# Patient Record
Sex: FEMALE | Race: WHITE | NOT HISPANIC OR LATINO | Employment: OTHER | URBAN - METROPOLITAN AREA
[De-identification: names, ages, dates, MRNs, and addresses within clinical notes are randomized per-mention and may not be internally consistent; named-entity substitution may affect disease eponyms.]

---

## 2017-06-19 RX ORDER — ATORVASTATIN CALCIUM 40 MG/1
TABLET, FILM COATED ORAL
Qty: 90 TABLET | Refills: 3 | Status: SHIPPED | OUTPATIENT
Start: 2017-06-19 | End: 2018-06-09 | Stop reason: SDUPTHER

## 2017-11-14 DIAGNOSIS — I25.709 CORONARY ARTERY DISEASE INVOLVING CORONARY BYPASS GRAFT OF NATIVE HEART WITH ANGINA PECTORIS (HCC): Primary | ICD-10-CM

## 2017-11-16 ENCOUNTER — TELEPHONE (OUTPATIENT)
Dept: CARDIOLOGY | Facility: CLINIC | Age: 82
End: 2017-11-16

## 2017-11-21 ENCOUNTER — HOSPITAL ENCOUNTER (OUTPATIENT)
Dept: CARDIOLOGY | Facility: HOSPITAL | Age: 82
Discharge: HOME | End: 2017-11-21
Attending: INTERNAL MEDICINE | Admitting: INTERNAL MEDICINE

## 2017-11-21 DIAGNOSIS — I25.709 CORONARY ARTERY DISEASE INVOLVING CORONARY BYPASS GRAFT OF NATIVE HEART WITH ANGINA PECTORIS (HCC): ICD-10-CM

## 2017-11-21 LAB
BH CV NUCLEAR PRIOR STUDY: 2
BH CV STRESS BP STAGE 1: NORMAL
BH CV STRESS DURATION MIN STAGE 1: 4
BH CV STRESS DURATION SEC STAGE 1: 30
BH CV STRESS GRADE STAGE 1: 10
BH CV STRESS HR STAGE 1: 130
BH CV STRESS METS STAGE 1: 5
BH CV STRESS PROTOCOL 1: NORMAL
BH CV STRESS RECOVERY BP: NORMAL MMHG
BH CV STRESS RECOVERY HR: 65 BPM
BH CV STRESS SPEED STAGE 1: 1.7
BH CV STRESS STAGE 1: 1
LV EF NUC BP: 63 %
MAXIMAL PREDICTED HEART RATE: 135 BPM
PERCENT MAX PREDICTED HR: 96.3 %
STRESS BASELINE BP: NORMAL MMHG
STRESS BASELINE HR: 62 BPM
STRESS PERCENT HR: 113 %
STRESS POST ESTIMATED WORKLOAD: 5 METS
STRESS POST EXERCISE DUR MIN: 4 MIN
STRESS POST EXERCISE DUR SEC: 30 SEC
STRESS POST PEAK BP: NORMAL MMHG
STRESS POST PEAK HR: 130 BPM
STRESS TARGET HR: 115 BPM

## 2017-11-21 PROCEDURE — 93017 CV STRESS TEST TRACING ONLY: CPT

## 2017-11-21 PROCEDURE — 78452 HT MUSCLE IMAGE SPECT MULT: CPT | Performed by: INTERNAL MEDICINE

## 2017-11-21 PROCEDURE — 93016 CV STRESS TEST SUPVJ ONLY: CPT | Performed by: INTERNAL MEDICINE

## 2017-11-21 PROCEDURE — 93018 CV STRESS TEST I&R ONLY: CPT | Performed by: INTERNAL MEDICINE

## 2017-11-21 PROCEDURE — 78452 HT MUSCLE IMAGE SPECT MULT: CPT

## 2017-11-21 PROCEDURE — 0 TECHNETIUM TETROFOSMIN KIT: Performed by: INTERNAL MEDICINE

## 2017-11-21 PROCEDURE — A9502 TC99M TETROFOSMIN: HCPCS | Performed by: INTERNAL MEDICINE

## 2017-11-21 RX ADMIN — TETROFOSMIN 1 DOSE: 1.38 INJECTION, POWDER, LYOPHILIZED, FOR SOLUTION INTRAVENOUS at 12:30

## 2017-11-21 RX ADMIN — TETROFOSMIN 1 DOSE: 1.38 INJECTION, POWDER, LYOPHILIZED, FOR SOLUTION INTRAVENOUS at 11:45

## 2017-11-22 NOTE — TELEPHONE ENCOUNTER
11/22/17  11:00 AM  Dahlia with Dr. Darby has not received clearance letter. She requests that it be faxed to her attention at [phone].    Georgie VILLASEÑOR RN

## 2017-12-15 ENCOUNTER — OFFICE VISIT (OUTPATIENT)
Dept: CARDIOLOGY | Facility: CLINIC | Age: 82
End: 2017-12-15

## 2017-12-15 VITALS
BODY MASS INDEX: 22.6 KG/M2 | HEIGHT: 66 IN | HEART RATE: 63 BPM | WEIGHT: 140.6 LBS | DIASTOLIC BLOOD PRESSURE: 72 MMHG | SYSTOLIC BLOOD PRESSURE: 166 MMHG

## 2017-12-15 DIAGNOSIS — Z95.1 S/P CABG (CORONARY ARTERY BYPASS GRAFT): Primary | ICD-10-CM

## 2017-12-15 DIAGNOSIS — I10 ESSENTIAL HYPERTENSION: ICD-10-CM

## 2017-12-15 PROCEDURE — 99214 OFFICE O/P EST MOD 30 MIN: CPT | Performed by: INTERNAL MEDICINE

## 2017-12-15 NOTE — PROGRESS NOTES
Date of Office Visit: 12/15/2017  Encounter Provider: José Hatch MD  Place of Service: [facility] CARDIOLOGY  Patient Name: Brodie Mo  :10/31/1932  [id number]    Chief Complaint   Patient presents with   • Coronary Artery Disease   :     HPI: Brodie Mo is a 85 y.o. female she's here for follow-up she had a bypass long time ago has been asymptomatic and doing well from a cardiac standpoint issue bad year for her her   and she is pretty upset about she had a stress test in November the ECG portion was abnormal but the nuclear pictures were normal and she didn't have any symptoms on the treadmill she's been doing well in  she had a LIMA to LAD and a vein to an OM 2 she was cathetered in  all grafts are open those had normal LV function    Past Medical History:   Diagnosis Date   • CAD (coronary artery disease)    • Cancer     bladder    • Health care maintenance    • HTN (hypertension)    • Hyperlipidemia    • LVH (left ventricular hypertrophy)    • S/P CABG (coronary artery bypass graft)        Past Surgical History:   Procedure Laterality Date   • BLADDER SURGERY      cancer   • CORONARY ARTERY BYPASS GRAFT       with Dr Prabhakar; AGUAYO to LAD and SVG to OM2;  cath all grafts open and 30% dx in RCA; nl stress in ,        Social History     Social History   • Marital status: Unknown     Spouse name: N/A   • Number of children: N/A   • Years of education: N/A     Occupational History   • Not on file.     Social History Main Topics   • Smoking status: Former Smoker   • Smokeless tobacco: Not on file      Comment: caffine use   • Alcohol use Yes   • Drug use: No   • Sexual activity: Not on file     Other Topics Concern   • Not on file     Social History Narrative       No family history on file.    Review of Systems   Constitution: Negative for decreased appetite, fever, malaise/fatigue and weight loss.   HENT: Negative for nosebleeds.   "  Eyes: Negative for double vision.   Cardiovascular: Negative for chest pain, claudication, cyanosis, dyspnea on exertion, irregular heartbeat, leg swelling, near-syncope, orthopnea, palpitations, paroxysmal nocturnal dyspnea and syncope.   Respiratory: Negative for cough, hemoptysis and shortness of breath.    Hematologic/Lymphatic: Negative for bleeding problem.   Skin: Negative for rash.   Musculoskeletal: Negative for falls and myalgias.   Gastrointestinal: Negative for hematochezia, jaundice, melena, nausea and vomiting.   Genitourinary: Negative for hematuria.   Neurological: Negative for dizziness and seizures.   Psychiatric/Behavioral: Negative for altered mental status and memory loss.       Allergies   Allergen Reactions   • No Known Drug Allergy          Current Outpatient Prescriptions:   •  aspirin 81 MG tablet, Take  by mouth Daily., Disp: , Rfl:   •  atenolol (TENORMIN) 25 MG tablet, Take  by mouth Daily., Disp: , Rfl:   •  atorvastatin (LIPITOR) 40 MG tablet, TAKE 1 TABLET DAILY., Disp: 90 tablet, Rfl: 3  •  lisinopril (PRINIVIL,ZESTRIL) 10 MG tablet, Take  by mouth Daily., Disp: , Rfl:      Objective:     Vitals:    12/15/17 1302   BP: 166/72   Pulse: 63   Weight: 63.8 kg (140 lb 9.6 oz)   Height: 166.4 cm (65.5\")     Body mass index is 23.04 kg/(m^2).    Physical Exam   Constitutional: She is oriented to person, place, and time. She appears well-developed and well-nourished.   HENT:   Head: Normocephalic.   Eyes: No scleral icterus.   Neck: No JVD present. No thyromegaly present.   Cardiovascular: Normal rate, regular rhythm and normal heart sounds.  Exam reveals no gallop and no friction rub.    No murmur heard.  Pulmonary/Chest: Effort normal and breath sounds normal. She has no wheezes. She has no rales.       Abdominal: Soft. There is no hepatosplenomegaly. There is no tenderness.   Musculoskeletal: Normal range of motion. She exhibits no edema.   Lymphadenopathy:     She has no cervical " adenopathy.   Neurological: She is alert and oriented to person, place, and time.   Skin: Skin is warm and dry. No rash noted.   Psychiatric: She has a normal mood and affect.       Procedures     Assessment:       Diagnosis Plan   1. S/P CABG (coronary artery bypass graft)     2. Essential hypertension            Plan:       She is doing well.  The ECG portion of her stress test is a little bit abnormal but the nuclear images portion is normal.  She is not having any symptoms.  She is 85.  I am not going to go looking for trouble.  If she becomes symptomatic, we could consider more medication but by definition with the pictures being normal, this is a lower risk stress test.  So I am just going to have her come back in a year or sooner if she has trouble.      Coronary Artery Disease  Assessment  • The patient has no angina    Plan  • Lifestyle modifications discussed include adhering to a heart healthy diet, maintenance of a healthy weight, medication compliance, regular exercise and regular monitoring of cholesterol and blood pressure    Subjective - Objective  • There is a history of previous coronary artery bypass graft  • Current antiplatelet therapy includes aspirin 81 mg        As always, it has been a pleasure to participate in your patient's care.      Sincerely,       José Hatch MD

## 2018-04-05 ENCOUNTER — HOSPITAL ENCOUNTER (OUTPATIENT)
Dept: GENERAL RADIOLOGY | Facility: HOSPITAL | Age: 83
Discharge: HOME | End: 2018-04-05

## 2018-04-05 ENCOUNTER — HOSPITAL ENCOUNTER (INPATIENT)
Facility: HOSPITAL | Age: 83
LOS: 4 days | Discharge: HOME | End: 2018-04-09
Attending: INTERNAL MEDICINE | Admitting: INTERNAL MEDICINE

## 2018-04-05 ENCOUNTER — APPOINTMENT (OUTPATIENT)
Dept: CT IMAGING | Facility: HOSPITAL | Age: 83
End: 2018-04-05

## 2018-04-05 DIAGNOSIS — R05.9 COUGH: ICD-10-CM

## 2018-04-05 PROBLEM — R91.8 LUNG MASS: Status: ACTIVE | Noted: 2018-04-05

## 2018-04-05 PROBLEM — J18.9 PNEUMONIA: Status: ACTIVE | Noted: 2018-04-05

## 2018-04-05 PROBLEM — I25.10 CAD (CORONARY ARTERY DISEASE): Status: ACTIVE | Noted: 2018-04-05

## 2018-04-05 PROBLEM — N17.9 AKI (ACUTE KIDNEY INJURY): Status: ACTIVE | Noted: 2018-04-05

## 2018-04-05 PROBLEM — C80.1 CANCER (HCC): Status: ACTIVE | Noted: 2018-04-05

## 2018-04-05 PROBLEM — J18.9 PNEUMONIA: Status: RESOLVED | Noted: 2018-04-05 | Resolved: 2018-04-05

## 2018-04-05 PROBLEM — R91.8 LUNG MASS: Status: RESOLVED | Noted: 2018-04-05 | Resolved: 2018-04-05

## 2018-04-05 LAB
ANION GAP SERPL CALCULATED.3IONS-SCNC: 13.6 MMOL/L
BUN BLD-MCNC: 24 MG/DL (ref 8–23)
BUN/CREAT SERPL: 18.5 (ref 7–25)
CALCIUM SPEC-SCNC: 8.7 MG/DL (ref 8.6–10.5)
CHLORIDE SERPL-SCNC: 95 MMOL/L (ref 98–107)
CO2 SERPL-SCNC: 23.4 MMOL/L (ref 22–29)
CREAT BLD-MCNC: 1.3 MG/DL (ref 0.57–1)
DEPRECATED RDW RBC AUTO: 42.9 FL (ref 37–54)
ERYTHROCYTE [DISTWIDTH] IN BLOOD BY AUTOMATED COUNT: 12.8 % (ref 11.7–13)
GFR SERPL CREATININE-BSD FRML MDRD: 39 ML/MIN/1.73
GLUCOSE BLD-MCNC: 160 MG/DL (ref 65–99)
HCT VFR BLD AUTO: 36.1 % (ref 35.6–45.5)
HGB BLD-MCNC: 11.4 G/DL (ref 11.9–15.5)
MCH RBC QN AUTO: 29.5 PG (ref 26.9–32)
MCHC RBC AUTO-ENTMCNC: 31.6 G/DL (ref 32.4–36.3)
MCV RBC AUTO: 93.5 FL (ref 80.5–98.2)
PLATELET # BLD AUTO: 154 10*3/MM3 (ref 140–500)
PMV BLD AUTO: 12 FL (ref 6–12)
POTASSIUM BLD-SCNC: 3.9 MMOL/L (ref 3.5–5.2)
RBC # BLD AUTO: 3.86 10*6/MM3 (ref 3.9–5.2)
SODIUM BLD-SCNC: 132 MMOL/L (ref 136–145)
WBC NRBC COR # BLD: 12.83 10*3/MM3 (ref 4.5–10.7)

## 2018-04-05 PROCEDURE — 71250 CT THORAX DX C-: CPT

## 2018-04-05 PROCEDURE — 25010000002 LEVOFLOXACIN PER 250 MG: Performed by: INTERNAL MEDICINE

## 2018-04-05 PROCEDURE — 87633 RESP VIRUS 12-25 TARGETS: CPT | Performed by: INTERNAL MEDICINE

## 2018-04-05 PROCEDURE — 71046 X-RAY EXAM CHEST 2 VIEWS: CPT

## 2018-04-05 PROCEDURE — 87486 CHLMYD PNEUM DNA AMP PROBE: CPT | Performed by: INTERNAL MEDICINE

## 2018-04-05 PROCEDURE — 87581 M.PNEUMON DNA AMP PROBE: CPT | Performed by: INTERNAL MEDICINE

## 2018-04-05 PROCEDURE — 87798 DETECT AGENT NOS DNA AMP: CPT | Performed by: INTERNAL MEDICINE

## 2018-04-05 PROCEDURE — 87040 BLOOD CULTURE FOR BACTERIA: CPT | Performed by: INTERNAL MEDICINE

## 2018-04-05 PROCEDURE — 80048 BASIC METABOLIC PNL TOTAL CA: CPT | Performed by: INTERNAL MEDICINE

## 2018-04-05 PROCEDURE — 85027 COMPLETE CBC AUTOMATED: CPT | Performed by: INTERNAL MEDICINE

## 2018-04-05 PROCEDURE — 25010000002 FUROSEMIDE PER 20 MG: Performed by: INTERNAL MEDICINE

## 2018-04-05 PROCEDURE — 94640 AIRWAY INHALATION TREATMENT: CPT

## 2018-04-05 RX ORDER — ASPIRIN 81 MG/1
81 TABLET, CHEWABLE ORAL DAILY
Status: DISCONTINUED | OUTPATIENT
Start: 2018-04-06 | End: 2018-04-09 | Stop reason: HOSPADM

## 2018-04-05 RX ORDER — ONDANSETRON 2 MG/ML
4 INJECTION INTRAMUSCULAR; INTRAVENOUS EVERY 6 HOURS PRN
Status: DISCONTINUED | OUTPATIENT
Start: 2018-04-05 | End: 2018-04-09 | Stop reason: HOSPADM

## 2018-04-05 RX ORDER — ZOLPIDEM TARTRATE 5 MG/1
5 TABLET ORAL NIGHTLY PRN
Status: DISCONTINUED | OUTPATIENT
Start: 2018-04-05 | End: 2018-04-09 | Stop reason: HOSPADM

## 2018-04-05 RX ORDER — SODIUM CHLORIDE 9 MG/ML
75 INJECTION, SOLUTION INTRAVENOUS CONTINUOUS
Status: DISCONTINUED | OUTPATIENT
Start: 2018-04-05 | End: 2018-04-05

## 2018-04-05 RX ORDER — LEVOFLOXACIN 5 MG/ML
750 INJECTION, SOLUTION INTRAVENOUS EVERY 24 HOURS
Status: DISCONTINUED | OUTPATIENT
Start: 2018-04-05 | End: 2018-04-06

## 2018-04-05 RX ORDER — SODIUM CHLORIDE 0.9 % (FLUSH) 0.9 %
1-10 SYRINGE (ML) INJECTION AS NEEDED
Status: DISCONTINUED | OUTPATIENT
Start: 2018-04-05 | End: 2018-04-09 | Stop reason: HOSPADM

## 2018-04-05 RX ORDER — LISINOPRIL 5 MG/1
5 TABLET ORAL DAILY
Status: DISCONTINUED | OUTPATIENT
Start: 2018-04-06 | End: 2018-04-06

## 2018-04-05 RX ORDER — FUROSEMIDE 10 MG/ML
40 INJECTION INTRAMUSCULAR; INTRAVENOUS DAILY
Status: DISCONTINUED | OUTPATIENT
Start: 2018-04-05 | End: 2018-04-07

## 2018-04-05 RX ORDER — IPRATROPIUM BROMIDE AND ALBUTEROL SULFATE 2.5; .5 MG/3ML; MG/3ML
3 SOLUTION RESPIRATORY (INHALATION)
Status: DISCONTINUED | OUTPATIENT
Start: 2018-04-05 | End: 2018-04-09 | Stop reason: HOSPADM

## 2018-04-05 RX ORDER — ATORVASTATIN CALCIUM 20 MG/1
40 TABLET, FILM COATED ORAL DAILY
Status: DISCONTINUED | OUTPATIENT
Start: 2018-04-06 | End: 2018-04-09 | Stop reason: HOSPADM

## 2018-04-05 RX ORDER — ATENOLOL 25 MG/1
25 TABLET ORAL
Status: DISCONTINUED | OUTPATIENT
Start: 2018-04-06 | End: 2018-04-09 | Stop reason: HOSPADM

## 2018-04-05 RX ORDER — ACETAMINOPHEN 325 MG/1
650 TABLET ORAL EVERY 6 HOURS PRN
Status: DISCONTINUED | OUTPATIENT
Start: 2018-04-05 | End: 2018-04-09 | Stop reason: HOSPADM

## 2018-04-05 RX ADMIN — ACETAMINOPHEN 650 MG: 325 TABLET ORAL at 19:10

## 2018-04-05 RX ADMIN — IPRATROPIUM BROMIDE AND ALBUTEROL SULFATE 3 ML: .5; 3 SOLUTION RESPIRATORY (INHALATION) at 23:50

## 2018-04-05 RX ADMIN — SODIUM CHLORIDE 75 ML/HR: 9 INJECTION, SOLUTION INTRAVENOUS at 20:14

## 2018-04-05 RX ADMIN — FUROSEMIDE 40 MG: 10 INJECTION, SOLUTION INTRAMUSCULAR; INTRAVENOUS at 23:47

## 2018-04-05 RX ADMIN — LEVOFLOXACIN 750 MG: 5 INJECTION, SOLUTION INTRAVENOUS at 20:14

## 2018-04-05 NOTE — PLAN OF CARE
Problem: Patient Care Overview  Goal: Plan of Care Review  Outcome: Ongoing (interventions implemented as appropriate)   04/05/18 3631   Coping/Psychosocial   Plan of Care Reviewed With patient;family   Plan of Care Review   Progress improving   OTHER   Outcome Summary Vitals stable. pt direct admit from PCP. Admission complete. Waiting MD orders. pt stated she wishes to be a conditional code (no CPR or intubation) . Will continue to monitor.        Problem: Pneumonia (Adult)  Goal: Signs and Symptoms of Listed Potential Problems Will be Absent, Minimized or Managed (Pneumonia)  Outcome: Ongoing (interventions implemented as appropriate)

## 2018-04-06 PROBLEM — N18.30 CKD (CHRONIC KIDNEY DISEASE) STAGE 3, GFR 30-59 ML/MIN: Status: ACTIVE | Noted: 2018-04-06

## 2018-04-06 LAB
ALBUMIN SERPL-MCNC: 3.5 G/DL (ref 3.5–5.2)
ALBUMIN/GLOB SERPL: 1.1 G/DL
ALP SERPL-CCNC: 69 U/L (ref 39–117)
ALT SERPL W P-5'-P-CCNC: 25 U/L (ref 1–33)
ANION GAP SERPL CALCULATED.3IONS-SCNC: 12.9 MMOL/L
AST SERPL-CCNC: 37 U/L (ref 1–32)
B PERT DNA SPEC QL NAA+PROBE: NOT DETECTED
BASOPHILS # BLD AUTO: 0.02 10*3/MM3 (ref 0–0.2)
BASOPHILS NFR BLD AUTO: 0.2 % (ref 0–1.5)
BILIRUB SERPL-MCNC: 0.7 MG/DL (ref 0.1–1.2)
BUN BLD-MCNC: 22 MG/DL (ref 8–23)
BUN/CREAT SERPL: 16.3 (ref 7–25)
C PNEUM DNA NPH QL NAA+NON-PROBE: NOT DETECTED
CALCIUM SPEC-SCNC: 9 MG/DL (ref 8.6–10.5)
CHLORIDE SERPL-SCNC: 95 MMOL/L (ref 98–107)
CHOLEST SERPL-MCNC: 141 MG/DL (ref 0–200)
CO2 SERPL-SCNC: 28.1 MMOL/L (ref 22–29)
CREAT BLD-MCNC: 1.35 MG/DL (ref 0.57–1)
DEPRECATED RDW RBC AUTO: 43.2 FL (ref 37–54)
EOSINOPHIL # BLD AUTO: 0.1 10*3/MM3 (ref 0–0.7)
EOSINOPHIL NFR BLD AUTO: 0.9 % (ref 0.3–6.2)
ERYTHROCYTE [DISTWIDTH] IN BLOOD BY AUTOMATED COUNT: 12.5 % (ref 11.7–13)
FLUAV H1 2009 PAND RNA NPH QL NAA+PROBE: NOT DETECTED
FLUAV H1 HA GENE NPH QL NAA+PROBE: NOT DETECTED
FLUAV H3 RNA NPH QL NAA+PROBE: NOT DETECTED
FLUAV SUBTYP SPEC NAA+PROBE: NOT DETECTED
FLUBV RNA ISLT QL NAA+PROBE: NOT DETECTED
GFR SERPL CREATININE-BSD FRML MDRD: 37 ML/MIN/1.73
GLOBULIN UR ELPH-MCNC: 3.2 GM/DL
GLUCOSE BLD-MCNC: 126 MG/DL (ref 65–99)
HADV DNA SPEC NAA+PROBE: NOT DETECTED
HBA1C MFR BLD: 6.1 % (ref 4.8–5.6)
HCOV 229E RNA SPEC QL NAA+PROBE: NOT DETECTED
HCOV HKU1 RNA SPEC QL NAA+PROBE: NOT DETECTED
HCOV NL63 RNA SPEC QL NAA+PROBE: NOT DETECTED
HCOV OC43 RNA SPEC QL NAA+PROBE: NOT DETECTED
HCT VFR BLD AUTO: 38.3 % (ref 35.6–45.5)
HDLC SERPL-MCNC: 76 MG/DL (ref 40–60)
HGB BLD-MCNC: 12.1 G/DL (ref 11.9–15.5)
HMPV RNA NPH QL NAA+NON-PROBE: NOT DETECTED
HPIV1 RNA SPEC QL NAA+PROBE: NOT DETECTED
HPIV2 RNA SPEC QL NAA+PROBE: NOT DETECTED
HPIV3 RNA NPH QL NAA+PROBE: NOT DETECTED
HPIV4 P GENE NPH QL NAA+PROBE: NOT DETECTED
IMM GRANULOCYTES # BLD: 0.02 10*3/MM3 (ref 0–0.03)
IMM GRANULOCYTES NFR BLD: 0.2 % (ref 0–0.5)
LDLC SERPL CALC-MCNC: 52 MG/DL (ref 0–100)
LDLC/HDLC SERPL: 0.68 {RATIO}
LYMPHOCYTES # BLD AUTO: 0.74 10*3/MM3 (ref 0.9–4.8)
LYMPHOCYTES NFR BLD AUTO: 6.8 % (ref 19.6–45.3)
M PNEUMO IGG SER IA-ACNC: NOT DETECTED
MCH RBC QN AUTO: 30 PG (ref 26.9–32)
MCHC RBC AUTO-ENTMCNC: 31.6 G/DL (ref 32.4–36.3)
MCV RBC AUTO: 94.8 FL (ref 80.5–98.2)
MONOCYTES # BLD AUTO: 1.53 10*3/MM3 (ref 0.2–1.2)
MONOCYTES NFR BLD AUTO: 14 % (ref 5–12)
NEUTROPHILS # BLD AUTO: 8.51 10*3/MM3 (ref 1.9–8.1)
NEUTROPHILS NFR BLD AUTO: 77.9 % (ref 42.7–76)
NT-PROBNP SERPL-MCNC: 2582 PG/ML (ref 0–1800)
PLATELET # BLD AUTO: 156 10*3/MM3 (ref 140–500)
PMV BLD AUTO: 11.5 FL (ref 6–12)
POTASSIUM BLD-SCNC: 4.3 MMOL/L (ref 3.5–5.2)
PROCALCITONIN SERPL-MCNC: 3.58 NG/ML (ref 0.1–0.25)
PROT SERPL-MCNC: 6.7 G/DL (ref 6–8.5)
RBC # BLD AUTO: 4.04 10*6/MM3 (ref 3.9–5.2)
RHINOVIRUS RNA SPEC NAA+PROBE: NOT DETECTED
RSV RNA NPH QL NAA+NON-PROBE: NOT DETECTED
SODIUM BLD-SCNC: 136 MMOL/L (ref 136–145)
TRIGL SERPL-MCNC: 66 MG/DL (ref 0–150)
TROPONIN T SERPL-MCNC: <0.01 NG/ML (ref 0–0.03)
TSH SERPL DL<=0.05 MIU/L-ACNC: 6.25 MIU/ML (ref 0.27–4.2)
VLDLC SERPL-MCNC: 13.2 MG/DL (ref 5–40)
WBC NRBC COR # BLD: 10.92 10*3/MM3 (ref 4.5–10.7)

## 2018-04-06 PROCEDURE — 83880 ASSAY OF NATRIURETIC PEPTIDE: CPT | Performed by: INTERNAL MEDICINE

## 2018-04-06 PROCEDURE — 83036 HEMOGLOBIN GLYCOSYLATED A1C: CPT | Performed by: INTERNAL MEDICINE

## 2018-04-06 PROCEDURE — 84484 ASSAY OF TROPONIN QUANT: CPT | Performed by: INTERNAL MEDICINE

## 2018-04-06 PROCEDURE — 84443 ASSAY THYROID STIM HORMONE: CPT | Performed by: INTERNAL MEDICINE

## 2018-04-06 PROCEDURE — 25010000002 LEVOFLOXACIN PER 250 MG: Performed by: INTERNAL MEDICINE

## 2018-04-06 PROCEDURE — 84145 PROCALCITONIN (PCT): CPT | Performed by: INTERNAL MEDICINE

## 2018-04-06 PROCEDURE — 80061 LIPID PANEL: CPT | Performed by: INTERNAL MEDICINE

## 2018-04-06 PROCEDURE — 80053 COMPREHEN METABOLIC PANEL: CPT | Performed by: INTERNAL MEDICINE

## 2018-04-06 PROCEDURE — 85025 COMPLETE CBC W/AUTO DIFF WBC: CPT | Performed by: INTERNAL MEDICINE

## 2018-04-06 PROCEDURE — 94799 UNLISTED PULMONARY SVC/PX: CPT

## 2018-04-06 RX ORDER — LEVOFLOXACIN 5 MG/ML
500 INJECTION, SOLUTION INTRAVENOUS EVERY 24 HOURS
Status: DISCONTINUED | OUTPATIENT
Start: 2018-04-06 | End: 2018-04-08

## 2018-04-06 RX ORDER — BENZONATATE 100 MG/1
200 CAPSULE ORAL 3 TIMES DAILY
Status: DISCONTINUED | OUTPATIENT
Start: 2018-04-06 | End: 2018-04-09 | Stop reason: HOSPADM

## 2018-04-06 RX ORDER — CODEINE PHOSPHATE AND GUAIFENESIN 10; 100 MG/5ML; MG/5ML
10 SOLUTION ORAL EVERY 6 HOURS PRN
Status: DISCONTINUED | OUTPATIENT
Start: 2018-04-06 | End: 2018-04-09 | Stop reason: HOSPADM

## 2018-04-06 RX ADMIN — IPRATROPIUM BROMIDE AND ALBUTEROL SULFATE 3 ML: .5; 3 SOLUTION RESPIRATORY (INHALATION) at 20:21

## 2018-04-06 RX ADMIN — GUAIFENESIN AND CODEINE PHOSPHATE 10 ML: 100; 10 SOLUTION ORAL at 20:38

## 2018-04-06 RX ADMIN — BENZONATATE 200 MG: 100 CAPSULE ORAL at 20:32

## 2018-04-06 RX ADMIN — LEVOFLOXACIN 500 MG: 5 INJECTION, SOLUTION INTRAVENOUS at 20:32

## 2018-04-06 RX ADMIN — LISINOPRIL 5 MG: 5 TABLET ORAL at 08:51

## 2018-04-06 RX ADMIN — IPRATROPIUM BROMIDE AND ALBUTEROL SULFATE 3 ML: .5; 3 SOLUTION RESPIRATORY (INHALATION) at 10:42

## 2018-04-06 RX ADMIN — IPRATROPIUM BROMIDE AND ALBUTEROL SULFATE 3 ML: .5; 3 SOLUTION RESPIRATORY (INHALATION) at 14:31

## 2018-04-06 RX ADMIN — HYDROCODONE POLISTIREX AND CHLORPHENIRAMINE POLISTIREX 5 ML: 10; 8 SUSPENSION, EXTENDED RELEASE ORAL at 16:32

## 2018-04-06 RX ADMIN — IPRATROPIUM BROMIDE AND ALBUTEROL SULFATE 3 ML: .5; 3 SOLUTION RESPIRATORY (INHALATION) at 07:40

## 2018-04-06 RX ADMIN — ATORVASTATIN CALCIUM 40 MG: 20 TABLET, FILM COATED ORAL at 08:51

## 2018-04-06 RX ADMIN — ASPIRIN 81 MG: 81 TABLET, CHEWABLE ORAL at 08:51

## 2018-04-06 RX ADMIN — BENZONATATE 200 MG: 100 CAPSULE ORAL at 17:48

## 2018-04-06 RX ADMIN — ATENOLOL 25 MG: 25 TABLET ORAL at 08:51

## 2018-04-06 NOTE — CONSULTS
Referring Provider: Dr. Pompa  Reason for Consultation: Lung mass ? pneumonia    Patient Care Team:  Georgie Diane MD as PCP - General (Internal Medicine)    Chief complaint:   Cough and shortness of breath    History of present illness:    This is an 85-year-old female patient, former minimal smoker (3 cigarettes daily for about 15 years), [place] but no birds.  She does not have prior history of lung disease.  She presented with shortness of breath which started about 4 days ago associated with generalized fatigue then cough which started about 2 days ago, predominantly dry and occasionally productive.  Dyspnea is worse with exertion and usually relieved by rest.  There is no recent flulike symptoms or sick contact.  However, she reported that she was exposed to fumes/smoke related to a defect in her furnace at home which she usually run on Kerosene.  This occurred about a week ago.  She described that her house was filled with black dust.    Labs:  JlfTLA=3670; RVP -ve; WBC=10    Review of Systems  Constitutional: She felt like she had a fever but she denies chills.  ENMT: No sinus congestion  Cardiovascular: No chest pain, palpitation or legs swelling.    Respiratory: See above  Gastrointestinal: No constipation, diarrhea or abdominal pain   Neurology: No headache, weakness, numbness or dizziness.   Musculoskeletal: No joints pain, stiffness or swelling.   Psychiatry: No depression.  Lymphatic: No swollen glands.  Integumentary: No rash.    History  Past Medical History:   Diagnosis Date   • CAD (coronary artery disease)    • Cancer     bladder    • Health care maintenance    • HTN (hypertension)    • Hyperlipidemia    • LVH (left ventricular hypertrophy)    • S/P CABG (coronary artery bypass graft)    ,   Past Surgical History:   Procedure Laterality Date   • BLADDER SURGERY      cancer   • CORONARY ARTERY BYPASS GRAFT      2002 with Dr Prabhakar; OLIVIER schwartz  LAD and SVG to OM2; 2005 cath all grafts open and 30% dx in RCA; nl stress in 2010, 2013   , No family history on file.,   Social History   Substance Use Topics   • Smoking status: Former Smoker   • Smokeless tobacco: Not on file      Comment: caffine use   • Alcohol use Yes   ,   Prescriptions Prior to Admission   Medication Sig Dispense Refill Last Dose   • aspirin 81 MG tablet Take  by mouth Daily.   4/4/2018 at 2000   • atenolol (TENORMIN) 25 MG tablet Take  by mouth Daily.   4/5/2018 at 0830   • atorvastatin (LIPITOR) 40 MG tablet TAKE 1 TABLET DAILY. (Patient taking differently: TAKE 1 TABLET DAILY at bedtime.) 90 tablet 3 4/4/2018 at 2000   • lisinopril (PRINIVIL,ZESTRIL) 10 MG tablet Take  by mouth Daily.   4/5/2018 at 0830   , Scheduled Meds:    aspirin 81 mg Oral Daily   atenolol 25 mg Oral Q24H   atorvastatin 40 mg Oral Daily   furosemide 40 mg Intravenous Daily   ipratropium-albuterol 3 mL Nebulization 4x Daily - RT   levoFLOXacin 750 mg Intravenous Q24H   lisinopril 5 mg Oral Daily    and Allergies:  No known drug allergy    Objective     Vital Signs   Temp:  [98.7 °F (37.1 °C)-99.3 °F (37.4 °C)] 99.3 °F (37.4 °C)  Heart Rate:  [71-95] 95  Resp:  [16-18] 16  BP: (140-156)/(54-70) 141/62    Physical Exam:  Constitutional: Not in acute distress.  Eyes: Injected conjunctiva, EOMI.  ENMT: Singh 3. No oral thrush.   Heart: RRR, no murmur  Lungs: Crackles in the right lower lobe.  Exam was limited due to her persistent cough.  There was mild wheezing during forced expiration and cough.       Abdomen: Soft. No tenderness or dullness.  Extremities: No cyanosis, clubbing or pitting edema: . Moves all extremities.  Neuro: Conscious, alert, oriented x3  Psych: Appropriate mood and affect.    Integumentary: No rash  Lymphatic: No palpable cervical or supraclavicular lymph nodes.            Diagnostic imaging:  I personally and independently reviewed the following images:     RLL scarring, chronic, present since  2015 on CT.   Consolidation + Nodular infiltrates RUL.  Partially calcified RLL nodule                  Assessment:  1. Right upper lobe pneumonia.  Doubt malignancy  2. Right lower lobe pulmonary nodule, at least partially calcified and located anteriorly.  3. Cough, Intractable, secondary to #1  4. RLL scarring, chronic, present since 2015 on CT.  5. 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  This is most likely consistent with community-acquired pneumonia.  Agree with current antibiotic treatment.  I discussed with the patient and her family.    Agree with current nebulizers.  She has mild bronchospasm as well.  No signs of emphysema on CT and she does not have an underlying diagnosis of COPD.  She was minimal smoker in the past    Would recommend follow-up CT of the chest in about 4-6 weeks after discontinuation of antibiotic treatment to evaluate for resolution of her pulmonary infiltrates.  She does also have a right lower lobe pulmonary nodule which needs to be followed medically in over 2 years due to her increased risk of malignancy.    Concerning the cough, it appears to be significant.  She could not stop coughing up on my interview and she reported waking up in the middle of the night with cough.  Therefore, I will start her on codeine.        I discussed the patients findings and my recommendations with patient and family    Eric Mi MD  04/06/18  9:23 AM

## 2018-04-06 NOTE — PLAN OF CARE
Problem: Patient Care Overview  Goal: Plan of Care Review  Outcome: Ongoing (interventions implemented as appropriate)   04/06/18 0452   Coping/Psychosocial   Plan of Care Reviewed With patient   Plan of Care Review   Progress no change   OTHER   Outcome Summary VSS. On 2L NC. C/o pain when coughing. CT chest complete. Resp. panel collected. SCD's placed. Started IV lasix. Blood cultures collected and IV abx started. Pulmonary consult placed. Will continue to monitor.     Goal: Individualization and Mutuality  Outcome: Ongoing (interventions implemented as appropriate)    Goal: Discharge Needs Assessment  Outcome: Ongoing (interventions implemented as appropriate)    Goal: Interprofessional Rounds/Family Conf  Outcome: Ongoing (interventions implemented as appropriate)      Problem: Pneumonia (Adult)  Goal: Signs and Symptoms of Listed Potential Problems Will be Absent, Minimized or Managed (Pneumonia)  Outcome: Ongoing (interventions implemented as appropriate)

## 2018-04-06 NOTE — H&P
Internal medicine history and physical    INTERNAL MEDICINE   [facility]       Patient Identification:  Name: Brodie Mo  Age: 85 y.o.  Sex: female  :  10/31/1932  MRN: [id number]                   Primary Care Physician: Georgie Diane MD                                   Chief Complaint:  Progressive cough shortness of breath since Monday morning fever and chills last night.  Decrease appetite for the last couple days.    History of Present Illness:   Discussion is a 85-year-old female with complicated past medical history as noted below was in her usual state of her health until Monday morning when she woke up feeling under the weather and was having shortness of breath and cough.  She continued to struggle throughout the week and last night had a fever as contributing very much concerned.  She went to see her primary care physician Dr. Diane and from there referred for a chest x-ray resulting in the finding of possible right-sided lung infiltrate with possible exacerbation of congestive heart failure and the possibility of lung mass in the right lung resulting in this hospitalization.  Chest x-rays were called to Dr. Diane who I asked [facility] to admit the patient for further care.  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  Past Medical History:  Past Medical History:   Diagnosis Date   • CAD (coronary artery disease)    • Cancer     bladder    • Health care maintenance    • HTN (hypertension)    • Hyperlipidemia    • LVH (left ventricular hypertrophy)    • S/P CABG (coronary artery bypass graft)      Past Surgical History:  Past Surgical History:   Procedure Laterality Date   • BLADDER SURGERY      cancer   • CORONARY ARTERY BYPASS GRAFT       with Dr Prabhakar; OLIVIER to LAD and SVG to OM2;  cath all grafts open and 30% dx in RCA; nl stress in ,       Home Meds:  Prescriptions Prior to Admission   Medication Sig Dispense  Refill Last Dose   • aspirin 81 MG tablet Take  by mouth Daily.   4/4/2018 at 2000   • atenolol (TENORMIN) 25 MG tablet Take  by mouth Daily.   4/5/2018 at 0830   • atorvastatin (LIPITOR) 40 MG tablet TAKE 1 TABLET DAILY. (Patient taking differently: TAKE 1 TABLET DAILY at bedtime.) 90 tablet 3 4/4/2018 at 2000   • lisinopril (PRINIVIL,ZESTRIL) 10 MG tablet Take  by mouth Daily.   4/5/2018 at 0830     Current Meds:     Current Facility-Administered Medications:   •  acetaminophen (TYLENOL) tablet 650 mg, 650 mg, Oral, Q6H PRN, Orly Lowery MD, 650 mg at 04/05/18 1910  •  levoFLOXacin (LEVAQUIN) 750 mg/150 mL D5W (premix) (LEVAQUIN) 750 mg, 750 mg, Intravenous, Q24H, Orly Lowery MD, 750 mg at 04/05/18 2014  •  pneumococcal polysaccharide 23-valent (PNEUMOVAX-23) vaccine 0.5 mL, 0.5 mL, Intramuscular, During Hospitalization, Orly Lowery MD  •  sodium chloride 0.9 % infusion, 75 mL/hr, Intravenous, Continuous, Orly Lowery MD, Last Rate: 75 mL/hr at 04/05/18 2014, 75 mL/hr at 04/05/18 2014  Allergies:  Allergies   Allergen Reactions   • No Known Drug Allergy      Social History:   Social History   Substance Use Topics   • Smoking status: Former Smoker   • Smokeless tobacco: Not on file      Comment: caffine use   • Alcohol use Yes      Family History:  No family history on file.       Review of Systems  See history of present illness and past medical history.    Constitutional: Remarkable for fevers chills and weakness since last night progressive weakness for the last 4 days.  Cardio vascular: Remarkable for dyspnea on exertion and orthopnea noted swelling of her legs.  Respiratory: Remarkable for dry cough congestion fever chills and obvious pleuritic chest discomfort  : Remarkable for no burning in urination frequency urgency  Musculoskeletal: Remarkable for chronic arthritic discomfort but no new joint aches and pain  Neurological: Remarkable for no loss of consciousness or incontinence  GI: Remarkable for  "preserved appetite but afraid to eat because of increasing cough.      Vitals:   /56 (BP Location: Right arm, Patient Position: Lying)   Pulse 71   Temp 98.7 °F (37.1 °C) (Oral)   Resp 18   Ht 165.1 cm (65\")   Wt 64.8 kg (142 lb 14.4 oz)   SpO2 91%   BMI 23.78 kg/m²   I/O:   Intake/Output Summary (Last 24 hours) at 04/05/18 2235  Last data filed at 04/05/18 2156   Gross per 24 hour   Intake                0 ml   Output              300 ml   Net             -300 ml     Exam:  General Appearance:    Alert, cooperative, no distress, appears stated age, Cannot complete the sentence because of the episodes of incessant cough.     Head:    Normocephalic, without obvious abnormality, atraumatic   Eyes:    PERRL, conjunctiva/corneas clear, EOM's intact, both eyes   Ears:    Normal external ear canals, both ears   Nose:   Nares normal, septum midline, mucosa normal, no drainage    or sinus tenderness   Throat:   Lips, tongue, gums normal; oral mucosa pink and moist   Neck:   Supple, symmetrical, trachea midline, no adenopathy;     thyroid:  no enlargement/tenderness/nodules; no carotid    bruit or JVD   Back:     Symmetric, no curvature, ROM normal, no CVA tenderness   Lungs:     rhonchi anteriorly decreased breath sounds the basis    Chest Wall:    No tenderness or deformity    Heart:    Regular rate and rhythm, S1 and S2 normal, no murmur, rub   or gallop   Abdomen:     Soft, non-tender, bowel sounds active all four quadrants,     no masses, no hepatomegaly, no splenomegaly   Extremities:   Extremities normal, atraumatic, no cyanosis or edema   Pulses:   Pulses palpable in all extremities; symmetric all extremities   Skin:   Skin color normal, Skin is warm and dry,  no rashes or palpable lesions   Neurologic:   CNII-XII intact, motor strength grossly intact, sensation grossly intact to light touch, no focal deficits noted       Data Review:      I reviewed the patient's new clinical results.    Results from " last 7 days  Lab Units 04/05/18 1949   WBC 10*3/mm3 12.83*   HEMOGLOBIN g/dL 11.4*   PLATELETS 10*3/mm3 154       Results from last 7 days  Lab Units 04/05/18 1949   SODIUM mmol/L 132*   POTASSIUM mmol/L 3.9   CHLORIDE mmol/L 95*   CO2 mmol/L 23.4   BUN mg/dL 24*   CREATININE mg/dL 1.30*   CALCIUM mg/dL 8.7   GLUCOSE mg/dL 160*   TWO-VIEW CHEST     HISTORY: Bladder cancer. Cough and fever.     FINDINGS:  There is cardiomegaly with moderate vascular congestion  suspicious for changes of mild congestive heart failure. In addition,  there is patchy density in the right upper lobe and a portion of this  has a nodular configuration measuring up to 2.5 cm. The findings are  suspicious for pneumonia and possible underlying metastatic disease and  further evaluation with CT scan may be helpful.    Assessment:  Active Hospital Problems (** Indicates Principal Problem)    Diagnosis Date Noted   • **Pneumonia [J18.9] 04/05/2018   • Cancer [C80.1] 04/05/2018     bladder      • Lung mass [R91.8] 04/05/2018   • CAD (coronary artery disease) [I25.10] 04/05/2018   • Essential hypertension [I10] 12/15/2017   • S/P CABG (coronary artery bypass graft) [Z95.1] 12/09/2016      Resolved Hospital Problems    Diagnosis Date Noted Date Resolved   No resolved problems to display.       Plan:  Cough shortness of breath and bilateral vascular congestion with right upper lung infiltrate and nodular lesion in the right upper lung.  This is consistent with:   - community acquired pneumonia with probable  - Levaquin after blood cultures   - exacerbation of diastolic congestive heart failure - IV diuretics check BNP, monitor renal function  Lung mass rule out underlying malignancy - pulmonary consultation check respiratory viral panel  Coronary artery disease status post coronary artery bypass grafting - new current regimen  Hypertension - continue her home medications  Hyperglycemia - hemoglobin A1c  Acute on chronic renal insufficiency - avoid  nephrotoxic medications    Orly Lowery MD   4/5/2018  10:35 PM  Much of this encounter note is an electronic transcription/translation of spoken language to printed text. The electronic translation of spoken language may permit erroneous, or at times, nonsensical words or phrases to be inadvertently transcribed; Although I have reviewed the note for such errors, some may still exist

## 2018-04-06 NOTE — PLAN OF CARE
Problem: Patient Care Overview  Goal: Plan of Care Review  Outcome: Ongoing (interventions implemented as appropriate)   04/06/18 2703   Coping/Psychosocial   Plan of Care Reviewed With patient   Plan of Care Review   Progress no change   OTHER   Outcome Summary echo ordered, tessalon and cough syrup ordered for cough, no acute changes, vss, will continue to monitor      Goal: Individualization and Mutuality  Outcome: Ongoing (interventions implemented as appropriate)    Goal: Discharge Needs Assessment  Outcome: Ongoing (interventions implemented as appropriate)    Goal: Interprofessional Rounds/Family Conf  Outcome: Ongoing (interventions implemented as appropriate)      Problem: Pneumonia (Adult)  Goal: Signs and Symptoms of Listed Potential Problems Will be Absent, Minimized or Managed (Pneumonia)  Outcome: Ongoing (interventions implemented as appropriate)

## 2018-04-06 NOTE — PROGRESS NOTES
"     LOS: 1 day   Primary Care Physician: Georgie Diane MD     Subjective   Feels about the same.  Dry cough.  No orthopnea but when I recline the head of her bed, she does cough more.    Vital Signs  Body mass index is 23.78 kg/m².  Temp:  [98.6 °F (37 °C)-99.3 °F (37.4 °C)] 98.6 °F (37 °C)  Heart Rate:  [71-95] 73  Resp:  [16-18] 18  BP: (123-156)/(54-70) 123/67      Objective:  General Appearance:  In no acute distress (Looks younger than age).    Vital signs: (most recent): Blood pressure 123/67, pulse 73, temperature 98.6 °F (37 °C), temperature source Oral, resp. rate 18, height 165.1 cm (65\"), weight 64.8 kg (142 lb 14.4 oz), SpO2 90 %.    HEENT: (Positive JVD.  No lymphadenopathy.  Trachea midline.  Neck supple)    Lungs:  She is not in respiratory distress.  No stridor.  There are rales and decreased breath sounds.  No wheezes or rhonchi.  (A few scattered light crackles)  Heart: Normal rate.  Regular rhythm.  Positive for murmur.    Abdomen: Abdomen is soft and non-distended.  Bowel sounds are normal.   There is no abdominal tenderness.   There is no splenomegaly. There is no hepatomegaly.   Extremities: There is no dependent edema.    Neurological: Patient is alert.    Skin:  Warm and dry.          Results Review:    I reviewed the patient's new clinical results.      Results from last 7 days  Lab Units 04/06/18 0533 04/05/18 1949   WBC 10*3/mm3 10.92* 12.83*   HEMOGLOBIN g/dL 12.1 11.4*   PLATELETS 10*3/mm3 156 154       Results from last 7 days  Lab Units 04/06/18 0533 04/05/18 1949   SODIUM mmol/L 136 132*   POTASSIUM mmol/L 4.3 3.9   CHLORIDE mmol/L 95* 95*   CO2 mmol/L 28.1 23.4   BUN mg/dL 22 24*   CREATININE mg/dL 1.35* 1.30*   CALCIUM mg/dL 9.0 8.7   GLUCOSE mg/dL 126* 160*         Hemoglobin A1C:  Lab Results   Component Value Date    HGBA1C 6.10 (H) 04/06/2018       Glucose Range:No results found for: POCGLU    No results found for: [id number]    Lab Results   Component Value Date    " TSH 6.250 (H) 04/06/2018       Assessment & Plan      Medication Review: Yes    Active Hospital Problems (** Indicates Principal Problem)    Diagnosis Date Noted   • **Pneumonia [J18.9] 04/05/2018   • CKD (chronic kidney disease) stage 3, GFR 30-59 ml/min [N18.3] 04/06/2018   • Cancer [C80.1] 04/05/2018   • CAD (coronary artery disease) [I25.10] 04/05/2018   • SUKI (acute kidney injury) [N17.9] 04/05/2018   • Essential hypertension [I10] 12/15/2017   • S/P CABG (coronary artery bypass graft) [Z95.1] 12/09/2016      Resolved Hospital Problems    Diagnosis Date Noted Date Resolved   No resolved problems to display.       Assessment/Plan  1.  Cough with shortness of breath.  Pneumonia by CT.  Old scar, not tumor by CT.  Add Tessalon Perles and Tussionex.  Continue IV antibiotic.  Echocardiogram ordered for component of congestive heart failure.  Some of her cough could be from that.  Leukocytosis improved  2.  Chronic kidney disease stage III.  Creatinine was 1.2 in 2005.  We don't have any more recent than that.  Will stop lisinopril for now.  Blood pressures are okay for now.  3.  Coronary artery disease.  Stress test read as low risk November 2017.  4.  Hyperglycemia without diabetes    Glo Brunner MD  04/06/18  3:56 PM

## 2018-04-06 NOTE — PROGRESS NOTES
Discharge Planning Assessment  [facility]     Patient Name: Brodie Mo  MRN: [id number]  Today's Date: 4/6/2018    Admit Date: 4/5/2018          Discharge Needs Assessment     Row Name 04/06/18 1650       Living Environment    Lives With alone    Current Living Arrangements home/apartment/condo    Primary Care Provided by self    Able to Return to Prior Arrangements yes       Resource/Environmental Concerns    Transportation Concerns car, none       Transition Planning    Patient/Family Anticipates Transition to home    Transportation Anticipated family or friend will provide       Discharge Needs Assessment    Readmission Within the Last 30 Days no previous admission in last 30 days    Concerns to be Addressed no discharge needs identified    Equipment Currently Used at Home none    Equipment Needed After Discharge none            Discharge Plan     Row Name 04/06/18 1657       Plan    Plan Home denies any discharge needs.    Plan Comments Spoke with patient at bedside, face sheet verified. Patient lives alone in a 2 story house and does not use any medical equipment. Patient has used Home Health in the past she can not recall which agency she used. Patient plans to return home denies any discharge needs. Carrie Epstein RN        Destination     No service coordination in this encounter.      Durable Medical Equipment     No service coordination in this encounter.      Dialysis/Infusion     No service coordination in this encounter.      Home Medical Care     No service coordination in this encounter.      Social Care     No service coordination in this encounter.                Demographic Summary     Row Name 04/06/18 1657       General Information    Admission Type inpatient    Referral Source admission list    Reason for Consult discharge planning    Preferred Language English            Functional Status     Row Name 04/06/18 1651       Functional Status    Usual Activity Tolerance good    Current  Activity Tolerance good       Functional Status, IADL    Medications independent    Meal Preparation independent    Housekeeping independent    Laundry independent    Shopping independent            Psychosocial    No documentation.           Abuse/Neglect    No documentation.           Legal    No documentation.           Substance Abuse    No documentation.           Patient Forms    No documentation.         Carrie Epstein RN

## 2018-04-07 ENCOUNTER — APPOINTMENT (OUTPATIENT)
Dept: CARDIOLOGY | Facility: HOSPITAL | Age: 83
End: 2018-04-07
Attending: INTERNAL MEDICINE

## 2018-04-07 PROBLEM — N17.9 AKI (ACUTE KIDNEY INJURY): Status: RESOLVED | Noted: 2018-04-05 | Resolved: 2018-04-07

## 2018-04-07 LAB
ANION GAP SERPL CALCULATED.3IONS-SCNC: 12.6 MMOL/L
BUN BLD-MCNC: 25 MG/DL (ref 8–23)
BUN/CREAT SERPL: 19.8 (ref 7–25)
CALCIUM SPEC-SCNC: 8.6 MG/DL (ref 8.6–10.5)
CHLORIDE SERPL-SCNC: 92 MMOL/L (ref 98–107)
CO2 SERPL-SCNC: 26.4 MMOL/L (ref 22–29)
CREAT BLD-MCNC: 1.26 MG/DL (ref 0.57–1)
DEPRECATED RDW RBC AUTO: 43 FL (ref 37–54)
ERYTHROCYTE [DISTWIDTH] IN BLOOD BY AUTOMATED COUNT: 12.4 % (ref 11.7–13)
GFR SERPL CREATININE-BSD FRML MDRD: 40 ML/MIN/1.73
GLUCOSE BLD-MCNC: 101 MG/DL (ref 65–99)
HCT VFR BLD AUTO: 37.1 % (ref 35.6–45.5)
HGB BLD-MCNC: 11.7 G/DL (ref 11.9–15.5)
MCH RBC QN AUTO: 29.6 PG (ref 26.9–32)
MCHC RBC AUTO-ENTMCNC: 31.5 G/DL (ref 32.4–36.3)
MCV RBC AUTO: 93.9 FL (ref 80.5–98.2)
PLATELET # BLD AUTO: 160 10*3/MM3 (ref 140–500)
PMV BLD AUTO: 11.6 FL (ref 6–12)
POTASSIUM BLD-SCNC: 4 MMOL/L (ref 3.5–5.2)
RBC # BLD AUTO: 3.95 10*6/MM3 (ref 3.9–5.2)
SODIUM BLD-SCNC: 131 MMOL/L (ref 136–145)
WBC NRBC COR # BLD: 9.04 10*3/MM3 (ref 4.5–10.7)

## 2018-04-07 PROCEDURE — 94799 UNLISTED PULMONARY SVC/PX: CPT

## 2018-04-07 PROCEDURE — 25010000002 LEVOFLOXACIN PER 250 MG: Performed by: INTERNAL MEDICINE

## 2018-04-07 PROCEDURE — 93306 TTE W/DOPPLER COMPLETE: CPT | Performed by: INTERNAL MEDICINE

## 2018-04-07 PROCEDURE — 85027 COMPLETE CBC AUTOMATED: CPT | Performed by: INTERNAL MEDICINE

## 2018-04-07 PROCEDURE — 93306 TTE W/DOPPLER COMPLETE: CPT

## 2018-04-07 PROCEDURE — 25010000002 FUROSEMIDE PER 20 MG: Performed by: INTERNAL MEDICINE

## 2018-04-07 PROCEDURE — 80048 BASIC METABOLIC PNL TOTAL CA: CPT | Performed by: INTERNAL MEDICINE

## 2018-04-07 RX ORDER — SENNA AND DOCUSATE SODIUM 50; 8.6 MG/1; MG/1
2 TABLET, FILM COATED ORAL 2 TIMES DAILY
Status: DISCONTINUED | OUTPATIENT
Start: 2018-04-07 | End: 2018-04-09 | Stop reason: HOSPADM

## 2018-04-07 RX ORDER — BISACODYL 5 MG/1
10 TABLET, DELAYED RELEASE ORAL DAILY PRN
Status: DISCONTINUED | OUTPATIENT
Start: 2018-04-07 | End: 2018-04-09 | Stop reason: HOSPADM

## 2018-04-07 RX ADMIN — GUAIFENESIN AND CODEINE PHOSPHATE 10 ML: 100; 10 SOLUTION ORAL at 08:08

## 2018-04-07 RX ADMIN — FUROSEMIDE 40 MG: 10 INJECTION, SOLUTION INTRAMUSCULAR; INTRAVENOUS at 08:07

## 2018-04-07 RX ADMIN — ASPIRIN 81 MG: 81 TABLET, CHEWABLE ORAL at 08:07

## 2018-04-07 RX ADMIN — GUAIFENESIN AND CODEINE PHOSPHATE 10 ML: 100; 10 SOLUTION ORAL at 14:17

## 2018-04-07 RX ADMIN — ATENOLOL 25 MG: 25 TABLET ORAL at 08:07

## 2018-04-07 RX ADMIN — IPRATROPIUM BROMIDE AND ALBUTEROL SULFATE 3 ML: .5; 3 SOLUTION RESPIRATORY (INHALATION) at 16:14

## 2018-04-07 RX ADMIN — LEVOFLOXACIN 500 MG: 5 INJECTION, SOLUTION INTRAVENOUS at 20:04

## 2018-04-07 RX ADMIN — IPRATROPIUM BROMIDE AND ALBUTEROL SULFATE 3 ML: .5; 3 SOLUTION RESPIRATORY (INHALATION) at 11:12

## 2018-04-07 RX ADMIN — BENZONATATE 200 MG: 100 CAPSULE ORAL at 15:52

## 2018-04-07 RX ADMIN — BENZONATATE 200 MG: 100 CAPSULE ORAL at 20:04

## 2018-04-07 RX ADMIN — IPRATROPIUM BROMIDE AND ALBUTEROL SULFATE 3 ML: .5; 3 SOLUTION RESPIRATORY (INHALATION) at 20:31

## 2018-04-07 RX ADMIN — ATORVASTATIN CALCIUM 40 MG: 20 TABLET, FILM COATED ORAL at 08:07

## 2018-04-07 RX ADMIN — ZOLPIDEM TARTRATE 5 MG: 5 TABLET ORAL at 20:04

## 2018-04-07 RX ADMIN — ZOLPIDEM TARTRATE 5 MG: 5 TABLET ORAL at 00:19

## 2018-04-07 RX ADMIN — DOCUSATE SODIUM -SENNOSIDES 2 TABLET: 50; 8.6 TABLET, COATED ORAL at 20:04

## 2018-04-07 RX ADMIN — IPRATROPIUM BROMIDE AND ALBUTEROL SULFATE 3 ML: .5; 3 SOLUTION RESPIRATORY (INHALATION) at 07:26

## 2018-04-07 RX ADMIN — BENZONATATE 200 MG: 100 CAPSULE ORAL at 08:07

## 2018-04-07 NOTE — PROGRESS NOTES
"     LOS: 2 days   Primary Care Physician: Georgie Diane MD     Subjective   Feels a little better.  Coughing and shortness of breath are slightly improved.  No chest pain.  Still no bowel movement    Vital Signs  Body mass index is 23.78 kg/m².  Temp:  [98.1 °F (36.7 °C)-98.6 °F (37 °C)] 98.5 °F (36.9 °C)  Heart Rate:  [63-83] 73  Resp:  [16-18] 18  BP: (121-149)/(51-62) 121/51      Objective:  General Appearance:  In no acute distress (Looks younger than age).    Vital signs: (most recent): Blood pressure 121/51, pulse 73, temperature 98.5 °F (36.9 °C), temperature source Oral, resp. rate 18, height 165.1 cm (65\"), weight 64.8 kg (142 lb 14.4 oz), SpO2 93 %.    HEENT: (JVD 3 cm at 30°)    Lungs:  She is not in respiratory distress.  No stridor.  There are rales and decreased breath sounds.  No wheezes or rhonchi.  (Frequent coughing.  A few crackles right base greater than left.  Breath sounds are decreased on the right compared to the left)  Heart: Normal rate.  Regular rhythm.  No murmur.   Abdomen: Abdomen is soft and distended.  Bowel sounds are normal.   There is no abdominal tenderness.   There is no splenomegaly. There is no hepatomegaly.   Extremities: There is no dependent edema.    Neurological: Patient is alert.    Skin:  Warm and dry.          Results Review:    I reviewed the patient's new clinical results.      Results from last 7 days  Lab Units 04/07/18  0443 04/06/18  0533   WBC 10*3/mm3 9.04 10.92*   HEMOGLOBIN g/dL 11.7* 12.1   PLATELETS 10*3/mm3 160 156       Results from last 7 days  Lab Units 04/07/18  0443 04/06/18  0533   SODIUM mmol/L 131* 136   POTASSIUM mmol/L 4.0 4.3   CHLORIDE mmol/L 92* 95*   CO2 mmol/L 26.4 28.1   BUN mg/dL 25* 22   CREATININE mg/dL 1.26* 1.35*   CALCIUM mg/dL 8.6 9.0   GLUCOSE mg/dL 101* 126*         Hemoglobin A1C:  Lab Results   Component Value Date    HGBA1C 6.10 (H) 04/06/2018       Glucose Range:No results found for: POCGLU    No results found for: " [id number]    Lab Results   Component Value Date    TSH 6.250 (H) 04/06/2018       Assessment & Plan      Medication Review: Yes    Active Hospital Problems (** Indicates Principal Problem)    Diagnosis Date Noted   • **Pneumonia [J18.9] 04/05/2018   • CKD (chronic kidney disease) stage 3, GFR 30-59 ml/min [N18.3] 04/06/2018   • History of bladder cancer [C80.1] 04/05/2018   • CAD (coronary artery disease) [I25.10] 04/05/2018   • Essential hypertension [I10] 12/15/2017   • S/P CABG (coronary artery bypass graft) [Z95.1] 12/09/2016      Resolved Hospital Problems    Diagnosis Date Noted Date Resolved   • SUIK (acute kidney injury) [N17.9] 04/05/2018 04/07/2018       Assessment/Plan  1.  Community-acquired pneumonia.  Continue IV Levaquin.  Will need repeat CT in 4-6 weeks to confirm resolution.  Scar right upper lobe per radiologist.  Right lower lobe nodule per Dr. Mi  2.  Chronic kidney disease stage III, stable.  Off lisinopril for now.  Creatinine 1.2 in 2015  3.  Possible congestive heart failure.  Await results of echo.  Volume status looks okay.  I stopped Lasix.  4.  Hyponatremia likely secondary to pulmonary process plus minus Lasix.  Recheck in a.m.  5.  Hypertension.  Blood pressures acceptable    Glo Brunner MD  04/07/18  4:32 PM

## 2018-04-07 NOTE — PLAN OF CARE
Problem: Patient Care Overview  Goal: Plan of Care Review  Outcome: Ongoing (interventions implemented as appropriate)   04/06/18 1651 04/06/18 2040 04/07/18 0542   Coping/Psychosocial   Plan of Care Reviewed With --  patient --    Plan of Care Review   Progress no change --  --    OTHER   Outcome Summary --  --  VSS; cough syrup seems to suppress cough so patient can rest; will continue to monitor;      Goal: Individualization and Mutuality  Outcome: Ongoing (interventions implemented as appropriate)    Goal: Discharge Needs Assessment  Outcome: Ongoing (interventions implemented as appropriate)    Goal: Interprofessional Rounds/Family Conf  Outcome: Ongoing (interventions implemented as appropriate)      Problem: Pneumonia (Adult)  Goal: Signs and Symptoms of Listed Potential Problems Will be Absent, Minimized or Managed (Pneumonia)  Outcome: Ongoing (interventions implemented as appropriate)      Problem: Cardiac: Heart Failure (Adult)  Goal: Signs and Symptoms of Listed Potential Problems Will be Absent, Minimized or Managed (Cardiac: Heart Failure)  Outcome: Ongoing (interventions implemented as appropriate)

## 2018-04-07 NOTE — PROGRESS NOTES
"                                              LOS: 2 days   Patient Care Team:  Georgie Diane MD as PCP - General (Internal Medicine)    Chief Complaint:  Follow up pneumonia    Interval History:   Cough improved and more loose now. Less short of breath. Codeine helped. She used it last night and again today    Ventilator/Non-Invasive Ventilation Settings     None            Vital Signs  Temp:  [98.1 °F (36.7 °C)-98.6 °F (37 °C)] 98.5 °F (36.9 °C)  Heart Rate:  [63-83] 68  Resp:  [16-18] 18  BP: (121-149)/(51-62) 121/51    Intake/Output Summary (Last 24 hours) at 04/07/18 1524  Last data filed at 04/07/18 0959   Gross per 24 hour   Intake                0 ml   Output             1200 ml   Net            -1200 ml     Flowsheet Rows    Flowsheet Row First Filed Value   Admission Height 165.1 cm (65\") Documented at 04/05/2018 1622   Admission Weight 64.8 kg (142 lb 14.4 oz) Documented at 04/05/2018 1622          Physical Exam:   General Appearance:    Alert, cooperative, in no acute distress   Lungs:     Decreased air entry overall. Mild rales RLL    Heart:    Regular rhythm and normal rate, normal S1 and S2, no            murmur   Chest Wall:    No abnormalities observed   Abdomen:     Soft   Neuro:   Conscious, alert, oriented x3   Extremities:   Moves all extremities well, no edema, no cyanosis, no             Redness          Results Review:          Results from last 7 days  Lab Units 04/07/18 0443 04/06/18  0533 04/05/18 1949   SODIUM mmol/L 131* 136 132*   POTASSIUM mmol/L 4.0 4.3 3.9   CHLORIDE mmol/L 92* 95* 95*   CO2 mmol/L 26.4 28.1 23.4   BUN mg/dL 25* 22 24*   CREATININE mg/dL 1.26* 1.35* 1.30*   GLUCOSE mg/dL 101* 126* 160*   CALCIUM mg/dL 8.6 9.0 8.7       Results from last 7 days  Lab Units 04/06/18  0533   TROPONIN T ng/mL <0.010       Results from last 7 days  Lab Units 04/07/18 0443 04/06/18  0533 04/05/18 1949   WBC 10*3/mm3 9.04 10.92* 12.83*   HEMOGLOBIN g/dL 11.7* 12.1 11.4*   HEMATOCRIT " % 37.1 38.3 36.1   PLATELETS 10*3/mm3 160 156 154           Results from last 7 days  Lab Units 04/06/18  0533   PROBNP pg/mL 2,582.0*       I reviewed the patient's new clinical results.  I personally viewed and interpreted the patient's CXR        Medication Review:     aspirin 81 mg Oral Daily   atenolol 25 mg Oral Q24H   atorvastatin 40 mg Oral Daily   benzonatate 200 mg Oral TID   furosemide 40 mg Intravenous Daily   ipratropium-albuterol 3 mL Nebulization 4x Daily - RT   levoFLOXacin 500 mg Intravenous Q24H       Diagnostic imaging:  I personally and independently reviewed the following images:      RLL scarring, chronic, present since 2015 on CT.   Consolidation + Nodular infiltrates RUL.  Partially calcified RLL nodule                      Assessment:  1. Right upper lobe pneumonia.  Doubt malignancy  2. Right lower lobe pulmonary nodule, at least partially calcified and located anteriorly.  3. Cough, Intractable, secondary to #1  4. RLL scarring, chronic, present since 2015 on CT.  5. 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    Continue Codeine PRN     Would recommend follow-up CT of the chest in about 4-6 weeks after discontinuation of antibiotic treatment to evaluate for resolution of her pulmonary infiltrates.  She does also have a right lower lobe pulmonary nodule which needs to be followed radiologicall over 2 years due to her increased risk of malignancy.         Eric Mi MD  04/07/18  3:24 PM            This note was dictated utilizing Dragon dictation

## 2018-04-07 NOTE — PLAN OF CARE
Problem: Patient Care Overview  Goal: Plan of Care Review  Outcome: Ongoing (interventions implemented as appropriate)   04/07/18 9812   Coping/Psychosocial   Plan of Care Reviewed With patient   Plan of Care Review   Progress no change   OTHER   Outcome Summary cough syrup has helped to suppress cough given x2, IV lasix d/c'd,  No BM so ducolax started, up in chair, vss, will continue to monitor      Goal: Individualization and Mutuality  Outcome: Ongoing (interventions implemented as appropriate)    Goal: Discharge Needs Assessment  Outcome: Ongoing (interventions implemented as appropriate)    Goal: Interprofessional Rounds/Family Conf  Outcome: Ongoing (interventions implemented as appropriate)      Problem: Pneumonia (Adult)  Goal: Signs and Symptoms of Listed Potential Problems Will be Absent, Minimized or Managed (Pneumonia)  Outcome: Ongoing (interventions implemented as appropriate)      Problem: Cardiac: Heart Failure (Adult)  Goal: Signs and Symptoms of Listed Potential Problems Will be Absent, Minimized or Managed (Cardiac: Heart Failure)  Outcome: Ongoing (interventions implemented as appropriate)

## 2018-04-08 PROBLEM — I50.33 ACUTE ON CHRONIC DIASTOLIC CONGESTIVE HEART FAILURE (HCC): Status: ACTIVE | Noted: 2018-04-08

## 2018-04-08 LAB
ANION GAP SERPL CALCULATED.3IONS-SCNC: 12.9 MMOL/L
ASCENDING AORTA: 2.7 CM
BH CV ECHO MEAS - ACS: 1.4 CM
BH CV ECHO MEAS - AO MAX PG (FULL): 5.4 MMHG
BH CV ECHO MEAS - AO MAX PG: 9.4 MMHG
BH CV ECHO MEAS - AO MEAN PG (FULL): 2 MMHG
BH CV ECHO MEAS - AO MEAN PG: 4 MMHG
BH CV ECHO MEAS - AO ROOT AREA (BSA CORRECTED): 1.3
BH CV ECHO MEAS - AO ROOT AREA: 3.8 CM^2
BH CV ECHO MEAS - AO ROOT DIAM: 2.2 CM
BH CV ECHO MEAS - AO V2 MAX: 153 CM/SEC
BH CV ECHO MEAS - AO V2 MEAN: 93.5 CM/SEC
BH CV ECHO MEAS - AO V2 VTI: 29.4 CM
BH CV ECHO MEAS - ASC AORTA: 2.7 CM
BH CV ECHO MEAS - AVA(I,A): 2.1 CM^2
BH CV ECHO MEAS - AVA(I,D): 2.1 CM^2
BH CV ECHO MEAS - AVA(V,A): 1.9 CM^2
BH CV ECHO MEAS - AVA(V,D): 1.9 CM^2
BH CV ECHO MEAS - BSA(HAYCOCK): 1.7 M^2
BH CV ECHO MEAS - BSA: 1.7 M^2
BH CV ECHO MEAS - BZI_BMI: 23.6 KILOGRAMS/M^2
BH CV ECHO MEAS - BZI_METRIC_HEIGHT: 165.1 CM
BH CV ECHO MEAS - BZI_METRIC_WEIGHT: 64.4 KG
BH CV ECHO MEAS - CONTRAST EF (2CH): 60.6 ML/M^2
BH CV ECHO MEAS - CONTRAST EF 4CH: 54.5 ML/M^2
BH CV ECHO MEAS - EDV(CUBED): 74.1 ML
BH CV ECHO MEAS - EDV(MOD-SP2): 127 ML
BH CV ECHO MEAS - EDV(MOD-SP4): 88 ML
BH CV ECHO MEAS - EDV(TEICH): 78.6 ML
BH CV ECHO MEAS - EF(CUBED): 70.4 %
BH CV ECHO MEAS - EF(MOD-SP2): 60.6 %
BH CV ECHO MEAS - EF(MOD-SP4): 54.5 %
BH CV ECHO MEAS - EF(TEICH): 62.4 %
BH CV ECHO MEAS - ESV(CUBED): 22 ML
BH CV ECHO MEAS - ESV(MOD-SP2): 50 ML
BH CV ECHO MEAS - ESV(MOD-SP4): 40 ML
BH CV ECHO MEAS - ESV(TEICH): 29.6 ML
BH CV ECHO MEAS - FS: 33.3 %
BH CV ECHO MEAS - IVS/LVPW: 1
BH CV ECHO MEAS - IVSD: 0.8 CM
BH CV ECHO MEAS - LAT PEAK E' VEL: 8 CM/SEC
BH CV ECHO MEAS - LV DIASTOLIC VOL/BSA (35-75): 51.5 ML/M^2
BH CV ECHO MEAS - LV MASS(C)D: 101.3 GRAMS
BH CV ECHO MEAS - LV MASS(C)DI: 59.2 GRAMS/M^2
BH CV ECHO MEAS - LV MAX PG: 4 MMHG
BH CV ECHO MEAS - LV MEAN PG: 2 MMHG
BH CV ECHO MEAS - LV SYSTOLIC VOL/BSA (12-30): 23.4 ML/M^2
BH CV ECHO MEAS - LV V1 MAX: 100 CM/SEC
BH CV ECHO MEAS - LV V1 MEAN: 66 CM/SEC
BH CV ECHO MEAS - LV V1 VTI: 21.4 CM
BH CV ECHO MEAS - LVIDD: 4.2 CM
BH CV ECHO MEAS - LVIDS: 2.8 CM
BH CV ECHO MEAS - LVLD AP2: 7.2 CM
BH CV ECHO MEAS - LVLD AP4: 7.2 CM
BH CV ECHO MEAS - LVLS AP2: 6 CM
BH CV ECHO MEAS - LVLS AP4: 6 CM
BH CV ECHO MEAS - LVOT AREA (M): 2.8 CM^2
BH CV ECHO MEAS - LVOT AREA: 2.8 CM^2
BH CV ECHO MEAS - LVOT DIAM: 1.9 CM
BH CV ECHO MEAS - LVPWD: 0.8 CM
BH CV ECHO MEAS - MED PEAK E' VEL: 6 CM/SEC
BH CV ECHO MEAS - MR MAX PG: 106.1 MMHG
BH CV ECHO MEAS - MR MAX VEL: 515 CM/SEC
BH CV ECHO MEAS - MV A DUR: 0.12 SEC
BH CV ECHO MEAS - MV A MAX VEL: 72.8 CM/SEC
BH CV ECHO MEAS - MV DEC SLOPE: 660 CM/SEC^2
BH CV ECHO MEAS - MV DEC TIME: 0.12 SEC
BH CV ECHO MEAS - MV E MAX VEL: 114 CM/SEC
BH CV ECHO MEAS - MV E/A: 1.6
BH CV ECHO MEAS - MV MAX PG: 5.8 MMHG
BH CV ECHO MEAS - MV MEAN PG: 2 MMHG
BH CV ECHO MEAS - MV P1/2T MAX VEL: 120 CM/SEC
BH CV ECHO MEAS - MV P1/2T: 53.3 MSEC
BH CV ECHO MEAS - MV V2 MAX: 120 CM/SEC
BH CV ECHO MEAS - MV V2 MEAN: 59.7 CM/SEC
BH CV ECHO MEAS - MV V2 VTI: 30.7 CM
BH CV ECHO MEAS - MVA P1/2T LCG: 1.8 CM^2
BH CV ECHO MEAS - MVA(P1/2T): 4.1 CM^2
BH CV ECHO MEAS - MVA(VTI): 2 CM^2
BH CV ECHO MEAS - PA ACC TIME: 0.12 SEC
BH CV ECHO MEAS - PA MAX PG (FULL): 2.3 MMHG
BH CV ECHO MEAS - PA MAX PG: 4.4 MMHG
BH CV ECHO MEAS - PA PR(ACCEL): 25 MMHG
BH CV ECHO MEAS - PA V2 MAX: 105 CM/SEC
BH CV ECHO MEAS - PULM A REVS DUR: 0.14 SEC
BH CV ECHO MEAS - PULM A REVS VEL: 40.3 CM/SEC
BH CV ECHO MEAS - PULM DIAS VEL: 44.1 CM/SEC
BH CV ECHO MEAS - PULM S/D: 0.8
BH CV ECHO MEAS - PULM SYS VEL: 35.4 CM/SEC
BH CV ECHO MEAS - PVA(V,A): 2 CM^2
BH CV ECHO MEAS - PVA(V,D): 2 CM^2
BH CV ECHO MEAS - QP/QS: 0.89
BH CV ECHO MEAS - RAP SYSTOLE: 3 MMHG
BH CV ECHO MEAS - RV MAX PG: 2.1 MMHG
BH CV ECHO MEAS - RV MEAN PG: 1 MMHG
BH CV ECHO MEAS - RV V1 MAX: 73.2 CM/SEC
BH CV ECHO MEAS - RV V1 MEAN: 51.1 CM/SEC
BH CV ECHO MEAS - RV V1 VTI: 19.1 CM
BH CV ECHO MEAS - RVOT AREA: 2.8 CM^2
BH CV ECHO MEAS - RVOT DIAM: 1.9 CM
BH CV ECHO MEAS - RVSP: 49.8 MMHG
BH CV ECHO MEAS - SI(AO): 65.3 ML/M^2
BH CV ECHO MEAS - SI(CUBED): 30.5 ML/M^2
BH CV ECHO MEAS - SI(LVOT): 35.5 ML/M^2
BH CV ECHO MEAS - SI(MOD-SP2): 45 ML/M^2
BH CV ECHO MEAS - SI(MOD-SP4): 28.1 ML/M^2
BH CV ECHO MEAS - SI(TEICH): 28.7 ML/M^2
BH CV ECHO MEAS - SUP REN AO DIAM: 1.4 CM
BH CV ECHO MEAS - SV(AO): 111.8 ML
BH CV ECHO MEAS - SV(CUBED): 52.1 ML
BH CV ECHO MEAS - SV(LVOT): 60.7 ML
BH CV ECHO MEAS - SV(MOD-SP2): 77 ML
BH CV ECHO MEAS - SV(MOD-SP4): 48 ML
BH CV ECHO MEAS - SV(RVOT): 54.2 ML
BH CV ECHO MEAS - SV(TEICH): 49 ML
BH CV ECHO MEAS - TAPSE (>1.6): 1.9 CM2
BH CV ECHO MEAS - TR MAX VEL: 342 CM/SEC
BH CV VAS BP RIGHT ARM: NORMAL MMHG
BH CV XLRA - RV BASE: 3.2 CM
BH CV XLRA - TDI S': 11 CM/SEC
BUN BLD-MCNC: 28 MG/DL (ref 8–23)
BUN/CREAT SERPL: 21.2 (ref 7–25)
CALCIUM SPEC-SCNC: 9.1 MG/DL (ref 8.6–10.5)
CHLORIDE SERPL-SCNC: 96 MMOL/L (ref 98–107)
CO2 SERPL-SCNC: 27.1 MMOL/L (ref 22–29)
CREAT BLD-MCNC: 1.32 MG/DL (ref 0.57–1)
DEPRECATED RDW RBC AUTO: 42.3 FL (ref 37–54)
E/E' RATIO: 17
ERYTHROCYTE [DISTWIDTH] IN BLOOD BY AUTOMATED COUNT: 12.5 % (ref 11.7–13)
GFR SERPL CREATININE-BSD FRML MDRD: 38 ML/MIN/1.73
GLUCOSE BLD-MCNC: 124 MG/DL (ref 65–99)
HCT VFR BLD AUTO: 36.2 % (ref 35.6–45.5)
HGB BLD-MCNC: 11.7 G/DL (ref 11.9–15.5)
LEFT ATRIUM VOLUME INDEX: 36 ML/M2
MAXIMAL PREDICTED HEART RATE: 135 BPM
MCH RBC QN AUTO: 29.8 PG (ref 26.9–32)
MCHC RBC AUTO-ENTMCNC: 32.3 G/DL (ref 32.4–36.3)
MCV RBC AUTO: 92.3 FL (ref 80.5–98.2)
PLATELET # BLD AUTO: 177 10*3/MM3 (ref 140–500)
PMV BLD AUTO: 11.1 FL (ref 6–12)
POTASSIUM BLD-SCNC: 4.1 MMOL/L (ref 3.5–5.2)
RBC # BLD AUTO: 3.92 10*6/MM3 (ref 3.9–5.2)
SODIUM BLD-SCNC: 136 MMOL/L (ref 136–145)
STRESS TARGET HR: 115 BPM
WBC NRBC COR # BLD: 10.37 10*3/MM3 (ref 4.5–10.7)

## 2018-04-08 PROCEDURE — 80048 BASIC METABOLIC PNL TOTAL CA: CPT | Performed by: INTERNAL MEDICINE

## 2018-04-08 PROCEDURE — 85027 COMPLETE CBC AUTOMATED: CPT | Performed by: INTERNAL MEDICINE

## 2018-04-08 PROCEDURE — 94799 UNLISTED PULMONARY SVC/PX: CPT

## 2018-04-08 RX ORDER — LEVOFLOXACIN 500 MG/1
500 TABLET, FILM COATED ORAL EVERY 24 HOURS
Status: DISCONTINUED | OUTPATIENT
Start: 2018-04-08 | End: 2018-04-09 | Stop reason: HOSPADM

## 2018-04-08 RX ADMIN — BENZONATATE 200 MG: 100 CAPSULE ORAL at 21:54

## 2018-04-08 RX ADMIN — IPRATROPIUM BROMIDE AND ALBUTEROL SULFATE 3 ML: .5; 3 SOLUTION RESPIRATORY (INHALATION) at 13:08

## 2018-04-08 RX ADMIN — HYDROCODONE POLISTIREX AND CHLORPHENIRAMINE POLISTIREX 5 ML: 10; 8 SUSPENSION, EXTENDED RELEASE ORAL at 20:08

## 2018-04-08 RX ADMIN — LEVOFLOXACIN 500 MG: 500 TABLET, FILM COATED ORAL at 20:06

## 2018-04-08 RX ADMIN — ATENOLOL 25 MG: 25 TABLET ORAL at 08:37

## 2018-04-08 RX ADMIN — HYDROCODONE POLISTIREX AND CHLORPHENIRAMINE POLISTIREX 5 ML: 10; 8 SUSPENSION, EXTENDED RELEASE ORAL at 06:08

## 2018-04-08 RX ADMIN — IPRATROPIUM BROMIDE AND ALBUTEROL SULFATE 3 ML: .5; 3 SOLUTION RESPIRATORY (INHALATION) at 17:12

## 2018-04-08 RX ADMIN — IPRATROPIUM BROMIDE AND ALBUTEROL SULFATE 3 ML: .5; 3 SOLUTION RESPIRATORY (INHALATION) at 19:47

## 2018-04-08 RX ADMIN — IPRATROPIUM BROMIDE AND ALBUTEROL SULFATE 3 ML: .5; 3 SOLUTION RESPIRATORY (INHALATION) at 09:37

## 2018-04-08 RX ADMIN — BENZONATATE 200 MG: 100 CAPSULE ORAL at 15:56

## 2018-04-08 RX ADMIN — ASPIRIN 81 MG: 81 TABLET, CHEWABLE ORAL at 08:37

## 2018-04-08 RX ADMIN — GUAIFENESIN AND CODEINE PHOSPHATE 10 ML: 100; 10 SOLUTION ORAL at 14:20

## 2018-04-08 RX ADMIN — ATORVASTATIN CALCIUM 40 MG: 20 TABLET, FILM COATED ORAL at 08:37

## 2018-04-08 RX ADMIN — BENZONATATE 200 MG: 100 CAPSULE ORAL at 08:37

## 2018-04-08 NOTE — PLAN OF CARE
Problem: Patient Care Overview  Goal: Plan of Care Review  Outcome: Ongoing (interventions implemented as appropriate)   04/07/18 1642 04/07/18 2020 04/08/18 0511   Coping/Psychosocial   Plan of Care Reviewed With --  patient --    Plan of Care Review   Progress no change --  --    OTHER   Outcome Summary --  --  VSS; cough has lessen and patient is able to rest better; will continue current cough surpressant; will continue to monitor;      Goal: Individualization and Mutuality  Outcome: Ongoing (interventions implemented as appropriate)    Goal: Discharge Needs Assessment  Outcome: Ongoing (interventions implemented as appropriate)    Goal: Interprofessional Rounds/Family Conf  Outcome: Ongoing (interventions implemented as appropriate)      Problem: Pneumonia (Adult)  Goal: Signs and Symptoms of Listed Potential Problems Will be Absent, Minimized or Managed (Pneumonia)  Outcome: Ongoing (interventions implemented as appropriate)      Problem: Cardiac: Heart Failure (Adult)  Goal: Signs and Symptoms of Listed Potential Problems Will be Absent, Minimized or Managed (Cardiac: Heart Failure)  Outcome: Ongoing (interventions implemented as appropriate)

## 2018-04-08 NOTE — PROGRESS NOTES
"                                              LOS: 3 days   Patient Care Team:  Georgie Diane MD as PCP - General (Internal Medicine)    Chief Complaint:  Follow up pneumonia    Interval History:   Same since yesterday.  Still has cough which improves with codeine for the low back.  No dyspnea at rest or on minimal exertion    Ventilator/Non-Invasive Ventilation Settings     None            Vital Signs  Temp:  [98.5 °F (36.9 °C)-100.3 °F (37.9 °C)] 98.5 °F (36.9 °C)  Heart Rate:  [67-80] 67  Resp:  [16-20] 18  BP: (109-135)/(61-77) 118/75    Intake/Output Summary (Last 24 hours) at 04/08/18 1447  Last data filed at 04/08/18 0700   Gross per 24 hour   Intake                0 ml   Output             1800 ml   Net            -1800 ml     Flowsheet Rows    Flowsheet Row First Filed Value   Admission Height 165.1 cm (65\") Documented at 04/05/2018 1622   Admission Weight 64.8 kg (142 lb 14.4 oz) Documented at 04/05/2018 1622          Physical Exam:   General Appearance:    Alert, cooperative, in no acute distress   Lungs:     Decreased air entry overall. Mild rales RLLAnd right upper lobe.  No wheezing     Heart:    Regular rhythm and normal rate, normal S1 and S2, no            murmur   Chest Wall:    No abnormalities observed   Abdomen:     Soft   Neuro:   Conscious, alert, oriented x3   Extremities:   Moves all extremities well, no edema, no cyanosis, no             Redness          Results Review:          Results from last 7 days  Lab Units 04/08/18  0553 04/07/18  0443 04/06/18  0533   SODIUM mmol/L 136 131* 136   POTASSIUM mmol/L 4.1 4.0 4.3   CHLORIDE mmol/L 96* 92* 95*   CO2 mmol/L 27.1 26.4 28.1   BUN mg/dL 28* 25* 22   CREATININE mg/dL 1.32* 1.26* 1.35*   GLUCOSE mg/dL 124* 101* 126*   CALCIUM mg/dL 9.1 8.6 9.0       Results from last 7 days  Lab Units 04/06/18  0533   TROPONIN T ng/mL <0.010       Results from last 7 days  Lab Units 04/08/18  0553 04/07/18  0443 04/06/18  0533   WBC 10*3/mm3 10.37 9.04 " 10.92*   HEMOGLOBIN g/dL 11.7* 11.7* 12.1   HEMATOCRIT % 36.2 37.1 38.3   PLATELETS 10*3/mm3 177 160 156           Results from last 7 days  Lab Units 04/06/18  0533   PROBNP pg/mL 2,582.0*       I reviewed the patient's new clinical results.  I personally viewed and interpreted the patient's CXR        Medication Review:     aspirin 81 mg Oral Daily   atenolol 25 mg Oral Q24H   atorvastatin 40 mg Oral Daily   benzonatate 200 mg Oral TID   ipratropium-albuterol 3 mL Nebulization 4x Daily - RT   levoFLOXacin 500 mg Intravenous Q24H   sennosides-docusate sodium 2 tablet Oral BID       Diagnostic imaging:  I personally and independently reviewed the following images:      RLL scarring, chronic, present since 2015 on CT.   Consolidation + Nodular infiltrates RUL.  Partially calcified RLL nodule                      Assessment:  1. Right upper lobe pneumonia.  Doubt malignancy  2. Right lower lobe pulmonary nodule, at least partially calcified and located anteriorly.  3. Cough, Intractable, secondary to #1  4. RLL scarring, chronic, present since 2015 on CT.  5. Recent diagnosis of limited stage bladder carcinoma which was treated endoscopically     Plan:  Agree with Levaquin for 7 days    Continue Codeine PRN. May discharge her on that since she has significant cough     Would recommend follow-up CT of the chest in about 4-6 weeks after discontinuation of antibiotic treatment to evaluate for resolution of her pulmonary infiltrates.  She does also have a right lower lobe pulmonary nodule which needs to be followed radiologicall over 2 years due to her increased risk of malignancy.    Ok to discharge home tomorrow.  Follow-up with us in 4 weeks after discharge         Eric Mi MD  04/08/18  2:47 PM            This note was dictated utilizing Veeqo dictation

## 2018-04-08 NOTE — PROGRESS NOTES
Name: Brodie DANIELSON Naive ADMIT: 2018   : 10/31/1932  PCP: Georgie Diane MD    MRN: [id number] LOS: 3 days   AGE/SEX: 85 y.o. female  ROOM: [facility]/     Subjective   Feeling better.    Objective   Temp:  [98.5 °F (36.9 °C)-100.3 °F (37.9 °C)] 98.7 °F (37.1 °C)  Heart Rate:  [63-77] 69  Resp:  [16-20] 20  BP: (109-135)/(51-77) 135/62  SpO2:  [90 %-97 %] 94 %  on  Flow (L/min):  [1-2] 2;   Device (Oxygen Therapy): nasal cannula  Body mass index is 23.78 kg/m².    Physical Exam   Constitutional: She is oriented to person, place, and time. No distress.   Cardiovascular: Normal rate and regular rhythm.    No murmur heard.  Pulmonary/Chest: Effort normal and breath sounds normal.   Abdominal: Soft. Bowel sounds are normal. She exhibits no distension. There is no tenderness.   Musculoskeletal: Normal range of motion. She exhibits no edema.   Neurological: She is alert and oriented to person, place, and time.   Skin: Skin is warm and dry. She is not diaphoretic.       Results Review:       I reviewed the patient's new clinical results.    Results from last 7 days  Lab Units 18  0553 183 18   WBC 10*3/mm3 10.37 9.04 10.92* 12.83*   HEMOGLOBIN g/dL 11.7* 11.7* 12.1 11.4*   PLATELETS 10*3/mm3 177 160 156 154     Results from last 7 days  Lab Units 18  0553 18  0443 18  0533 18   SODIUM mmol/L 136 131* 136 132*   POTASSIUM mmol/L 4.1 4.0 4.3 3.9   CHLORIDE mmol/L 96* 92* 95* 95*   CO2 mmol/L 27.1 26.4 28.1 23.4   BUN mg/dL 28* 25* 22 24*   CREATININE mg/dL 1.32* 1.26* 1.35* 1.30*   GLUCOSE mg/dL 124* 101* 126* 160*   Estimated Creatinine Clearance: 31.9 mL/min (by C-G formula based on SCr of 1.32 mg/dL (H)).  Results from last 7 days  Lab Units 18  0553 18  0443 18  0533 18  1949   CALCIUM mg/dL 9.1 8.6 9.0 8.7   ALBUMIN g/dL  --   --  3.50  --      Results from last 7 days  Lab Units 18  0533   PROCALCITONIN  ng/mL 3.58*   No results found for: STREPPNEUAG, LEGANTIGENUR  Results from last 7 days  Lab Units 04/05/18  1949   BLOODCX  No growth at 2 days   VRP negative      2D ECHOCARDIOGRAM   4/7/2018  · Left atrial cavity size is moderately dilated.  · Mild-to-moderate mitral valve regurgitation is present  · Moderate tricuspid valve regurgitation is present.  · Right ventricular cavity is mild-to-moderately dilated.  · Calculated EF = 54.5%.  · There is no evidence of pericardial effusion.    THORACIC CT SCAN WITHOUT CONTRAST  4/5/2018  1. Exam quality degraded by motion.  2. Stable right lung base consolidation with adjacent scarring and right  greater than left pleural thickening.  3. Extensive right upper lobe opacities are most consistent with  pneumonia. Follow-up will be needed to ensure resolution.      aspirin 81 mg Oral Daily   atenolol 25 mg Oral Q24H   atorvastatin 40 mg Oral Daily   benzonatate 200 mg Oral TID   ipratropium-albuterol 3 mL Nebulization 4x Daily - RT   levoFLOXacin 500 mg Intravenous Q24H   sennosides-docusate sodium 2 tablet Oral BID      Diet Regular      Assessment/Plan      Active Hospital Problems (** Indicates Principal Problem)    Diagnosis Date Noted   • **Community acquired pneumonia [J18.9] 04/05/2018   • Acute on chronic diastolic congestive heart failure [I50.33] 04/08/2018   • CKD (chronic kidney disease) stage 3, GFR 30-59 ml/min [N18.3] 04/06/2018   • History of bladder cancer [C80.1] 04/05/2018   • CAD (coronary artery disease) [I25.10] 04/05/2018   • Essential hypertension [I10] 12/15/2017   • S/P CABG (coronary artery bypass graft) [Z95.1] 12/09/2016      Resolved Hospital Problems    Diagnosis Date Noted Date Resolved   No resolved problems to display.       Ms. Mo is a 85 y.o. female with a history of CAD, CHF and CKD3 who has been admitted with pneumonia.     · Admitted to monitored unit.  · Continue LEVAQUIN day 4 of 7 for treatment of community acquired pneumonia.    · All cultures including viral panel negative.  · Supplemental oxygen to keep SaO2 > 92%  · Pulmonary consulted. Will need outpatient follow-up and repeat CT scan as per their note.  · Echo noted. Normal EF. Moderate valvular disease.  · Likely discharge tomorrow if okay with pulmonology.      Mayur Baer MD  [facility]  04/08/18  8:48 AM

## 2018-04-08 NOTE — PLAN OF CARE
Problem: Patient Care Overview  Goal: Plan of Care Review  Outcome: Ongoing (interventions implemented as appropriate)   04/08/18 0444   Coping/Psychosocial   Plan of Care Reviewed With patient   Plan of Care Review   Progress no change   OTHER   Outcome Summary cough syrup given to help with cough, on RA and sats in 90s, d/c in am, vss, will continue to monitor      Goal: Individualization and Mutuality  Outcome: Ongoing (interventions implemented as appropriate)    Goal: Discharge Needs Assessment  Outcome: Ongoing (interventions implemented as appropriate)    Goal: Interprofessional Rounds/Family Conf  Outcome: Ongoing (interventions implemented as appropriate)      Problem: Pneumonia (Adult)  Goal: Signs and Symptoms of Listed Potential Problems Will be Absent, Minimized or Managed (Pneumonia)  Outcome: Ongoing (interventions implemented as appropriate)      Problem: Cardiac: Heart Failure (Adult)  Goal: Signs and Symptoms of Listed Potential Problems Will be Absent, Minimized or Managed (Cardiac: Heart Failure)  Outcome: Ongoing (interventions implemented as appropriate)

## 2018-04-09 VITALS
TEMPERATURE: 99 F | RESPIRATION RATE: 20 BRPM | DIASTOLIC BLOOD PRESSURE: 56 MMHG | SYSTOLIC BLOOD PRESSURE: 148 MMHG | BODY MASS INDEX: 23.81 KG/M2 | WEIGHT: 142.9 LBS | HEIGHT: 65 IN | OXYGEN SATURATION: 98 % | HEART RATE: 65 BPM

## 2018-04-09 PROBLEM — J18.9 COMMUNITY ACQUIRED PNEUMONIA: Status: RESOLVED | Noted: 2018-04-05 | Resolved: 2018-04-09

## 2018-04-09 PROBLEM — I50.33 ACUTE ON CHRONIC DIASTOLIC CONGESTIVE HEART FAILURE: Status: RESOLVED | Noted: 2018-04-08 | Resolved: 2018-04-09

## 2018-04-09 PROCEDURE — 94799 UNLISTED PULMONARY SVC/PX: CPT

## 2018-04-09 RX ORDER — CODEINE PHOSPHATE AND GUAIFENESIN 10; 100 MG/5ML; MG/5ML
5 SOLUTION ORAL EVERY 6 HOURS PRN
Qty: 118 ML | Refills: 0 | Status: SHIPPED | OUTPATIENT
Start: 2018-04-09 | End: 2018-12-05

## 2018-04-09 RX ORDER — LEVOFLOXACIN 500 MG/1
500 TABLET, FILM COATED ORAL EVERY 24 HOURS
Qty: 3 TABLET | Refills: 0 | Status: SHIPPED | OUTPATIENT
Start: 2018-04-09 | End: 2018-04-12

## 2018-04-09 RX ORDER — BENZONATATE 200 MG/1
200 CAPSULE ORAL 3 TIMES DAILY
Qty: 21 CAPSULE | Refills: 0 | Status: SHIPPED | OUTPATIENT
Start: 2018-04-09 | End: 2018-04-16

## 2018-04-09 RX ADMIN — BENZONATATE 200 MG: 100 CAPSULE ORAL at 08:10

## 2018-04-09 RX ADMIN — ATENOLOL 25 MG: 25 TABLET ORAL at 08:10

## 2018-04-09 RX ADMIN — ATORVASTATIN CALCIUM 40 MG: 20 TABLET, FILM COATED ORAL at 08:10

## 2018-04-09 RX ADMIN — IPRATROPIUM BROMIDE AND ALBUTEROL SULFATE 3 ML: .5; 3 SOLUTION RESPIRATORY (INHALATION) at 11:59

## 2018-04-09 RX ADMIN — GUAIFENESIN AND CODEINE PHOSPHATE 10 ML: 100; 10 SOLUTION ORAL at 06:34

## 2018-04-09 RX ADMIN — IPRATROPIUM BROMIDE AND ALBUTEROL SULFATE 3 ML: .5; 3 SOLUTION RESPIRATORY (INHALATION) at 08:53

## 2018-04-09 RX ADMIN — ASPIRIN 81 MG: 81 TABLET, CHEWABLE ORAL at 08:10

## 2018-04-09 NOTE — PROGRESS NOTES
"  PROGRESS NOTE  Patient Name: Brodie Mo  Age/Sex: 85 y.o. female  : 10/31/1932  MRN: [id number]    Date of Admission: 2018  Date of Encounter Visit: 18   LOS: 4 days   Patient Care Team:  Georgie Diane MD as PCP - General (Internal Medicine)    Chief Complaint:RUL pneumonia, ELL nodule     Interval History: patient was admitted 18 with RUL pneumonia and CT of chest showed RLL scarring present since  as well as a RLL nodule with partial calcification. Procalcitonin was evelated and was treated with antibiotics, bronchodilators and cough suppressants. Currently on Levaquin.     REVIEW OF SYSTEMS:   CARDIOVASCULAR: No chest pain, chest pressure or chest discomfort. No palpitations or edema.   RESPIRATORY: improved shortness of breath. Cough with difficulty taking a deep breath. No sputum production.   GASTROINTESTINAL: No anorexia, nausea, vomiting or diarrhea. No abdominal pain or blood.   HEMATOLOGIC: No bleeding or bruising.     Ventilator/Non-Invasive Ventilation Settings     None            Vital Signs  Temp:  [98.5 °F (36.9 °C)-100.3 °F (37.9 °C)] 99 °F (37.2 °C)  Heart Rate:  [67-78] 72  Resp:  [16-20] 20  BP: (118-150)/(51-75) 148/56  SpO2:  [90 %-98 %] 98 %  on  Flow (L/min):  [1-2] 1 Device (Oxygen Therapy): room air    Intake/Output Summary (Last 24 hours) at 18 0951  Last data filed at 18 0900   Gross per 24 hour   Intake              480 ml   Output             1700 ml   Net            -1220 ml     Flowsheet Rows    Flowsheet Row First Filed Value   Admission Height 165.1 cm (65\") Documented at 2018 162   Admission Weight 64.8 kg (142 lb 14.4 oz) Documented at 2018 162        Body mass index is 23.78 kg/m².  1    18  162   Weight: 64.8 kg (142 lb 14.4 oz)       Physical Exam:  GEN:  No acute distress, alert, cooperative, well developed, on room air  EYES:   Sclera clear. No icterus. PERRL.  ENT:   External ears/nose normal, inner lower lip " ulcer, no thrush, mucous membranes moist  NECK:  Supple, midline trachea, no JVD  LUNGS: Normal chest on inspection, diminished lung sounds. RLL crackles. no wheezes. No rhonchi. Respirations regular, even and unlabored.   CV:  Regular rhythm and rate. Normal S1/S2. No murmurs, gallops, or rubs noted.  ABD:  Soft, non-tender and non-distended. Normal bowel sounds. No guarding  EXT:  Moves all extremities well. No cyanosis. No redness. No edema.   Skin: dry, intact, no bleeding    Results Review:        Results from last 7 days  Lab Units 04/08/18 0553 04/07/18 0443 04/06/18 0533 04/05/18 1949   SODIUM mmol/L 136 131* 136 132*   POTASSIUM mmol/L 4.1 4.0 4.3 3.9   CHLORIDE mmol/L 96* 92* 95* 95*   CO2 mmol/L 27.1 26.4 28.1 23.4   BUN mg/dL 28* 25* 22 24*   CREATININE mg/dL 1.32* 1.26* 1.35* 1.30*   CALCIUM mg/dL 9.1 8.6 9.0 8.7   AST (SGOT) U/L  --   --  37*  --    ALT (SGPT) U/L  --   --  25  --    ANION GAP mmol/L 12.9 12.6 12.9 13.6   ALBUMIN g/dL  --   --  3.50  --        Results from last 7 days  Lab Units 04/06/18  0533   TROPONIN T ng/mL <0.010       Results from last 7 days  Lab Units 04/06/18  0533   TSH mIU/mL 6.250*       Results from last 7 days  Lab Units 04/06/18  0533   PROBNP pg/mL 2,582.0*       Results from last 7 days  Lab Units 04/08/18 0553 04/07/18 0443 04/06/18 0533 04/05/18 1949   WBC 10*3/mm3 10.37 9.04 10.92* 12.83*   HEMOGLOBIN g/dL 11.7* 11.7* 12.1 11.4*   HEMATOCRIT % 36.2 37.1 38.3 36.1   PLATELETS 10*3/mm3 177 160 156 154   MCV fL 92.3 93.9 94.8 93.5   NEUTROPHIL % %  --   --  77.9*  --    LYMPHOCYTE % %  --   --  6.8*  --    MONOCYTES % %  --   --  14.0*  --    EOSINOPHIL % %  --   --  0.9  --    BASOPHIL % %  --   --  0.2  --    IMM GRAN % %  --   --  0.2  --                Results from last 7 days  Lab Units 04/06/18  0533   CHOLESTEROL mg/dL 141   TRIGLYCERIDES mg/dL 66   HDL CHOL mg/dL 76*           Results from last 7 days  Lab Units 04/06/18  0533   HEMOGLOBIN A1C % 6.10*      No results found for: POCGLU    Results from last 7 days  Lab Units 04/06/18  0533   PROCALCITONIN ng/mL 3.58*       Results from last 7 days  Lab Units 04/05/18  1949   BLOODCX  No growth at 3 days           Results from last 7 days  Lab Units 04/05/18  2329   ADENOVIRUS DETECTION BY PCR  Not Detected   CORONAVIRUS 229E  Not Detected   CORONAVIRUS HKU1  Not Detected   CORONAVIRUS NL63  Not Detected   CORONAVIRUS OC43  Not Detected   HUMAN METAPNEUMOVIRUS  Not Detected   HUMAN RHINOVIRUS/ENTEROVIRUS  Not Detected   INFLUENZA B PCR  Not Detected   PARAINFLUENZA 1  Not Detected   PARAINFLUENZA VIRUS 2  Not Detected   PARAINFLUENZA VIRUS 3  Not Detected   PARAINFLUENZA VIRUS 4  Not Detected   BORDETELLA PERTUSSIS PCR  Not Detected   CHLAMYDOPHILA PNEUMONIAE PCR  Not Detected   MYCOPLAMA PNEUMO PCR  Not Detected   INFLUENZA A PCR  Not Detected   INFLUENZA A H3  Not Detected   INFLUENZA A H1  Not Detected   RSV, PCR  Not Detected           Imaging:   Imaging Results (all)     Procedure Component Value Units Date/Time    CT Chest Without Contrast [[id number]] Collected:  04/05/18 2343     Updated:  04/07/18 0530    Narrative:       THORACIC CT SCAN WITHOUT CONTRAST     HISTORY: Mass or lump, thorax axilla     COMPARISON: 10/07/2015 .     TECHNIQUE:  Radiation dose reduction techniques were utilized, including  automated exposure control and exposure modulation based on body size.  Axial images of the thorax obtained without IV contrast, per request.     FINDINGS: There is respiratory motion on the vast majority of images  which degrades quality significantly. Within these limitations, there is  a stable masslike density in the right lung base near the diaphragm with  curvilinear scarring which appears virtually identical to the comparison  examination and is felt to be chronic. Ill-defined areas of  consolidation are noted in the right upper lobe in particular and are  most consistent with extensive pneumonia.  Follow-up will be needed to  ensure resolution. The left lung is grossly clear considering excessive  motion other than a calcified granuloma. There is some mild pleural  thickening bilaterally, right greater than left but without significant  pleural fluid collection identified. Mild cardiomegaly. No obvious  thoracic adenopathy although assessment limited by motion and lack of  contrast. Aorta is nonaneurysmal     Unenhanced images of the included upper abdomen are otherwise  unremarkable.                   Impression:       1. Exam quality degraded by motion.  2. Stable right lung base consolidation with adjacent scarring and right  greater than left pleural thickening.  3. Extensive right upper lobe opacities are most consistent with  pneumonia. Follow-up will be needed to ensure resolution.        This report was finalized on 4/7/2018 5:26 AM by Jermain De La Rosa MD.             I reviewed the patient's new clinical results.  I personally viewed and interpreted the patient's imaging results: RLL scarring, chronic, present since 2015 on CT. Consolidation + Nodular infiltrates RUL. Partially calcified RLL nodule.        Medication Review:     aspirin 81 mg Oral Daily   atenolol 25 mg Oral Q24H   atorvastatin 40 mg Oral Daily   benzonatate 200 mg Oral TID   ipratropium-albuterol 3 mL Nebulization 4x Daily - RT   levoFLOXacin 500 mg Oral Q24H   sennosides-docusate sodium 2 tablet Oral BID            ASSESSMENT:   1. Right upper lobe pneumonia.  Doubt malignancy  2. Right lower lobe pulmonary nodule, at least partially calcified and located anteriorly.  3. Cough, Intractable, secondary to #1  4. RLL scarring, chronic, present since 2015 on CT.  5. 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   Continue tessalon perles and may use codeine PRN for cough.   Patient was encouraged to work harder on deep breathing. She has urge to cough  with shallow breaths today and unable to get to 500 ml. Encouraged to take IS home and continue working on deep breathing and coughing.   Ok to discharge home today.     Discussed with patient.     Disposition: home.   Will need follow up with Dr. Mi and repeat CT of chest in 4-6 weeks.     STACIA Ocampo  04/09/18  9:51 AM      Time:       Dictated utilizing Dragon dictation:  Much of this encounter note is an electronic transcription/translation of spoken language to printed text. The electronic translation of spoken language may permit erroneous, or at times, nonsensical words or phrases to be inadvertently transcribed; Although I have reviewed the note for such errors, some may still exist.

## 2018-04-09 NOTE — PLAN OF CARE
Problem: Patient Care Overview  Goal: Plan of Care Review  Outcome: Ongoing (interventions implemented as appropriate)   04/09/18 0031 04/09/18 0433   Coping/Psychosocial   Plan of Care Reviewed With patient --    Plan of Care Review   Progress --  no change   OTHER   Outcome Summary --  Pt given PRN cough medicine. VSS. Room air to 1 L NC. Safety maintained. Probable d/c today.      Goal: Individualization and Mutuality  Outcome: Ongoing (interventions implemented as appropriate)    Goal: Discharge Needs Assessment  Outcome: Ongoing (interventions implemented as appropriate)    Goal: Interprofessional Rounds/Family Conf  Outcome: Ongoing (interventions implemented as appropriate)      Problem: Pneumonia (Adult)  Goal: Signs and Symptoms of Listed Potential Problems Will be Absent, Minimized or Managed (Pneumonia)  Outcome: Ongoing (interventions implemented as appropriate)      Problem: Cardiac: Heart Failure (Adult)  Goal: Signs and Symptoms of Listed Potential Problems Will be Absent, Minimized or Managed (Cardiac: Heart Failure)  Outcome: Ongoing (interventions implemented as appropriate)

## 2018-04-09 NOTE — DISCHARGE SUMMARY
Name: Brodie Mo  Age: 85 y.o.  Sex: female  :  10/31/1932  MRN: [id number]         Primary Care Physician: Georgie Diane MD      Date of Admission:  2018  Date of Discharge:  2018      CHIEF COMPLAINT  No chief complaint on file.      DISCHARGE DIAGNOSIS  Active Hospital Problems (** Indicates Principal Problem)    Diagnosis Date Noted   • **Community acquired pneumonia - bacterial  [J18.9] 2018   • Acute on chronic diastolic congestive heart failure [I50.33] 2018   • CKD (chronic kidney disease) stage 3, GFR 30-59 ml/min [N18.3] 2018   • History of bladder cancer [C80.1] 2018   • CAD (coronary artery disease) [I25.10] 2018   • Essential hypertension [I10] 12/15/2017   • S/P CABG (coronary artery bypass graft) [Z95.1] 2016      Resolved Hospital Problems    Diagnosis Date Noted Date Resolved   No resolved problems to display.       SECONDARY DIAGNOSES  Past Medical History:   Diagnosis Date   • CAD (coronary artery disease)    • Cancer     bladder    • Health care maintenance    • HTN (hypertension)    • Hyperlipidemia    • LVH (left ventricular hypertrophy)    • S/P CABG (coronary artery bypass graft)        CONSULTS   Consulting Physician(s)     Provider Relationship Specialty    Kelby Huston MD Consulting Physician Pulmonary Disease            PROCEDURES PERFORMED  2D ECHOCARDIOGRAM   2018  · Left atrial cavity size is moderately dilated.  · Mild-to-moderate mitral valve regurgitation is present  · Moderate tricuspid valve regurgitation is present.  · Right ventricular cavity is mild-to-moderately dilated.  · Calculated EF = 54.5%.  · There is no evidence of pericardial effusion.    THORACIC CT SCAN WITHOUT CONTRAST  2018  1. Exam quality degraded by motion.  2. Stable right lung base consolidation with adjacent scarring and right  greater than left pleural thickening.  3. Extensive right upper lobe opacities are most consistent  with  pneumonia. Follow-up will be needed to ensure resolution.          HOSPITAL COURSE  Ms. Mo is a 85 y.o. female who presented to [facility] initially complaining of SOA, fever and chills. Please see the admitting history and physical for further details. She was found to have pneumonia and was admitted to the hospital for further evaluation and treatment. She was started on Levaquin and seen by pulmonology. Cultures have been negative and she has responded to treatment. There was initial concern of a right upper lobe mass however radiologist read it as scar tissue.  She does have a nodule in her RLL and will need repeat CT chest 4-6 weeks. She was also felt to have a slight component of decompensated heart failure and was given diuresis with IV Lasix. Echocardiogram was performed with results outlined above. She seems compensated at this point. Renal function has been stable. Will resume lisinopril at discharge. She will follow-up with her primary care provider as well as pulmonology.    She will require a follow-up CT of the chest in about 4-6 weeks after discontinuation of antibiotic treatment to evaluate for resolution of her pulmonary infiltrates.  She does also have a right lower lobe pulmonary nodule which needs to be followed radiologicall over 2 years due to her increased risk of malignancy.    She will complete a seven-day course of Levaquin. She was given prescription for Tessalon Perles and codeine cough syrup to be used mainly at night.        PHYSICAL EXAM  Temp:  [98.5 °F (36.9 °C)-100.3 °F (37.9 °C)] 99 °F (37.2 °C)  Heart Rate:  [67-78] 72  Resp:  [16-20] 20  BP: (118-150)/(51-75) 148/56  Body mass index is 23.78 kg/m².  Physical Exam   Constitutional: She is oriented to person, place, and time. No distress.   Eyes: No scleral icterus.   Cardiovascular: Normal rate and regular rhythm.    No murmur heard.  Pulmonary/Chest: Effort normal. She has rales (RLL).   Abdominal: Soft.  Bowel sounds are normal. She exhibits no distension. There is no tenderness.   Musculoskeletal: Normal range of motion. She exhibits no edema.   Neurological: She is alert and oriented to person, place, and time.   Skin: Skin is warm and dry. She is not diaphoretic.         CONDITION ON DISCHARGE  Stable.      DISCHARGE DISPOSITION   Home with family      ALLERGIES  Allergies   Allergen Reactions   • No Known Drug Allergy          DISCHARGE MEDICATIONS     Your medication list      START taking these medications      Instructions Last Dose Given Next Dose Due   benzonatate 200 MG capsule  Commonly known as:  TESSALON      Take 1 capsule by mouth 3 (Three) Times a Day for 7 days.       guaiFENesin-codeine 100-10 MG/5ML syrup  Commonly known as:  ROMILAR-AC      Take 5 mL by mouth Every 6 (Six) Hours As Needed for Cough.       levoFLOXacin 500 MG tablet  Commonly known as:  LEVAQUIN      Take 1 tablet by mouth Daily for 3 doses.          CHANGE how you take these medications      Instructions Last Dose Given Next Dose Due   atorvastatin 40 MG tablet  Commonly known as:  LIPITOR  What changed:  See the new instructions.      TAKE 1 TABLET DAILY.          CONTINUE taking these medications      Instructions Last Dose Given Next Dose Due   aspirin 81 MG tablet      Take  by mouth Daily.       atenolol 25 MG tablet  Commonly known as:  TENORMIN      Take  by mouth Daily.       lisinopril 10 MG tablet  Commonly known as:  PRINIVIL,ZESTRIL      Take  by mouth Daily.             Where to Get Your Medications      These medications were sent to [facility]/pharmacy #[id number] - [facility], [place] - [address] AT [facility] [phone]  - [phone]   [address], [facility] [address]    Phone:  [phone]    benzonatate 200 MG capsule   levoFLOXacin 500 MG tablet     You can get these medications from any pharmacy    Bring a paper prescription for each of these medications   guaiFENesin-codeine  100-10 MG/5ML syrup       Diet Instructions     Diet: Regular, Cardiac       Discharge Diet:   Regular  Cardiac          Activity Instructions     Activity as Tolerated         Future Appointments  Date Time Provider Department Center   12/5/2018 11:00 AM EDVIN Walsh [facility] None     Follow-up Information     Georgie Diane MD Follow up in 2 week(s).    Specialty:  Internal Medicine  Contact information:  [address] [facility] [facility] [address]  [place] [address]  [phone]             Eric Mi MD Follow up in 4 week(s).    Specialties:  Pulmonary Disease, Sleep Medicine  Contact information:  [address]  [place] [address]  [phone]                   TEST  RESULTS PENDING AT DISCHARGE  None      CODE STATUS  Conditional Code        Mayur Baer MD  [facility]  04/09/18  11:52 AM

## 2018-04-10 LAB — BACTERIA SPEC AEROBE CULT: NORMAL

## 2018-04-17 ENCOUNTER — TRANSCRIBE ORDERS (OUTPATIENT)
Dept: ADMINISTRATIVE | Facility: HOSPITAL | Age: 83
End: 2018-04-17

## 2018-04-17 DIAGNOSIS — J18.9 PNEUMONIA OF BOTH LOWER LOBES DUE TO INFECTIOUS ORGANISM: Primary | ICD-10-CM

## 2018-05-15 ENCOUNTER — HOSPITAL ENCOUNTER (OUTPATIENT)
Dept: CT IMAGING | Facility: HOSPITAL | Age: 83
Discharge: HOME | End: 2018-05-15
Admitting: INTERNAL MEDICINE

## 2018-05-15 DIAGNOSIS — J18.9 PNEUMONIA OF BOTH LOWER LOBES DUE TO INFECTIOUS ORGANISM: ICD-10-CM

## 2018-05-15 LAB — CREAT BLDA-MCNC: 1.2 MG/DL (ref 0.6–1.3)

## 2018-05-15 PROCEDURE — 82565 ASSAY OF CREATININE: CPT

## 2018-05-15 PROCEDURE — 25010000002 IOPAMIDOL 61 % SOLUTION: Performed by: INTERNAL MEDICINE

## 2018-05-15 PROCEDURE — 71260 CT THORAX DX C+: CPT

## 2018-05-15 RX ADMIN — IOPAMIDOL 75 ML: 612 INJECTION, SOLUTION INTRAVENOUS at 12:58

## 2018-06-11 RX ORDER — ATORVASTATIN CALCIUM 40 MG/1
TABLET, FILM COATED ORAL
Qty: 90 TABLET | Refills: 3 | Status: SHIPPED | OUTPATIENT
Start: 2018-06-11 | End: 2019-06-18 | Stop reason: SDUPTHER

## 2018-12-05 ENCOUNTER — HOSPITAL ENCOUNTER (OUTPATIENT)
Dept: GENERAL RADIOLOGY | Facility: HOSPITAL | Age: 83
Discharge: HOME | End: 2018-12-05
Admitting: INTERNAL MEDICINE

## 2018-12-05 ENCOUNTER — OFFICE VISIT (OUTPATIENT)
Dept: CARDIOLOGY | Facility: CLINIC | Age: 83
End: 2018-12-05

## 2018-12-05 VITALS
WEIGHT: 144 LBS | DIASTOLIC BLOOD PRESSURE: 50 MMHG | OXYGEN SATURATION: 99 % | HEART RATE: 50 BPM | HEIGHT: 65 IN | BODY MASS INDEX: 23.99 KG/M2 | SYSTOLIC BLOOD PRESSURE: 138 MMHG

## 2018-12-05 DIAGNOSIS — I25.10 CORONARY ARTERY DISEASE INVOLVING NATIVE CORONARY ARTERY OF NATIVE HEART WITHOUT ANGINA PECTORIS: Primary | ICD-10-CM

## 2018-12-05 DIAGNOSIS — R06.09 DOE (DYSPNEA ON EXERTION): ICD-10-CM

## 2018-12-05 PROCEDURE — 99213 OFFICE O/P EST LOW 20 MIN: CPT | Performed by: PHYSICIAN ASSISTANT

## 2018-12-05 PROCEDURE — 93000 ELECTROCARDIOGRAM COMPLETE: CPT | Performed by: PHYSICIAN ASSISTANT

## 2018-12-05 PROCEDURE — 71046 X-RAY EXAM CHEST 2 VIEWS: CPT

## 2018-12-05 RX ORDER — MELATONIN
1000 DAILY
COMMUNITY

## 2018-12-05 NOTE — PROGRESS NOTES
Date of Office Visit: 2018  Encounter Provider: EDVIN Sutton  Place of Service: [facility] CARDIOLOGY  Patient Name: Brodie Mo  :10/31/1932    Chief Complaint   Patient presents with   • Coronary Artery Disease     1 year follow up   :     HPI: Brodie Mo is a 86 y.o. female, new to me, who presents today for follow-up.  Old records have been obtained and reviewed by me.  She is a patient of Dr. Hatch's with a past cardiac history significant for coronary artery disease.  In  she had a CABG ×2 with a LIMA to the LAD and a vein graft to the OM 2.  A repeat cardiac catheterization in  showed her grafts to be patent and normal LV function.  Her last stress test was in 2017.  The ECG portion was abnormal, however the nuclear pictures were normal and she was asymptomatic on the treadmill.  She was last in our office to see Dr. Hatch on 12/15/2017.  At that visit she was doing well.  She was dealing with some grief after the death of her .  No changes were made to her medical regimen, and she's here today for yearly visit.   Over the past year she's been doing well from a cardiac standpoint.  She denies any chest pain, palpitations, edema, dizziness, or syncope.  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  She used to run this with her , however he passed away about a year and a half ago and now her son helps to run the business.  It is been very stressful on her ever since her  , and she constantly worries about the farm and the cattle.  She has noticed over the past 2 months that she is a little more short of breath than she used to be.  She notices it when she climbs the stairs and when she is out on the farm.  The shortness of breath is not limiting, and it has not gotten progressively worse.  It has stayed the same over the past 2 months.         Past Medical History:    Diagnosis Date   • CAD (coronary artery disease)    • Cancer (CMS/HCC)     bladder    • Health care maintenance    • HTN (hypertension)    • Hyperlipidemia    • LVH (left ventricular hypertrophy)    • S/P CABG (coronary artery bypass graft)        Past Surgical History:   Procedure Laterality Date   • BLADDER SURGERY      cancer   • CORONARY ARTERY BYPASS GRAFT      2002 with Dr Prabhakar; OLIVIER to LAD and SVG to OM2; 2005 cath all grafts open and 30% dx in RCA; nl stress in 2010, 2013       Social History     Socioeconomic History   • Marital status: Unknown     Spouse name: Not on file   • Number of children: Not on file   • Years of education: Not on file   • Highest education level: Not on file   Social Needs   • Financial resource strain: Not on file   • Food insecurity - worry: Not on file   • Food insecurity - inability: Not on file   • Transportation needs - medical: Not on file   • Transportation needs - non-medical: Not on file   Occupational History   • Not on file   Tobacco Use   • Smoking status: Former Smoker   • Smokeless tobacco: Never Used   • Tobacco comment: caffine use   Substance and Sexual Activity   • Alcohol use: Yes     Alcohol/week: 3.0 oz     Types: 5 Shots of liquor per week     Comment: weekly   • Drug use: No   • Sexual activity: Defer   Other Topics Concern   • Not on file   Social History Narrative   • Not on file       Family History   Problem Relation Age of Onset   • No Known Problems Mother    • No Known Problems Father    • No Known Problems Maternal Grandmother    • No Known Problems Maternal Grandfather    • No Known Problems Paternal Grandmother    • No Known Problems Paternal Grandfather        Review of Systems   Constitution: Negative for chills, fever and malaise/fatigue.   Cardiovascular: Positive for dyspnea on exertion. Negative for chest pain, leg swelling, near-syncope, orthopnea, palpitations, paroxysmal nocturnal dyspnea and syncope.   Respiratory: Negative for cough  "and shortness of breath.    Musculoskeletal: Negative for joint pain, joint swelling and myalgias.   Gastrointestinal: Negative for abdominal pain, diarrhea, melena, nausea and vomiting.   Genitourinary: Negative for frequency and hematuria.   Neurological: Negative for light-headedness, numbness, paresthesias and seizures.   Allergic/Immunologic: Negative.    All other systems reviewed and are negative.      Allergies   Allergen Reactions   • No Known Drug Allergy          Current Outpatient Medications:   •  aspirin 81 MG tablet, Take 81 mg by mouth Daily., Disp: , Rfl:   •  atenolol (TENORMIN) 25 MG tablet, Take 25 mg by mouth Daily., Disp: , Rfl:   •  atorvastatin (LIPITOR) 40 MG tablet, TAKE 1 TABLET DAILY., Disp: 90 tablet, Rfl: 3  •  cholecalciferol (VITAMIN D3) 1000 units tablet, Take 1,000 Units by mouth Daily., Disp: , Rfl:   •  lisinopril (PRINIVIL,ZESTRIL) 10 MG tablet, Take 10 mg by mouth Daily., Disp: , Rfl:       Objective:     Vitals:    12/05/18 1051   BP: 138/50   BP Location: Right arm   Pulse: 50   SpO2: 99%   Weight: 65.3 kg (144 lb)   Height: 165.1 cm (65\")     Body mass index is 23.96 kg/m².    PHYSICAL EXAM:    Physical Exam   Constitutional: She is oriented to person, place, and time. She appears well-developed and well-nourished. No distress.   HENT:   Head: Normocephalic and atraumatic.   Eyes: Pupils are equal, round, and reactive to light.   Neck: No JVD present. No thyromegaly present.   Cardiovascular: Normal rate, regular rhythm, normal heart sounds and intact distal pulses.   No murmur heard.  Pulmonary/Chest: Effort normal and breath sounds normal. No respiratory distress.   Abdominal: Soft. Bowel sounds are normal. She exhibits no distension. There is no splenomegaly or hepatomegaly. There is no tenderness.   Musculoskeletal: Normal range of motion. She exhibits no edema.   Neurological: She is alert and oriented to person, place, and time.   Skin: Skin is warm and dry. She is not " diaphoretic. No erythema.   Psychiatric: She has a normal mood and affect. Her behavior is normal. Judgment normal.         ECG 12 Lead  Date/Time: 12/5/2018 11:10 AM  Performed by: Nisha Cedeno PA  Authorized by: Nisha Cedeno PA   Comparison: compared with previous ECG from 12/9/2016  Similar to previous ECG  Rhythm: sinus rhythm  BPM: 50  Clinical impression: abnormal ECG  Comments: Indication: Coronary disease.    Diffuse T-wave abnormalities, unchanged from prior ECG.              Assessment:       Diagnosis Plan   1. Coronary artery disease involving native coronary artery of native heart without angina pectoris  ECG 12 Lead     Orders Placed This Encounter   Procedures   • ECG 12 Lead     This order was created via procedure documentation          Plan:       1.  Coronary Artery Disease  Assessment  • The patient has no angina    Plan  • Lifestyle modifications discussed include regular exercise    Subjective - Objective  • There is a history of previous coronary artery bypass graft  • Current antiplatelet therapy includes aspirin 81 mg  • Overall I think she stable and doing well.  I think that her shortness of breath could be an anginal equivalent, however it is not lifestyle limiting at this point and she does not want to pursue further cardiac testing.  I also think that there could be a stress component to this.  She gets pretty tearful when discussing her stress and the death of her .  I've asked her to make sure that she reaches out to family members for help, she indicates that she does.  If her symptoms worsen or if they become concerning to her, my recommendation would be for a nuclear stress test.  I'm not going to make any changes to her medical regimen, and she will follow-up with Dr. Hatch in 1 year or sooner if needed.      As always, it has been a pleasure to participate in your patient's care.      Sincerely,         Nisha Cedeno PA-C

## 2019-05-22 ENCOUNTER — OFFICE VISIT (OUTPATIENT)
Dept: CARDIOLOGY | Facility: CLINIC | Age: 84
End: 2019-05-22

## 2019-05-22 VITALS
BODY MASS INDEX: 23.99 KG/M2 | HEIGHT: 65 IN | SYSTOLIC BLOOD PRESSURE: 142 MMHG | DIASTOLIC BLOOD PRESSURE: 70 MMHG | OXYGEN SATURATION: 98 % | HEART RATE: 58 BPM | WEIGHT: 144 LBS

## 2019-05-22 DIAGNOSIS — I25.10 CORONARY ARTERY DISEASE INVOLVING NATIVE CORONARY ARTERY OF NATIVE HEART WITHOUT ANGINA PECTORIS: Primary | ICD-10-CM

## 2019-05-22 DIAGNOSIS — R06.09 DOE (DYSPNEA ON EXERTION): ICD-10-CM

## 2019-05-22 PROCEDURE — 93000 ELECTROCARDIOGRAM COMPLETE: CPT | Performed by: PHYSICIAN ASSISTANT

## 2019-05-22 PROCEDURE — 99213 OFFICE O/P EST LOW 20 MIN: CPT | Performed by: PHYSICIAN ASSISTANT

## 2019-05-22 NOTE — PROGRESS NOTES
Date of Office Visit: 2019  Encounter Provider: EDVIN Sutton  Place of Service: [facility] CARDIOLOGY  Patient Name: Brodie Mo  :10/31/1932    Chief Complaint   Patient presents with   • Coronary Artery Disease     6 month follow up   :     HPI: Brodie Mo is a 86 y.o. female who presents today for follow-up.  Old records have been obtained and reviewed by me.  She is a patient of Dr. Hatch's with a past cardiac history significant for coronary artery disease.  She had a CABG x2 with a LIMA to the LAD and a vein graft to the OM  and .  She had a repeat cardiac catheterization in , her grafts were patent.  She had a normal nuclear stress test in 2017 although the ECG portion was abnormal.  She did have an echocardiogram in 2018, this showed mild to moderate mitral regurgitation, moderate tricuspid regurgitation, mild to moderately dilated RV, and normal LV function with an EF of 55%.  She was last in our office to see me on 2018.  At that visit she was doing well from a cardiac standpoint without complaints of angina or heart failure.  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  Her son was helping her run the business.  She was still struggling a little bit after the death of her .  She had some complaints of a little bit of shortness of breath, however it was relatively unchanged.  I offered a nuclear stress test, however she wanted to wait.  My recommendation was to see her in 1 year or sooner if needed.   She is here sooner than her appointment with increased shortness of breath on exertion.  She is having trouble walking to the end of the driveway and back without getting short of breath.  This time 6 months ago that was no problem for her.  She feels like it is gotten progressive.  Interestingly enough, her symptoms prior to her bypass surgery in  where shortness of  breath on exertion.  She never did have any chest pain.  She denies any palpitations, angina, edema, dizziness, or syncope.  She is a little lightheaded in the morning when she wakes up.  She has not had any weight gain, she has no orthopnea or PND.      Past Medical History:   Diagnosis Date   • CAD (coronary artery disease)    • Cancer (CMS/HCC)     bladder    • Health care maintenance    • HTN (hypertension)    • Hyperlipidemia    • LVH (left ventricular hypertrophy)    • Pneumonia    • S/P CABG (coronary artery bypass graft)        Past Surgical History:   Procedure Laterality Date   • BLADDER SURGERY      cancer   • CORONARY ARTERY BYPASS GRAFT      2002 with Dr Prabhakar; OLIVIER to LAD and SVG to OM2; 2005 cath all grafts open and 30% dx in RCA; nl stress in 2010, 2013       Social History     Socioeconomic History   • Marital status: Unknown     Spouse name: Not on file   • Number of children: Not on file   • Years of education: Not on file   • Highest education level: Not on file   Tobacco Use   • Smoking status: Former Smoker   • Smokeless tobacco: Never Used   • Tobacco comment: caffine use   Substance and Sexual Activity   • Alcohol use: Yes     Alcohol/week: 3.0 oz     Types: 5 Shots of liquor per week     Comment: weekly   • Drug use: No   • Sexual activity: Defer       Family History   Problem Relation Age of Onset   • No Known Problems Mother    • No Known Problems Father    • No Known Problems Maternal Grandmother    • No Known Problems Maternal Grandfather    • No Known Problems Paternal Grandmother    • No Known Problems Paternal Grandfather        Review of Systems   Constitution: Negative for chills, fever and malaise/fatigue.   Cardiovascular: Positive for dyspnea on exertion. Negative for chest pain, leg swelling, near-syncope, orthopnea, palpitations, paroxysmal nocturnal dyspnea and syncope.   Respiratory: Negative for cough and shortness of breath.    Musculoskeletal: Negative for joint pain,  "joint swelling and myalgias.   Gastrointestinal: Negative for abdominal pain, diarrhea, melena, nausea and vomiting.   Genitourinary: Negative for frequency and hematuria.   Neurological: Negative for light-headedness, numbness, paresthesias and seizures.   Allergic/Immunologic: Negative.    All other systems reviewed and are negative.      Allergies   Allergen Reactions   • No Known Drug Allergy          Current Outpatient Medications:   •  aspirin 81 MG tablet, Take 81 mg by mouth Daily., Disp: , Rfl:   •  atenolol (TENORMIN) 25 MG tablet, Take 25 mg by mouth Daily., Disp: , Rfl:   •  atorvastatin (LIPITOR) 40 MG tablet, TAKE 1 TABLET DAILY., Disp: 90 tablet, Rfl: 3  •  cholecalciferol (VITAMIN D3) 1000 units tablet, Take 1,000 Units by mouth Daily., Disp: , Rfl:   •  lisinopril (PRINIVIL,ZESTRIL) 10 MG tablet, Take 10 mg by mouth Daily., Disp: , Rfl:       Objective:     Vitals:    05/22/19 1113 05/22/19 1121   BP: 128/72 142/70   BP Location: Right arm Left arm   Pulse: 58    SpO2: 98%    Weight: 65.3 kg (144 lb)    Height: 165.1 cm (65\")      Body mass index is 23.96 kg/m².    PHYSICAL EXAM:    Physical Exam   Constitutional: She is oriented to person, place, and time. She appears well-developed and well-nourished. No distress.   HENT:   Head: Normocephalic and atraumatic.   Eyes: Pupils are equal, round, and reactive to light.   Neck: No JVD present. No thyromegaly present.   Cardiovascular: Normal rate, regular rhythm, normal heart sounds and intact distal pulses.   No murmur heard.  Pulmonary/Chest: Effort normal and breath sounds normal. No respiratory distress.   Abdominal: Soft. Bowel sounds are normal. She exhibits no distension. There is no splenomegaly or hepatomegaly. There is no tenderness.   Musculoskeletal: Normal range of motion. She exhibits no edema.   Neurological: She is alert and oriented to person, place, and time.   Skin: Skin is warm and dry. She is not diaphoretic. No erythema. "   Psychiatric: She has a normal mood and affect. Her behavior is normal. Judgment normal.         ECG 12 Lead  Date/Time: 5/22/2019 11:30 AM  Performed by: Nisha Cedeno PA  Authorized by: Nisha Cedeno PA   Comparison: compared with previous ECG from 12/5/2018  Similar to previous ECG  Rhythm: sinus rhythm  BPM: 59  T flattening: V5, V6, I, aVL, III and aVF    Clinical impression: abnormal EKG  Comments: Indication: Coronary disease              Assessment:       Diagnosis Plan   1. Coronary artery disease involving native coronary artery of native heart without angina pectoris  ECG 12 Lead    Stress Test With Myocardial Perfusion One Day    Adult Transthoracic Echo Complete W/ Cont if Necessary Per Protocol   2. [place] (dyspnea on exertion)  Stress Test With Myocardial Perfusion One Day    Adult Transthoracic Echo Complete W/ Cont if Necessary Per Protocol     Orders Placed This Encounter   Procedures   • Stress Test With Myocardial Perfusion One Day     Standing Status:   Future     Standing Expiration Date:   5/21/2020     Order Specific Question:   What stress agent will be used?     Answer:   Regadenoson (Lexiscan)     Order Specific Question:   Difficulty walking criteria?     Answer:   Poor Exercise Tolerance     Order Specific Question:   Reason for exam?     Answer:   Known Coronary Artery Disease   • ECG 12 Lead     This order was created via procedure documentation   • Adult Transthoracic Echo Complete W/ Cont if Necessary Per Protocol     Standing Status:   Future     Order Specific Question:   Reason for exam?     Answer:   Dyspnea          Plan:       Overall a little concerned about her.  I am worried that her shortness of breath on exertion is her anginal equivalent.  She also has some valvular disease that was detected last year on an echocardiogram, certainly this could be getting worse as well.  She does not have any signs of heart failure.  No JVD, no edema, no abdominal distention, no  orthopnea, no PND.  She has not had any weight gain.  Her twelve-lead today is unchanged.  I think she is stable, however I would like to check a nuclear stress test as well as another echocardiogram on her sometime in the next couple of weeks.  Her son is going to [place] and will not return until 6/10/2019, she would like to wait until after that and I think this is fine.  Of asked her to call me in the meantime if she has any problems.    Coronary Artery Disease  Assessment  • The patient has no angina    Plan  • Lifestyle modifications discussed include adhering to a heart healthy diet and regular exercise    Subjective - Objective  • There is a history of previous coronary artery bypass graft  • Current antiplatelet therapy includes aspirin 81 mg        As always, it has been a pleasure to participate in your patient's care.      Sincerely,         Nisha Cedeno PA-C

## 2019-06-18 RX ORDER — ATORVASTATIN CALCIUM 40 MG/1
TABLET, FILM COATED ORAL
Qty: 90 TABLET | Refills: 3 | Status: SHIPPED | OUTPATIENT
Start: 2019-06-18 | End: 2020-06-05

## 2019-07-09 ENCOUNTER — HOSPITAL ENCOUNTER (OUTPATIENT)
Dept: CARDIOLOGY | Facility: HOSPITAL | Age: 84
Discharge: HOME | End: 2019-07-09
Admitting: PHYSICIAN ASSISTANT

## 2019-07-09 ENCOUNTER — HOSPITAL ENCOUNTER (OUTPATIENT)
Dept: CARDIOLOGY | Facility: HOSPITAL | Age: 84
Discharge: HOME | End: 2019-07-09

## 2019-07-09 VITALS
WEIGHT: 144 LBS | SYSTOLIC BLOOD PRESSURE: 132 MMHG | HEIGHT: 65 IN | BODY MASS INDEX: 23.99 KG/M2 | DIASTOLIC BLOOD PRESSURE: 50 MMHG

## 2019-07-09 DIAGNOSIS — R06.09 DOE (DYSPNEA ON EXERTION): ICD-10-CM

## 2019-07-09 DIAGNOSIS — I25.10 CORONARY ARTERY DISEASE INVOLVING NATIVE CORONARY ARTERY OF NATIVE HEART WITHOUT ANGINA PECTORIS: ICD-10-CM

## 2019-07-09 LAB
AORTIC ROOT ANNULUS: 1.8 CM
ASCENDING AORTA: 2.2 CM
BH CV ECHO MEAS - ACS: 1.4 CM
BH CV ECHO MEAS - AO MAX PG (FULL): 3.9 MMHG
BH CV ECHO MEAS - AO MAX PG: 7.5 MMHG
BH CV ECHO MEAS - AO MEAN PG (FULL): 2.2 MMHG
BH CV ECHO MEAS - AO MEAN PG: 4.3 MMHG
BH CV ECHO MEAS - AO V2 MAX: 137.3 CM/SEC
BH CV ECHO MEAS - AO V2 MEAN: 98.9 CM/SEC
BH CV ECHO MEAS - AO V2 VTI: 38.3 CM
BH CV ECHO MEAS - AVA(I,A): 1.5 CM^2
BH CV ECHO MEAS - AVA(I,D): 1.5 CM^2
BH CV ECHO MEAS - AVA(V,A): 1.4 CM^2
BH CV ECHO MEAS - AVA(V,D): 1.4 CM^2
BH CV ECHO MEAS - BSA(HAYCOCK): 1.7 M^2
BH CV ECHO MEAS - BSA: 1.7 M^2
BH CV ECHO MEAS - BZI_BMI: 24 KILOGRAMS/M^2
BH CV ECHO MEAS - BZI_METRIC_HEIGHT: 165.1 CM
BH CV ECHO MEAS - BZI_METRIC_WEIGHT: 65.3 KG
BH CV ECHO MEAS - EDV(MOD-SP2): 78 ML
BH CV ECHO MEAS - EDV(MOD-SP4): 88 ML
BH CV ECHO MEAS - EDV(TEICH): 94.8 ML
BH CV ECHO MEAS - EF(CUBED): 80.4 %
BH CV ECHO MEAS - EF(MOD-BP): 63 %
BH CV ECHO MEAS - EF(MOD-SP2): 61.5 %
BH CV ECHO MEAS - EF(MOD-SP4): 64.8 %
BH CV ECHO MEAS - EF(TEICH): 73 %
BH CV ECHO MEAS - ESV(MOD-SP2): 30 ML
BH CV ECHO MEAS - ESV(MOD-SP4): 31 ML
BH CV ECHO MEAS - ESV(TEICH): 25.6 ML
BH CV ECHO MEAS - FS: 41.9 %
BH CV ECHO MEAS - IVS/LVPW: 0.97
BH CV ECHO MEAS - IVSD: 0.95 CM
BH CV ECHO MEAS - LAT PEAK E' VEL: 8 CM/SEC
BH CV ECHO MEAS - LV DIASTOLIC VOL/BSA (35-75): 51.2 ML/M^2
BH CV ECHO MEAS - LV MASS(C)D: 149.1 GRAMS
BH CV ECHO MEAS - LV MASS(C)DI: 86.7 GRAMS/M^2
BH CV ECHO MEAS - LV MAX PG: 3.6 MMHG
BH CV ECHO MEAS - LV MEAN PG: 2.1 MMHG
BH CV ECHO MEAS - LV SYSTOLIC VOL/BSA (12-30): 18 ML/M^2
BH CV ECHO MEAS - LV V1 MAX: 95.1 CM/SEC
BH CV ECHO MEAS - LV V1 MEAN: 67.7 CM/SEC
BH CV ECHO MEAS - LV V1 VTI: 27.6 CM
BH CV ECHO MEAS - LVIDD: 4.5 CM
BH CV ECHO MEAS - LVIDS: 2.6 CM
BH CV ECHO MEAS - LVLD AP2: 6.4 CM
BH CV ECHO MEAS - LVLD AP4: 6.8 CM
BH CV ECHO MEAS - LVLS AP2: 6 CM
BH CV ECHO MEAS - LVLS AP4: 6.2 CM
BH CV ECHO MEAS - LVOT AREA (M): 2 CM^2
BH CV ECHO MEAS - LVOT AREA: 2 CM^2
BH CV ECHO MEAS - LVOT DIAM: 1.6 CM
BH CV ECHO MEAS - LVPWD: 0.98 CM
BH CV ECHO MEAS - MED PEAK E' VEL: 8 CM/SEC
BH CV ECHO MEAS - MR MAX PG: 152.4 MMHG
BH CV ECHO MEAS - MR MAX VEL: 617.3 CM/SEC
BH CV ECHO MEAS - MV A DUR: 0.11 SEC
BH CV ECHO MEAS - MV A MAX VEL: 84.4 CM/SEC
BH CV ECHO MEAS - MV DEC SLOPE: 485.8 CM/SEC^2
BH CV ECHO MEAS - MV DEC TIME: 0.2 SEC
BH CV ECHO MEAS - MV E MAX VEL: 97.5 CM/SEC
BH CV ECHO MEAS - MV E/A: 1.2
BH CV ECHO MEAS - MV MAX PG: 7.1 MMHG
BH CV ECHO MEAS - MV MEAN PG: 1.3 MMHG
BH CV ECHO MEAS - MV P1/2T MAX VEL: 96.5 CM/SEC
BH CV ECHO MEAS - MV P1/2T: 58.2 MSEC
BH CV ECHO MEAS - MV V2 MAX: 132.9 CM/SEC
BH CV ECHO MEAS - MV V2 MEAN: 45.4 CM/SEC
BH CV ECHO MEAS - MV V2 VTI: 33.3 CM
BH CV ECHO MEAS - MVA P1/2T LCG: 2.3 CM^2
BH CV ECHO MEAS - MVA(P1/2T): 3.8 CM^2
BH CV ECHO MEAS - MVA(VTI): 1.7 CM^2
BH CV ECHO MEAS - PA ACC TIME: 0.19 SEC
BH CV ECHO MEAS - PA MAX PG (FULL): 1.3 MMHG
BH CV ECHO MEAS - PA MAX PG: 3.2 MMHG
BH CV ECHO MEAS - PA PR(ACCEL): -6.6 MMHG
BH CV ECHO MEAS - PA V2 MAX: 89.2 CM/SEC
BH CV ECHO MEAS - PULM A REVS DUR: 0.08 SEC
BH CV ECHO MEAS - PULM A REVS VEL: 32.5 CM/SEC
BH CV ECHO MEAS - PULM DIAS VEL: 62.1 CM/SEC
BH CV ECHO MEAS - PULM S/D: 1.1
BH CV ECHO MEAS - PULM SYS VEL: 69.4 CM/SEC
BH CV ECHO MEAS - PVA(V,A): 1.1 CM^2
BH CV ECHO MEAS - PVA(V,D): 1.1 CM^2
BH CV ECHO MEAS - QP/QS: 0.58
BH CV ECHO MEAS - RAP SYSTOLE: 15 MMHG
BH CV ECHO MEAS - RV MAX PG: 1.8 MMHG
BH CV ECHO MEAS - RV MEAN PG: 1.4 MMHG
BH CV ECHO MEAS - RV V1 MAX: 67.9 CM/SEC
BH CV ECHO MEAS - RV V1 MEAN: 58 CM/SEC
BH CV ECHO MEAS - RV V1 VTI: 21.7 CM
BH CV ECHO MEAS - RVOT AREA: 1.5 CM^2
BH CV ECHO MEAS - RVOT DIAM: 1.4 CM
BH CV ECHO MEAS - RVSP: 56 MMHG
BH CV ECHO MEAS - SI(CUBED): 43.9 ML/M^2
BH CV ECHO MEAS - SI(LVOT): 32.3 ML/M^2
BH CV ECHO MEAS - SI(MOD-SP2): 27.9 ML/M^2
BH CV ECHO MEAS - SI(MOD-SP4): 33.1 ML/M^2
BH CV ECHO MEAS - SI(TEICH): 40.2 ML/M^2
BH CV ECHO MEAS - SUP REN AO DIAM: 1.7 CM
BH CV ECHO MEAS - SV(CUBED): 75.6 ML
BH CV ECHO MEAS - SV(LVOT): 55.6 ML
BH CV ECHO MEAS - SV(MOD-SP2): 48 ML
BH CV ECHO MEAS - SV(MOD-SP4): 57 ML
BH CV ECHO MEAS - SV(RVOT): 32.3 ML
BH CV ECHO MEAS - SV(TEICH): 69.1 ML
BH CV ECHO MEAS - TAPSE (>1.6): 1.7 CM2
BH CV ECHO MEAS - TR MAX VEL: 319 CM/SEC
BH CV ECHO MEASUREMENTS AVERAGE E/E' RATIO: 12.19
BH CV NUCLEAR PRIOR STUDY: 3
BH CV STRESS BP STAGE 1: NORMAL
BH CV STRESS COMMENTS STAGE 1: NORMAL
BH CV STRESS DOSE REGADENOSON STAGE 1: 0.4
BH CV STRESS DURATION MIN STAGE 1: 0
BH CV STRESS DURATION SEC STAGE 1: 10
BH CV STRESS HR STAGE 1: 81
BH CV STRESS PROTOCOL 1: NORMAL
BH CV STRESS RECOVERY BP: NORMAL MMHG
BH CV STRESS RECOVERY HR: 67 BPM
BH CV STRESS STAGE 1: 1
BH CV XLRA - RV BASE: 2.7 CM
BH CV XLRA - TDI S': 10 CM/SEC
LEFT ATRIUM VOLUME INDEX: 27 ML/M2
LV EF 2D ECHO EST: 63 %
LV EF NUC BP: 63 %
MAXIMAL PREDICTED HEART RATE: 134 BPM
MAXIMAL PREDICTED HEART RATE: 134 BPM
PERCENT MAX PREDICTED HR: 60.45 %
SINUS: 2.8 CM
STJ: 2 CM
STRESS BASELINE BP: NORMAL MMHG
STRESS BASELINE HR: 52 BPM
STRESS PERCENT HR: 71 %
STRESS POST EXERCISE DUR SEC: 10 SEC
STRESS POST PEAK BP: NORMAL MMHG
STRESS POST PEAK HR: 81 BPM
STRESS TARGET HR: 114 BPM
STRESS TARGET HR: 114 BPM

## 2019-07-09 PROCEDURE — 93018 CV STRESS TEST I&R ONLY: CPT | Performed by: INTERNAL MEDICINE

## 2019-07-09 PROCEDURE — 93306 TTE W/DOPPLER COMPLETE: CPT | Performed by: INTERNAL MEDICINE

## 2019-07-09 PROCEDURE — 25010000002 REGADENOSON 0.4 MG/5ML SOLUTION: Performed by: PHYSICIAN ASSISTANT

## 2019-07-09 PROCEDURE — 93017 CV STRESS TEST TRACING ONLY: CPT

## 2019-07-09 PROCEDURE — A9502 TC99M TETROFOSMIN: HCPCS | Performed by: PHYSICIAN ASSISTANT

## 2019-07-09 PROCEDURE — 78452 HT MUSCLE IMAGE SPECT MULT: CPT

## 2019-07-09 PROCEDURE — 93016 CV STRESS TEST SUPVJ ONLY: CPT | Performed by: INTERNAL MEDICINE

## 2019-07-09 PROCEDURE — 93306 TTE W/DOPPLER COMPLETE: CPT

## 2019-07-09 PROCEDURE — 78452 HT MUSCLE IMAGE SPECT MULT: CPT | Performed by: INTERNAL MEDICINE

## 2019-07-09 PROCEDURE — 0 TECHNETIUM TETROFOSMIN KIT: Performed by: PHYSICIAN ASSISTANT

## 2019-07-09 RX ADMIN — REGADENOSON 0.4 MG: 0.08 INJECTION, SOLUTION INTRAVENOUS at 11:55

## 2019-07-09 RX ADMIN — TETROFOSMIN 1 DOSE: 1.38 INJECTION, POWDER, LYOPHILIZED, FOR SOLUTION INTRAVENOUS at 10:57

## 2019-07-09 RX ADMIN — TETROFOSMIN 1 DOSE: 1.38 INJECTION, POWDER, LYOPHILIZED, FOR SOLUTION INTRAVENOUS at 11:55

## 2019-07-11 ENCOUNTER — TELEPHONE (OUTPATIENT)
Dept: CARDIOLOGY | Facility: CLINIC | Age: 84
End: 2019-07-11

## 2019-07-11 NOTE — TELEPHONE ENCOUNTER
I left a message asking her to call me.  She has severe mitral regurgitation.  We will need to order a BRI.  I will wait until she calls me back so I can discuss it with her further.

## 2019-07-11 NOTE — TELEPHONE ENCOUNTER
I spoke with her.  I told her my recommendations for a BRI.  Right now she would like to wait.  She still has days where she feels pretty good and is not short of breath at all.  She will keep her appointment with Dr. Hatch for January and will call me if she needs to be seen sooner.  If any of her symptoms change, I would recommend that she go ahead and get a BRI for further evaluation of her mitral valve.

## 2019-09-24 ENCOUNTER — TELEPHONE (OUTPATIENT)
Dept: CARDIOLOGY | Facility: CLINIC | Age: 84
End: 2019-09-24

## 2019-09-24 ENCOUNTER — HOSPITAL ENCOUNTER (OUTPATIENT)
Dept: CARDIOLOGY | Facility: HOSPITAL | Age: 84
Discharge: HOME | End: 2019-09-24
Admitting: INTERNAL MEDICINE

## 2019-09-24 VITALS
OXYGEN SATURATION: 97 % | DIASTOLIC BLOOD PRESSURE: 72 MMHG | SYSTOLIC BLOOD PRESSURE: 191 MMHG | HEART RATE: 58 BPM | BODY MASS INDEX: 23.99 KG/M2 | WEIGHT: 144 LBS | HEIGHT: 65 IN

## 2019-09-24 DIAGNOSIS — R06.02 SHORTNESS OF BREATH: ICD-10-CM

## 2019-09-24 DIAGNOSIS — R00.2 PALPITATIONS: ICD-10-CM

## 2019-09-24 DIAGNOSIS — R00.2 PALPITATIONS: Primary | ICD-10-CM

## 2019-09-24 LAB
ALBUMIN SERPL-MCNC: 4.4 G/DL (ref 3.5–5.2)
ALBUMIN/GLOB SERPL: 1.6 G/DL
ALP SERPL-CCNC: 85 U/L (ref 39–117)
ALT SERPL W P-5'-P-CCNC: 15 U/L (ref 1–33)
ANION GAP SERPL CALCULATED.3IONS-SCNC: 12.8 MMOL/L (ref 5–15)
AST SERPL-CCNC: 26 U/L (ref 1–32)
BASOPHILS # BLD AUTO: 0.05 10*3/MM3 (ref 0–0.2)
BASOPHILS NFR BLD AUTO: 0.8 % (ref 0–1.5)
BILIRUB SERPL-MCNC: 0.5 MG/DL (ref 0.2–1.2)
BUN BLD-MCNC: 17 MG/DL (ref 8–23)
BUN/CREAT SERPL: 13.3 (ref 7–25)
CALCIUM SPEC-SCNC: 9.2 MG/DL (ref 8.6–10.5)
CHLORIDE SERPL-SCNC: 101 MMOL/L (ref 98–107)
CO2 SERPL-SCNC: 27.2 MMOL/L (ref 22–29)
CREAT BLD-MCNC: 1.28 MG/DL (ref 0.57–1)
D DIMER PPP FEU-MCNC: 2.79 MCGFEU/ML (ref 0–0.49)
DEPRECATED RDW RBC AUTO: 42.4 FL (ref 37–54)
EOSINOPHIL # BLD AUTO: 0.1 10*3/MM3 (ref 0–0.4)
EOSINOPHIL NFR BLD AUTO: 1.6 % (ref 0.3–6.2)
ERYTHROCYTE [DISTWIDTH] IN BLOOD BY AUTOMATED COUNT: 12.8 % (ref 12.3–15.4)
GFR SERPL CREATININE-BSD FRML MDRD: 40 ML/MIN/1.73
GLOBULIN UR ELPH-MCNC: 2.7 GM/DL
GLUCOSE BLD-MCNC: 102 MG/DL (ref 65–99)
HCT VFR BLD AUTO: 41.8 % (ref 34–46.6)
HGB BLD-MCNC: 13.9 G/DL (ref 12–15.9)
IMM GRANULOCYTES # BLD AUTO: 0.01 10*3/MM3 (ref 0–0.05)
IMM GRANULOCYTES NFR BLD AUTO: 0.2 % (ref 0–0.5)
LYMPHOCYTES # BLD AUTO: 1.18 10*3/MM3 (ref 0.7–3.1)
LYMPHOCYTES NFR BLD AUTO: 18.5 % (ref 19.6–45.3)
MCH RBC QN AUTO: 30.2 PG (ref 26.6–33)
MCHC RBC AUTO-ENTMCNC: 33.3 G/DL (ref 31.5–35.7)
MCV RBC AUTO: 90.7 FL (ref 79–97)
MONOCYTES # BLD AUTO: 0.7 10*3/MM3 (ref 0.1–0.9)
MONOCYTES NFR BLD AUTO: 11 % (ref 5–12)
NEUTROPHILS # BLD AUTO: 4.35 10*3/MM3 (ref 1.7–7)
NEUTROPHILS NFR BLD AUTO: 67.9 % (ref 42.7–76)
NRBC BLD AUTO-RTO: 0 /100 WBC (ref 0–0.2)
NT-PROBNP SERPL-MCNC: 1195 PG/ML (ref 5–1800)
PLATELET # BLD AUTO: 171 10*3/MM3 (ref 140–450)
PMV BLD AUTO: 11.1 FL (ref 6–12)
POTASSIUM BLD-SCNC: 4.4 MMOL/L (ref 3.5–5.2)
PROT SERPL-MCNC: 7.1 G/DL (ref 6–8.5)
RBC # BLD AUTO: 4.61 10*6/MM3 (ref 3.77–5.28)
SODIUM BLD-SCNC: 141 MMOL/L (ref 136–145)
TROPONIN T SERPL-MCNC: <0.01 NG/ML (ref 0–0.03)
WBC NRBC COR # BLD: 6.39 10*3/MM3 (ref 3.4–10.8)

## 2019-09-24 PROCEDURE — 93010 ELECTROCARDIOGRAM REPORT: CPT | Performed by: INTERNAL MEDICINE

## 2019-09-24 PROCEDURE — 80053 COMPREHEN METABOLIC PANEL: CPT | Performed by: INTERNAL MEDICINE

## 2019-09-24 PROCEDURE — 85379 FIBRIN DEGRADATION QUANT: CPT | Performed by: INTERNAL MEDICINE

## 2019-09-24 PROCEDURE — 85025 COMPLETE CBC W/AUTO DIFF WBC: CPT | Performed by: INTERNAL MEDICINE

## 2019-09-24 PROCEDURE — 36415 COLL VENOUS BLD VENIPUNCTURE: CPT

## 2019-09-24 PROCEDURE — 93005 ELECTROCARDIOGRAM TRACING: CPT | Performed by: INTERNAL MEDICINE

## 2019-09-24 PROCEDURE — 83880 ASSAY OF NATRIURETIC PEPTIDE: CPT | Performed by: INTERNAL MEDICINE

## 2019-09-24 PROCEDURE — 94760 N-INVAS EAR/PLS OXIMETRY 1: CPT

## 2019-09-24 PROCEDURE — 84484 ASSAY OF TROPONIN QUANT: CPT | Performed by: INTERNAL MEDICINE

## 2019-09-24 PROCEDURE — 99214 OFFICE O/P EST MOD 30 MIN: CPT | Performed by: INTERNAL MEDICINE

## 2019-09-24 RX ORDER — NITROGLYCERIN 0.4 MG/1
0.4 TABLET SUBLINGUAL
Status: DISCONTINUED | OUTPATIENT
Start: 2019-09-24 | End: 2020-09-10

## 2019-09-24 RX ORDER — SODIUM CHLORIDE 0.9 % (FLUSH) 0.9 %
10 SYRINGE (ML) INJECTION AS NEEDED
Status: DISCONTINUED | OUTPATIENT
Start: 2019-09-24 | End: 2020-09-10

## 2019-09-24 NOTE — PROGRESS NOTES
[facility]      Patient Name: Brodie Mo  :10/31/1932  Age: 86 y.o.  Encounter Provider:  TIMOTEO CPU      Chief Complaint:   Chief Complaint   Patient presents with   • Chest Pain     Patient states that she had an episode on Friday where she felt like her heart was racing.  States the next day she was dizzy and lightheaded. This morning sh had some chest pressure while working in the yard.          HPI  Brodie Mo is a 86 y.o. female past medical history coronary artery disease status post CABG  followed by Dr. Hatch who presents for episode of palpitations.  On Friday night she awoke and felt well was having a hard time going back to sleep when she had a episode of palpitations.  She noticed some shortness of breath with this but it was mostly sensation of fluttering that bother her.  She states that lasted for 30 minutes and then spontaneously resolved.  She was able to go to back to sleep shortly thereafter as she felt well.  However she was severely fatigued for the remainder of Saturday and .  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  She denies orthopnea PND or edema.  She denies tobacco alcohol or illicit drug use.  One concern that it is been noted in past notes through tosin Cedeno and Dr. Hatch is her lack of clear anginal equivalent.  It is for this that she underwent nuclear stress study in May 2019 showing no infarct or ischemia.  She had a transthoracic echocardiogram at the same time showing normal left ventricular ejection fraction (63%), mild mitral valve prolapse with the results since moderate to severe mitral regurgitation and severe pulmonary hypertension.      The following portions of the patient's history were reviewed and updated as appropriate: allergies, current medications, past family history, past medical history, past social history,  "past surgical history and problem list.      Review of Systems   Constitution: Negative for chills and fever.   HENT: Negative for hoarse voice and sore throat.    Eyes: Negative for double vision and photophobia.   Cardiovascular: Positive for palpitations. Negative for chest pain, dyspnea on exertion, leg swelling, near-syncope, orthopnea, paroxysmal nocturnal dyspnea and syncope.   Respiratory: Positive for shortness of breath. Negative for cough and wheezing.    Skin: Negative for poor wound healing and rash.   Musculoskeletal: Negative for arthritis and joint swelling.   Gastrointestinal: Negative for bloating, abdominal pain, hematemesis and hematochezia.   Neurological: Negative for dizziness and focal weakness.   Psychiatric/Behavioral: Negative for depression and suicidal ideas.       OBJECTIVE:   Vital Signs  Vitals:    09/24/19 1356   BP: (!) 191/72   Pulse: 58   SpO2: 97%     Estimated body mass index is 23.96 kg/m² as calculated from the following:    Height as of this encounter: 165.1 cm (65\").    Weight as of this encounter: 65.3 kg (144 lb).    Physical Exam   Constitutional: She is oriented to person, place, and time. She appears well-developed and well-nourished.   HENT:   Head: Normocephalic and atraumatic.   Eyes: Conjunctivae are normal. Pupils are equal, round, and reactive to light.   Neck: No JVD present. No thyromegaly present.   Cardiovascular: Exam reveals no gallop and no friction rub.   No murmur heard.  Pulmonary/Chest: No respiratory distress. She exhibits no tenderness.   Abdominal: Bowel sounds are normal. She exhibits no distension.   Musculoskeletal: She exhibits no edema or tenderness.   Neurological: She is alert and oriented to person, place, and time.   Skin: No rash noted. No erythema.   Psychiatric: She has a normal mood and affect. Judgment normal.   Vitals reviewed.        ECG 12 Lead  Date/Time: 9/24/2019 3:56 PM  Performed by: Gokul Oliva Jr., MD  Authorized by: " Gokul Oliva Jr., MD   Comparison: compared with previous ECG from 5/22/2019  Similar to previous ECG  Rhythm: sinus rhythm  ST segment depression noted on lead: Diffuse 0.5 to 1 mm ST depression unchanged from previous.    Clinical impression: abnormal EKG              ASSESSMENT:      Diagnosis Plan   1. Palpitations   Holter Monitor - 72 Hour Up To 21 Days   2. Shortness of breath  Cardiac Monitoring    Vital Signs - Once    Vital Signs - As Needed    Pulse Oximetry    Insert Peripheral IV    sodium chloride 0.9 % flush 10 mL    nitroglycerin (NITROSTAT) SL tablet 0.4 mg    NPO Diet    Bathroom Privileges With Assistance    CBC & Differential    Comprehensive Metabolic Panel    Troponin T    D-Dimer    proBNP    ECG 12 Lead    Cardiac Monitoring    Vital Signs - Once    Insert Peripheral IV    NPO Diet    Bathroom Privileges With Assistance    Troponin T    Troponin T    D-Dimer    D-Dimer    proBNP    proBNP         PLAN OF CARE:     1. Palpitations -clinical complaints seem consistent with arrhythmia.  We will check a ZIO patch for 14 days.  Continue atenolol.  2. Mitral regurgitation -although echocardiogram shows moderate to severe regurgitation the murmur is barely audible.  The patient has no complaints of clinical heart failure.  According to her report has good functional capacity.  Case discussed with Dr. Hatch who does not feel the further work-up for her valvular disease and heart failure is warranted at this time.  Monitor clinical progress.  3. Pulmonary hypertension -as above  4. Coronary artery disease -continue aspirin and statin.  Negative stress test as noted above.  No clear clinical complaints consistent with anginal equivalent.  5. Elevated d-dimer -patient is not tachycardic or hypoxic.  She was elevated under similar clinical circumstances in 2015 had a CT angiogram which was negative for pulmonary embolism.  No clinical signs for PE or DVT.  No indication for further testing.    We will  follow ambulatory telemetry and have her follow-up at normally scheduled clinic appointment with Dr. Hatch.             Discharge Medications      ASK your doctor about these medications      Instructions Start Date   aspirin 81 MG tablet   81 mg, Oral, Daily      atenolol 25 MG tablet  Commonly known as:  TENORMIN   25 mg, Oral, Daily      atorvastatin 40 MG tablet  Commonly known as:  LIPITOR   TAKE 1 TABLET DAILY.      cholecalciferol 1000 units tablet  Commonly known as:  VITAMIN D3   1,000 Units, Oral, Daily      lisinopril 10 MG tablet  Commonly known as:  PRINIVIL,ZESTRIL   10 mg, Oral, Daily             Thank you for allowing me to participate in the care of your patient,      Sincerely,   Gokul Oliva Jr, MD  [facility]  09/24/19  3:52 PM

## 2019-09-24 NOTE — ADDENDUM NOTE
Encounter addended by: Gokul Oliva Jr., MD on: 9/24/2019 4:04 PM   Actions taken: Sign clinical note

## 2019-09-24 NOTE — TELEPHONE ENCOUNTER
Spoke with Dr. Hatch. He referred her to [facility] [place]. Notified son. He verbalized understanding. He stated they would try to be here by 1:45 pm. Dr. Hatch said he will see her if he has time. If not, [facility] should see.    Shyla Branch, RN  Triage RN [facility] [place] [facility] [place]

## 2019-09-24 NOTE — TELEPHONE ENCOUNTER
"09/24/19  12:32 PM  Brodie DANIELSON Naive  10/31/1932  Home Phone [phone]       Dr. Hatch,    The pt's son called me today. On Friday night she had a 30 minute episode that woke her up in the middle of the night. She felt like her \"heart was bouncing all over her chest\". Afterwards, she felt dizzy all day Saturday. Then, this morning she was doing some yard work and started feeling a \"heaviness in her chest\".     She did not have a way to check her B/P and HR at home.    Cardiac-related medications:  Atorvastatin 40 mg daily  Aspirin 81 mg daily  Atenolol 25 mg daily  Lisinopril 10 mg daily    Her next appt with you is 1-7-20. Would you like to see her sooner? Does she need to come to [facility] [place]?    Thank you!    Shyla Branch, RN  Triage RN [facility]      "

## 2019-10-11 ENCOUNTER — TELEPHONE (OUTPATIENT)
Dept: CARDIOLOGY | Facility: CLINIC | Age: 84
End: 2019-10-11

## 2019-10-11 NOTE — TELEPHONE ENCOUNTER
Called left voicemail.  She is to return call to discuss 7 short runs of SVT what appears to be possible atrial flutter but not sustained.  She had a single episode of Mobitz type I second-degree AV block that occurred at 7:44 AM.  Episodes of shortness of breath did not correlate.  There were no complaints of chest pain.  Assumed these arrhythmias are asymptomatic and would not recommend any further treatment.

## 2019-10-14 NOTE — TELEPHONE ENCOUNTER
Spoke with patient she only has palpitations when she is climbing steps or exerting herself otherwise no real symptoms.  She will keep her follow-up in January and verbalized understanding.

## 2020-01-07 ENCOUNTER — OFFICE VISIT (OUTPATIENT)
Dept: CARDIOLOGY | Facility: CLINIC | Age: 85
End: 2020-01-07

## 2020-01-07 VITALS
SYSTOLIC BLOOD PRESSURE: 130 MMHG | BODY MASS INDEX: 23.32 KG/M2 | WEIGHT: 140 LBS | DIASTOLIC BLOOD PRESSURE: 64 MMHG | HEIGHT: 65 IN | HEART RATE: 62 BPM

## 2020-01-07 DIAGNOSIS — Z95.1 S/P CABG (CORONARY ARTERY BYPASS GRAFT): ICD-10-CM

## 2020-01-07 DIAGNOSIS — I25.10 CORONARY ARTERY DISEASE INVOLVING NATIVE CORONARY ARTERY OF NATIVE HEART WITHOUT ANGINA PECTORIS: Primary | ICD-10-CM

## 2020-01-07 DIAGNOSIS — I10 ESSENTIAL HYPERTENSION: ICD-10-CM

## 2020-01-07 PROCEDURE — 93000 ELECTROCARDIOGRAM COMPLETE: CPT | Performed by: INTERNAL MEDICINE

## 2020-01-07 PROCEDURE — 99214 OFFICE O/P EST MOD 30 MIN: CPT | Performed by: INTERNAL MEDICINE

## 2020-01-07 NOTE — PROGRESS NOTES
Date of Office Visit: 20  Encounter Provider: José Hatch MD  Place of Service: [facility] CARDIOLOGY  Patient Name: Brodie Mo  :10/31/1932  [id number]    Chief Complaint   Patient presents with   • Coronary Artery Disease   • Shortness of Breath   :     HPI: Brodie Mo is a 87 y.o. female she had a 5 vessel bypass in  with Dr. Prabhakar.  She had a stress test in May 2019 that was normal she had an echo which showed moderate to severe MR and severe pulmonary hypertension.  She recently was seen in the 2019 with some palpitations.  The monitor showed a little bit of tachycardia possibly a brief run of atrial flutter.     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  She is a little bit more short of breath than she used to be she has occasional chest discomfort if she exerts herself, beyond the normal but for the most part she is able to do everything she likes to do.  She does not have any PND orthopnea edema.  She had an episode of tachypalpitations in the fall was evaluated had this kind of short run might of been atrial flutter or fib but it was not more than 30 seconds.  She is not had any recurrence of that    Past Medical History:   Diagnosis Date   • CAD (coronary artery disease)    • Cancer (CMS/HCC)     bladder    • Deep vein thrombosis (CMS/HCC)     Left leg-   • Health care maintenance    • HTN (hypertension)    • Hyperlipidemia    • LVH (left ventricular hypertrophy)    • Pneumonia    • S/P CABG (coronary artery bypass graft)        Past Surgical History:   Procedure Laterality Date   • BLADDER SURGERY      cancer   • CORONARY ARTERY BYPASS GRAFT       with Dr Prabhakar; AGUAYO to LAD and SVG to OM2;  cath all grafts open and 30% dx in RCA; nl stress in ,        Social History     Socioeconomic History   • Marital status: Unknown     Spouse name: Not on file   • Number of children: Not on file   • Years of education: Not  on file   • Highest education level: Not on file   Tobacco Use   • Smoking status: Former Smoker   • Smokeless tobacco: Never Used   • Tobacco comment: caffine use   Substance and Sexual Activity   • Alcohol use: Yes     Alcohol/week: 5.0 standard drinks     Types: 5 Shots of liquor per week     Comment: weekly   • Drug use: No   • Sexual activity: Defer       Family History   Problem Relation Age of Onset   • Heart disease Mother    • Kidney cancer Father    • No Known Problems Maternal Grandmother    • No Known Problems Maternal Grandfather    • No Known Problems Paternal Grandmother    • No Known Problems Paternal Grandfather    • Heart disease Sister    • Heart disease Brother        Review of Systems   Constitution: Negative for decreased appetite, fever, malaise/fatigue and weight loss.   HENT: Negative for nosebleeds.    Eyes: Negative for double vision.   Cardiovascular: Negative for chest pain, claudication, cyanosis, dyspnea on exertion, irregular heartbeat, leg swelling, near-syncope, orthopnea, palpitations, paroxysmal nocturnal dyspnea and syncope.   Respiratory: Negative for cough, hemoptysis and shortness of breath.    Hematologic/Lymphatic: Negative for bleeding problem.   Skin: Negative for rash.   Musculoskeletal: Negative for falls and myalgias.   Gastrointestinal: Negative for hematochezia, jaundice, melena, nausea and vomiting.   Genitourinary: Negative for hematuria.   Neurological: Negative for dizziness and seizures.   Psychiatric/Behavioral: Negative for altered mental status and memory loss.       Allergies   Allergen Reactions   • No Known Drug Allergy          Current Outpatient Medications:   •  aspirin 81 MG tablet, Take 81 mg by mouth Daily., Disp: , Rfl:   •  atenolol (TENORMIN) 25 MG tablet, Take 25 mg by mouth Daily., Disp: , Rfl:   •  atorvastatin (LIPITOR) 40 MG tablet, TAKE 1 TABLET DAILY., Disp: 90 tablet, Rfl: 3  •  cholecalciferol (VITAMIN D3) 1000 units tablet, Take 1,000  Units by mouth Daily., Disp: , Rfl:   •  lisinopril (PRINIVIL,ZESTRIL) 10 MG tablet, Take 10 mg by mouth Daily., Disp: , Rfl:     Current Facility-Administered Medications:   •  nitroglycerin (NITROSTAT) SL tablet 0.4 mg, 0.4 mg, Sublingual, Q5 Min PRN, Gokul Oliva Jr., MD  •  sodium chloride 0.9 % flush 10 mL, 10 mL, Intravenous, PRN, Gokul Oliva Jr., MD      Objective:     There were no vitals filed for this visit.  There is no height or weight on file to calculate BMI.    Physical Exam   Constitutional: She is oriented to person, place, and time. She appears well-developed and well-nourished.   HENT:   Head: Normocephalic.   Eyes: No scleral icterus.   Neck: No JVD present. No thyromegaly present.   Cardiovascular: Normal rate, regular rhythm and normal heart sounds. Exam reveals no gallop and no friction rub.   No murmur heard.  Pulmonary/Chest: Effort normal and breath sounds normal. She has no wheezes. She has no rales.       Abdominal: Soft. There is no hepatosplenomegaly. There is no tenderness.   Musculoskeletal: Normal range of motion. She exhibits no edema.   Lymphadenopathy:     She has no cervical adenopathy.   Neurological: She is alert and oriented to person, place, and time.   Skin: Skin is warm and dry. No rash noted.   Psychiatric: She has a normal mood and affect.         ECG 12 Lead  Date/Time: 1/7/2020 2:28 PM  Performed by: José Hatch MD  Authorized by: José Hatch MD   Comparison: compared with previous ECG   Similar to previous ECG  Rhythm: sinus rhythm  Other findings: left ventricular hypertrophy with strain    Clinical impression: abnormal EKG             Assessment:       Diagnosis Plan   1. Coronary artery disease involving native coronary artery of native heart without angina pectoris     2. S/P CABG (coronary artery bypass graft)     3. Essential hypertension            Plan:       So I think she has severe MR I mean she is got a huge left atrium a lot of leak and  probably is having a little bit of symptoms with that the issue is she is an elderly woman we talked about the options she does not want to have a second open heart surgery and at her age I do not recommend that.  She could be a candidate for mitral clip but right now more than a stick tight and just see how she does I will see her back in 3 months and I think she is at risk to have A. fib if she does we will anticoagulate her and treat her    As always, it has been a pleasure to participate in your patient's care.      Sincerely,       José Hatch MD

## 2020-02-04 ENCOUNTER — TRANSCRIBE ORDERS (OUTPATIENT)
Dept: ADMINISTRATIVE | Facility: HOSPITAL | Age: 85
End: 2020-02-04

## 2020-02-04 DIAGNOSIS — R19.4 CHANGE IN BOWEL HABITS: ICD-10-CM

## 2020-02-04 DIAGNOSIS — R10.9 ABDOMINAL PAIN, UNSPECIFIED ABDOMINAL LOCATION: Primary | ICD-10-CM

## 2020-02-05 ENCOUNTER — HOSPITAL ENCOUNTER (OUTPATIENT)
Dept: CT IMAGING | Facility: HOSPITAL | Age: 85
Discharge: HOME | End: 2020-02-05
Admitting: INTERNAL MEDICINE

## 2020-02-05 DIAGNOSIS — R19.4 CHANGE IN BOWEL HABITS: ICD-10-CM

## 2020-02-05 DIAGNOSIS — R10.9 ABDOMINAL PAIN, UNSPECIFIED ABDOMINAL LOCATION: ICD-10-CM

## 2020-02-05 PROCEDURE — 82565 ASSAY OF CREATININE: CPT

## 2020-02-05 PROCEDURE — 25010000002 IOPAMIDOL 61 % SOLUTION: Performed by: INTERNAL MEDICINE

## 2020-02-05 PROCEDURE — 0 DIATRIZOATE MEGLUMINE & SODIUM PER 1 ML: Performed by: INTERNAL MEDICINE

## 2020-02-05 PROCEDURE — 74177 CT ABD & PELVIS W/CONTRAST: CPT

## 2020-02-05 RX ADMIN — IOPAMIDOL 85 ML: 612 INJECTION, SOLUTION INTRAVENOUS at 14:36

## 2020-02-05 RX ADMIN — DIATRIZOATE MEGLUMINE AND DIATRIZOATE SODIUM 30 ML: 660; 100 LIQUID ORAL; RECTAL at 13:30

## 2020-02-06 LAB — CREAT BLDA-MCNC: 1.2 MG/DL (ref 0.6–1.3)

## 2020-04-09 ENCOUNTER — OFFICE VISIT (OUTPATIENT)
Dept: CARDIOLOGY | Facility: CLINIC | Age: 85
End: 2020-04-09

## 2020-04-09 VITALS — WEIGHT: 143.5 LBS | HEIGHT: 65 IN | TEMPERATURE: 97.6 F | BODY MASS INDEX: 23.91 KG/M2

## 2020-04-09 DIAGNOSIS — I34.0 NONRHEUMATIC MITRAL VALVE REGURGITATION: ICD-10-CM

## 2020-04-09 DIAGNOSIS — R06.09 DYSPNEA ON EXERTION: ICD-10-CM

## 2020-04-09 DIAGNOSIS — Z95.1 S/P CABG (CORONARY ARTERY BYPASS GRAFT): ICD-10-CM

## 2020-04-09 DIAGNOSIS — I10 ESSENTIAL HYPERTENSION: Primary | ICD-10-CM

## 2020-04-09 PROCEDURE — 99441 PR PHYS/QHP TELEPHONE EVALUATION 5-10 MIN: CPT | Performed by: INTERNAL MEDICINE

## 2020-04-09 NOTE — PROGRESS NOTES
She is an 89-year-old woman who is here with a telemedicine visit today she has had a prior history of bypass a long time ago is done very well with that.   However she has developed dyspnea on exertion with some activities of daily living and some a little bit more than just daily activities if she exerts herself.  We evaluated her and found that she has I think severe mitral insufficiency.  We taken an approach of watching it at this point.  She does not really notice any difference in months.  And with a virus going on she does not have any done.  He is not having PND orthopnea edema.  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  I think at this point I am going to have her come see me in about 3 months and we will echo her at that time.  I will then asked Dr. Nolan Mandujano to take a look at her echo I really think she is between class II and class III heart failure symptoms and she might benefit from a mitral clip    I spent about 10 minutes on the phone and 10 minutes of charting

## 2020-06-05 RX ORDER — ATORVASTATIN CALCIUM 40 MG/1
TABLET, FILM COATED ORAL
Qty: 90 TABLET | Refills: 3 | Status: SHIPPED | OUTPATIENT
Start: 2020-06-05 | End: 2021-05-24

## 2020-07-16 ENCOUNTER — TRANSCRIBE ORDERS (OUTPATIENT)
Dept: CARDIOLOGY | Facility: CLINIC | Age: 85
End: 2020-07-16

## 2020-07-16 ENCOUNTER — TRANSCRIBE ORDERS (OUTPATIENT)
Dept: SLEEP MEDICINE | Facility: HOSPITAL | Age: 85
End: 2020-07-16

## 2020-07-16 ENCOUNTER — OFFICE VISIT (OUTPATIENT)
Dept: CARDIOLOGY | Facility: CLINIC | Age: 85
End: 2020-07-16

## 2020-07-16 VITALS
HEIGHT: 65 IN | DIASTOLIC BLOOD PRESSURE: 60 MMHG | BODY MASS INDEX: 24.09 KG/M2 | SYSTOLIC BLOOD PRESSURE: 160 MMHG | HEART RATE: 53 BPM | WEIGHT: 144.6 LBS

## 2020-07-16 DIAGNOSIS — Z01.810 PREPROCEDURAL CARDIOVASCULAR EXAMINATION: ICD-10-CM

## 2020-07-16 DIAGNOSIS — Z01.818 OTHER SPECIFIED PRE-OPERATIVE EXAMINATION: Primary | ICD-10-CM

## 2020-07-16 DIAGNOSIS — Z13.6 SCREENING FOR CARDIOVASCULAR CONDITION: Primary | ICD-10-CM

## 2020-07-16 DIAGNOSIS — I34.0 NONRHEUMATIC MITRAL VALVE REGURGITATION: ICD-10-CM

## 2020-07-16 DIAGNOSIS — I34.0 NONRHEUMATIC MITRAL VALVE REGURGITATION: Primary | ICD-10-CM

## 2020-07-16 DIAGNOSIS — I10 ESSENTIAL HYPERTENSION: ICD-10-CM

## 2020-07-16 DIAGNOSIS — I25.10 CORONARY ARTERY DISEASE INVOLVING NATIVE CORONARY ARTERY OF NATIVE HEART WITHOUT ANGINA PECTORIS: ICD-10-CM

## 2020-07-16 DIAGNOSIS — Z95.1 S/P CABG (CORONARY ARTERY BYPASS GRAFT): ICD-10-CM

## 2020-07-16 PROCEDURE — 99214 OFFICE O/P EST MOD 30 MIN: CPT | Performed by: INTERNAL MEDICINE

## 2020-07-16 PROCEDURE — 93000 ELECTROCARDIOGRAM COMPLETE: CPT | Performed by: INTERNAL MEDICINE

## 2020-07-16 NOTE — PROGRESS NOTES
Date of Office Visit: 20  Encounter Provider: José Hatch MD  Place of Service: [facility] CARDIOLOGY  Patient Name: Brodie Mo  :10/31/1932  [id number]    Chief Complaint   Patient presents with   • Shortness of Breath   :     HPI: Brodie Mo is a 87 y.o. female she had a 5 vessel bypass in  with Dr. Prabhakar.  She had a stress test in May 2019 that was normal she had an echo which showed moderate to severe MR and severe pulmonary hypertension.  She is having more dyspnea on exertion she does not have PND he does not have orthopnea no edema but definitely has noted a little bit worsening shortness of breath.  He is not had any chest discomfort.  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      Past Medical History:   Diagnosis Date   • CAD (coronary artery disease)    • Cancer (CMS/HCC)     bladder    • Deep vein thrombosis (CMS/HCC)     Left leg-   • Health care maintenance    • HTN (hypertension)    • Hyperlipidemia    • LVH (left ventricular hypertrophy)    • Pneumonia    • S/P CABG (coronary artery bypass graft)        Past Surgical History:   Procedure Laterality Date   • BLADDER SURGERY      cancer   • CORONARY ARTERY BYPASS GRAFT       with Dr Prabhakar; AGUAYO to LAD and SVG to OM2;  cath all grafts open and 30% dx in RCA; nl stress in ,        Social History     Socioeconomic History   • Marital status: Unknown     Spouse name: Not on file   • Number of children: Not on file   • Years of education: Not on file   • Highest education level: Not on file   Tobacco Use   • Smoking status: Former Smoker   • Smokeless tobacco: Never Used   • Tobacco comment: caffine use   Substance and Sexual Activity   • Alcohol use: Yes     Alcohol/week: 5.0 standard drinks     Types: 5 Shots of liquor per week     Comment: weekly   • Drug use: No   • Sexual activity: Defer       Family History   Problem Relation Age of Onset   •  Heart disease Mother    • Kidney cancer Father    • No Known Problems Maternal Grandmother    • No Known Problems Maternal Grandfather    • No Known Problems Paternal Grandmother    • No Known Problems Paternal Grandfather    • Heart disease Sister    • Heart disease Brother        Review of Systems   Constitution: Negative for decreased appetite, fever, malaise/fatigue and weight loss.   HENT: Negative for nosebleeds.    Eyes: Negative for double vision.   Cardiovascular: Negative for chest pain, claudication, cyanosis, dyspnea on exertion, irregular heartbeat, leg swelling, near-syncope, orthopnea, palpitations, paroxysmal nocturnal dyspnea and syncope.   Respiratory: Negative for cough, hemoptysis and shortness of breath.    Hematologic/Lymphatic: Negative for bleeding problem.   Skin: Negative for rash.   Musculoskeletal: Negative for falls and myalgias.   Gastrointestinal: Negative for hematochezia, jaundice, melena, nausea and vomiting.   Genitourinary: Negative for hematuria.   Neurological: Negative for dizziness and seizures.   Psychiatric/Behavioral: Negative for altered mental status and memory loss.       Allergies   Allergen Reactions   • No Known Drug Allergy          Current Outpatient Medications:   •  aspirin 81 MG tablet, Take 81 mg by mouth Daily., Disp: , Rfl:   •  atenolol (TENORMIN) 25 MG tablet, Take 25 mg by mouth Daily., Disp: , Rfl:   •  atorvastatin (LIPITOR) 40 MG tablet, TAKE 1 TABLET BY MOUTH EVERY DAY, Disp: 90 tablet, Rfl: 3  •  cholecalciferol (VITAMIN D3) 1000 units tablet, Take 1,000 Units by mouth Daily., Disp: , Rfl:   •  lisinopril (PRINIVIL,ZESTRIL) 10 MG tablet, Take 10 mg by mouth Daily., Disp: , Rfl:     Current Facility-Administered Medications:   •  nitroglycerin (NITROSTAT) SL tablet 0.4 mg, 0.4 mg, Sublingual, Q5 Min PRN, Gokul Oliva Jr., MD  •  sodium chloride 0.9 % flush 10 mL, 10 mL, Intravenous, PRN, Gokul Oliva Jr., MD      Objective:     Vitals:    07/16/20  "1148   BP: 160/60   Pulse: 53   Weight: 65.6 kg (144 lb 9.6 oz)   Height: 165.1 cm (65\")     Body mass index is 24.06 kg/m².    Physical Exam   Constitutional: She is oriented to person, place, and time. She appears well-developed and well-nourished.   HENT:   Head: Normocephalic.   Eyes: No scleral icterus.   Neck: No JVD present. No thyromegaly present.   Cardiovascular: Normal rate, regular rhythm and normal heart sounds. Exam reveals no gallop and no friction rub.   No murmur heard.  Pulmonary/Chest: Effort normal and breath sounds normal. She has no wheezes. She has no rales.   Abdominal: Soft. There is no hepatosplenomegaly. There is no tenderness.   Musculoskeletal: Normal range of motion. She exhibits no edema.   Lymphadenopathy:     She has no cervical adenopathy.   Neurological: She is alert and oriented to person, place, and time.   Skin: Skin is warm and dry. No rash noted.   Psychiatric: She has a normal mood and affect.         ECG 12 Lead  Date/Time: 7/16/2020 12:13 PM  Performed by: José Hatch MD  Authorized by: José Hatch MD   Comparison: compared with previous ECG   Similar to previous ECG  Rhythm: sinus rhythm  Other findings: left ventricular hypertrophy with strain    Clinical impression: abnormal EKG             Assessment:       Diagnosis Plan   1. Nonrheumatic mitral valve regurgitation  Adult Transesophageal Echo (BRI) W/ Cont if Necessary Per Protocol   2. Coronary artery disease involving native coronary artery of native heart without angina pectoris  Adult Transesophageal Echo (BRI) W/ Cont if Necessary Per Protocol   3. Essential hypertension  Adult Transesophageal Echo (BRI) W/ Cont if Necessary Per Protocol   4. S/P CABG (coronary artery bypass graft)  Adult Transesophageal Echo (BRI) W/ Cont if Necessary Per Protocol          Plan:       I think she has severe mitral insufficiency now with some pulmonary hypertension she is developing worsening symptoms class III symptoms. "  She is elderly lady has had a prior bypass so her risk for re-op would be pretty high I do not think she is a good surgical candidate to do that I think it would be a better route to go and pursue for a mitral clip.  I am in a start by getting a BRI on her we will get that set up and then will go down the road at looking at getting a mitral clip for her    As always, it has been a pleasure to participate in your patient's care.      Sincerely,       José Hatch MD

## 2020-07-25 ENCOUNTER — LAB (OUTPATIENT)
Dept: LAB | Facility: HOSPITAL | Age: 85
End: 2020-07-25

## 2020-07-25 DIAGNOSIS — Z01.818 OTHER SPECIFIED PRE-OPERATIVE EXAMINATION: ICD-10-CM

## 2020-07-25 PROCEDURE — C9803 HOPD COVID-19 SPEC COLLECT: HCPCS

## 2020-07-25 PROCEDURE — U0004 COV-19 TEST NON-CDC HGH THRU: HCPCS

## 2020-07-27 LAB
REF LAB TEST METHOD: NORMAL
SARS-COV-2 RNA RESP QL NAA+PROBE: NOT DETECTED

## 2020-07-28 ENCOUNTER — ANESTHESIA (OUTPATIENT)
Dept: POSTOP/PACU | Facility: HOSPITAL | Age: 85
End: 2020-07-28

## 2020-07-28 ENCOUNTER — HOSPITAL ENCOUNTER (OUTPATIENT)
Dept: POSTOP/PACU | Facility: HOSPITAL | Age: 85
Discharge: HOME | End: 2020-07-28
Admitting: INTERNAL MEDICINE

## 2020-07-28 ENCOUNTER — ANESTHESIA EVENT (OUTPATIENT)
Dept: POSTOP/PACU | Facility: HOSPITAL | Age: 85
End: 2020-07-28

## 2020-07-28 VITALS
HEIGHT: 65 IN | SYSTOLIC BLOOD PRESSURE: 170 MMHG | TEMPERATURE: 98.7 F | BODY MASS INDEX: 23.99 KG/M2 | RESPIRATION RATE: 20 BRPM | HEART RATE: 49 BPM | DIASTOLIC BLOOD PRESSURE: 65 MMHG | OXYGEN SATURATION: 97 % | WEIGHT: 144 LBS

## 2020-07-28 VITALS — SYSTOLIC BLOOD PRESSURE: 166 MMHG | DIASTOLIC BLOOD PRESSURE: 62 MMHG | OXYGEN SATURATION: 100 %

## 2020-07-28 DIAGNOSIS — Z95.1 S/P CABG (CORONARY ARTERY BYPASS GRAFT): ICD-10-CM

## 2020-07-28 DIAGNOSIS — I10 ESSENTIAL HYPERTENSION: ICD-10-CM

## 2020-07-28 DIAGNOSIS — I25.10 CORONARY ARTERY DISEASE INVOLVING NATIVE CORONARY ARTERY OF NATIVE HEART WITHOUT ANGINA PECTORIS: ICD-10-CM

## 2020-07-28 DIAGNOSIS — I34.0 NONRHEUMATIC MITRAL VALVE REGURGITATION: ICD-10-CM

## 2020-07-28 PROCEDURE — 93312 ECHO TRANSESOPHAGEAL: CPT

## 2020-07-28 PROCEDURE — 76376 3D RENDER W/INTRP POSTPROCES: CPT

## 2020-07-28 PROCEDURE — 93312 ECHO TRANSESOPHAGEAL: CPT | Performed by: INTERNAL MEDICINE

## 2020-07-28 PROCEDURE — 93325 DOPPLER ECHO COLOR FLOW MAPG: CPT | Performed by: INTERNAL MEDICINE

## 2020-07-28 PROCEDURE — 76376 3D RENDER W/INTRP POSTPROCES: CPT | Performed by: INTERNAL MEDICINE

## 2020-07-28 PROCEDURE — 93325 DOPPLER ECHO COLOR FLOW MAPG: CPT

## 2020-07-28 PROCEDURE — 93320 DOPPLER ECHO COMPLETE: CPT

## 2020-07-28 PROCEDURE — 25010000003 LIDOCAINE 1 % SOLUTION: Performed by: NURSE ANESTHETIST, CERTIFIED REGISTERED

## 2020-07-28 PROCEDURE — 93320 DOPPLER ECHO COMPLETE: CPT | Performed by: INTERNAL MEDICINE

## 2020-07-28 PROCEDURE — 25010000002 PROPOFOL 10 MG/ML EMULSION: Performed by: NURSE ANESTHETIST, CERTIFIED REGISTERED

## 2020-07-28 RX ORDER — PROMETHAZINE HYDROCHLORIDE 25 MG/ML
6.25 INJECTION, SOLUTION INTRAMUSCULAR; INTRAVENOUS
Status: DISCONTINUED | OUTPATIENT
Start: 2020-07-28 | End: 2020-07-29 | Stop reason: HOSPADM

## 2020-07-28 RX ORDER — PROMETHAZINE HYDROCHLORIDE 25 MG/ML
12.5 INJECTION, SOLUTION INTRAMUSCULAR; INTRAVENOUS ONCE AS NEEDED
Status: DISCONTINUED | OUTPATIENT
Start: 2020-07-28 | End: 2020-07-29 | Stop reason: HOSPADM

## 2020-07-28 RX ORDER — FENTANYL CITRATE 50 UG/ML
50 INJECTION, SOLUTION INTRAMUSCULAR; INTRAVENOUS
Status: DISCONTINUED | OUTPATIENT
Start: 2020-07-28 | End: 2020-07-29 | Stop reason: HOSPADM

## 2020-07-28 RX ORDER — FLUMAZENIL 0.1 MG/ML
0.2 INJECTION INTRAVENOUS AS NEEDED
Status: DISCONTINUED | OUTPATIENT
Start: 2020-07-28 | End: 2020-07-29 | Stop reason: HOSPADM

## 2020-07-28 RX ORDER — HYDRALAZINE HYDROCHLORIDE 20 MG/ML
5 INJECTION INTRAMUSCULAR; INTRAVENOUS
Status: DISCONTINUED | OUTPATIENT
Start: 2020-07-28 | End: 2020-07-29 | Stop reason: HOSPADM

## 2020-07-28 RX ORDER — PROMETHAZINE HYDROCHLORIDE 25 MG/1
25 SUPPOSITORY RECTAL ONCE AS NEEDED
Status: DISCONTINUED | OUTPATIENT
Start: 2020-07-28 | End: 2020-07-29 | Stop reason: HOSPADM

## 2020-07-28 RX ORDER — LIDOCAINE HYDROCHLORIDE 10 MG/ML
INJECTION, SOLUTION INFILTRATION; PERINEURAL AS NEEDED
Status: DISCONTINUED | OUTPATIENT
Start: 2020-07-28 | End: 2020-07-28 | Stop reason: SURG

## 2020-07-28 RX ORDER — OXYCODONE AND ACETAMINOPHEN 7.5; 325 MG/1; MG/1
1 TABLET ORAL ONCE AS NEEDED
Status: DISCONTINUED | OUTPATIENT
Start: 2020-07-28 | End: 2020-07-29 | Stop reason: HOSPADM

## 2020-07-28 RX ORDER — ONDANSETRON 2 MG/ML
4 INJECTION INTRAMUSCULAR; INTRAVENOUS ONCE AS NEEDED
Status: DISCONTINUED | OUTPATIENT
Start: 2020-07-28 | End: 2020-07-29 | Stop reason: HOSPADM

## 2020-07-28 RX ORDER — ACETAMINOPHEN 325 MG/1
650 TABLET ORAL ONCE AS NEEDED
Status: DISCONTINUED | OUTPATIENT
Start: 2020-07-28 | End: 2020-07-29 | Stop reason: HOSPADM

## 2020-07-28 RX ORDER — PROMETHAZINE HYDROCHLORIDE 25 MG/1
25 TABLET ORAL ONCE AS NEEDED
Status: DISCONTINUED | OUTPATIENT
Start: 2020-07-28 | End: 2020-07-29 | Stop reason: HOSPADM

## 2020-07-28 RX ORDER — EPHEDRINE SULFATE 50 MG/ML
5 INJECTION, SOLUTION INTRAVENOUS ONCE AS NEEDED
Status: DISCONTINUED | OUTPATIENT
Start: 2020-07-28 | End: 2020-07-29 | Stop reason: HOSPADM

## 2020-07-28 RX ORDER — NALOXONE HCL 0.4 MG/ML
0.2 VIAL (ML) INJECTION AS NEEDED
Status: DISCONTINUED | OUTPATIENT
Start: 2020-07-28 | End: 2020-07-29 | Stop reason: HOSPADM

## 2020-07-28 RX ORDER — HYDROCODONE BITARTRATE AND ACETAMINOPHEN 7.5; 325 MG/1; MG/1
1 TABLET ORAL ONCE AS NEEDED
Status: DISCONTINUED | OUTPATIENT
Start: 2020-07-28 | End: 2020-07-29 | Stop reason: HOSPADM

## 2020-07-28 RX ORDER — SODIUM CHLORIDE 9 MG/ML
INJECTION, SOLUTION INTRAVENOUS CONTINUOUS PRN
Status: DISCONTINUED | OUTPATIENT
Start: 2020-07-28 | End: 2020-07-28 | Stop reason: SURG

## 2020-07-28 RX ORDER — DIPHENHYDRAMINE HCL 25 MG
25 CAPSULE ORAL
Status: DISCONTINUED | OUTPATIENT
Start: 2020-07-28 | End: 2020-07-29 | Stop reason: HOSPADM

## 2020-07-28 RX ORDER — LABETALOL HYDROCHLORIDE 5 MG/ML
5 INJECTION, SOLUTION INTRAVENOUS
Status: DISCONTINUED | OUTPATIENT
Start: 2020-07-28 | End: 2020-07-29 | Stop reason: HOSPADM

## 2020-07-28 RX ORDER — HYDROMORPHONE HYDROCHLORIDE 1 MG/ML
0.5 INJECTION, SOLUTION INTRAMUSCULAR; INTRAVENOUS; SUBCUTANEOUS
Status: DISCONTINUED | OUTPATIENT
Start: 2020-07-28 | End: 2020-07-29 | Stop reason: HOSPADM

## 2020-07-28 RX ORDER — PROPOFOL 10 MG/ML
VIAL (ML) INTRAVENOUS AS NEEDED
Status: DISCONTINUED | OUTPATIENT
Start: 2020-07-28 | End: 2020-07-28 | Stop reason: SURG

## 2020-07-28 RX ORDER — DIPHENHYDRAMINE HYDROCHLORIDE 50 MG/ML
12.5 INJECTION INTRAMUSCULAR; INTRAVENOUS
Status: DISCONTINUED | OUTPATIENT
Start: 2020-07-28 | End: 2020-07-29 | Stop reason: HOSPADM

## 2020-07-28 RX ADMIN — PROPOFOL 2 MG: 10 INJECTION, EMULSION INTRAVENOUS at 07:29

## 2020-07-28 RX ADMIN — PROPOFOL 100 MCG/KG/MIN: 10 INJECTION, EMULSION INTRAVENOUS at 07:27

## 2020-07-28 RX ADMIN — SODIUM CHLORIDE: 9 INJECTION, SOLUTION INTRAVENOUS at 07:09

## 2020-07-28 RX ADMIN — PROPOFOL 2 MG: 10 INJECTION, EMULSION INTRAVENOUS at 07:27

## 2020-07-28 RX ADMIN — LIDOCAINE HYDROCHLORIDE 50 ML: 10 INJECTION, SOLUTION INFILTRATION; PERINEURAL at 07:27

## 2020-07-28 RX ADMIN — PROPOFOL 4 MG: 10 INJECTION, EMULSION INTRAVENOUS at 07:28

## 2020-07-28 NOTE — H&P
Date of Hospital Visit: 2020  Encounter Provider: Gokul Oliva Jr, MD  Place of Service: [facility] CARDIOLOGY  Patient Name: Brodie Mo  :10/31/1932  Referral Provider: José Hatch MD    Chief complaint: Dyspnea on exertion    History of Present Illness: 87-year-old female with past medical history of severe mitral insufficiency with dyspnea on exertion who presents for evaluation of potential mitral clip.  She is a patient of Dr. Hatch whose last note reads as follows:    Brodie Mo is a 87 y.o. female she had a 5 vessel bypass in  with Dr. Prabhakar.  She had a stress test in May 2019 that was normal she had an echo which showed moderate to severe MR and severe pulmonary hypertension.  She is having more dyspnea on exertion she does not have PND he does not have orthopnea no edema but definitely has noted a little bit worsening shortness of breath.  He is not had any chest discomfort.  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    She denies dysphagia, hematemesis, hematochezia, loose or false teeth or medical allergy to sedating medications.      Past Medical History:   Diagnosis Date   • CAD (coronary artery disease)    • Cancer (CMS/HCC)     bladder    • Deep vein thrombosis (CMS/HCC)     Left leg-   • Health care maintenance    • HTN (hypertension)    • Hyperlipidemia    • LVH (left ventricular hypertrophy)    • Pneumonia    • S/P CABG (coronary artery bypass graft)        Past Surgical History:   Procedure Laterality Date   • BLADDER SURGERY      cancer   • CORONARY ARTERY BYPASS GRAFT       with Dr Prabhakar; AGUAYO to LAD and SVG to OM2;  cath all grafts open and 30% dx in RCA; nl stress in ,          (Not in a hospital admission)    Current Meds  Scheduled Meds:   Continuous Infusions:   PRN Meds:.•  acetaminophen **OR** acetaminophen  •  diphenhydrAMINE  •  diphenhydrAMINE  •  ePHEDrine  •  fentanyl  •   flumazenil  •  hydrALAZINE  •  HYDROcodone-acetaminophen  •  HYDROmorphone  •  labetalol  •  naloxone  •  nitroglycerin  •  ondansetron  •  oxyCODONE-acetaminophen  •  promethazine **OR** promethazine **OR** promethazine **OR** promethazine  •  sodium chloride    Allergies as of 07/28/2020 - Reviewed 07/28/2020   Allergen Reaction Noted   • No known drug allergy  12/07/2016       Social History     Socioeconomic History   • Marital status: Unknown     Spouse name: Not on file   • Number of children: Not on file   • Years of education: Not on file   • Highest education level: Not on file   Tobacco Use   • Smoking status: Former Smoker   • Smokeless tobacco: Never Used   • Tobacco comment: caffine use   Substance and Sexual Activity   • Alcohol use: Yes     Alcohol/week: 5.0 standard drinks     Types: 5 Shots of liquor per week     Comment: weekly   • Drug use: No   • Sexual activity: Defer       Family History   Problem Relation Age of Onset   • Heart disease Mother    • Kidney cancer Father    • No Known Problems Maternal Grandmother    • No Known Problems Maternal Grandfather    • No Known Problems Paternal Grandmother    • No Known Problems Paternal Grandfather    • Heart disease Sister    • Heart disease Brother        Review of Systems   Constitution: Negative for chills and fever.   HENT: Negative for hoarse voice and sore throat.    Eyes: Negative for double vision and photophobia.   Cardiovascular: Positive for dyspnea on exertion. Negative for chest pain, leg swelling, near-syncope, orthopnea, palpitations, paroxysmal nocturnal dyspnea and syncope.   Respiratory: Negative for cough and wheezing.    Skin: Negative for poor wound healing and rash.   Musculoskeletal: Negative for arthritis and joint swelling.   Gastrointestinal: Negative for bloating, abdominal pain, hematemesis and hematochezia.   Neurological: Negative for dizziness and focal weakness.   Psychiatric/Behavioral: Negative for depression and  suicidal ideas.            Objective:   Temp:  [98.7 °F (37.1 °C)] 98.7 °F (37.1 °C)  Heart Rate:  [51] 51  Resp:  [18] 18  BP: (199)/(78) 199/78  There is no height or weight on file to calculate BMI.    Vitals:    07/28/20 0608   BP: (!) 199/78   Pulse: 51   Resp: 18   Temp: 98.7 °F (37.1 °C)   SpO2: 96%       Physical Exam   Constitutional: She is oriented to person, place, and time. She appears well-developed and well-nourished.   HENT:   Head: Normocephalic and atraumatic.   Eyes: Pupils are equal, round, and reactive to light. Conjunctivae are normal.   Neck: No JVD present. No thyromegaly present.   Cardiovascular: Exam reveals no gallop and no friction rub.   Murmur heard.  Pulmonary/Chest: No respiratory distress. She exhibits no tenderness.   Abdominal: Bowel sounds are normal. She exhibits no distension.   Musculoskeletal: She exhibits no edema or tenderness.   Neurological: She is alert and oriented to person, place, and time.   Skin: No rash noted. No erythema.   Psychiatric: She has a normal mood and affect. Judgment normal.   Vitals reviewed.              Lab Review:          Invalid input(s): SEUN SERNA      @LABRCNTbnp@              @LABRCNT(chol,trig,hdl,ldl)    I personally viewed and interpreted the patient's EKG/Telemetry data  )  Patient Active Problem List   Diagnosis   • S/P CABG (coronary artery bypass graft)   • Essential hypertension   • History of bladder cancer   • CAD (coronary artery disease)   • CKD (chronic kidney disease) stage 3, GFR 30-59 ml/min (CMS/[facility])   • Nonrheumatic mitral valve regurgitation   • Dyspnea on exertion     Assessment and Plan:    No contraindication to BRI and we plan to proceed with the procedure in the work-up of mitral clip for severe mitral insufficiency.    Gokul Oliva Jr, MD  07/28/20  07:20.  Time spent in reviewing chart, discussion and examination:

## 2020-07-28 NOTE — ANESTHESIA PREPROCEDURE EVALUATION
Anesthesia Evaluation     no history of anesthetic complications:               Airway   Mallampati: II  TM distance: >3 FB  Neck ROM: full  Dental - normal exam     Pulmonary    (+) a smoker Former, shortness of breath,   (-) recent URI  Cardiovascular     (+) hypertension, valvular problems/murmurs MR, CAD, CABG, DVT, hyperlipidemia,       Neuro/Psych  (-) dizziness/light headedness, syncope  GI/Hepatic/Renal/Endo    (-) hepatitis    Musculoskeletal     Abdominal    Substance History      OB/GYN          Other                        Anesthesia Plan    ASA 3     MAC     intravenous induction     Anesthetic plan, all risks, benefits, and alternatives have been provided, discussed and informed consent has been obtained with: patient.

## 2020-07-28 NOTE — ANESTHESIA POSTPROCEDURE EVALUATION
Patient: Brodie DANIELSON Naive    Procedure Summary     Date:  07/28/20 Room / Location:  [facility] PACU    Anesthesia Start:  0724 Anesthesia Stop:  0750    Procedure:  ADULT TRANSESOPHAGEAL ECHO 3D (BRI) W/ CONT IF NECESSARY PER PROTOCOL Diagnosis:       Nonrheumatic mitral valve regurgitation      Coronary artery disease involving native coronary artery of native heart without angina pectoris      Essential hypertension      S/P CABG (coronary artery bypass graft)      (Valvular Function)    Scheduled Providers:  Gokul Oliva Jr., MD Provider:  Marisol Bryant MD    Anesthesia Type:  MAC ASA Status:  3          Anesthesia Type: MAC    Vitals  Vitals Value Taken Time   /55 7/28/2020  8:20 AM   Temp     Pulse 45 7/28/2020  8:21 AM   Resp 20 7/28/2020  8:20 AM   SpO2 97 % 7/28/2020  8:23 AM   Vitals shown include unvalidated device data.        Post Anesthesia Care and Evaluation    Patient location during evaluation: PACU  Patient participation: complete - patient participated  Level of consciousness: awake and alert  Pain management: adequate  Airway patency: patent  Anesthetic complications: No anesthetic complications    Cardiovascular status: acceptable  Respiratory status: acceptable  Hydration status: acceptable    Comments: --------------------            07/28/20               0835     --------------------   BP:       140/68     Pulse:    (!) 49     Resp:       20       Temp:                SpO2:      96%      --------------------

## 2020-08-06 ENCOUNTER — OFFICE VISIT (OUTPATIENT)
Dept: CARDIOLOGY | Facility: CLINIC | Age: 85
End: 2020-08-06

## 2020-08-06 VITALS
HEIGHT: 65 IN | HEART RATE: 61 BPM | DIASTOLIC BLOOD PRESSURE: 70 MMHG | BODY MASS INDEX: 24.22 KG/M2 | WEIGHT: 145.4 LBS | SYSTOLIC BLOOD PRESSURE: 180 MMHG

## 2020-08-06 DIAGNOSIS — I10 ESSENTIAL HYPERTENSION: ICD-10-CM

## 2020-08-06 DIAGNOSIS — N18.30 CKD (CHRONIC KIDNEY DISEASE) STAGE 3, GFR 30-59 ML/MIN (HCC): ICD-10-CM

## 2020-08-06 DIAGNOSIS — I25.10 CORONARY ARTERY DISEASE INVOLVING NATIVE CORONARY ARTERY OF NATIVE HEART WITHOUT ANGINA PECTORIS: ICD-10-CM

## 2020-08-06 DIAGNOSIS — I34.0 NONRHEUMATIC MITRAL VALVE REGURGITATION: ICD-10-CM

## 2020-08-06 DIAGNOSIS — Z95.1 S/P CABG (CORONARY ARTERY BYPASS GRAFT): Primary | ICD-10-CM

## 2020-08-06 PROCEDURE — 99214 OFFICE O/P EST MOD 30 MIN: CPT | Performed by: INTERNAL MEDICINE

## 2020-08-06 PROCEDURE — 93000 ELECTROCARDIOGRAM COMPLETE: CPT | Performed by: INTERNAL MEDICINE

## 2020-08-06 RX ORDER — LISINOPRIL 20 MG/1
20 TABLET ORAL DAILY
Qty: 30 TABLET | Refills: 6 | Status: ON HOLD | OUTPATIENT
Start: 2020-08-06 | End: 2020-09-03 | Stop reason: SDUPTHER

## 2020-08-06 NOTE — PROGRESS NOTES
Brodie Mo  10/31/1932  Date of Office Visit: 08/06/20  Encounter Provider: Brady Mandujano MD  Place of Service: [facility] CARDIOLOGY    Structural heart team consultation    CHIEF COMPLAINT:  1. Severe symptomatic degenerative mitral regurgitation  2. Chronic diastolic congestive heart failure (class III-IV symptoms)  3. Hypertension  4. Coronary artery disease  5.  CKD    HISTORY OF PRESENT ILLNESS:Dear Harshal Hatch and Roxi,    I had the pleasure of seeing Brodie Mo in consultation today.  She is a very pleasant 87-year-old female with medical history of 5-vessel bypass with Dr. Prabhakar in 2002 along with recent stress test in 2019 with no evidence of ischemia.  She has essential hypertension and also has been noted to have mitral valve regurgitation that has been monitored.  She had noticed that over the past few months that she has had increasing shortness of breath with exertion.  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  She has moderate dyspnea on exertion and has at least New York Heart Association Class 2 symptoms.  She has no orthopnea or PND.  No significant lower extremity edema.  She underwent a transesophageal echocardiogram performed by Gokul Hylton MD and this noted her to have severe mitral valve regurgitation with posterior leaflet length of about 1 cm and a mean gradient of 1.6 mmHg.            Review of Systems   Constitution: Negative for fever and malaise/fatigue.   HENT: Negative for nosebleeds and sore throat.    Eyes: Negative for blurred vision and double vision.   Cardiovascular: Positive for dyspnea on exertion. Negative for chest pain, claudication, palpitations and syncope.   Respiratory: Negative for cough, shortness of breath and snoring.    Endocrine: Negative for cold intolerance, heat intolerance and polydipsia.   Skin: Negative for itching, poor wound  "healing and rash.   Musculoskeletal: Negative for joint pain, joint swelling, muscle weakness and myalgias.   Gastrointestinal: Negative for abdominal pain, melena, nausea and vomiting.   Neurological: Negative for light-headedness, loss of balance, seizures, vertigo and weakness.   Psychiatric/Behavioral: Negative for altered mental status and depression.          Past Medical History:   Diagnosis Date   • CAD (coronary artery disease)    • Cancer (CMS/HCC)     bladder    • Deep vein thrombosis (CMS/HCC)     Left leg-2002   • Health care maintenance    • HTN (hypertension)    • Hyperlipidemia    • LVH (left ventricular hypertrophy)    • Pneumonia    • S/P CABG (coronary artery bypass graft)        The following portions of the patient's history were reviewed and updated as appropriate: Social history , Family history and Surgical history     Current Outpatient Medications on File Prior to Visit   Medication Sig Dispense Refill   • aspirin 81 MG tablet Take 81 mg by mouth Daily.     • atenolol (TENORMIN) 25 MG tablet Take 25 mg by mouth Daily.     • atorvastatin (LIPITOR) 40 MG tablet TAKE 1 TABLET BY MOUTH EVERY DAY 90 tablet 3   • cholecalciferol (VITAMIN D3) 1000 units tablet Take 1,000 Units by mouth Daily.     • lisinopril (PRINIVIL,ZESTRIL) 10 MG tablet Take 10 mg by mouth Daily.       Current Facility-Administered Medications on File Prior to Visit   Medication Dose Route Frequency Provider Last Rate Last Dose   • nitroglycerin (NITROSTAT) SL tablet 0.4 mg  0.4 mg Sublingual Q5 Min PRN Gokul Oliva Jr., MD       • sodium chloride 0.9 % flush 10 mL  10 mL Intravenous PRN Gokul Oliva Jr., MD           Allergies   Allergen Reactions   • No Known Drug Allergy        Vitals:    08/06/20 1259   BP: 180/70   Pulse: 61   Weight: 66 kg (145 lb 6.4 oz)   Height: 165.1 cm (65\")     Physical Exam   Constitutional: She is oriented to person, place, and time. She appears well-developed and well-nourished.   Elderly   "   HENT:   Head: Normocephalic and atraumatic.   Eyes: Conjunctivae and EOM are normal. No scleral icterus.   Neck: Normal range of motion. Neck supple. Normal carotid pulses, no hepatojugular reflux and no JVD present. Carotid bruit is not present. No tracheal deviation present. No thyromegaly present.   Cardiovascular: Normal rate and regular rhythm. Exam reveals no gallop and no friction rub.   Murmur (systolic murmur) heard.  Pulses:       Carotid pulses are 2+ on the right side, and 2+ on the left side.       Radial pulses are 2+ on the right side, and 2+ on the left side.        Femoral pulses are 2+ on the right side, and 2+ on the left side.       Dorsalis pedis pulses are 2+ on the right side, and 2+ on the left side.        Posterior tibial pulses are 2+ on the right side, and 2+ on the left side.   Pulmonary/Chest: Breath sounds normal. No respiratory distress. She has no decreased breath sounds. She has no wheezes. She has no rhonchi. She has no rales. She exhibits no tenderness.   Sternotomy     Abdominal: Soft. Bowel sounds are normal. She exhibits no distension. There is no tenderness. There is no rebound.   Musculoskeletal: She exhibits no edema or deformity.   Neurological: She is alert and oriented to person, place, and time. She has normal strength. No sensory deficit.   Skin: No rash noted. No erythema.   Psychiatric: She has a normal mood and affect. Her behavior is normal.          Lab Results   Component Value Date    WBC 6.39 09/24/2019    HGB 13.9 09/24/2019    HCT 41.8 09/24/2019    MCV 90.7 09/24/2019     09/24/2019       Lab Results   Component Value Date    GLUCOSE 102 (H) 09/24/2019    BUN 17 09/24/2019    CREATININE 1.20 02/05/2020    EGFRIFNONA 40 (L) 09/24/2019    BCR 13.3 09/24/2019    K 4.4 09/24/2019    CO2 27.2 09/24/2019    CALCIUM 9.2 09/24/2019    ALBUMIN 4.40 09/24/2019    AST 26 09/24/2019    ALT 15 09/24/2019       Lab Results   Component Value Date    GLUCOSE 102 (H)  09/24/2019    CALCIUM 9.2 09/24/2019     09/24/2019    K 4.4 09/24/2019    CO2 27.2 09/24/2019     09/24/2019    BUN 17 09/24/2019    CREATININE 1.20 02/05/2020    EGFRIFNONA 40 (L) 09/24/2019    BCR 13.3 09/24/2019    ANIONGAP 12.8 09/24/2019       Lab Results   Component Value Date    CHOL 141 04/06/2018    TRIG 66 04/06/2018    HDL 76 (H) 04/06/2018    LDL 52 04/06/2018         ECG 12 Lead  Date/Time: 8/6/2020 1:37 PM  Performed by: Brady Mandujano MD  Authorized by: Brady Mandujano MD   Comparison: compared with previous ECG   Rhythm: sinus rhythm  Rate: normal  QRS axis: normal  Other findings: left ventricular hypertrophy with strain    Clinical impression: non-specific ECG             Results for orders placed during the hospital encounter of 07/28/20   Adult Transesophageal Echo 3D (BRI) W/ Cont If Necessary Per Protocol    Narrative · Estimated EF appears to be in the range of 56 - 60%. Normal left   ventricular cavity size noted. All left ventricular wall segments contract   normally. Left ventricular wall thickness is consistent with mild   concentric hypertrophy. Left ventricular diastolic dysfunction is noted   (grade II w/high LAP) consistent with pseudonormalization.  · Left atrial cavity size is severely dilated. No evidence of a left   atrial thrombus present.  · No evidence of a left atrial appendage thrombus was present.  · No evidence of a patent foramen ovale. No evidence of an atrial septal   defect present. . Saline test results are negative.  · The mitral valve is abnormal in structure. Severe mitral valve   regurgitation is present. Etiology is unclear however there is significant   thickening of the anterior leaflet. Posterior leaflet length is 0.9 cm.   Mean transvalvular gradient is 1.6 mmHg. No significant mitral valve   stenosis is present.          DISCUSSION/SUMMARY  87-year-old male patient of Dr. Hatch with worsening shortness of breath due to worsening  congestive heart failure symptoms (class II-III ) owing to severe symptomatic degenerative mitral regurgitation. They underwent evaluation for mitral valve replacement versus repair.  Echocardiogram confirmed severe mitral regurgitation with regurgitant site at A2-P2.  The patient has previously had CABG and would be a reoperation at the age of 87.  I will refer her to Dr. Prabhakar for assessment of her surgical risk but I do think she would be better served with MitraClip in her situation.  She has adequate anatomy and posterior leaflet length to allow for clipping.    1.  Severe symptomatic degenerative mitral valve regurgitation  -I have recommended evaluation by our surgical colleagues for assessment of surgical risk.  I do think her anatomy is acceptable for MitraClip.  - I do also think we would benefit from improved blood pressure control here.    2.  Essential hypertension: Not at goal.  Double lisinopril.  Repeat BMP has been ordered for 2 weeks to ensure that her mild chronic renal insufficiency does not worsen.    3.  Chronic diastolic heart failure: New York Heart Association class II-III symptoms.

## 2020-08-19 ENCOUNTER — OFFICE VISIT (OUTPATIENT)
Dept: CARDIAC SURGERY | Facility: CLINIC | Age: 85
End: 2020-08-19

## 2020-08-19 VITALS
HEIGHT: 65 IN | HEART RATE: 70 BPM | OXYGEN SATURATION: 96 % | TEMPERATURE: 97.6 F | RESPIRATION RATE: 20 BRPM | WEIGHT: 145 LBS | DIASTOLIC BLOOD PRESSURE: 70 MMHG | BODY MASS INDEX: 24.16 KG/M2 | SYSTOLIC BLOOD PRESSURE: 146 MMHG

## 2020-08-19 DIAGNOSIS — C80.1 CANCER (HCC): ICD-10-CM

## 2020-08-19 DIAGNOSIS — N18.30 CKD (CHRONIC KIDNEY DISEASE) STAGE 3, GFR 30-59 ML/MIN (HCC): ICD-10-CM

## 2020-08-19 DIAGNOSIS — R06.09 DYSPNEA ON EXERTION: ICD-10-CM

## 2020-08-19 DIAGNOSIS — Z95.1 S/P CABG (CORONARY ARTERY BYPASS GRAFT): ICD-10-CM

## 2020-08-19 DIAGNOSIS — I10 ESSENTIAL HYPERTENSION: Primary | ICD-10-CM

## 2020-08-19 DIAGNOSIS — I34.0 NONRHEUMATIC MITRAL VALVE REGURGITATION: ICD-10-CM

## 2020-08-19 DIAGNOSIS — I25.10 CORONARY ARTERY DISEASE INVOLVING NATIVE CORONARY ARTERY OF NATIVE HEART WITHOUT ANGINA PECTORIS: ICD-10-CM

## 2020-08-19 PROCEDURE — 99204 OFFICE O/P NEW MOD 45 MIN: CPT | Performed by: THORACIC SURGERY (CARDIOTHORACIC VASCULAR SURGERY)

## 2020-08-20 ENCOUNTER — LAB (OUTPATIENT)
Dept: LAB | Facility: HOSPITAL | Age: 85
End: 2020-08-20

## 2020-08-20 DIAGNOSIS — I34.0 NONRHEUMATIC MITRAL VALVE REGURGITATION: ICD-10-CM

## 2020-08-20 DIAGNOSIS — I10 ESSENTIAL HYPERTENSION: ICD-10-CM

## 2020-08-20 LAB
ANION GAP SERPL CALCULATED.3IONS-SCNC: 7.2 MMOL/L (ref 5–15)
BUN SERPL-MCNC: 17 MG/DL (ref 8–23)
BUN/CREAT SERPL: 12.5 (ref 7–25)
CALCIUM SPEC-SCNC: 9.7 MG/DL (ref 8.6–10.5)
CHLORIDE SERPL-SCNC: 99 MMOL/L (ref 98–107)
CO2 SERPL-SCNC: 28.8 MMOL/L (ref 22–29)
CREAT SERPL-MCNC: 1.36 MG/DL (ref 0.57–1)
GFR SERPL CREATININE-BSD FRML MDRD: 37 ML/MIN/1.73
GLUCOSE SERPL-MCNC: 150 MG/DL (ref 65–99)
POTASSIUM SERPL-SCNC: 4.3 MMOL/L (ref 3.5–5.2)
SODIUM SERPL-SCNC: 135 MMOL/L (ref 136–145)

## 2020-08-20 PROCEDURE — 36415 COLL VENOUS BLD VENIPUNCTURE: CPT

## 2020-08-20 PROCEDURE — 80048 BASIC METABOLIC PNL TOTAL CA: CPT

## 2020-08-24 ENCOUNTER — TELEPHONE (OUTPATIENT)
Dept: CARDIOLOGY | Facility: CLINIC | Age: 85
End: 2020-08-24

## 2020-08-24 NOTE — TELEPHONE ENCOUNTER
Pt called regarding questions about her scheduled mitral clip.  Attempted to contact patient and left message.

## 2020-08-25 NOTE — TELEPHONE ENCOUNTER
See below. Pt called, she has some questions about her MitraClip procedure. She can be reached at #[phone].     Thanks,  Katty

## 2020-08-26 NOTE — PROGRESS NOTES
8/25/2020    Seen on 8/17/2020    Chief Complaint     Dyspnea, evaluation of mitral regurgitation    History of Present Illness:       Dear Dr. Hatch and Colleagues,  It was nice to see Brodie Mo in consultation at your request. She is a 87 y.o. female with a history of coronary artery disease status post CABG in the past, CKD 3 with creatinine in the 2 range, essential hypertension, bladder cancer who has developed symptoms of dyspnea of exertion.  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  She does her chores around the farm and feels any animals but he states that when he climbs hills walks inclines she feels short of breath. She denies chest pain, orthopnea, PND, syncope or TIA.  CABG was 18 years ago and I do not have a recent one to compare viability of the grafts.  I did hear CABG in 2002 at [facility].  She has no history of rheumatic disease or scarlet fever.  She denies a family history of aneurysms, dissections or connective tissue disorders.  She had a 2D echo that showed severe MR followed by a BRI that showed an EF ejection fraction of 60%, normal left ventricular cavity, left atrial cavity severely dilated without thrombus, no thrombus in the left atrial appendage, no evidence of PFO and she has a mitral valve that is abnormal in structure.  She has severe mitral regurgitation.  There is thickening of the anterior leaflet.  The posterior leaflet length is less than 1 cm.  There is no significant mitral valve stenosis.      Patient Active Problem List   Diagnosis   • S/P CABG (coronary artery bypass graft)   • Essential hypertension   • History of bladder cancer   • CAD (coronary artery disease)   • CKD (chronic kidney disease) stage 3, GFR 30-59 ml/min (CMS/HCC)   • Nonrheumatic mitral valve regurgitation   • Dyspnea on exertion       Past Medical History:   Diagnosis Date   • CAD (coronary artery disease)    • Cancer (CMS/HCC)     bladder    • Deep  vein thrombosis (CMS/HCC)     Left leg-2002   • Health care maintenance    • HTN (hypertension)    • Hyperlipidemia    • LVH (left ventricular hypertrophy)    • Pneumonia    • S/P CABG (coronary artery bypass graft)        Past Surgical History:   Procedure Laterality Date   • BLADDER SURGERY      cancer   • CORONARY ARTERY BYPASS GRAFT      2002 with Dr Prabhakar; AGUAYO to LAD and SVG to OM2; 2005 cath all grafts open and 30% dx in RCA; nl stress in 2010, 2013       Allergies   Allergen Reactions   • No Known Drug Allergy          Current Outpatient Medications:   •  aspirin 81 MG tablet, Take 81 mg by mouth Daily., Disp: , Rfl:   •  atorvastatin (LIPITOR) 40 MG tablet, TAKE 1 TABLET BY MOUTH EVERY DAY, Disp: 90 tablet, Rfl: 3  •  cholecalciferol (VITAMIN D3) 1000 units tablet, Take 1,000 Units by mouth Daily., Disp: , Rfl:   •  lisinopril (PRINIVIL,ZESTRIL) 20 MG tablet, Take 1 tablet by mouth Daily., Disp: 30 tablet, Rfl: 6  •  atenolol (TENORMIN) 25 MG tablet, Take 25 mg by mouth Daily., Disp: , Rfl:     Current Facility-Administered Medications:   •  nitroglycerin (NITROSTAT) SL tablet 0.4 mg, 0.4 mg, Sublingual, Q5 Min PRN, Gokul Oliva Jr., MD  •  sodium chloride 0.9 % flush 10 mL, 10 mL, Intravenous, PRN, Gokul Oliva Jr., MD    Social History     Socioeconomic History   • Marital status:      Spouse name: Not on file   • Number of children: Not on file   • Years of education: Not on file   • Highest education level: Not on file   Tobacco Use   • Smoking status: Former Smoker   • Smokeless tobacco: Never Used   • Tobacco comment: caffine use   Substance and Sexual Activity   • Alcohol use: Yes     Alcohol/week: 5.0 standard drinks     Types: 5 Shots of liquor per week     Comment: weekly   • Drug use: No   • Sexual activity: Defer       Family History   Problem Relation Age of Onset   • Heart disease Mother    • Kidney cancer Father    • No Known Problems Maternal Grandmother    • No Known Problems  Maternal Grandfather    • No Known Problems Paternal Grandmother    • No Known Problems Paternal Grandfather    • Heart disease Sister    • Heart disease Brother      Review of Systems   Constitutional: Positive for fatigue.   Respiratory: Positive for shortness of breath. Negative for wheezing.    Cardiovascular: Negative for chest pain, palpitations and leg swelling.   Genitourinary: Positive for dysuria. Negative for flank pain.   Neurological: Negative for syncope and weakness.   All other systems reviewed and are negative.      Physical Exam:    Vital Signs:  Weight: 65.8 kg (145 lb)   Body mass index is 24.13 kg/m².  Temp: 97.6 °F (36.4 °C)   Heart Rate: 70   BP: 146/70     Physical Exam   Constitutional: She is oriented to person, place, and time. She appears well-developed and well-nourished.   HENT:   Head: Normocephalic and atraumatic.   Nose: Nose normal.   Mouth/Throat: Oropharynx is clear and moist.   Eyes: Pupils are equal, round, and reactive to light. Conjunctivae are normal.   Neck: Normal range of motion. Neck supple. No JVD present. No thyromegaly present.   Cardiovascular: Normal rate, regular rhythm, S1 normal, S2 normal and intact distal pulses. PMI is not displaced. Exam reveals no gallop and no friction rub.   Murmur heard.  Pulses:       Radial pulses are 2+ on the right side, and 2+ on the left side.        Dorsalis pedis pulses are 2+ on the right side, and 2+ on the left side.   3/6 systolic murmur in left sternal border   Pulmonary/Chest: Effort normal and breath sounds normal. She has no rales.   Abdominal: Soft. Bowel sounds are normal. She exhibits no distension and no mass. There is no tenderness.   Musculoskeletal: Normal range of motion. She exhibits no tenderness or deformity.   Lymphadenopathy:     She has no cervical adenopathy.   Neurological: She is alert and oriented to person, place, and time. She has normal reflexes.   Skin: Skin is warm and dry. No rash noted. No erythema.    Psychiatric: She has a normal mood and affect. Her behavior is normal.   No groin or neck adenomegaly     Assessment:     Problem List Items Addressed This Visit        Cardiovascular and Mediastinum    Essential hypertension - Primary    CAD (coronary artery disease)    Nonrheumatic mitral valve regurgitation       Respiratory    Dyspnea on exertion       Genitourinary    CKD (chronic kidney disease) stage 3, GFR 30-59 ml/min (CMS/[facility])       Other    S/P CABG (coronary artery bypass graft)    History of bladder cancer          1. Severe mitral regurgitation of unclear etiology and most likely a IIIa mechanism  2. Coronary artery disease status post CABG  3. CKD stage III/IV  4. Advanced age  5. Dyspnea of exertion    Recommendation/Plan:     She has severe mitral regurgitation with progression of symptoms.  He still very functional for your age.  I would rather not offer her surgery due to her advanced age and the fact that it would be a reoperation.  I would recommend a MitraClip if feasible.  I am concerned about that showed, less than 1 cm posterior leaflet.  I will let the MitraClip team decide whether she is feasible or not.  I think that is her best option if not she will need medical management    Thank you for allowing me to participate in her care.    Regards,    Shahram Prabhakar M.D.

## 2020-08-27 ENCOUNTER — TRANSCRIBE ORDERS (OUTPATIENT)
Dept: SLEEP MEDICINE | Facility: HOSPITAL | Age: 85
End: 2020-08-27

## 2020-08-27 DIAGNOSIS — Z01.818 OTHER SPECIFIED PRE-OPERATIVE EXAMINATION: Primary | ICD-10-CM

## 2020-08-31 ENCOUNTER — LAB (OUTPATIENT)
Dept: LAB | Facility: HOSPITAL | Age: 85
End: 2020-08-31

## 2020-08-31 DIAGNOSIS — Z01.810 PREPROCEDURAL CARDIOVASCULAR EXAMINATION: ICD-10-CM

## 2020-08-31 DIAGNOSIS — Z13.6 SCREENING FOR CARDIOVASCULAR CONDITION: ICD-10-CM

## 2020-08-31 DIAGNOSIS — I34.0 NONRHEUMATIC MITRAL VALVE REGURGITATION: ICD-10-CM

## 2020-08-31 DIAGNOSIS — Z01.818 OTHER SPECIFIED PRE-OPERATIVE EXAMINATION: ICD-10-CM

## 2020-08-31 LAB
ANION GAP SERPL CALCULATED.3IONS-SCNC: 9.5 MMOL/L (ref 5–15)
BASOPHILS # BLD AUTO: 0.05 10*3/MM3 (ref 0–0.2)
BASOPHILS NFR BLD AUTO: 0.8 % (ref 0–1.5)
BUN SERPL-MCNC: 18 MG/DL (ref 8–23)
BUN/CREAT SERPL: 14 (ref 7–25)
CALCIUM SPEC-SCNC: 9.8 MG/DL (ref 8.6–10.5)
CHLORIDE SERPL-SCNC: 102 MMOL/L (ref 98–107)
CO2 SERPL-SCNC: 25.5 MMOL/L (ref 22–29)
CREAT SERPL-MCNC: 1.29 MG/DL (ref 0.57–1)
DEPRECATED RDW RBC AUTO: 40.3 FL (ref 37–54)
EOSINOPHIL # BLD AUTO: 0.16 10*3/MM3 (ref 0–0.4)
EOSINOPHIL NFR BLD AUTO: 2.5 % (ref 0.3–6.2)
ERYTHROCYTE [DISTWIDTH] IN BLOOD BY AUTOMATED COUNT: 12.5 % (ref 12.3–15.4)
GFR SERPL CREATININE-BSD FRML MDRD: 39 ML/MIN/1.73
GLUCOSE SERPL-MCNC: 91 MG/DL (ref 65–99)
HCT VFR BLD AUTO: 40.4 % (ref 34–46.6)
HGB BLD-MCNC: 13.5 G/DL (ref 12–15.9)
IMM GRANULOCYTES # BLD AUTO: 0.04 10*3/MM3 (ref 0–0.05)
IMM GRANULOCYTES NFR BLD AUTO: 0.6 % (ref 0–0.5)
LYMPHOCYTES # BLD AUTO: 1.37 10*3/MM3 (ref 0.7–3.1)
LYMPHOCYTES NFR BLD AUTO: 21.6 % (ref 19.6–45.3)
MCH RBC QN AUTO: 29.7 PG (ref 26.6–33)
MCHC RBC AUTO-ENTMCNC: 33.4 G/DL (ref 31.5–35.7)
MCV RBC AUTO: 88.8 FL (ref 79–97)
MONOCYTES # BLD AUTO: 0.85 10*3/MM3 (ref 0.1–0.9)
MONOCYTES NFR BLD AUTO: 13.4 % (ref 5–12)
NEUTROPHILS NFR BLD AUTO: 3.87 10*3/MM3 (ref 1.7–7)
NEUTROPHILS NFR BLD AUTO: 61.1 % (ref 42.7–76)
NRBC BLD AUTO-RTO: 0 /100 WBC (ref 0–0.2)
PLATELET # BLD AUTO: 180 10*3/MM3 (ref 140–450)
PMV BLD AUTO: 11.5 FL (ref 6–12)
POTASSIUM SERPL-SCNC: 4.5 MMOL/L (ref 3.5–5.2)
RBC # BLD AUTO: 4.55 10*6/MM3 (ref 3.77–5.28)
SODIUM SERPL-SCNC: 137 MMOL/L (ref 136–145)
WBC # BLD AUTO: 6.34 10*3/MM3 (ref 3.4–10.8)

## 2020-08-31 PROCEDURE — 80048 BASIC METABOLIC PNL TOTAL CA: CPT

## 2020-08-31 PROCEDURE — 85025 COMPLETE CBC W/AUTO DIFF WBC: CPT

## 2020-08-31 PROCEDURE — 36415 COLL VENOUS BLD VENIPUNCTURE: CPT

## 2020-08-31 PROCEDURE — U0004 COV-19 TEST NON-CDC HGH THRU: HCPCS

## 2020-08-31 PROCEDURE — C9803 HOPD COVID-19 SPEC COLLECT: HCPCS

## 2020-09-01 LAB
REF LAB TEST METHOD: NORMAL
SARS-COV-2 RNA RESP QL NAA+PROBE: NOT DETECTED

## 2020-09-02 ENCOUNTER — HOSPITAL ENCOUNTER (INPATIENT)
Facility: HOSPITAL | Age: 85
LOS: 1 days | Discharge: HOME | End: 2020-09-03
Attending: INTERNAL MEDICINE | Admitting: INTERNAL MEDICINE

## 2020-09-02 ENCOUNTER — ANESTHESIA (OUTPATIENT)
Dept: CARDIOLOGY | Facility: HOSPITAL | Age: 85
End: 2020-09-02

## 2020-09-02 ENCOUNTER — HOSPITAL ENCOUNTER (OUTPATIENT)
Dept: CARDIOLOGY | Facility: HOSPITAL | Age: 85
Discharge: HOME | End: 2020-09-02

## 2020-09-02 ENCOUNTER — ANESTHESIA EVENT (OUTPATIENT)
Dept: CARDIOLOGY | Facility: HOSPITAL | Age: 85
End: 2020-09-02

## 2020-09-02 DIAGNOSIS — I34.0 NONRHEUMATIC MITRAL VALVE REGURGITATION: ICD-10-CM

## 2020-09-02 DIAGNOSIS — R06.02 SHORTNESS OF BREATH: ICD-10-CM

## 2020-09-02 DIAGNOSIS — Z95.818 S/P MITRAL VALVE CLIP IMPLANTATION: Primary | ICD-10-CM

## 2020-09-02 DIAGNOSIS — Z98.890 S/P MITRAL VALVE CLIP IMPLANTATION: Primary | ICD-10-CM

## 2020-09-02 DIAGNOSIS — I34.0 MITRAL VALVE INSUFFICIENCY, UNSPECIFIED ETIOLOGY: ICD-10-CM

## 2020-09-02 LAB — ACT BLD: 109 SECONDS (ref 82–152)

## 2020-09-02 PROCEDURE — C1894 INTRO/SHEATH, NON-LASER: HCPCS | Performed by: INTERNAL MEDICINE

## 2020-09-02 PROCEDURE — 25010000002 HEPARIN (PORCINE) PER 1000 UNITS: Performed by: ANESTHESIOLOGY

## 2020-09-02 PROCEDURE — 25010000003 MEPERIDINE PER 100 MG: Performed by: ANESTHESIOLOGY

## 2020-09-02 PROCEDURE — 76376 3D RENDER W/INTRP POSTPROCES: CPT | Performed by: INTERNAL MEDICINE

## 2020-09-02 PROCEDURE — C1893 INTRO/SHEATH, FIXED,NON-PEEL: HCPCS | Performed by: INTERNAL MEDICINE

## 2020-09-02 PROCEDURE — 25010000002 MIDAZOLAM PER 1 MG: Performed by: ANESTHESIOLOGY

## 2020-09-02 PROCEDURE — G0378 HOSPITAL OBSERVATION PER HR: HCPCS

## 2020-09-02 PROCEDURE — 25010000002 PROPOFOL 10 MG/ML EMULSION: Performed by: ANESTHESIOLOGY

## 2020-09-02 PROCEDURE — 76376 3D RENDER W/INTRP POSTPROCES: CPT

## 2020-09-02 PROCEDURE — 93010 ELECTROCARDIOGRAM REPORT: CPT | Performed by: INTERNAL MEDICINE

## 2020-09-02 PROCEDURE — 25010000002 PROTAMINE SULFATE PER 10 MG: Performed by: ANESTHESIOLOGY

## 2020-09-02 PROCEDURE — 25010000003 CEFAZOLIN IN DEXTROSE 2-4 GM/100ML-% SOLUTION: Performed by: ANESTHESIOLOGY

## 2020-09-02 PROCEDURE — 33418 REPAIR TCAT MITRAL VALVE: CPT | Performed by: INTERNAL MEDICINE

## 2020-09-02 PROCEDURE — 93355 ECHO TRANSESOPHAGEAL (TEE): CPT

## 2020-09-02 PROCEDURE — C1769 GUIDE WIRE: HCPCS | Performed by: INTERNAL MEDICINE

## 2020-09-02 PROCEDURE — 25010000002 FENTANYL CITRATE (PF) 100 MCG/2ML SOLUTION: Performed by: ANESTHESIOLOGY

## 2020-09-02 PROCEDURE — 93320 DOPPLER ECHO COMPLETE: CPT | Performed by: INTERNAL MEDICINE

## 2020-09-02 PROCEDURE — 25010000002 ONDANSETRON PER 1 MG: Performed by: ANESTHESIOLOGY

## 2020-09-02 PROCEDURE — 25010000002 NEOSTIGMINE PER 0.5 MG: Performed by: ANESTHESIOLOGY

## 2020-09-02 PROCEDURE — 25010000002 HYDRALAZINE PER 20 MG: Performed by: INTERNAL MEDICINE

## 2020-09-02 PROCEDURE — 93312 ECHO TRANSESOPHAGEAL: CPT | Performed by: INTERNAL MEDICINE

## 2020-09-02 PROCEDURE — 85347 COAGULATION TIME ACTIVATED: CPT

## 2020-09-02 PROCEDURE — 25010000002 PHENYLEPHRINE PER 1 ML: Performed by: ANESTHESIOLOGY

## 2020-09-02 PROCEDURE — 93325 DOPPLER ECHO COLOR FLOW MAPG: CPT

## 2020-09-02 PROCEDURE — B245ZZ4 ULTRASONOGRAPHY OF LEFT HEART, TRANSESOPHAGEAL: ICD-10-PCS | Performed by: INTERNAL MEDICINE

## 2020-09-02 PROCEDURE — 02UG3JZ SUPPLEMENT MITRAL VALVE WITH SYNTHETIC SUBSTITUTE, PERCUTANEOUS APPROACH: ICD-10-PCS | Performed by: INTERNAL MEDICINE

## 2020-09-02 PROCEDURE — 93325 DOPPLER ECHO COLOR FLOW MAPG: CPT | Performed by: INTERNAL MEDICINE

## 2020-09-02 PROCEDURE — 93005 ELECTROCARDIOGRAM TRACING: CPT | Performed by: INTERNAL MEDICINE

## 2020-09-02 PROCEDURE — C1760 CLOSURE DEV, VASC: HCPCS | Performed by: INTERNAL MEDICINE

## 2020-09-02 DEVICE — IMPLANTABLE DEVICE: Type: IMPLANTABLE DEVICE | Status: FUNCTIONAL

## 2020-09-02 RX ORDER — FLUMAZENIL 0.1 MG/ML
0.2 INJECTION INTRAVENOUS AS NEEDED
Status: DISCONTINUED | OUTPATIENT
Start: 2020-09-02 | End: 2020-09-02 | Stop reason: HOSPADM

## 2020-09-02 RX ORDER — DIPHENHYDRAMINE HYDROCHLORIDE 50 MG/ML
12.5 INJECTION INTRAMUSCULAR; INTRAVENOUS
Status: DISCONTINUED | OUTPATIENT
Start: 2020-09-02 | End: 2020-09-02 | Stop reason: HOSPADM

## 2020-09-02 RX ORDER — LIDOCAINE HYDROCHLORIDE 10 MG/ML
0.1 INJECTION, SOLUTION EPIDURAL; INFILTRATION; INTRACAUDAL; PERINEURAL ONCE AS NEEDED
Status: DISCONTINUED | OUTPATIENT
Start: 2020-09-02 | End: 2020-09-02 | Stop reason: HOSPADM

## 2020-09-02 RX ORDER — ROCURONIUM BROMIDE 10 MG/ML
INJECTION, SOLUTION INTRAVENOUS AS NEEDED
Status: DISCONTINUED | OUTPATIENT
Start: 2020-09-02 | End: 2020-09-02 | Stop reason: SURG

## 2020-09-02 RX ORDER — NALOXONE HCL 0.4 MG/ML
0.2 VIAL (ML) INJECTION AS NEEDED
Status: DISCONTINUED | OUTPATIENT
Start: 2020-09-02 | End: 2020-09-02 | Stop reason: HOSPADM

## 2020-09-02 RX ORDER — EPHEDRINE SULFATE 50 MG/ML
5 INJECTION, SOLUTION INTRAVENOUS ONCE AS NEEDED
Status: DISCONTINUED | OUTPATIENT
Start: 2020-09-02 | End: 2020-09-02 | Stop reason: HOSPADM

## 2020-09-02 RX ORDER — HYDROCODONE BITARTRATE AND ACETAMINOPHEN 7.5; 325 MG/1; MG/1
1 TABLET ORAL ONCE AS NEEDED
Status: DISCONTINUED | OUTPATIENT
Start: 2020-09-02 | End: 2020-09-02 | Stop reason: HOSPADM

## 2020-09-02 RX ORDER — FENTANYL CITRATE 50 UG/ML
50 INJECTION, SOLUTION INTRAMUSCULAR; INTRAVENOUS
Status: DISCONTINUED | OUTPATIENT
Start: 2020-09-02 | End: 2020-09-02 | Stop reason: HOSPADM

## 2020-09-02 RX ORDER — ATENOLOL 25 MG/1
25 TABLET ORAL DAILY
Status: DISCONTINUED | OUTPATIENT
Start: 2020-09-02 | End: 2020-09-03 | Stop reason: HOSPADM

## 2020-09-02 RX ORDER — DIPHENHYDRAMINE HCL 25 MG
25 CAPSULE ORAL
Status: DISCONTINUED | OUTPATIENT
Start: 2020-09-02 | End: 2020-09-02 | Stop reason: HOSPADM

## 2020-09-02 RX ORDER — FENTANYL CITRATE 50 UG/ML
INJECTION, SOLUTION INTRAMUSCULAR; INTRAVENOUS AS NEEDED
Status: DISCONTINUED | OUTPATIENT
Start: 2020-09-02 | End: 2020-09-02 | Stop reason: SURG

## 2020-09-02 RX ORDER — SODIUM CHLORIDE 9 MG/ML
75 INJECTION, SOLUTION INTRAVENOUS CONTINUOUS
Status: DISCONTINUED | OUTPATIENT
Start: 2020-09-02 | End: 2020-09-02

## 2020-09-02 RX ORDER — MIDAZOLAM HYDROCHLORIDE 1 MG/ML
INJECTION INTRAMUSCULAR; INTRAVENOUS AS NEEDED
Status: DISCONTINUED | OUTPATIENT
Start: 2020-09-02 | End: 2020-09-02 | Stop reason: SURG

## 2020-09-02 RX ORDER — SODIUM CHLORIDE 0.9 % (FLUSH) 0.9 %
3 SYRINGE (ML) INJECTION EVERY 12 HOURS SCHEDULED
Status: DISCONTINUED | OUTPATIENT
Start: 2020-09-02 | End: 2020-09-02 | Stop reason: HOSPADM

## 2020-09-02 RX ORDER — SODIUM CHLORIDE 9 MG/ML
100 INJECTION, SOLUTION INTRAVENOUS CONTINUOUS
Status: DISCONTINUED | OUTPATIENT
Start: 2020-09-02 | End: 2020-09-03

## 2020-09-02 RX ORDER — LABETALOL HYDROCHLORIDE 5 MG/ML
5 INJECTION, SOLUTION INTRAVENOUS
Status: DISCONTINUED | OUTPATIENT
Start: 2020-09-02 | End: 2020-09-02 | Stop reason: HOSPADM

## 2020-09-02 RX ORDER — SODIUM CHLORIDE 0.9 % (FLUSH) 0.9 %
10 SYRINGE (ML) INJECTION AS NEEDED
Status: DISCONTINUED | OUTPATIENT
Start: 2020-09-02 | End: 2020-09-02 | Stop reason: HOSPADM

## 2020-09-02 RX ORDER — PROPOFOL 10 MG/ML
VIAL (ML) INTRAVENOUS AS NEEDED
Status: DISCONTINUED | OUTPATIENT
Start: 2020-09-02 | End: 2020-09-02 | Stop reason: SURG

## 2020-09-02 RX ORDER — HYDRALAZINE HYDROCHLORIDE 20 MG/ML
10 INJECTION INTRAMUSCULAR; INTRAVENOUS EVERY 4 HOURS PRN
Status: DISCONTINUED | OUTPATIENT
Start: 2020-09-02 | End: 2020-09-03 | Stop reason: HOSPADM

## 2020-09-02 RX ORDER — ASPIRIN 81 MG/1
81 TABLET ORAL DAILY
Status: DISCONTINUED | OUTPATIENT
Start: 2020-09-02 | End: 2020-09-03 | Stop reason: HOSPADM

## 2020-09-02 RX ORDER — SODIUM CHLORIDE, SODIUM LACTATE, POTASSIUM CHLORIDE, CALCIUM CHLORIDE 600; 310; 30; 20 MG/100ML; MG/100ML; MG/100ML; MG/100ML
9 INJECTION, SOLUTION INTRAVENOUS CONTINUOUS
Status: DISCONTINUED | OUTPATIENT
Start: 2020-09-02 | End: 2020-09-02

## 2020-09-02 RX ORDER — GLYCOPYRROLATE 0.2 MG/ML
INJECTION INTRAMUSCULAR; INTRAVENOUS AS NEEDED
Status: DISCONTINUED | OUTPATIENT
Start: 2020-09-02 | End: 2020-09-02 | Stop reason: SURG

## 2020-09-02 RX ORDER — HYDRALAZINE HYDROCHLORIDE 20 MG/ML
5 INJECTION INTRAMUSCULAR; INTRAVENOUS
Status: DISCONTINUED | OUTPATIENT
Start: 2020-09-02 | End: 2020-09-02 | Stop reason: HOSPADM

## 2020-09-02 RX ORDER — HEPARIN SODIUM 1000 [USP'U]/ML
INJECTION, SOLUTION INTRAVENOUS; SUBCUTANEOUS AS NEEDED
Status: DISCONTINUED | OUTPATIENT
Start: 2020-09-02 | End: 2020-09-02 | Stop reason: SURG

## 2020-09-02 RX ORDER — FAMOTIDINE 10 MG/ML
20 INJECTION, SOLUTION INTRAVENOUS ONCE
Status: COMPLETED | OUTPATIENT
Start: 2020-09-02 | End: 2020-09-02

## 2020-09-02 RX ORDER — ONDANSETRON 2 MG/ML
4 INJECTION INTRAMUSCULAR; INTRAVENOUS ONCE AS NEEDED
Status: DISCONTINUED | OUTPATIENT
Start: 2020-09-02 | End: 2020-09-02 | Stop reason: HOSPADM

## 2020-09-02 RX ORDER — MEPERIDINE HYDROCHLORIDE 25 MG/ML
12.5 INJECTION INTRAMUSCULAR; INTRAVENOUS; SUBCUTANEOUS ONCE
Status: COMPLETED | OUTPATIENT
Start: 2020-09-02 | End: 2020-09-02

## 2020-09-02 RX ORDER — LIDOCAINE HYDROCHLORIDE 10 MG/ML
0.5 INJECTION, SOLUTION EPIDURAL; INFILTRATION; INTRACAUDAL; PERINEURAL ONCE AS NEEDED
Status: DISCONTINUED | OUTPATIENT
Start: 2020-09-02 | End: 2020-09-02 | Stop reason: HOSPADM

## 2020-09-02 RX ORDER — PROMETHAZINE HYDROCHLORIDE 25 MG/1
25 SUPPOSITORY RECTAL ONCE AS NEEDED
Status: DISCONTINUED | OUTPATIENT
Start: 2020-09-02 | End: 2020-09-02 | Stop reason: HOSPADM

## 2020-09-02 RX ORDER — ACETAMINOPHEN 325 MG/1
650 TABLET ORAL EVERY 4 HOURS PRN
Status: DISCONTINUED | OUTPATIENT
Start: 2020-09-02 | End: 2020-09-03 | Stop reason: HOSPADM

## 2020-09-02 RX ORDER — LISINOPRIL 20 MG/1
20 TABLET ORAL DAILY
Status: DISCONTINUED | OUTPATIENT
Start: 2020-09-02 | End: 2020-09-03 | Stop reason: HOSPADM

## 2020-09-02 RX ORDER — PROMETHAZINE HYDROCHLORIDE 12.5 MG/1
25 TABLET ORAL ONCE AS NEEDED
Status: DISCONTINUED | OUTPATIENT
Start: 2020-09-02 | End: 2020-09-02 | Stop reason: HOSPADM

## 2020-09-02 RX ORDER — CEFAZOLIN SODIUM 2 G/100ML
INJECTION, SOLUTION INTRAVENOUS AS NEEDED
Status: DISCONTINUED | OUTPATIENT
Start: 2020-09-02 | End: 2020-09-02 | Stop reason: SURG

## 2020-09-02 RX ORDER — PROTAMINE SULFATE 10 MG/ML
INJECTION, SOLUTION INTRAVENOUS AS NEEDED
Status: DISCONTINUED | OUTPATIENT
Start: 2020-09-02 | End: 2020-09-02 | Stop reason: SURG

## 2020-09-02 RX ORDER — SODIUM CHLORIDE 0.9 % (FLUSH) 0.9 %
3-10 SYRINGE (ML) INJECTION AS NEEDED
Status: DISCONTINUED | OUTPATIENT
Start: 2020-09-02 | End: 2020-09-02 | Stop reason: HOSPADM

## 2020-09-02 RX ORDER — ONDANSETRON 2 MG/ML
INJECTION INTRAMUSCULAR; INTRAVENOUS AS NEEDED
Status: DISCONTINUED | OUTPATIENT
Start: 2020-09-02 | End: 2020-09-02 | Stop reason: SURG

## 2020-09-02 RX ORDER — ATORVASTATIN CALCIUM 20 MG/1
40 TABLET, FILM COATED ORAL DAILY
Status: DISCONTINUED | OUTPATIENT
Start: 2020-09-02 | End: 2020-09-03 | Stop reason: HOSPADM

## 2020-09-02 RX ORDER — MELATONIN
1000 DAILY
Status: DISCONTINUED | OUTPATIENT
Start: 2020-09-02 | End: 2020-09-03 | Stop reason: HOSPADM

## 2020-09-02 RX ORDER — SODIUM CHLORIDE 0.9 % (FLUSH) 0.9 %
10 SYRINGE (ML) INJECTION AS NEEDED
Status: DISCONTINUED | OUTPATIENT
Start: 2020-09-02 | End: 2020-09-03 | Stop reason: HOSPADM

## 2020-09-02 RX ORDER — OXYCODONE AND ACETAMINOPHEN 7.5; 325 MG/1; MG/1
1 TABLET ORAL ONCE AS NEEDED
Status: DISCONTINUED | OUTPATIENT
Start: 2020-09-02 | End: 2020-09-02 | Stop reason: HOSPADM

## 2020-09-02 RX ORDER — NITROGLYCERIN 0.4 MG/1
0.4 TABLET SUBLINGUAL
Status: DISCONTINUED | OUTPATIENT
Start: 2020-09-02 | End: 2020-09-03 | Stop reason: HOSPADM

## 2020-09-02 RX ORDER — HYDROMORPHONE HYDROCHLORIDE 1 MG/ML
0.5 INJECTION, SOLUTION INTRAMUSCULAR; INTRAVENOUS; SUBCUTANEOUS
Status: DISCONTINUED | OUTPATIENT
Start: 2020-09-02 | End: 2020-09-02 | Stop reason: HOSPADM

## 2020-09-02 RX ADMIN — FENTANYL CITRATE 50 MCG: 50 INJECTION INTRAMUSCULAR; INTRAVENOUS at 13:01

## 2020-09-02 RX ADMIN — PROPOFOL 110 MG: 10 INJECTION, EMULSION INTRAVENOUS at 13:03

## 2020-09-02 RX ADMIN — NEOSTIGMINE METHYLSULFATE 3 MG: 1 INJECTION INTRAMUSCULAR; INTRAVENOUS; SUBCUTANEOUS at 14:34

## 2020-09-02 RX ADMIN — HEPARIN SODIUM 4000 UNITS: 1000 INJECTION, SOLUTION INTRAVENOUS; SUBCUTANEOUS at 13:52

## 2020-09-02 RX ADMIN — PHENYLEPHRINE HYDROCHLORIDE 100 MCG: 10 INJECTION INTRAVENOUS at 13:50

## 2020-09-02 RX ADMIN — ROCURONIUM BROMIDE 40 MG: 10 INJECTION INTRAVENOUS at 13:03

## 2020-09-02 RX ADMIN — SODIUM CHLORIDE 75 ML/HR: 9 INJECTION, SOLUTION INTRAVENOUS at 11:31

## 2020-09-02 RX ADMIN — PHENYLEPHRINE HYDROCHLORIDE 100 MCG: 10 INJECTION INTRAVENOUS at 13:21

## 2020-09-02 RX ADMIN — PROTAMINE SULFATE 100 MG: 10 INJECTION, SOLUTION INTRAVENOUS at 14:30

## 2020-09-02 RX ADMIN — NOREPINEPHRINE BITARTRATE 0.02 MCG/KG/MIN: 1 INJECTION, SOLUTION, CONCENTRATE INTRAVENOUS at 13:22

## 2020-09-02 RX ADMIN — MIDAZOLAM 1 MG: 1 INJECTION INTRAMUSCULAR; INTRAVENOUS at 12:43

## 2020-09-02 RX ADMIN — GLYCOPYRROLATE 0.6 MG: 0.2 INJECTION INTRAMUSCULAR; INTRAVENOUS at 14:34

## 2020-09-02 RX ADMIN — FAMOTIDINE 20 MG: 10 INJECTION INTRAVENOUS at 12:27

## 2020-09-02 RX ADMIN — LABETALOL HYDROCHLORIDE 5 MG: 5 INJECTION, SOLUTION INTRAVENOUS at 15:27

## 2020-09-02 RX ADMIN — PROPOFOL 30 MG: 10 INJECTION, EMULSION INTRAVENOUS at 13:05

## 2020-09-02 RX ADMIN — FENTANYL CITRATE 50 MCG: 50 INJECTION INTRAMUSCULAR; INTRAVENOUS at 13:37

## 2020-09-02 RX ADMIN — HEPARIN SODIUM 8000 UNITS: 1000 INJECTION, SOLUTION INTRAVENOUS; SUBCUTANEOUS at 13:34

## 2020-09-02 RX ADMIN — Medication 1000 UNITS: at 21:37

## 2020-09-02 RX ADMIN — SODIUM CHLORIDE, PRESERVATIVE FREE 10 ML: 5 INJECTION INTRAVENOUS at 21:40

## 2020-09-02 RX ADMIN — CEFAZOLIN SODIUM 2 G: 2 INJECTION, SOLUTION INTRAVENOUS at 13:10

## 2020-09-02 RX ADMIN — SODIUM CHLORIDE 100 ML/HR: 9 INJECTION, SOLUTION INTRAVENOUS at 21:40

## 2020-09-02 RX ADMIN — ATORVASTATIN CALCIUM 40 MG: 20 TABLET, FILM COATED ORAL at 21:37

## 2020-09-02 RX ADMIN — GLYCOPYRROLATE 0.2 MG: 0.2 INJECTION INTRAMUSCULAR; INTRAVENOUS at 13:54

## 2020-09-02 RX ADMIN — ONDANSETRON HYDROCHLORIDE 4 MG: 2 SOLUTION INTRAMUSCULAR; INTRAVENOUS at 14:31

## 2020-09-02 RX ADMIN — ASPIRIN 81 MG: 81 TABLET, COATED ORAL at 21:37

## 2020-09-02 RX ADMIN — HYDRALAZINE HYDROCHLORIDE 10 MG: 20 INJECTION INTRAMUSCULAR; INTRAVENOUS at 17:45

## 2020-09-02 RX ADMIN — MIDAZOLAM 1 MG: 1 INJECTION INTRAMUSCULAR; INTRAVENOUS at 12:51

## 2020-09-02 RX ADMIN — MEPERIDINE HYDROCHLORIDE 12.5 MG: 25 INJECTION INTRAMUSCULAR; INTRAVENOUS; SUBCUTANEOUS at 16:08

## 2020-09-02 NOTE — ANESTHESIA PREPROCEDURE EVALUATION
Anesthesia Evaluation     Patient summary reviewed and Nursing notes reviewed   no history of anesthetic complications:  NPO Solid Status: > 8 hours  NPO Liquid Status: > 2 hours           Airway   Mallampati: II  TM distance: >3 FB  Neck ROM: full  Dental - normal exam     Pulmonary - normal exam   (+) pneumonia , a smoker Former, shortness of breath,   Cardiovascular     ECG reviewed  Rhythm: regular    (+) hypertension, valvular problems/murmurs MR, CAD, CABG, murmur, DVT, hyperlipidemia,     ROS comment: Rhythm: sinus rhythm  Rate: normal  QRS axis: normal    · Estimated EF appears to be in the range of 56 - 60%. Normal left ventricular cavity size noted. All left ventricular wall segments contract normally. Left ventricular wall thickness is consistent with mild concentric hypertrophy. Left ventricular diastolic dysfunction is noted (grade II w/high LAP) consistent with pseudonormalization.  · Left atrial cavity size is severely dilated. No evidence of a left atrial thrombus present.  · No evidence of a left atrial appendage thrombus was present.  · No evidence of a patent foramen ovale. No evidence of an atrial septal defect present. . Saline test results are negative.  · The mitral valve is abnormal in structure. Severe mitral valve regurgitation is present. Etiology is unclear however there is significant thickening of the anterior leaflet. Posterior leaflet length is 0.9 cm. Mean transvalvular gradient is 1.6 mmHg. No significant mitral valve stenosis is present    Neuro/Psych- negative ROS  GI/Hepatic/Renal/Endo    (+)   renal disease CRI,     Musculoskeletal (-) negative ROS    Abdominal  - normal exam    Bowel sounds: normal.   Substance History - negative use     OB/GYN negative ob/gyn ROS         Other      history of cancer remission                    Anesthesia Plan    ASA 4     general   (Arterial line    I have reviewed the patient's history with the patient and the chart, including all pertinent  laboratory results and imaging. I have explained the risks of anesthesia including but not limited to dental damage, corneal abrasion, nerve injury, MI, stroke, and death..)  intravenous induction     Anesthetic plan, all risks, benefits, and alternatives have been provided, discussed and informed consent has been obtained with: patient.    Plan discussed with attending and CRNA.

## 2020-09-02 NOTE — PLAN OF CARE
Pt from cath sp mitral clip. Bilat groin sites CDI soft. HOB @30 1735, OOB 1935. SBP elevated >170, PRN meds onhand. Will continue to monitor.

## 2020-09-02 NOTE — ANESTHESIA PROCEDURE NOTES
Arterial Line      Patient reassessed immediately prior to procedure    Patient location during procedure: OR  Start time: 9/2/2020 12:45 PM  Stop Time:9/2/2020 12:51 PM       Line placed for hemodynamic monitoring, ABGs/Labs/ISTAT and MD/Surgeon request.  Performed By   Anesthesiologist: Gokul Broderick MD  Preanesthetic Checklist  Completed: patient identified, site marked, surgical consent, pre-op evaluation, timeout performed, IV checked, risks and benefits discussed and monitors and equipment checked  Arterial Line Prep   Sterile Tech: cap, gloves, mask and sterile barriers  Prep: ChloraPrep  Patient monitoring: blood pressure monitoring, continuous pulse oximetry and EKG  Arterial Line Procedure   Laterality:left  Location:  radial artery  Catheter size: 20 G   Guidance: ultrasound guided  PROCEDURE NOTE/ULTRASOUND INTERPRETATION.  Using ultrasound guidance the potential vascular sites for insertion of the catheter were visualized to determine the patency of the vessel to be used for vascular access.  After selecting the appropriate site for insertion, the needle was visualized under ultrasound being inserted into the radial artery, followed by ultrasound confirmation of wire and catheter placement. There were no abnormalities seen on ultrasound; an image was taken; and the patient tolerated the procedure with no complications.   Number of attempts: 1  Successful placement: yes  Post Assessment   Dressing Type: wrist guard applied, occlusive dressing applied and secured with tape.   Complications no  Circ/Move/Sens Assessment: normal and unchanged.   Patient Tolerance: patient tolerated the procedure well with no apparent complications

## 2020-09-02 NOTE — H&P
8/25/2020     Seen on 8/17/2020     Chief Complaint      Dyspnea, evaluation of mitral regurgitation     History of Present Illness:                                        Dear Dr. Hatch and Colleagues,  It was nice to see Brodie Mo in consultation at your request. She is a 87 y.o. female with a history of coronary artery disease status post CABG in the past, CKD 3 with creatinine in the 2 range, essential hypertension, bladder cancer who has developed symptoms of dyspnea of exertion.  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  She does her chores around the farm and feels any animals but he states that when he climbs hills walks inclines she feels short of breath. She denies chest pain, orthopnea, PND, syncope or TIA.  CABG was 18 years ago and I do not have a recent one to compare viability of the grafts.  I did hear CABG in 2002 at [facility].  She has no history of rheumatic disease or scarlet fever.  She denies a family history of aneurysms, dissections or connective tissue disorders.  She had a 2D echo that showed severe MR followed by a BRI that showed an EF ejection fraction of 60%, normal left ventricular cavity, left atrial cavity severely dilated without thrombus, no thrombus in the left atrial appendage, no evidence of PFO and she has a mitral valve that is abnormal in structure.  She has severe mitral regurgitation.  There is thickening of the anterior leaflet.  The posterior leaflet length is less than 1 cm.  There is no significant mitral valve stenosis.           Patient Active Problem List   Diagnosis   • S/P CABG (coronary artery bypass graft)   • Essential hypertension   • History of bladder cancer   • CAD (coronary artery disease)   • CKD (chronic kidney disease) stage 3, GFR 30-59 ml/min (CMS/[facility])   • Nonrheumatic mitral valve regurgitation   • Dyspnea on exertion         Medical History        Past Medical History:   Diagnosis Date   • CAD  (coronary artery disease)     • Cancer (CMS/HCC)       bladder    • Deep vein thrombosis (CMS/HCC)       Left leg-2002   • Health care maintenance     • HTN (hypertension)     • Hyperlipidemia     • LVH (left ventricular hypertrophy)     • Pneumonia     • S/P CABG (coronary artery bypass graft)              Surgical History         Past Surgical History:   Procedure Laterality Date   • BLADDER SURGERY         cancer   • CORONARY ARTERY BYPASS GRAFT         2002 with Dr Prabhakar; AGUAYO to LAD and SVG to OM2; 2005 cath all grafts open and 30% dx in RCA; nl stress in 2010, 2013                 Allergies   Allergen Reactions   • No Known Drug Allergy              Current Outpatient Medications:   •  aspirin 81 MG tablet, Take 81 mg by mouth Daily., Disp: , Rfl:   •  atorvastatin (LIPITOR) 40 MG tablet, TAKE 1 TABLET BY MOUTH EVERY DAY, Disp: 90 tablet, Rfl: 3  •  cholecalciferol (VITAMIN D3) 1000 units tablet, Take 1,000 Units by mouth Daily., Disp: , Rfl:   •  lisinopril (PRINIVIL,ZESTRIL) 20 MG tablet, Take 1 tablet by mouth Daily., Disp: 30 tablet, Rfl: 6  •  atenolol (TENORMIN) 25 MG tablet, Take 25 mg by mouth Daily., Disp: , Rfl:      Current Facility-Administered Medications:   •  nitroglycerin (NITROSTAT) SL tablet 0.4 mg, 0.4 mg, Sublingual, Q5 Min PRN, Gokul Oliva Jr., MD  •  sodium chloride 0.9 % flush 10 mL, 10 mL, Intravenous, PRN, Gokul Oliva Jr., MD     Social History   Social History            Socioeconomic History   • Marital status:        Spouse name: Not on file   • Number of children: Not on file   • Years of education: Not on file   • Highest education level: Not on file   Tobacco Use   • Smoking status: Former Smoker   • Smokeless tobacco: Never Used   • Tobacco comment: caffine use   Substance and Sexual Activity   • Alcohol use: Yes       Alcohol/week: 5.0 standard drinks       Types: 5 Shots of liquor per week       Comment: weekly   • Drug use: No   • Sexual activity: Defer                   Family History   Problem Relation Age of Onset   • Heart disease Mother     • Kidney cancer Father     • No Known Problems Maternal Grandmother     • No Known Problems Maternal Grandfather     • No Known Problems Paternal Grandmother     • No Known Problems Paternal Grandfather     • Heart disease Sister     • Heart disease Brother        Review of Systems   Constitutional: Positive for fatigue.   Respiratory: Positive for shortness of breath. Negative for wheezing.    Cardiovascular: Negative for chest pain, palpitations and leg swelling.   Genitourinary: Positive for dysuria. Negative for flank pain.   Neurological: Negative for syncope and weakness.   All other systems reviewed and are negative.        Physical Exam:     Vital Signs:  Weight: 65.8 kg (145 lb)   Body mass index is 24.13 kg/m².  Temp: 97.6 °F (36.4 °C)   Heart Rate: 70   BP: 146/70      Physical Exam   Constitutional: She is oriented to person, place, and time. She appears well-developed and well-nourished.   HENT:   Head: Normocephalic and atraumatic.   Nose: Nose normal.   Mouth/Throat: Oropharynx is clear and moist.   Eyes: Pupils are equal, round, and reactive to light. Conjunctivae are normal.   Neck: Normal range of motion. Neck supple. No JVD present. No thyromegaly present.   Cardiovascular: Normal rate, regular rhythm, S1 normal, S2 normal and intact distal pulses. PMI is not displaced. Exam reveals no gallop and no friction rub.   Murmur heard.  Pulses:       Radial pulses are 2+ on the right side, and 2+ on the left side.        Dorsalis pedis pulses are 2+ on the right side, and 2+ on the left side.   3/6 systolic murmur in left sternal border   Pulmonary/Chest: Effort normal and breath sounds normal. She has no rales.   Abdominal: Soft. Bowel sounds are normal. She exhibits no distension and no mass. There is no tenderness.   Musculoskeletal: Normal range of motion. She exhibits no tenderness or deformity.    Lymphadenopathy:     She has no cervical adenopathy.   Neurological: She is alert and oriented to person, place, and time. She has normal reflexes.   Skin: Skin is warm and dry. No rash noted. No erythema.   Psychiatric: She has a normal mood and affect. Her behavior is normal.   No groin or neck adenomegaly     Assessment:           Problem List Items Addressed This Visit                 Cardiovascular and Mediastinum      Essential hypertension - Primary      CAD (coronary artery disease)      Nonrheumatic mitral valve regurgitation            Respiratory      Dyspnea on exertion            Genitourinary      CKD (chronic kidney disease) stage 3, GFR 30-59 ml/min (CMS/[facility])            Other      S/P CABG (coronary artery bypass graft)      History of bladder cancer              1. Severe mitral regurgitation of unclear etiology and most likely a IIIa mechanism  2. Coronary artery disease status post CABG  3. CKD stage III/IV  4. Advanced age  5. Dyspnea of exertion     Recommendation/Plan:      She has severe mitral regurgitation with progression of symptoms.  He still very functional for your age.  I would rather not offer her surgery due to her advanced age and the fact that it would be a reoperation.  I would recommend a MitraClip if feasible.  I am concerned about that showed, less than 1 cm posterior leaflet.  I will let the MitraClip team decide whether she is feasible or not.  I think that is her best option if not she will need medical management    Interim update:    Patient unable to perform 6-minute walk test.  The KCCQ12 questionnaire shows significant impairment in her overall quality of life with exceedingly worse shortness of breath at rest and with exertion.  Her STS mortality risk was calculated at greater than 6%.  She is surgically prohibitive given her severe frailty, and advanced age.

## 2020-09-02 NOTE — DISCHARGE INSTRUCTIONS
[facility]  [address] [place] [address]      Coronary Angiogram (Femoral Approach) After Care     Refer to this sheet in the next few weeks. These instructions provide you with information on caring for yourself after your procedure. Your health care provider may also give you more specific instructions. Your treatment has been planned according to current medical practices, but problems sometimes occur. Call your health care provider if you have any problems or questions after your procedure.      What to Expect After the Procedure:  After your procedure, it is typical to have the following sensations:  · Minor discomfort or tenderness and a small bump at the catheter insertion site. The bump should usually decrease in size and tenderness within 1 to 2 weeks.  · Any bruising will usually fade within 2 to 4 weeks.    Home Care Instructions:  · Do not apply powder or lotion to the site.  · Do not take baths, swim, or use a hot tub until your health care provider approves and the site is completely healed.  · Do not bend, squat, or lift anything over 20 lb (9 kg) or as directed by your health care provider. However, we recommend lifting nothing heavier than a gallon of milk.    · You may shower 24 hours after the procedure. Remove the bandage (dressing) and gently wash the site with plain soap and water. Gently pat the site dry. You may apply a band aid daily for 2 days if desired.    · Inspect the site at least twice daily.  · Increase your fluid intake for the next 2 days.    · Limit your activity for the first 48 hours. .    · Avoid strenuous activity for 1 week or as advised by your physician.    · Follow instructions about when you can drive or return to work as directed by your physician.    · Hold direct pressure over the site when you cough, sneeze, laugh or change positions.  Do this for the next 2 days.    · Do not operate machinery or power tools for 24 hours.  · A responsible adult  should be with you for the first 24 hours after you arrive home. Do not make any important legal decisions or sign legal papers for 24 hours.  Do not drink alcohol for 24 hours.      Call Your Doctor If:  · You have drainage (other than a small amount of blood on the dressing).  · You have chills or a fever > 101.  · You have redness, warmth, swelling(larger than a walnut), or pain at the insertion site  · .You have heavy bleeding from the site. If this happens, hold pressure on the site and call 911.  · You develop chest pain or shortness of breath, feel faint, or pass out.  · You develop pain, discoloration, coldness, numbness, tingling, or severe bruising in the leg that held the catheter.  · You develop bleeding from any other place, such as the bowels.    Make Sure You:  · Understand these instructions.  · Will watch your condition.  · Will get help right away if you are not doing well or get worse.

## 2020-09-02 NOTE — ANESTHESIA PROCEDURE NOTES
Airway  Urgency: elective    Date/Time: 9/2/2020 1:08 PM  Airway not difficult    General Information and Staff    Patient location during procedure: OR  Anesthesiologist: Gokul Broderick MD    Indications and Patient Condition  Indications for airway management: airway protection    Preoxygenated: yes  Mask difficulty assessment: 1 - vent by mask    Final Airway Details  Final airway type: endotracheal airway      Successful airway: ETT  Cuffed: yes   Successful intubation technique: direct laryngoscopy  Endotracheal tube insertion site: oral  Blade: Penelope  Blade size: 4  ETT size (mm): 7.0  Cormack-Lehane Classification: grade IIb - view of arytenoids or posterior of glottis only  Placement verified by: chest auscultation   Measured from: lips  ETT/EBT  to lips (cm): 22  Number of attempts at approach: 1  Assessment: lips, teeth, and gum same as pre-op and atraumatic intubation

## 2020-09-03 ENCOUNTER — APPOINTMENT (OUTPATIENT)
Dept: CARDIOLOGY | Facility: HOSPITAL | Age: 85
End: 2020-09-03

## 2020-09-03 ENCOUNTER — TELEPHONE (OUTPATIENT)
Dept: CARDIOLOGY | Facility: CLINIC | Age: 85
End: 2020-09-03

## 2020-09-03 VITALS
RESPIRATION RATE: 16 BRPM | BODY MASS INDEX: 23.99 KG/M2 | OXYGEN SATURATION: 94 % | TEMPERATURE: 98.6 F | SYSTOLIC BLOOD PRESSURE: 119 MMHG | DIASTOLIC BLOOD PRESSURE: 55 MMHG | WEIGHT: 144 LBS | HEIGHT: 65 IN | HEART RATE: 75 BPM

## 2020-09-03 DIAGNOSIS — I34.0 NONRHEUMATIC MITRAL VALVE REGURGITATION: Primary | ICD-10-CM

## 2020-09-03 LAB
ACT BLD: 120 SECONDS (ref 82–152)
ACT BLD: 252 SECONDS (ref 82–152)
ACT BLD: 323 SECONDS (ref 82–152)
ANION GAP SERPL CALCULATED.3IONS-SCNC: 9.5 MMOL/L (ref 5–15)
AORTIC DIMENSIONLESS INDEX: 0.7 (DI)
BASOPHILS # BLD AUTO: 0.05 10*3/MM3 (ref 0–0.2)
BASOPHILS NFR BLD AUTO: 0.6 % (ref 0–1.5)
BH CV ECHO MEAS - AO MAX PG (FULL): 6.4 MMHG
BH CV ECHO MEAS - AO MAX PG: 11 MMHG
BH CV ECHO MEAS - AO MEAN PG (FULL): 2 MMHG
BH CV ECHO MEAS - AO MEAN PG: 5 MMHG
BH CV ECHO MEAS - AO ROOT AREA (BSA CORRECTED): 1.7
BH CV ECHO MEAS - AO ROOT AREA: 7.1 CM^2
BH CV ECHO MEAS - AO ROOT DIAM: 3 CM
BH CV ECHO MEAS - AO V2 MAX: 166 CM/SEC
BH CV ECHO MEAS - AO V2 MEAN: 107 CM/SEC
BH CV ECHO MEAS - AO V2 VTI: 34.9 CM
BH CV ECHO MEAS - AVA(I,A): 1.9 CM^2
BH CV ECHO MEAS - AVA(I,D): 1.9 CM^2
BH CV ECHO MEAS - AVA(V,A): 1.6 CM^2
BH CV ECHO MEAS - AVA(V,D): 1.6 CM^2
BH CV ECHO MEAS - BSA(HAYCOCK): 1.7 M^2
BH CV ECHO MEAS - BSA: 1.7 M^2
BH CV ECHO MEAS - BZI_BMI: 24 KILOGRAMS/M^2
BH CV ECHO MEAS - BZI_METRIC_HEIGHT: 165.1 CM
BH CV ECHO MEAS - BZI_METRIC_WEIGHT: 65.3 KG
BH CV ECHO MEAS - EDV(CUBED): 54.9 ML
BH CV ECHO MEAS - EDV(MOD-SP2): 98 ML
BH CV ECHO MEAS - EDV(MOD-SP4): 90 ML
BH CV ECHO MEAS - EDV(TEICH): 62 ML
BH CV ECHO MEAS - EF(CUBED): 64.1 %
BH CV ECHO MEAS - EF(MOD-BP): 74.7 %
BH CV ECHO MEAS - EF(MOD-SP2): 75.5 %
BH CV ECHO MEAS - EF(MOD-SP4): 73.3 %
BH CV ECHO MEAS - EF(TEICH): 56.4 %
BH CV ECHO MEAS - ESV(CUBED): 19.7 ML
BH CV ECHO MEAS - ESV(MOD-SP2): 24 ML
BH CV ECHO MEAS - ESV(MOD-SP4): 24 ML
BH CV ECHO MEAS - ESV(TEICH): 27 ML
BH CV ECHO MEAS - FS: 28.9 %
BH CV ECHO MEAS - IVS/LVPW: 1
BH CV ECHO MEAS - IVSD: 0.8 CM
BH CV ECHO MEAS - LAT PEAK E' VEL: 4.9 CM/SEC
BH CV ECHO MEAS - LV DIASTOLIC VOL/BSA (35-75): 52.3 ML/M^2
BH CV ECHO MEAS - LV MASS(C)D: 86 GRAMS
BH CV ECHO MEAS - LV MASS(C)DI: 50 GRAMS/M^2
BH CV ECHO MEAS - LV MAX PG: 2.5 MMHG
BH CV ECHO MEAS - LV MAX PG: 4.6 MMHG
BH CV ECHO MEAS - LV MEAN PG: 1.4 MMHG
BH CV ECHO MEAS - LV MEAN PG: 3 MMHG
BH CV ECHO MEAS - LV SYSTOLIC VOL/BSA (12-30): 14 ML/M^2
BH CV ECHO MEAS - LV V1 MAX: 107 CM/SEC
BH CV ECHO MEAS - LV V1 MAX: 79.4 CM/SEC
BH CV ECHO MEAS - LV V1 MEAN: 55.8 CM/SEC
BH CV ECHO MEAS - LV V1 MEAN: 74.6 CM/SEC
BH CV ECHO MEAS - LV V1 VTI: 22.8 CM
BH CV ECHO MEAS - LV V1 VTI: 26.1 CM
BH CV ECHO MEAS - LVIDD: 3.8 CM
BH CV ECHO MEAS - LVIDS: 2.7 CM
BH CV ECHO MEAS - LVLD AP2: 7.1 CM
BH CV ECHO MEAS - LVLD AP4: 7.4 CM
BH CV ECHO MEAS - LVLS AP2: 5.5 CM
BH CV ECHO MEAS - LVLS AP4: 5.6 CM
BH CV ECHO MEAS - LVOT AREA (M): 2.5 CM^2
BH CV ECHO MEAS - LVOT AREA (M): 2.5 CM^2
BH CV ECHO MEAS - LVOT AREA: 2.4 CM^2
BH CV ECHO MEAS - LVOT AREA: 2.5 CM^2
BH CV ECHO MEAS - LVOT DIAM: 1.8 CM
BH CV ECHO MEAS - LVOT DIAM: 1.8 CM
BH CV ECHO MEAS - LVPWD: 0.8 CM
BH CV ECHO MEAS - MED PEAK E' VEL: 4.8 CM/SEC
BH CV ECHO MEAS - MV A MAX VEL: 149 CM/SEC
BH CV ECHO MEAS - MV DEC SLOPE: 271.3 CM/SEC^2
BH CV ECHO MEAS - MV DEC SLOPE: 491.8 CM/SEC^2
BH CV ECHO MEAS - MV DEC TIME: 0.41 SEC
BH CV ECHO MEAS - MV E MAX VEL: 165 CM/SEC
BH CV ECHO MEAS - MV E/A: 1.1
BH CV ECHO MEAS - MV MAX PG: 16 MMHG
BH CV ECHO MEAS - MV MAX PG: 7.6 MMHG
BH CV ECHO MEAS - MV MEAN PG: 3.5 MMHG
BH CV ECHO MEAS - MV MEAN PG: 8 MMHG
BH CV ECHO MEAS - MV P1/2T MAX VEL: 186.6 CM/SEC
BH CV ECHO MEAS - MV P1/2T MAX VEL: 93.5 CM/SEC
BH CV ECHO MEAS - MV P1/2T: 100.9 MSEC
BH CV ECHO MEAS - MV P1/2T: 111.1 MSEC
BH CV ECHO MEAS - MV V2 MAX: 132.1 CM/SEC
BH CV ECHO MEAS - MV V2 MAX: 199.1 CM/SEC
BH CV ECHO MEAS - MV V2 MEAN: 135 CM/SEC
BH CV ECHO MEAS - MV V2 MEAN: 80.9 CM/SEC
BH CV ECHO MEAS - MV V2 VTI: 63.8 CM
BH CV ECHO MEAS - MV V2 VTI: 67.2 CM
BH CV ECHO MEAS - MVA P1/2T LCG: 1.2 CM^2
BH CV ECHO MEAS - MVA P1/2T LCG: 2.4 CM^2
BH CV ECHO MEAS - MVA(P1/2T): 2 CM^2
BH CV ECHO MEAS - MVA(P1/2T): 2.2 CM^2
BH CV ECHO MEAS - MVA(VTI): 0.87 CM^2
BH CV ECHO MEAS - MVA(VTI): 0.99 CM^2
BH CV ECHO MEAS - RAP SYSTOLE: 3 MMHG
BH CV ECHO MEAS - RVSP: 55 MMHG
BH CV ECHO MEAS - SI(AO): 143.4 ML/M^2
BH CV ECHO MEAS - SI(CUBED): 20.5 ML/M^2
BH CV ECHO MEAS - SI(LVOT): 38.6 ML/M^2
BH CV ECHO MEAS - SI(MOD-SP2): 43 ML/M^2
BH CV ECHO MEAS - SI(MOD-SP4): 38.4 ML/M^2
BH CV ECHO MEAS - SI(TEICH): 20.3 ML/M^2
BH CV ECHO MEAS - SV(AO): 246.7 ML
BH CV ECHO MEAS - SV(CUBED): 35.2 ML
BH CV ECHO MEAS - SV(LVOT): 55.7 ML
BH CV ECHO MEAS - SV(LVOT): 66.4 ML
BH CV ECHO MEAS - SV(MOD-SP2): 74 ML
BH CV ECHO MEAS - SV(MOD-SP4): 66 ML
BH CV ECHO MEAS - SV(TEICH): 34.9 ML
BH CV ECHO MEAS - TR MAX VEL: 356 CM/SEC
BH CV ECHO MEASUREMENTS AVERAGE E/E' RATIO: 34.02
BH CV XLRA - TDI S': 10.3 CM/SEC
BUN SERPL-MCNC: 17 MG/DL (ref 8–23)
BUN/CREAT SERPL: 13.5 (ref 7–25)
CALCIUM SPEC-SCNC: 8.9 MG/DL (ref 8.6–10.5)
CHLORIDE SERPL-SCNC: 102 MMOL/L (ref 98–107)
CO2 SERPL-SCNC: 26.5 MMOL/L (ref 22–29)
CREAT SERPL-MCNC: 1.26 MG/DL (ref 0.57–1)
DEPRECATED RDW RBC AUTO: 41.2 FL (ref 37–54)
DEPRECATED RDW RBC AUTO: 43.1 FL (ref 37–54)
EOSINOPHIL # BLD AUTO: 0.21 10*3/MM3 (ref 0–0.4)
EOSINOPHIL NFR BLD AUTO: 2.5 % (ref 0.3–6.2)
ERYTHROCYTE [DISTWIDTH] IN BLOOD BY AUTOMATED COUNT: 12.8 % (ref 12.3–15.4)
ERYTHROCYTE [DISTWIDTH] IN BLOOD BY AUTOMATED COUNT: 12.8 % (ref 12.3–15.4)
GFR SERPL CREATININE-BSD FRML MDRD: 40 ML/MIN/1.73
GLUCOSE SERPL-MCNC: 118 MG/DL (ref 65–99)
HCT VFR BLD AUTO: 40.1 % (ref 34–46.6)
HCT VFR BLD AUTO: 41 % (ref 34–46.6)
HGB BLD-MCNC: 13.3 G/DL (ref 12–15.9)
HGB BLD-MCNC: 13.4 G/DL (ref 12–15.9)
IMM GRANULOCYTES # BLD AUTO: 0.03 10*3/MM3 (ref 0–0.05)
IMM GRANULOCYTES NFR BLD AUTO: 0.4 % (ref 0–0.5)
LEFT ATRIUM VOLUME INDEX: 25 ML/M2
LYMPHOCYTES # BLD AUTO: 1.04 10*3/MM3 (ref 0.7–3.1)
LYMPHOCYTES NFR BLD AUTO: 12.3 % (ref 19.6–45.3)
MAXIMAL PREDICTED HEART RATE: 133 BPM
MCH RBC QN AUTO: 29.4 PG (ref 26.6–33)
MCH RBC QN AUTO: 30 PG (ref 26.6–33)
MCHC RBC AUTO-ENTMCNC: 32.7 G/DL (ref 31.5–35.7)
MCHC RBC AUTO-ENTMCNC: 33.2 G/DL (ref 31.5–35.7)
MCV RBC AUTO: 88.7 FL (ref 79–97)
MCV RBC AUTO: 91.9 FL (ref 79–97)
MONOCYTES # BLD AUTO: 0.8 10*3/MM3 (ref 0.1–0.9)
MONOCYTES NFR BLD AUTO: 9.4 % (ref 5–12)
NEUTROPHILS NFR BLD AUTO: 6.34 10*3/MM3 (ref 1.7–7)
NEUTROPHILS NFR BLD AUTO: 74.8 % (ref 42.7–76)
NRBC BLD AUTO-RTO: 0 /100 WBC (ref 0–0.2)
PLATELET # BLD AUTO: 168 10*3/MM3 (ref 140–450)
PLATELET # BLD AUTO: 178 10*3/MM3 (ref 140–450)
PMV BLD AUTO: 11.1 FL (ref 6–12)
PMV BLD AUTO: 11.4 FL (ref 6–12)
POTASSIUM SERPL-SCNC: 3.9 MMOL/L (ref 3.5–5.2)
RBC # BLD AUTO: 4.46 10*6/MM3 (ref 3.77–5.28)
RBC # BLD AUTO: 4.52 10*6/MM3 (ref 3.77–5.28)
SODIUM SERPL-SCNC: 138 MMOL/L (ref 136–145)
STRESS TARGET HR: 113 BPM
WBC # BLD AUTO: 8.26 10*3/MM3 (ref 3.4–10.8)
WBC # BLD AUTO: 8.47 10*3/MM3 (ref 3.4–10.8)

## 2020-09-03 PROCEDURE — 93010 ELECTROCARDIOGRAM REPORT: CPT | Performed by: INTERNAL MEDICINE

## 2020-09-03 PROCEDURE — 25010000002 FUROSEMIDE PER 20 MG: Performed by: INTERNAL MEDICINE

## 2020-09-03 PROCEDURE — 25010000002 HYDRALAZINE PER 20 MG: Performed by: INTERNAL MEDICINE

## 2020-09-03 PROCEDURE — 80048 BASIC METABOLIC PNL TOTAL CA: CPT | Performed by: INTERNAL MEDICINE

## 2020-09-03 PROCEDURE — 93306 TTE W/DOPPLER COMPLETE: CPT

## 2020-09-03 PROCEDURE — 85027 COMPLETE CBC AUTOMATED: CPT | Performed by: INTERNAL MEDICINE

## 2020-09-03 PROCEDURE — 99239 HOSP IP/OBS DSCHRG MGMT >30: CPT | Performed by: INTERNAL MEDICINE

## 2020-09-03 PROCEDURE — 93005 ELECTROCARDIOGRAM TRACING: CPT | Performed by: INTERNAL MEDICINE

## 2020-09-03 PROCEDURE — 25010000002 PERFLUTREN (DEFINITY) 8.476 MG IN SODIUM CHLORIDE 0.9 % 10 ML INJECTION: Performed by: INTERNAL MEDICINE

## 2020-09-03 PROCEDURE — 85025 COMPLETE CBC W/AUTO DIFF WBC: CPT | Performed by: INTERNAL MEDICINE

## 2020-09-03 PROCEDURE — 93306 TTE W/DOPPLER COMPLETE: CPT | Performed by: INTERNAL MEDICINE

## 2020-09-03 RX ORDER — LISINOPRIL 20 MG/1
20 TABLET ORAL ONCE
Status: COMPLETED | OUTPATIENT
Start: 2020-09-03 | End: 2020-09-03

## 2020-09-03 RX ORDER — HYDRALAZINE HYDROCHLORIDE 20 MG/ML
20 INJECTION INTRAMUSCULAR; INTRAVENOUS ONCE
Status: COMPLETED | OUTPATIENT
Start: 2020-09-03 | End: 2020-09-03

## 2020-09-03 RX ORDER — FUROSEMIDE 10 MG/ML
40 INJECTION INTRAMUSCULAR; INTRAVENOUS ONCE
Status: COMPLETED | OUTPATIENT
Start: 2020-09-03 | End: 2020-09-03

## 2020-09-03 RX ORDER — CLOPIDOGREL BISULFATE 75 MG/1
75 TABLET ORAL DAILY
Qty: 30 TABLET | Refills: 3 | Status: SHIPPED | OUTPATIENT
Start: 2020-09-03 | End: 2020-09-21

## 2020-09-03 RX ORDER — LISINOPRIL 40 MG/1
40 TABLET ORAL DAILY
Qty: 30 TABLET | Refills: 6 | Status: SHIPPED | OUTPATIENT
Start: 2020-09-03 | End: 2021-03-01

## 2020-09-03 RX ADMIN — ATENOLOL 25 MG: 25 TABLET ORAL at 07:50

## 2020-09-03 RX ADMIN — LISINOPRIL 20 MG: 20 TABLET ORAL at 09:33

## 2020-09-03 RX ADMIN — LISINOPRIL 20 MG: 20 TABLET ORAL at 07:49

## 2020-09-03 RX ADMIN — PERFLUTREN 3 ML: 6.52 INJECTION, SUSPENSION INTRAVENOUS at 13:30

## 2020-09-03 RX ADMIN — HYDRALAZINE HYDROCHLORIDE 20 MG: 20 INJECTION INTRAMUSCULAR; INTRAVENOUS at 11:24

## 2020-09-03 RX ADMIN — ACETAMINOPHEN 650 MG: 325 TABLET, FILM COATED ORAL at 14:20

## 2020-09-03 RX ADMIN — FUROSEMIDE 40 MG: 10 INJECTION, SOLUTION INTRAMUSCULAR; INTRAVENOUS at 11:24

## 2020-09-03 NOTE — TELEPHONE ENCOUNTER
----- Message from Queta Lorenzana RN sent at 9/3/2020  8:14 AM EDT -----  Hi all,     This patient had a MitraClip procedure done yesterday.  She is a patient of Dr. Hmamer.  She will need to follow up with an APRN in 1 week.  She will need a TTE and follow-up with Dr. Mandujano between 9/23/2020-11/2/2020 and a 1 year follow-up with Dr. Mandujano between 7/5/2021-10/18/2021    Thank you so much!    Queta

## 2020-09-03 NOTE — ANESTHESIA POSTPROCEDURE EVALUATION
"Patient: Brodie DANIELSON Naive    Procedure Summary     Date:  09/02/20 Room / Location:  [facility]  /  [facility]    Anesthesia Start:  1231 Anesthesia Stop:  1455    Procedure:  TRANSCATHETER MITRAL VALVE REPAIR (N/A ) Diagnosis:  Mitral valve insufficiency, unspecified etiology    Provider:  Edward Humphreys MD Provider:  Gokul Broderick MD    Anesthesia Type:  general ASA Status:  4          Anesthesia Type: general    Vitals  Vitals Value Taken Time   /57 9/3/2020  3:01 PM   Temp 37 °C (98.6 °F) 9/3/2020  7:47 AM   Pulse 83 9/3/2020  5:45 PM   Resp 16 9/3/2020  7:47 AM   SpO2 94 % 9/3/2020  4:44 PM   Vitals shown include unvalidated device data.        Post Anesthesia Care and Evaluation    Patient location during evaluation: bedside  Patient participation: complete - patient participated  Level of consciousness: awake and alert  Pain management: adequate  Airway patency: patent  Anesthetic complications: No anesthetic complications  PONV Status: none  Cardiovascular status: acceptable  Respiratory status: acceptable  Hydration status: acceptable    Comments: /55   Pulse 75   Temp 37 °C (98.6 °F) (Temporal)   Resp 16   Ht 165.1 cm (65\")   Wt 65.3 kg (144 lb)   SpO2 94%   BMI 23.96 kg/m²         "

## 2020-09-03 NOTE — CONSULTS
Met with patient, discussed benefits of cardiac rehab. Patient reports attended program following her open heart procedure a number of years ago. 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 Provided phase II information along with the contact information for cardiac rehab here at [facility].   \

## 2020-09-10 ENCOUNTER — OFFICE VISIT (OUTPATIENT)
Dept: CARDIOLOGY | Facility: CLINIC | Age: 85
End: 2020-09-10

## 2020-09-10 VITALS
BODY MASS INDEX: 23.76 KG/M2 | SYSTOLIC BLOOD PRESSURE: 144 MMHG | WEIGHT: 142.6 LBS | OXYGEN SATURATION: 97 % | RESPIRATION RATE: 16 BRPM | HEART RATE: 63 BPM | DIASTOLIC BLOOD PRESSURE: 72 MMHG | HEIGHT: 65 IN

## 2020-09-10 DIAGNOSIS — Z95.818 STATUS POST IMPLANTATION OF MITRAL VALVE LEAFLET CLIP: ICD-10-CM

## 2020-09-10 DIAGNOSIS — Z98.890 STATUS POST IMPLANTATION OF MITRAL VALVE LEAFLET CLIP: ICD-10-CM

## 2020-09-10 DIAGNOSIS — I34.0 NONRHEUMATIC MITRAL VALVE REGURGITATION: Primary | ICD-10-CM

## 2020-09-10 PROCEDURE — 93000 ELECTROCARDIOGRAM COMPLETE: CPT | Performed by: NURSE PRACTITIONER

## 2020-09-10 PROCEDURE — 99214 OFFICE O/P EST MOD 30 MIN: CPT | Performed by: NURSE PRACTITIONER

## 2020-09-10 NOTE — PROGRESS NOTES
Date of Office Visit: 09/10/2020  Encounter Provider: STACIA Shelby  Place of Service: [facility] CARDIOLOGY  Patient Name: Brodie Mo  :10/31/1932    Chief Complaint   Patient presents with   • Cardiac Valve Problem     FOLLOW UP-POST MITRAL CLIP   :     HPI: Brodie Mo is a 87 y.o. female, new to me, who presents today for follow-up.  Old records have been obtained and reviewed by me.  She is a patient of Dr. Hatch's with a past cardiac history significant for coronary artery disease.  In , she had a CABG x 2 with a LIMA to the LAD and a vein graft to the OM 2.  We have been following her closely for severe mitral insufficiency.  She was last seen in the office by Dr. Hatch on 2020 at which time was reporting increased dyspnea on exertion.  Ultimately, she was referred for a mitral clip.  On 2020, she underwent a transfemoral transcatheter mitral clip mitral valve repair.  Echocardiogram revealed hyperdynamic LV function, grade 2 diastolic dysfunction, a mitral clip in stable position with mild mitral regurgitation and mild to moderate mitral stenosis.  She is here today for follow-up.   Overall she has been doing okay.  She admittedly does not notice a huge difference in her symptoms since her procedure.  She is still having some shortness of breath on exertion.  However, it does not happen all the time.  She feels like she just has some difficulty catching her breath sometimes but this reportedly only last for a few seconds.  She denies any PND or orthopnea.  Her weight is been stable.  She denies any edema.  She denies any chest pain, palpitations, dizziness, or syncope.  She denies any bleeding difficulties or melena.  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  She is a little concerned about some bruising on her left arm.  She is also wondering if she should be checking her blood pressures at home.  She eats a very healthy diet and is fairly active.       Past Medical History:   Diagnosis Date   • CAD (coronary artery disease)    • Cancer (CMS/HCC)     bladder    • Deep vein thrombosis (CMS/HCC)     Left leg-2002   • Health care maintenance    • HTN (hypertension)    • Hyperlipidemia    • LVH (left ventricular hypertrophy)    • Pneumonia    • S/P CABG (coronary artery bypass graft)        Past Surgical History:   Procedure Laterality Date   • BLADDER SURGERY      cancer   • CORONARY ARTERY BYPASS GRAFT      2002 with Dr Prabhakar; AGUAYO to LAD and SVG to OM2; 2005 cath all grafts open and 30% dx in RCA; nl stress in 2010, 2013   • HYSTERECTOMY     • MITRAL VALVE REPAIR/REPLACEMENT N/A 9/2/2020    Procedure: TRANSCATHETER MITRAL VALVE REPAIR;  Surgeon: Edward Humphreys MD;  Location: [facility] INVASIVE LOCATION;  Service: Cardiovascular;  Laterality: N/A;       Social History     Socioeconomic History   • Marital status:      Spouse name: Not on file   • Number of children: Not on file   • Years of education: Not on file   • Highest education level: Not on file   Tobacco Use   • Smoking status: Former Smoker   • Smokeless tobacco: Never Used   • Tobacco comment: CAFFEINE USE: 2  CUPS COFFEE DAILY   Substance and Sexual Activity   • Alcohol use: Yes     Alcohol/week: 1.0 standard drinks     Types: 1 Shots of liquor per week     Comment: 1 DRINK DAILY   • Drug use: No   • Sexual activity: Defer       Family History   Problem Relation Age of Onset   • Heart disease Mother    • Kidney cancer Father    • No Known Problems Maternal Grandmother    • No Known Problems Maternal Grandfather    • No Known Problems Paternal Grandmother    • No Known Problems Paternal Grandfather    • Heart disease Sister    • Heart disease Brother        Review of Systems   Constitution: Positive for malaise/fatigue. Negative for chills and fever.   Cardiovascular: Positive for dyspnea on exertion (sometimes). Negative for chest pain, leg swelling, near-syncope, orthopnea,  "palpitations, paroxysmal nocturnal dyspnea and syncope.   Respiratory: Negative for cough and shortness of breath.    Musculoskeletal: Negative for joint pain, joint swelling and myalgias.   Gastrointestinal: Negative for abdominal pain, diarrhea, melena, nausea and vomiting.   Genitourinary: Negative for frequency and hematuria.   Neurological: Negative for light-headedness, numbness, paresthesias and seizures.   Allergic/Immunologic: Negative.    All other systems reviewed and are negative.      Allergies   Allergen Reactions   • No Known Drug Allergy          Current Outpatient Medications:   •  aspirin 81 MG tablet, Take 81 mg by mouth Daily., Disp: , Rfl:   •  atenolol (TENORMIN) 25 MG tablet, Take 25 mg by mouth Daily., Disp: , Rfl:   •  atorvastatin (LIPITOR) 40 MG tablet, TAKE 1 TABLET BY MOUTH EVERY DAY, Disp: 90 tablet, Rfl: 3  •  cholecalciferol (VITAMIN D3) 1000 units tablet, Take 1,000 Units by mouth Daily., Disp: , Rfl:   •  clopidogrel (PLAVIX) 75 MG tablet, Take 1 tablet by mouth Daily., Disp: 30 tablet, Rfl: 3  •  lisinopril (PRINIVIL,ZESTRIL) 40 MG tablet, Take 1 tablet by mouth Daily., Disp: 30 tablet, Rfl: 6  No current facility-administered medications for this visit.       Objective:     Vitals:    09/10/20 1333 09/10/20 1340   BP: 138/72 144/72   BP Location: Right arm Left arm   Patient Position: Sitting Sitting   Pulse: 63    Resp: 16    SpO2: 97%    Weight: 64.7 kg (142 lb 9.6 oz)    Height: 165.1 cm (65\")      Body mass index is 23.73 kg/m².    PHYSICAL EXAM:    Physical Exam   Constitutional: She is oriented to person, place, and time. She appears well-developed and well-nourished. No distress.   HENT:   Head: Normocephalic and atraumatic.   Eyes: Pupils are equal, round, and reactive to light.   Neck: No JVD present. No thyromegaly present.   Cardiovascular: Normal rate, regular rhythm, normal heart sounds and intact distal pulses.   No murmur heard.  Pulmonary/Chest: Effort normal and " breath sounds normal. No respiratory distress.   Abdominal: Soft. Bowel sounds are normal. She exhibits no distension. There is no splenomegaly or hepatomegaly. There is no tenderness.   Musculoskeletal: Normal range of motion. She exhibits no edema.   Neurological: She is alert and oriented to person, place, and time.   Skin: Skin is warm and dry. Bruising noted. She is not diaphoretic. No erythema.   Bilateral groin sites C, D, I  Left radial site and forearm bruising.  Excellent radial pulse.  Excellent capillary refill.   Psychiatric: She has a normal mood and affect. Her behavior is normal. Judgment normal.         ECG 12 Lead  Date/Time: 9/10/2020 1:46 PM  Performed by: Brenda Gutiérrez APRN  Authorized by: Brenda Gutiérrez APRN   Comparison: compared with previous ECG from 8/6/2020  Similar to previous ECG  Rhythm: sinus rhythm  Rate: normal  BPM: 59  T inversion: II, III, aVF, V4, V5 and V6  Other findings: non-specific ST-T wave changes    Clinical impression: abnormal EKG  Comments: Indication: Mitral insufficiency              Assessment:       Diagnosis Plan   1. Nonrheumatic mitral valve regurgitation  ECG 12 Lead   2. Status post implantation of mitral valve leaflet clip       Orders Placed This Encounter   Procedures   • ECG 12 Lead     This order was created via procedure documentation          Plan:       Overall I think she is stable and doing well.  She denies any symptoms of angina or heart failure.  The post procedure echocardiogram revealed a mitral clip in stable position with mild mitral regurgitation and mild to moderate mitral stenosis.  Even though she does not notice much of an improvement in her symptoms, I think she looks great.  She does not appear to be in any acute heart failure.  She appears euvolemic.  Her left arm is bruised but there is no sign of any kind of vascular compromise.  I told her to keep an eye on it.  I did recommend she continue checking her blood pressures  at home to ensure adequate control.  Otherwise, I am not going to make any changes.  She will follow-up with Dr. Mandujano on 10/1/2020 or sooner if needed.      As always, it has been a pleasure to participate in your patient's care.      Sincerely,         STACIA Dawson

## 2020-09-21 RX ORDER — CLOPIDOGREL BISULFATE 75 MG/1
TABLET ORAL
Qty: 90 TABLET | Refills: 1 | Status: SHIPPED | OUTPATIENT
Start: 2020-09-21 | End: 2021-05-24

## 2020-09-22 NOTE — DISCHARGE SUMMARY
Date of Discharge:  9/3/2020  Date of Admit: 9/2/2020    Discharge Diagnosis  1.  Severe mitral valve regurgitation  2.  Coronary artery disease status post CABG  3.  Chronic kidney disease  4.  Frailty  5.  Advanced age  6.  [facility] [place]: 87 y.o. female who presents today for follow-up.  She is a patient of Dr. Hatch's with a past cardiac history significant for coronary artery disease.  In 2002, she had a CABG x 2 with a LIMA to the LAD and a vein graft to the OM 2.  We have been following her closely for severe mitral insufficiency.  She was last seen in the office by Dr. Hatch on 7/16/2020 at which time was reporting increased dyspnea on exertion.  Ultimately, she was referred for a mitraclip.  On 9/2/2020, she underwent a Mitraclip with residual mild mitral valve regurgitation and migrating approximating the mitral valve.   Follow-up transthoracic echocardiogram showed a mean gradient of 6 mmHg across the mitral valve but a stable mitral clip with only mild regurgitation.  Appropriate for discharge today.    Procedures Performed  Procedure(s):  TRANSCATHETER MITRAL VALVE REPAIR      1. Percutaneous transseptal puncture for left atrial access  2. Transfemoral Transcatheter mitral clip mitral valve repair using a single Mitraclip device(s) under general anesthesia   3. Percutaneous left femoral arterial access   4. Percutaneous right femoral venous access  5. Trans esophageal echocardiogram  6. Arterial line placement  7. Central venous catheter placement      Post implant: Mitral regurgitation mild, mean mitral valvular gradient 3 mmHg    Consults     No orders found from 8/4/2020 to 9/3/2020.          Pertinent Test Results:  TTE  9/3/2020     Left ventricular systolic function is hyperdynamic (EF > 70%). Calculated EF = 74.7%. Estimated EF was in agreement with the calculated EF. Normal left ventricular cavity size and wall thickness noted. All left ventricular wall segments contract normally.  "Left ventricular diastolic dysfunction is noted (grade II w/high LAP) consistent with pseudonormalization. There is no evidence of a left ventricular mass or thrombus present.    The mitral valve is abnormal in structure. Mitral clip in stable position at A2-P2.. Mild mitral valve regurgitation is present. Mild-to-moderate mitral valve stenosis is present. Mean gradient 6 to 7 mmHg with mitral valve area 1.7 to 2.0 cm² by pressure half-time. This is measured in the setting of hyperdynamic ventricle with elevated blood pressure..    Discharge Physical Exam:     No intake or output data in the 24 hours ending 09/22/20 1535  Flowsheet Rows      First Filed Value   Admission Height  165.1 cm (65\") Documented at 09/02/2020 1127   Admission Weight  65.3 kg (144 lb) Documented at 09/02/2020 1127          General Appearance:    Alert, cooperative, in no acute distress. Frail.    Head:    Normocephalic, without obvious abnormality, atraumatic       Neck:   No adenopathy, supple, no thyromegaly, no carotid bruit, no    JVD   Lungs:     Clear to auscultation bilaterally, no wheezes, rales, or     rhonchi    Heart:    Normal rate, regular rhythm, no murmur, no rub, no gallop   Chest Wall:    No abnormalities observed   Abdomen:     Normal bowel sounds, soft, nontender, nondistended,            no rebound tenderness   Extremities:   No cyanosis, clubbing, or edema   Pulses:   Pulses palpable and equal bilaterally   Skin:   No bleeding or rash       Neurologic:   Cranial nerves 2 - 12 grossly intact, sensation intact             Discharge Medications     Discharge Medications      Changes to Medications      Instructions Start Date   lisinopril 40 MG tablet  Commonly known as: FLORENTINO GARRETT  What changed:   · medication strength  · how much to take   40 mg, Oral, Daily         Continue These Medications      Instructions Start Date   aspirin 81 MG tablet   81 mg, Oral, Daily      atenolol 25 MG tablet  Commonly known as: " TENORMIN   25 mg, Oral, Daily      atorvastatin 40 MG tablet  Commonly known as: LIPITOR   TAKE 1 TABLET BY MOUTH EVERY DAY      cholecalciferol 25 MCG (1000 UT) tablet  Commonly known as: VITAMIN D3   1,000 Units, Oral, Daily             Discharge Diet:   Diet Instructions     PLEASE FOLLOW A HEART HEALTHY DIET               Activity at Discharge:   Activity Instructions     Activity per tolerance               Discharge disposition: Home    Condition on Discharge: good    Follow-up Appointments  Future Appointments   Date Time Provider Department Center   10/1/2020 12:30 PM [facility]   10/1/2020  1:00 PM Brady Mandujano MD [facility] None   9/2/2021  1:15 PM [facility] ECHO/V[facility] ECHO [facility]   9/2/2021  1:40 PM Brady Mandujano MD [facility] None     Additional Instructions for the Follow-ups that You Need to Schedule     Ambulatory Referral to Cardiac Rehab   As directed            Test Results Pending at Discharge       Brady Mandujano MD  09/22/20  15:32 EDT    Greater than 30 min spent in reviewing records, discussion and examination of the patient and discussion with other members of the patient's medical team.     Dictated utilizing Dragon dictation

## 2020-10-01 ENCOUNTER — OFFICE VISIT (OUTPATIENT)
Dept: CARDIOLOGY | Facility: CLINIC | Age: 85
End: 2020-10-01

## 2020-10-01 ENCOUNTER — HOSPITAL ENCOUNTER (OUTPATIENT)
Dept: CARDIOLOGY | Facility: HOSPITAL | Age: 85
Discharge: HOME | End: 2020-10-01
Admitting: INTERNAL MEDICINE

## 2020-10-01 VITALS
DIASTOLIC BLOOD PRESSURE: 60 MMHG | HEIGHT: 65 IN | OXYGEN SATURATION: 98 % | HEART RATE: 69 BPM | WEIGHT: 142 LBS | SYSTOLIC BLOOD PRESSURE: 160 MMHG | BODY MASS INDEX: 23.66 KG/M2

## 2020-10-01 VITALS
HEIGHT: 65 IN | SYSTOLIC BLOOD PRESSURE: 138 MMHG | BODY MASS INDEX: 21.66 KG/M2 | HEART RATE: 58 BPM | DIASTOLIC BLOOD PRESSURE: 64 MMHG | WEIGHT: 130 LBS

## 2020-10-01 DIAGNOSIS — Z95.1 S/P CABG (CORONARY ARTERY BYPASS GRAFT): Primary | ICD-10-CM

## 2020-10-01 DIAGNOSIS — I34.0 MITRAL VALVE INSUFFICIENCY, UNSPECIFIED ETIOLOGY: ICD-10-CM

## 2020-10-01 DIAGNOSIS — I34.0 NONRHEUMATIC MITRAL VALVE REGURGITATION: ICD-10-CM

## 2020-10-01 PROCEDURE — 93306 TTE W/DOPPLER COMPLETE: CPT

## 2020-10-01 PROCEDURE — 93306 TTE W/DOPPLER COMPLETE: CPT | Performed by: INTERNAL MEDICINE

## 2020-10-01 PROCEDURE — 99214 OFFICE O/P EST MOD 30 MIN: CPT | Performed by: INTERNAL MEDICINE

## 2020-10-01 PROCEDURE — 93000 ELECTROCARDIOGRAM COMPLETE: CPT | Performed by: INTERNAL MEDICINE

## 2020-10-01 NOTE — PROGRESS NOTES
Date of Office Visit: 10/01/20  Encounter Provider: Brady Mandujano MD  Place of Service: [facility] CARDIOLOGY  Patient Name: Brodie Mo  :10/31/1932    Chief Complaint   Patient presents with   • 30 MitraClip   :     HPI: Brodie Mo is a 87 y.o. femalewho presents today for follow-up.  She is a patient of Dr. Dawit Hatch's with a past cardiac history significant for coronary artery disease.  In , she had a CABG x 2 with a LIMA to the LAD and a vein graft to the OM 2.  We have been following her closely for severe mitral insufficiency.  She was last seen in the office by Dr. Hatch on 2020 at which time was reporting increased dyspnea on exertion.  Ultimately, she was referred for a mitral clip.  On 2020, she underwent a MitraClip with a good result as documented below.  Echocardiogram revealed hyperdynamic LV function, grade 2 diastolic dysfunction, a mitral clip in stable position with mild mitral regurgitation and mild to moderate mitral stenosis.  She is here today for follow-up.     She states that she has been doing very well. Her breathing has significantly improved with ambulation on flat ground. She still has mild shortness of breath when walking up stairs and when bending over at her waist but overall feels improved. She is tolerating her current medical regimen well.         Past Medical History:   Diagnosis Date   • CAD (coronary artery disease)    • Cancer (CMS/HCC)     bladder    • Deep vein thrombosis (CMS/HCC)     Left leg-   • Health care maintenance    • HTN (hypertension)    • Hyperlipidemia    • LVH (left ventricular hypertrophy)    • Pneumonia    • S/P CABG (coronary artery bypass graft)        Past Surgical History:   Procedure Laterality Date   • BLADDER SURGERY      cancer   • CORONARY ARTERY BYPASS GRAFT       with Dr Prabhakar; AGUAYO to LAD and SVG to OM2;  cath all grafts open and 30% dx in RCA; nl stress in  2010, 2013   • HYSTERECTOMY     • MITRAL VALVE REPAIR/REPLACEMENT N/A 9/2/2020    Procedure: TRANSCATHETER MITRAL VALVE REPAIR;  Surgeon: Edward Humphreys MD;  Location: [facility] INVASIVE LOCATION;  Service: Cardiovascular;  Laterality: N/A;       Social History     Socioeconomic History   • Marital status:      Spouse name: Not on file   • Number of children: Not on file   • Years of education: Not on file   • Highest education level: Not on file   Tobacco Use   • Smoking status: Former Smoker   • Smokeless tobacco: Never Used   • Tobacco comment: CAFFEINE USE: 2  CUPS COFFEE DAILY   Substance and Sexual Activity   • Alcohol use: Yes     Alcohol/week: 1.0 standard drinks     Types: 1 Shots of liquor per week     Comment: 1 DRINK DAILY   • Drug use: No   • Sexual activity: Defer       Family History   Problem Relation Age of Onset   • Heart disease Mother    • Kidney cancer Father    • No Known Problems Maternal Grandmother    • No Known Problems Maternal Grandfather    • No Known Problems Paternal Grandmother    • No Known Problems Paternal Grandfather    • Heart disease Sister    • Heart disease Brother        Review of Systems   Constitution: Positive for malaise/fatigue. Negative for chills and fever.   Cardiovascular: Positive for dyspnea on exertion (sometimes). Negative for chest pain, leg swelling, near-syncope, orthopnea, palpitations, paroxysmal nocturnal dyspnea and syncope.   Respiratory: Negative for cough and shortness of breath.    Musculoskeletal: Negative for joint pain, joint swelling and myalgias.   Gastrointestinal: Negative for abdominal pain, diarrhea, melena, nausea and vomiting.   Genitourinary: Negative for frequency and hematuria.   Neurological: Negative for light-headedness, numbness, paresthesias and seizures.   Allergic/Immunologic: Negative.    All other systems reviewed and are negative.      Allergies   Allergen Reactions   • No Known Drug Allergy          Current  "Outpatient Medications:   •  aspirin 81 MG tablet, Take 81 mg by mouth Daily., Disp: , Rfl:   •  atenolol (TENORMIN) 25 MG tablet, Take 25 mg by mouth Daily., Disp: , Rfl:   •  atorvastatin (LIPITOR) 40 MG tablet, TAKE 1 TABLET BY MOUTH EVERY DAY, Disp: 90 tablet, Rfl: 3  •  cholecalciferol (VITAMIN D3) 1000 units tablet, Take 1,000 Units by mouth Daily., Disp: , Rfl:   •  clopidogrel (PLAVIX) 75 MG tablet, TAKE 1 TABLET BY MOUTH EVERY DAY, Disp: 90 tablet, Rfl: 1  •  lisinopril (PRINIVIL,ZESTRIL) 40 MG tablet, Take 1 tablet by mouth Daily., Disp: 30 tablet, Rfl: 6      Objective:     Vitals:    10/01/20 1329   BP: 138/64   Pulse: 58   Weight: 59 kg (130 lb)   Height: 165.1 cm (65\")     Body mass index is 21.63 kg/m².    PHYSICAL EXAM:    Physical Exam   Constitutional: She is oriented to person, place, and time. She appears well-developed and well-nourished. No distress.   HENT:   Head: Normocephalic and atraumatic.   Eyes: Pupils are equal, round, and reactive to light.   Neck: No JVD present. No thyromegaly present.   Cardiovascular: Normal rate, regular rhythm, normal heart sounds and intact distal pulses.   No murmur heard.  Pulmonary/Chest: Effort normal and breath sounds normal. No respiratory distress.   Abdominal: Soft. Bowel sounds are normal. She exhibits no distension. There is no splenomegaly or hepatomegaly. There is no abdominal tenderness.   Musculoskeletal: Normal range of motion.         General: No edema.   Neurological: She is alert and oriented to person, place, and time.   Skin: Skin is warm and dry. Bruising noted. She is not diaphoretic. No erythema.   Psychiatric: She has a normal mood and affect. Her behavior is normal. Judgment normal.         ECG 12 Lead    Date/Time: 10/1/2020 2:04 PM  Performed by: Brady Mandujano MD  Authorized by: Brady Mandujano MD   Comparison: compared with previous ECG from 9/10/2020  Similar to previous ECG  Rhythm: sinus rhythm  Rate: normal  QRS " axis: right    Clinical impression: non-specific ECG  Comments: Nonspecific repolarization                Assessment:      No diagnosis found.  No orders of the defined types were placed in this encounter.    Plan:   This is a very pleasant 87-year-old female with a medical history of coronary disease with prior CABG, severe mitral valve regurgitation, dyspnea on exertion, who underwent a mitral clip with a good results about a month ago. She had what looks to be more towards mild mitral valve stenosis, at case completion a mild regurgitation. She is here today for follow up and is doing well. Her dyspnea has improved.     1. Severe mitral valve regurgitation: Status post mitral clip.        -Echo images today look like there is residual mild mitral valve regurgitation and mild mitral stenosis. This is a significant improvement.        -Continue dual antiplatelet therapy for 6 months and aspirin life long.     I will plan on seeing her back in 1 year. She will follow up with Dr. Hatch otherwise.

## 2020-10-02 LAB
ASCENDING AORTA: 2.4 CM
BH CV ECHO MEAS - ACS: 1.5 CM
BH CV ECHO MEAS - AO MAX PG (FULL): 6.4 MMHG
BH CV ECHO MEAS - AO MAX PG: 10.2 MMHG
BH CV ECHO MEAS - AO MEAN PG (FULL): 3.4 MMHG
BH CV ECHO MEAS - AO MEAN PG: 5.8 MMHG
BH CV ECHO MEAS - AO ROOT AREA (BSA CORRECTED): 1.5
BH CV ECHO MEAS - AO ROOT AREA: 5 CM^2
BH CV ECHO MEAS - AO ROOT DIAM: 2.5 CM
BH CV ECHO MEAS - AO V2 MAX: 159.9 CM/SEC
BH CV ECHO MEAS - AO V2 MEAN: 112.5 CM/SEC
BH CV ECHO MEAS - AO V2 VTI: 41.2 CM
BH CV ECHO MEAS - ASC AORTA: 2.4 CM
BH CV ECHO MEAS - AVA(I,A): 1.6 CM^2
BH CV ECHO MEAS - AVA(I,D): 1.6 CM^2
BH CV ECHO MEAS - AVA(V,A): 1.6 CM^2
BH CV ECHO MEAS - AVA(V,D): 1.6 CM^2
BH CV ECHO MEAS - BSA(HAYCOCK): 1.7 M^2
BH CV ECHO MEAS - BSA: 1.7 M^2
BH CV ECHO MEAS - BZI_BMI: 23.6 KILOGRAMS/M^2
BH CV ECHO MEAS - BZI_METRIC_HEIGHT: 165.1 CM
BH CV ECHO MEAS - BZI_METRIC_WEIGHT: 64.4 KG
BH CV ECHO MEAS - EDV(MOD-SP2): 55 ML
BH CV ECHO MEAS - EDV(MOD-SP4): 37 ML
BH CV ECHO MEAS - EDV(TEICH): 63.1 ML
BH CV ECHO MEAS - EF(CUBED): 68.9 %
BH CV ECHO MEAS - EF(MOD-BP): 66 %
BH CV ECHO MEAS - EF(MOD-SP2): 70.9 %
BH CV ECHO MEAS - EF(MOD-SP4): 59.5 %
BH CV ECHO MEAS - EF(TEICH): 61.2 %
BH CV ECHO MEAS - ESV(MOD-SP2): 16 ML
BH CV ECHO MEAS - ESV(MOD-SP4): 15 ML
BH CV ECHO MEAS - ESV(TEICH): 24.5 ML
BH CV ECHO MEAS - FS: 32.2 %
BH CV ECHO MEAS - IVS/LVPW: 0.99
BH CV ECHO MEAS - IVSD: 1 CM
BH CV ECHO MEAS - LAT PEAK E' VEL: 4.2 CM/SEC
BH CV ECHO MEAS - LV DIASTOLIC VOL/BSA (35-75): 21.6 ML/M^2
BH CV ECHO MEAS - LV MASS(C)D: 124 GRAMS
BH CV ECHO MEAS - LV MASS(C)DI: 72.5 GRAMS/M^2
BH CV ECHO MEAS - LV MAX PG: 3.8 MMHG
BH CV ECHO MEAS - LV MEAN PG: 2.4 MMHG
BH CV ECHO MEAS - LV SYSTOLIC VOL/BSA (12-30): 8.8 ML/M^2
BH CV ECHO MEAS - LV V1 MAX: 97.2 CM/SEC
BH CV ECHO MEAS - LV V1 MEAN: 74 CM/SEC
BH CV ECHO MEAS - LV V1 VTI: 26.3 CM
BH CV ECHO MEAS - LVIDD: 3.8 CM
BH CV ECHO MEAS - LVIDS: 2.6 CM
BH CV ECHO MEAS - LVLD AP2: 5.2 CM
BH CV ECHO MEAS - LVLD AP4: 6.1 CM
BH CV ECHO MEAS - LVLS AP2: 4.6 CM
BH CV ECHO MEAS - LVLS AP4: 5.2 CM
BH CV ECHO MEAS - LVOT AREA (M): 2.5 CM^2
BH CV ECHO MEAS - LVOT AREA: 2.6 CM^2
BH CV ECHO MEAS - LVOT DIAM: 1.8 CM
BH CV ECHO MEAS - LVPWD: 1 CM
BH CV ECHO MEAS - MED PEAK E' VEL: 3.5 CM/SEC
BH CV ECHO MEAS - MV A DUR: 0.1 SEC
BH CV ECHO MEAS - MV A MAX VEL: 121.8 CM/SEC
BH CV ECHO MEAS - MV DEC SLOPE: 268.1 CM/SEC^2
BH CV ECHO MEAS - MV DEC TIME: 0.61 SEC
BH CV ECHO MEAS - MV E MAX VEL: 139 CM/SEC
BH CV ECHO MEAS - MV E/A: 1.1
BH CV ECHO MEAS - MV MAX PG: 7.9 MMHG
BH CV ECHO MEAS - MV MEAN PG: 3 MMHG
BH CV ECHO MEAS - MV P1/2T MAX VEL: 142.7 CM/SEC
BH CV ECHO MEAS - MV P1/2T: 155.9 MSEC
BH CV ECHO MEAS - MV V2 MAX: 140.4 CM/SEC
BH CV ECHO MEAS - MV V2 MEAN: 77.2 CM/SEC
BH CV ECHO MEAS - MV V2 VTI: 63.1 CM
BH CV ECHO MEAS - MVA P1/2T LCG: 1.5 CM^2
BH CV ECHO MEAS - MVA(P1/2T): 1.4 CM^2
BH CV ECHO MEAS - MVA(VTI): 1.1 CM^2
BH CV ECHO MEAS - PA ACC TIME: 0.08 SEC
BH CV ECHO MEAS - PA MAX PG (FULL): 2.3 MMHG
BH CV ECHO MEAS - PA MAX PG: 4.8 MMHG
BH CV ECHO MEAS - PA PR(ACCEL): 43.3 MMHG
BH CV ECHO MEAS - PA V2 MAX: 109.5 CM/SEC
BH CV ECHO MEAS - PULM A REVS DUR: 0.08 SEC
BH CV ECHO MEAS - PULM A REVS VEL: 39.4 CM/SEC
BH CV ECHO MEAS - PULM DIAS VEL: 36.4 CM/SEC
BH CV ECHO MEAS - PULM S/D: 1.4
BH CV ECHO MEAS - PULM SYS VEL: 50.6 CM/SEC
BH CV ECHO MEAS - PVA(V,A): 2 CM^2
BH CV ECHO MEAS - PVA(V,D): 2 CM^2
BH CV ECHO MEAS - QP/QS: 0.83
BH CV ECHO MEAS - RAP SYSTOLE: 3 MMHG
BH CV ECHO MEAS - RV MAX PG: 2.5 MMHG
BH CV ECHO MEAS - RV MEAN PG: 1.9 MMHG
BH CV ECHO MEAS - RV V1 MAX: 78.4 CM/SEC
BH CV ECHO MEAS - RV V1 MEAN: 67.1 CM/SEC
BH CV ECHO MEAS - RV V1 VTI: 20 CM
BH CV ECHO MEAS - RVOT AREA: 2.8 CM^2
BH CV ECHO MEAS - RVOT DIAM: 1.9 CM
BH CV ECHO MEAS - RVSP: 34.6 MMHG
BH CV ECHO MEAS - SI(AO): 120 ML/M^2
BH CV ECHO MEAS - SI(CUBED): 22.6 ML/M^2
BH CV ECHO MEAS - SI(LVOT): 39.3 ML/M^2
BH CV ECHO MEAS - SI(MOD-SP2): 22.8 ML/M^2
BH CV ECHO MEAS - SI(MOD-SP4): 12.9 ML/M^2
BH CV ECHO MEAS - SI(TEICH): 22.6 ML/M^2
BH CV ECHO MEAS - SUP REN AO DIAM: 16 CM
BH CV ECHO MEAS - SV(AO): 205.3 ML
BH CV ECHO MEAS - SV(CUBED): 38.7 ML
BH CV ECHO MEAS - SV(LVOT): 67.2 ML
BH CV ECHO MEAS - SV(MOD-SP2): 39 ML
BH CV ECHO MEAS - SV(MOD-SP4): 22 ML
BH CV ECHO MEAS - SV(RVOT): 55.7 ML
BH CV ECHO MEAS - SV(TEICH): 38.6 ML
BH CV ECHO MEAS - TAPSE (>1.6): 1.6 CM
BH CV ECHO MEAS - TR MAX VEL: 281 CM/SEC
BH CV ECHO MEASUREMENTS AVERAGE E/E' RATIO: 36.1
BH CV VAS BP RIGHT ARM: NORMAL MMHG
BH CV XLRA - RV BASE: 2.9 CM
BH CV XLRA - RV LENGTH: 6.2 CM
BH CV XLRA - RV MID: 2.9 CM
BH CV XLRA - TDI S': 10.1 CM/SEC
LEFT ATRIUM VOLUME INDEX: 33 ML/M2
MAXIMAL PREDICTED HEART RATE: 133 BPM
SINUS: 2.7 CM
STJ: 2.6 CM
STRESS TARGET HR: 113 BPM

## 2021-03-01 RX ORDER — LISINOPRIL 40 MG/1
TABLET ORAL
Qty: 90 TABLET | Refills: 2 | Status: SHIPPED | OUTPATIENT
Start: 2021-03-01 | End: 2022-01-11

## 2021-05-24 RX ORDER — CLOPIDOGREL BISULFATE 75 MG/1
TABLET ORAL
Qty: 90 TABLET | Refills: 1 | Status: SHIPPED | OUTPATIENT
Start: 2021-05-24 | End: 2021-12-10

## 2021-05-24 RX ORDER — ATORVASTATIN CALCIUM 40 MG/1
TABLET, FILM COATED ORAL
Qty: 90 TABLET | Refills: 3 | Status: SHIPPED | OUTPATIENT
Start: 2021-05-24 | End: 2022-06-06

## 2021-08-10 ENCOUNTER — TELEPHONE (OUTPATIENT)
Dept: CARDIOLOGY | Facility: CLINIC | Age: 86
End: 2021-08-10

## 2021-08-10 NOTE — TELEPHONE ENCOUNTER
Hi Dr. Mandujano & Katty,     This patient has an appointment on 9/02 that needs to be rescheduled. She also needs to be seen before 10/18 (with an Echo before the appointment). Currently there are no follow up appointments available in that time period. Please advise where to schedule!    Thanks,   Marcy

## 2021-08-16 NOTE — TELEPHONE ENCOUNTER
If you can get this lady on for an echo on Wed this week you can move her up to then. If not let me know and I will look elsewhere.    Thanks,  Katty

## 2021-08-18 ENCOUNTER — OFFICE VISIT (OUTPATIENT)
Dept: CARDIOLOGY | Facility: CLINIC | Age: 86
End: 2021-08-18

## 2021-08-18 ENCOUNTER — HOSPITAL ENCOUNTER (OUTPATIENT)
Dept: CARDIOLOGY | Facility: HOSPITAL | Age: 86
Discharge: HOME | End: 2021-08-18

## 2021-08-18 ENCOUNTER — LAB (OUTPATIENT)
Dept: LAB | Facility: HOSPITAL | Age: 86
End: 2021-08-18

## 2021-08-18 VITALS
HEIGHT: 65 IN | WEIGHT: 130 LBS | HEART RATE: 58 BPM | BODY MASS INDEX: 21.66 KG/M2 | DIASTOLIC BLOOD PRESSURE: 64 MMHG | SYSTOLIC BLOOD PRESSURE: 138 MMHG

## 2021-08-18 VITALS
BODY MASS INDEX: 24.16 KG/M2 | HEART RATE: 58 BPM | DIASTOLIC BLOOD PRESSURE: 58 MMHG | HEIGHT: 65 IN | SYSTOLIC BLOOD PRESSURE: 142 MMHG | WEIGHT: 145 LBS

## 2021-08-18 DIAGNOSIS — I34.0 NONRHEUMATIC MITRAL VALVE REGURGITATION: ICD-10-CM

## 2021-08-18 DIAGNOSIS — Z95.818 STATUS POST IMPLANTATION OF MITRAL VALVE LEAFLET CLIP: ICD-10-CM

## 2021-08-18 DIAGNOSIS — Z98.890 STATUS POST IMPLANTATION OF MITRAL VALVE LEAFLET CLIP: ICD-10-CM

## 2021-08-18 DIAGNOSIS — N17.9 AKI (ACUTE KIDNEY INJURY) (HCC): ICD-10-CM

## 2021-08-18 DIAGNOSIS — Z95.1 S/P CABG (CORONARY ARTERY BYPASS GRAFT): Primary | ICD-10-CM

## 2021-08-18 LAB
ANION GAP SERPL CALCULATED.3IONS-SCNC: 11.7 MMOL/L (ref 5–15)
BASOPHILS # BLD AUTO: 0.05 10*3/MM3 (ref 0–0.2)
BASOPHILS NFR BLD AUTO: 0.9 % (ref 0–1.5)
BUN SERPL-MCNC: 23 MG/DL (ref 8–23)
BUN/CREAT SERPL: 14.4 (ref 7–25)
CALCIUM SPEC-SCNC: 9.6 MG/DL (ref 8.6–10.5)
CHLORIDE SERPL-SCNC: 101 MMOL/L (ref 98–107)
CO2 SERPL-SCNC: 26.3 MMOL/L (ref 22–29)
CREAT SERPL-MCNC: 1.6 MG/DL (ref 0.57–1)
DEPRECATED RDW RBC AUTO: 42.1 FL (ref 37–54)
EOSINOPHIL # BLD AUTO: 0.1 10*3/MM3 (ref 0–0.4)
EOSINOPHIL NFR BLD AUTO: 1.7 % (ref 0.3–6.2)
ERYTHROCYTE [DISTWIDTH] IN BLOOD BY AUTOMATED COUNT: 12.8 % (ref 12.3–15.4)
GFR SERPL CREATININE-BSD FRML MDRD: 30 ML/MIN/1.73
GLUCOSE SERPL-MCNC: 107 MG/DL (ref 65–99)
HCT VFR BLD AUTO: 40.1 % (ref 34–46.6)
HGB BLD-MCNC: 13.2 G/DL (ref 12–15.9)
IMM GRANULOCYTES # BLD AUTO: 0.02 10*3/MM3 (ref 0–0.05)
IMM GRANULOCYTES NFR BLD AUTO: 0.3 % (ref 0–0.5)
LYMPHOCYTES # BLD AUTO: 1.03 10*3/MM3 (ref 0.7–3.1)
LYMPHOCYTES NFR BLD AUTO: 17.9 % (ref 19.6–45.3)
MCH RBC QN AUTO: 30.1 PG (ref 26.6–33)
MCHC RBC AUTO-ENTMCNC: 32.9 G/DL (ref 31.5–35.7)
MCV RBC AUTO: 91.3 FL (ref 79–97)
MONOCYTES # BLD AUTO: 0.74 10*3/MM3 (ref 0.1–0.9)
MONOCYTES NFR BLD AUTO: 12.9 % (ref 5–12)
NEUTROPHILS NFR BLD AUTO: 3.81 10*3/MM3 (ref 1.7–7)
NEUTROPHILS NFR BLD AUTO: 66.3 % (ref 42.7–76)
NRBC BLD AUTO-RTO: 0 /100 WBC (ref 0–0.2)
PLATELET # BLD AUTO: 233 10*3/MM3 (ref 140–450)
PMV BLD AUTO: 10.6 FL (ref 6–12)
POTASSIUM SERPL-SCNC: 4.5 MMOL/L (ref 3.5–5.2)
RBC # BLD AUTO: 4.39 10*6/MM3 (ref 3.77–5.28)
SODIUM SERPL-SCNC: 139 MMOL/L (ref 136–145)
WBC # BLD AUTO: 5.75 10*3/MM3 (ref 3.4–10.8)

## 2021-08-18 PROCEDURE — 93306 TTE W/DOPPLER COMPLETE: CPT

## 2021-08-18 PROCEDURE — 93306 TTE W/DOPPLER COMPLETE: CPT | Performed by: INTERNAL MEDICINE

## 2021-08-18 PROCEDURE — 80048 BASIC METABOLIC PNL TOTAL CA: CPT

## 2021-08-18 PROCEDURE — 36415 COLL VENOUS BLD VENIPUNCTURE: CPT

## 2021-08-18 PROCEDURE — 99214 OFFICE O/P EST MOD 30 MIN: CPT | Performed by: INTERNAL MEDICINE

## 2021-08-18 PROCEDURE — 85025 COMPLETE CBC W/AUTO DIFF WBC: CPT

## 2021-08-18 PROCEDURE — 93000 ELECTROCARDIOGRAM COMPLETE: CPT | Performed by: INTERNAL MEDICINE

## 2021-08-18 NOTE — PROGRESS NOTES
Date of Office Visit: 21  Encounter Provider: Brady Mandujano MD  Place of Service: [facility] CARDIOLOGY  Patient Name: Brodie Mo  :10/31/1932    Chief Complaint   Patient presents with   • 1 Year Post MitraClip       HPI:   88 y.o. femalewho presents today for follow-up.  She is a patient of Dr. Dawit Hatch's with a past cardiac history significant for coronary artery disease.  In , she had a CABG x 2 with a LIMA to the LAD and a vein graft to the OM 2.  W  She was last seen in the office by Dr. Hatch on 2020 at which time was reporting increased dyspnea on exertion.  Ultimately, she was referred for a mitral clip.  On 2020, she underwent a MitraClip with a good result as documented below.  Echocardiogram revealed hyperdynamic LV function, grade 2 diastolic dysfunction, a mitral clip in stable position with mild mitral regurgitation and mild to moderate mitral stenosis.  She states she is been doing well since our last visit.  She continues to be able to walk on a flat ground and do most of her activities which is very mild dyspnea.  She states when she gets moving very quickly or goes upstairs she will have dyspnea that requires her to rest.  She has no orthopnea or PND.  Her blood pressure is reasonably controlled.  She did have lab work today with mildly elevated creatinine from baseline of 1.6 but no hyperkalemia.        Past Medical History:   Diagnosis Date   • CAD (coronary artery disease)    • Cancer (CMS/HCC)     bladder    • Deep vein thrombosis (CMS/HCC)     Left leg-   • Health care maintenance    • HTN (hypertension)    • Hyperlipidemia    • LVH (left ventricular hypertrophy)    • Pneumonia    • S/P CABG (coronary artery bypass graft)        Past Surgical History:   Procedure Laterality Date   • BLADDER SURGERY      cancer   • CORONARY ARTERY BYPASS GRAFT       with Dr Prabhakar; AGUAYO to LAD and SVG to OM2;  cath all grafts  open and 30% dx in RCA; nl stress in 2010, 2013   • HYSTERECTOMY     • MITRAL VALVE REPAIR/REPLACEMENT N/A 9/2/2020    Procedure: TRANSCATHETER MITRAL VALVE REPAIR;  Surgeon: Edward Humphreys MD;  Location: [facility] INVASIVE LOCATION;  Service: Cardiovascular;  Laterality: N/A;       Social History     Socioeconomic History   • Marital status:      Spouse name: Not on file   • Number of children: Not on file   • Years of education: Not on file   • Highest education level: Not on file   Tobacco Use   • Smoking status: Former Smoker   • Smokeless tobacco: Never Used   • Tobacco comment: CAFFEINE USE: 2  CUPS COFFEE DAILY   Substance and Sexual Activity   • Alcohol use: Yes     Alcohol/week: 1.0 standard drinks     Types: 1 Shots of liquor per week     Comment: 1 DRINK DAILY   • Drug use: No   • Sexual activity: Defer       Family History   Problem Relation Age of Onset   • Heart disease Mother    • Kidney cancer Father    • No Known Problems Maternal Grandmother    • No Known Problems Maternal Grandfather    • No Known Problems Paternal Grandmother    • No Known Problems Paternal Grandfather    • Heart disease Sister    • Heart disease Brother        Review of Systems   Constitutional: Positive for malaise/fatigue. Negative for chills and fever.   Cardiovascular: Positive for dyspnea on exertion (sometimes). Negative for chest pain, leg swelling, near-syncope, orthopnea, palpitations, paroxysmal nocturnal dyspnea and syncope.   Respiratory: Negative for cough and shortness of breath.    Musculoskeletal: Negative for joint pain, joint swelling and myalgias.   Gastrointestinal: Negative for abdominal pain, diarrhea, melena, nausea and vomiting.   Genitourinary: Negative for frequency and hematuria.   Neurological: Negative for light-headedness, numbness, paresthesias and seizures.   Allergic/Immunologic: Negative.    All other systems reviewed and are negative.      Allergies   Allergen Reactions   •  "No Known Drug Allergy          Current Outpatient Medications:   •  aspirin 81 MG tablet, Take 81 mg by mouth Daily., Disp: , Rfl:   •  atenolol (TENORMIN) 25 MG tablet, Take 25 mg by mouth Daily., Disp: , Rfl:   •  atorvastatin (LIPITOR) 40 MG tablet, TAKE 1 TABLET BY MOUTH EVERY DAY, Disp: 90 tablet, Rfl: 3  •  cholecalciferol (VITAMIN D3) 1000 units tablet, Take 1,000 Units by mouth Daily., Disp: , Rfl:   •  clopidogrel (PLAVIX) 75 MG tablet, TAKE 1 TABLET BY MOUTH EVERY DAY, Disp: 90 tablet, Rfl: 1  •  lisinopril (PRINIVIL,ZESTRIL) 40 MG tablet, TAKE 1 TABLET BY MOUTH EVERY DAY, Disp: 90 tablet, Rfl: 2      Objective:     Vitals:    08/18/21 1403   BP: 142/58   Pulse: 58   Weight: 65.8 kg (145 lb)   Height: 165.1 cm (65\")     Body mass index is 24.13 kg/m².    PHYSICAL EXAM:    Physical Exam  Constitutional:       General: She is not in acute distress.     Appearance: She is well-developed. She is not diaphoretic.   HENT:      Head: Normocephalic and atraumatic.   Eyes:      Pupils: Pupils are equal, round, and reactive to light.   Neck:      Thyroid: No thyromegaly.      Vascular: No JVD.   Cardiovascular:      Rate and Rhythm: Normal rate and regular rhythm.      Pulses: Intact distal pulses.      Heart sounds: Normal heart sounds. No murmur heard.     Pulmonary:      Effort: Pulmonary effort is normal. No respiratory distress.      Breath sounds: Normal breath sounds.   Abdominal:      General: Bowel sounds are normal. There is no distension.      Palpations: Abdomen is soft. There is no hepatomegaly or splenomegaly.      Tenderness: There is no abdominal tenderness.   Musculoskeletal:         General: Normal range of motion.   Skin:     General: Skin is warm and dry.      Findings: No bruising or erythema.   Neurological:      Mental Status: She is alert and oriented to person, place, and time.   Psychiatric:         Behavior: Behavior normal.         Judgment: Judgment normal.           ECG 12 " Lead    Date/Time: 8/18/2021 2:54 PM  Performed by: Brady Mandujano MD  Authorized by: Brady Mandujano MD   Comparison: compared with previous ECG from 10/1/2020  Similar to previous ECG  Rhythm: sinus rhythm  Rate: normal  QRS axis: right  Other findings: non-specific ST-T wave changes              Assessment:       Diagnosis Plan   1. S/P CABG (coronary artery bypass graft)     2. Nonrheumatic mitral valve regurgitation     3. Status post implantation of mitral valve leaflet clip       No orders of the defined types were placed in this encounter.    Plan:   88-year-old female with a medical history of coronary disease with prior CABG, severe mitral valve regurgitation, dyspnea on exertion, who underwent a mitral clip with a good result.  She is left with mild to moderate mitral valve regurgitation which is the mildly increased gradient across the valve.  She has done very well since her clip and complains of just mild dyspnea on exertion which is a significant improvement from prior.  She has no orthopnea or PND.  She is tolerating her current medical regimen.  She does have a mildly increased creatinine from her baseline on today's lab work but states that she has been doing fine with her p.o. intake.  Her symptoms at most appear to be New [place] Heart Association class II symptoms    1. Severe mitral valve regurgitation: Status post mitral clip.        -Echo images today look like there is residual mild-moderate mitral valve regurgitation and mild mitral stenosis. This is a significant improvement.        -I have recommended we continue the aspirin lifelong and discontinue the Plavix.    2.  Essential hypertension: Reasonable control.  She does have a mildly increased creatinine from baseline today and I have recommended repeat lab work prior to her appointment with Dr. Hatch in 1 month.  This order has been placed.    3.  Acute kidney injury on chronic kidney injury.  Baseline creatinine 1.2.   Currently 1.6.  Continue current medications and encourage adequate hydration.  Repeat lab work ordered for 1 month.     I will see her back as needed

## 2021-08-19 LAB
AORTIC ARCH: 1.7 CM
BH CV ECHO MEAS - ACS: 1.5 CM
BH CV ECHO MEAS - AO MAX PG (FULL): 2 MMHG
BH CV ECHO MEAS - AO MAX PG: 9 MMHG
BH CV ECHO MEAS - AO MEAN PG (FULL): 1 MMHG
BH CV ECHO MEAS - AO MEAN PG: 5 MMHG
BH CV ECHO MEAS - AO ROOT AREA (BSA CORRECTED): 1.8
BH CV ECHO MEAS - AO ROOT AREA: 6.6 CM^2
BH CV ECHO MEAS - AO ROOT DIAM: 2.9 CM
BH CV ECHO MEAS - AO V2 MAX: 150 CM/SEC
BH CV ECHO MEAS - AO V2 MEAN: 106 CM/SEC
BH CV ECHO MEAS - AO V2 VTI: 37.4 CM
BH CV ECHO MEAS - AVA(I,A): 2.3 CM^2
BH CV ECHO MEAS - AVA(I,D): 2.3 CM^2
BH CV ECHO MEAS - AVA(V,A): 2.2 CM^2
BH CV ECHO MEAS - AVA(V,D): 2.2 CM^2
BH CV ECHO MEAS - BSA(HAYCOCK): 1.6 M^2
BH CV ECHO MEAS - BSA: 1.6 M^2
BH CV ECHO MEAS - BZI_BMI: 21.6 KILOGRAMS/M^2
BH CV ECHO MEAS - BZI_METRIC_HEIGHT: 165.1 CM
BH CV ECHO MEAS - BZI_METRIC_WEIGHT: 59 KG
BH CV ECHO MEAS - EDV(MOD-SP2): 49 ML
BH CV ECHO MEAS - EDV(MOD-SP4): 56 ML
BH CV ECHO MEAS - EDV(TEICH): 83.1 ML
BH CV ECHO MEAS - EF(CUBED): 72.4 %
BH CV ECHO MEAS - EF(MOD-BP): 59.8 %
BH CV ECHO MEAS - EF(MOD-SP2): 51 %
BH CV ECHO MEAS - EF(MOD-SP4): 69.6 %
BH CV ECHO MEAS - EF(TEICH): 64.4 %
BH CV ECHO MEAS - ESV(MOD-SP2): 24 ML
BH CV ECHO MEAS - ESV(MOD-SP4): 17 ML
BH CV ECHO MEAS - ESV(TEICH): 29.6 ML
BH CV ECHO MEAS - FS: 34.9 %
BH CV ECHO MEAS - IVS/LVPW: 0.8
BH CV ECHO MEAS - IVSD: 0.8 CM
BH CV ECHO MEAS - LA A2CS (ATRIAL LENGTH): 73.1 CM
BH CV ECHO MEAS - LAT PEAK E' VEL: 5.6 CM/SEC
BH CV ECHO MEAS - LV DIASTOLIC VOL/BSA (35-75): 34 ML/M^2
BH CV ECHO MEAS - LV MASS(C)D: 123.3 GRAMS
BH CV ECHO MEAS - LV MASS(C)DI: 74.9 GRAMS/M^2
BH CV ECHO MEAS - LV MAX PG: 7 MMHG
BH CV ECHO MEAS - LV MEAN PG: 4 MMHG
BH CV ECHO MEAS - LV SYSTOLIC VOL/BSA (12-30): 10.3 ML/M^2
BH CV ECHO MEAS - LV V1 MAX: 132 CM/SEC
BH CV ECHO MEAS - LV V1 MEAN: 90.8 CM/SEC
BH CV ECHO MEAS - LV V1 VTI: 33.1 CM
BH CV ECHO MEAS - LVIDD: 4.3 CM
BH CV ECHO MEAS - LVIDS: 2.8 CM
BH CV ECHO MEAS - LVLD AP2: 5.7 CM
BH CV ECHO MEAS - LVLD AP4: 5.9 CM
BH CV ECHO MEAS - LVLS AP2: 5.4 CM
BH CV ECHO MEAS - LVLS AP4: 4.7 CM
BH CV ECHO MEAS - LVOT AREA (M): 2.5 CM^2
BH CV ECHO MEAS - LVOT AREA: 2.5 CM^2
BH CV ECHO MEAS - LVOT DIAM: 1.8 CM
BH CV ECHO MEAS - LVPWD: 1 CM
BH CV ECHO MEAS - MED PEAK E' VEL: 4.6 CM/SEC
BH CV ECHO MEAS - MR MAX PG: 112.8 MMHG
BH CV ECHO MEAS - MR MAX VEL: 531 CM/SEC
BH CV ECHO MEAS - MR MEAN PG: 77 MMHG
BH CV ECHO MEAS - MR MEAN VEL: 423 CM/SEC
BH CV ECHO MEAS - MR VTI: 178 CM
BH CV ECHO MEAS - MV AREA (1 DIAM): 6.2 CM^2
BH CV ECHO MEAS - MV DEC SLOPE: 445 CM/SEC^2
BH CV ECHO MEAS - MV DIAM: 2.8 CM
BH CV ECHO MEAS - MV FLOW AREA(1DIAM): 6.2 CM^2
BH CV ECHO MEAS - MV MAX PG: 10 MMHG
BH CV ECHO MEAS - MV MEAN PG: 4 MMHG
BH CV ECHO MEAS - MV P1/2T MAX VEL: 170 CM/SEC
BH CV ECHO MEAS - MV P1/2T: 111.9 MSEC
BH CV ECHO MEAS - MV V2 MAX: 158 CM/SEC
BH CV ECHO MEAS - MV V2 MEAN: 88.3 CM/SEC
BH CV ECHO MEAS - MV V2 VTI: 69.7 CM
BH CV ECHO MEAS - MVA P1/2T LCG: 1.3 CM^2
BH CV ECHO MEAS - MVA(P1/2T): 2 CM^2
BH CV ECHO MEAS - MVA(VTI): 1.2 CM^2
BH CV ECHO MEAS - PA ACC TIME: 0.16 SEC
BH CV ECHO MEAS - PA MAX PG (FULL): 3.1 MMHG
BH CV ECHO MEAS - PA MAX PG: 4.4 MMHG
BH CV ECHO MEAS - PA PR(ACCEL): 6.6 MMHG
BH CV ECHO MEAS - PA V2 MAX: 105 CM/SEC
BH CV ECHO MEAS - PULM A REVS DUR: 0.12 SEC
BH CV ECHO MEAS - PULM A REVS VEL: 24 CM/SEC
BH CV ECHO MEAS - PULM DIAS VEL: 38.7 CM/SEC
BH CV ECHO MEAS - PULM S/D: 0.81
BH CV ECHO MEAS - PULM SYS VEL: 31.2 CM/SEC
BH CV ECHO MEAS - PVA(V,A): 3.2 CM^2
BH CV ECHO MEAS - PVA(V,D): 3.2 CM^2
BH CV ECHO MEAS - QP/QS: 1
BH CV ECHO MEAS - RAP SYSTOLE: 8 MMHG
BH CV ECHO MEAS - RF(MV,AO)(1 DIAM): 0.42
BH CV ECHO MEAS - RF(MV,LVOT)(1DIAM): 0.8
BH CV ECHO MEAS - RV MAX PG: 1.4 MMHG
BH CV ECHO MEAS - RV MEAN PG: 1 MMHG
BH CV ECHO MEAS - RV V1 MAX: 58.1 CM/SEC
BH CV ECHO MEAS - RV V1 MEAN: 42.6 CM/SEC
BH CV ECHO MEAS - RV V1 VTI: 15.2 CM
BH CV ECHO MEAS - RVOT AREA: 5.7 CM^2
BH CV ECHO MEAS - RVOT DIAM: 2.7 CM
BH CV ECHO MEAS - RVSP: 33 MMHG
BH CV ECHO MEAS - SI(AO): 150 ML/M^2
BH CV ECHO MEAS - SI(CUBED): 34.9 ML/M^2
BH CV ECHO MEAS - SI(LVOT): 51.1 ML/M^2
BH CV ECHO MEAS - SI(MOD-SP2): 15.2 ML/M^2
BH CV ECHO MEAS - SI(MOD-SP4): 23.7 ML/M^2
BH CV ECHO MEAS - SI(MV 1 DIAM): 260.6 ML/M^2
BH CV ECHO MEAS - SI(TEICH): 32.5 ML/M^2
BH CV ECHO MEAS - SUP REN AO DIAM: 1.7 CM
BH CV ECHO MEAS - SV(AO): 247 ML
BH CV ECHO MEAS - SV(CUBED): 57.6 ML
BH CV ECHO MEAS - SV(LVOT): 84.2 ML
BH CV ECHO MEAS - SV(MOD-SP2): 25 ML
BH CV ECHO MEAS - SV(MOD-SP4): 39 ML
BH CV ECHO MEAS - SV(MV 1 DIAM): 429.2 ML
BH CV ECHO MEAS - SV(RVOT): 87 ML
BH CV ECHO MEAS - SV(TEICH): 53.5 ML
BH CV ECHO MEAS - TAPSE (>1.6): 1.4 CM
BH CV ECHO MEAS - TR MAX VEL: 250 CM/SEC
BH CV XLRA - RV BASE: 2.9 CM
BH CV XLRA - RV LENGTH: 5.4 CM
BH CV XLRA - RV MID: 2.3 CM
BH CV XLRA - TDI S': 8.8 CM/SEC
LEFT ATRIUM VOLUME INDEX: 52 ML/M2
MAXIMAL PREDICTED HEART RATE: 132 BPM
MR PISA EROA: 0.4 CM2
PISA ALIASING VEL: 38.5 M/S
PISA RADIUS: 0.3 CM
SINUS: 2.4 CM
STJ: 2 CM
STRESS TARGET HR: 112 BPM

## 2021-11-12 ENCOUNTER — TRANSCRIBE ORDERS (OUTPATIENT)
Dept: LAB | Facility: SURGERY CENTER | Age: 86
End: 2021-11-12

## 2021-11-12 DIAGNOSIS — Z01.818 OTHER SPECIFIED PRE-OPERATIVE EXAMINATION: Primary | ICD-10-CM

## 2021-12-10 ENCOUNTER — TELEPHONE (OUTPATIENT)
Dept: CARDIOLOGY | Facility: CLINIC | Age: 86
End: 2021-12-10

## 2021-12-10 RX ORDER — UBIDECARENONE 100 MG
100 CAPSULE ORAL DAILY
COMMUNITY

## 2021-12-10 RX ORDER — VITAMIN B COMPLEX
1 CAPSULE ORAL 2 TIMES WEEKLY
COMMUNITY

## 2021-12-10 NOTE — TELEPHONE ENCOUNTER
[facility] calling for surgery clearance having carpal tunnel and hand join surgery  [phone]   fax- [phone]

## 2021-12-13 ENCOUNTER — LAB (OUTPATIENT)
Dept: LAB | Facility: SURGERY CENTER | Age: 86
End: 2021-12-13

## 2021-12-13 DIAGNOSIS — Z01.818 OTHER SPECIFIED PRE-OPERATIVE EXAMINATION: ICD-10-CM

## 2021-12-13 LAB — SARS-COV-2 ORF1AB RESP QL NAA+PROBE: NOT DETECTED

## 2021-12-13 PROCEDURE — C9803 HOPD COVID-19 SPEC COLLECT: HCPCS

## 2021-12-13 PROCEDURE — U0004 COV-19 TEST NON-CDC HGH THRU: HCPCS | Performed by: PLASTIC SURGERY

## 2021-12-13 PROCEDURE — U0005 INFEC AGEN DETEC AMPLI PROBE: HCPCS | Performed by: PLASTIC SURGERY

## 2021-12-15 ENCOUNTER — ANESTHESIA (OUTPATIENT)
Dept: SURGERY | Facility: SURGERY CENTER | Age: 86
End: 2021-12-15

## 2021-12-15 ENCOUNTER — HOSPITAL ENCOUNTER (OUTPATIENT)
Facility: SURGERY CENTER | Age: 86
Setting detail: HOSPITAL OUTPATIENT SURGERY
Discharge: HOME | End: 2021-12-15
Attending: PLASTIC SURGERY | Admitting: PLASTIC SURGERY

## 2021-12-15 ENCOUNTER — ANESTHESIA EVENT (OUTPATIENT)
Dept: SURGERY | Facility: SURGERY CENTER | Age: 86
End: 2021-12-15

## 2021-12-15 VITALS
RESPIRATION RATE: 16 BRPM | SYSTOLIC BLOOD PRESSURE: 168 MMHG | TEMPERATURE: 98.2 F | WEIGHT: 143.2 LBS | HEART RATE: 53 BPM | HEIGHT: 65 IN | DIASTOLIC BLOOD PRESSURE: 85 MMHG | OXYGEN SATURATION: 96 % | BODY MASS INDEX: 23.86 KG/M2

## 2021-12-15 DIAGNOSIS — G56.01 CARPAL TUNNEL SYNDROME ON RIGHT: ICD-10-CM

## 2021-12-15 PROCEDURE — 64721 CARPAL TUNNEL SURGERY: CPT | Performed by: PLASTIC SURGERY

## 2021-12-15 PROCEDURE — 01N50ZZ RELEASE MEDIAN NERVE, OPEN APPROACH: ICD-10-PCS | Performed by: ANESTHESIOLOGY

## 2021-12-15 PROCEDURE — 25010000002 ROPIVACAINE PER 1 MG: Performed by: ANESTHESIOLOGY

## 2021-12-15 PROCEDURE — 25010000002 MIDAZOLAM PER 1 MG: Performed by: ANESTHESIOLOGY

## 2021-12-15 PROCEDURE — 25010000002 PROPOFOL 10 MG/ML EMULSION: Performed by: ANESTHESIOLOGY

## 2021-12-15 PROCEDURE — 88305 TISSUE EXAM BY PATHOLOGIST: CPT | Performed by: PLASTIC SURGERY

## 2021-12-15 RX ORDER — ONDANSETRON 2 MG/ML
4 INJECTION INTRAMUSCULAR; INTRAVENOUS ONCE AS NEEDED
Status: DISCONTINUED | OUTPATIENT
Start: 2021-12-15 | End: 2021-12-15 | Stop reason: HOSPADM

## 2021-12-15 RX ORDER — SODIUM CHLORIDE 0.9 % (FLUSH) 0.9 %
10 SYRINGE (ML) INJECTION EVERY 12 HOURS SCHEDULED
Status: DISCONTINUED | OUTPATIENT
Start: 2021-12-15 | End: 2021-12-15 | Stop reason: HOSPADM

## 2021-12-15 RX ORDER — SODIUM CHLORIDE, SODIUM LACTATE, POTASSIUM CHLORIDE, CALCIUM CHLORIDE 600; 310; 30; 20 MG/100ML; MG/100ML; MG/100ML; MG/100ML
20 INJECTION, SOLUTION INTRAVENOUS CONTINUOUS
Status: DISCONTINUED | OUTPATIENT
Start: 2021-12-15 | End: 2021-12-15 | Stop reason: HOSPADM

## 2021-12-15 RX ORDER — SODIUM CHLORIDE 0.9 % (FLUSH) 0.9 %
10 SYRINGE (ML) INJECTION AS NEEDED
Status: DISCONTINUED | OUTPATIENT
Start: 2021-12-15 | End: 2021-12-15 | Stop reason: HOSPADM

## 2021-12-15 RX ORDER — HYDROMORPHONE HCL 110MG/55ML
0.5 PATIENT CONTROLLED ANALGESIA SYRINGE INTRAVENOUS
Status: DISCONTINUED | OUTPATIENT
Start: 2021-12-15 | End: 2021-12-15 | Stop reason: HOSPADM

## 2021-12-15 RX ORDER — MIDAZOLAM HYDROCHLORIDE 1 MG/ML
2 INJECTION INTRAMUSCULAR; INTRAVENOUS ONCE
Status: COMPLETED | OUTPATIENT
Start: 2021-12-15 | End: 2021-12-15

## 2021-12-15 RX ORDER — FENTANYL CITRATE 50 UG/ML
25 INJECTION, SOLUTION INTRAMUSCULAR; INTRAVENOUS
Status: DISCONTINUED | OUTPATIENT
Start: 2021-12-15 | End: 2021-12-15 | Stop reason: HOSPADM

## 2021-12-15 RX ORDER — MAGNESIUM HYDROXIDE 1200 MG/15ML
LIQUID ORAL AS NEEDED
Status: DISCONTINUED | OUTPATIENT
Start: 2021-12-15 | End: 2021-12-15 | Stop reason: HOSPADM

## 2021-12-15 RX ORDER — ROPIVACAINE HYDROCHLORIDE 5 MG/ML
INJECTION, SOLUTION EPIDURAL; INFILTRATION; PERINEURAL
Status: COMPLETED | OUTPATIENT
Start: 2021-12-15 | End: 2021-12-15

## 2021-12-15 RX ADMIN — SODIUM CHLORIDE, SODIUM LACTATE, POTASSIUM CHLORIDE, AND CALCIUM CHLORIDE 20 ML/HR: .6; .31; .03; .02 INJECTION, SOLUTION INTRAVENOUS at 15:37

## 2021-12-15 RX ADMIN — ROPIVACAINE HYDROCHLORIDE 20 ML: 5 INJECTION, SOLUTION EPIDURAL; INFILTRATION; PERINEURAL at 15:41

## 2021-12-15 RX ADMIN — FAMOTIDINE 20 MG: 10 INJECTION INTRAVENOUS at 15:35

## 2021-12-15 RX ADMIN — MIDAZOLAM 2 MG: 1 INJECTION INTRAMUSCULAR; INTRAVENOUS at 15:35

## 2021-12-15 RX ADMIN — PROPOFOL 100 MCG/KG/MIN: 10 INJECTION, EMULSION INTRAVENOUS at 17:09

## 2021-12-15 NOTE — ANESTHESIA PREPROCEDURE EVALUATION
Anesthesia Evaluation     NPO Solid Status: > 8 hours             Airway   Mallampati: II  TM distance: >3 FB  Neck ROM: full  Dental - normal exam     Pulmonary - normal exam   (+) shortness of breath,   Cardiovascular - normal exam    (+) hypertension, valvular problems/murmurs MR, CAD, CABG >6 Months, DVT, hyperlipidemia,       Neuro/Psych  GI/Hepatic/Renal/Endo    (+)   renal disease CRI,     Musculoskeletal     Abdominal    Substance History      OB/GYN          Other                        Anesthesia Plan    ASA 3     regional   (Wrist block for surgical anesthesia)    Anesthetic plan, all risks, benefits, and alternatives have been provided, discussed and informed consent has been obtained with: patient.

## 2021-12-15 NOTE — ANESTHESIA POSTPROCEDURE EVALUATION
Patient: Brodie DANIELSON Naive    Procedure Summary     Date: 12/15/21 Room / Location: [facility] OR 04 / [facility] MAIN OR    Anesthesia Start: 1704 Anesthesia Stop: 1744    Procedure: RIGHT CARPAL TUNNEL RELEASE WITH POSSIBLE SYNOVECTOMY (Right Wrist) Diagnosis:       Carpal tunnel syndrome on right      (Carpal tunnel syndrome on right [G56.01])    Surgeons: Georgie Hines MD Provider: Adalid Robison MD    Anesthesia Type: regional ASA Status: 3          Anesthesia Type: regional    Vitals  No vitals data found for the desired time range.          Post Anesthesia Care and Evaluation    Patient location during evaluation: bedside  Pain management: adequate  Airway patency: patent  Anesthetic complications: No anesthetic complications    Cardiovascular status: acceptable  Respiratory status: acceptable  Hydration status: acceptable

## 2021-12-15 NOTE — ANESTHESIA PROCEDURE NOTES
Peripheral Block      Patient reassessed immediately prior to procedure    Patient location during procedure: pre-op  Start time: 12/15/2021 3:38 PM  Stop time: 12/15/2021 3:41 PM  Reason for block: at surgeon's request and primary anesthetic  Performed by  Anesthesiologist: Adalid Robison MD  Preanesthetic Checklist  Completed: patient identified, risks and benefits discussed, surgical consent, pre-op evaluation and timeout performed  Prep:  Pt Position: sitting  Sterile barriers:cap, gloves, mask and washed/disinfected hands  Prep: Betadine  Patient monitoring: blood pressure monitoring, continuous pulse oximetry and EKG  Procedure    Sedation: yes    Guidance:ultrasound guided  Images:still images obtained, printed/placed on chart    Laterality:right  Block Type:wrist  Injection Technique:single-shot  Needle Type:echogenic  Needle Gauge:25 G      Medications Used: ropivacaine (NAROPIN) 0.5 % injection, 20 mL  Med administered at 12/15/2021 3:41 PM      Post Assessment  Injection Assessment: negative aspiration for heme, no paresthesia on injection and incremental injection  Patient Tolerance:comfortable throughout block  Complications:no  Additional Notes  ULTRASOUND INTERPRETATION. Using ultrasound guidance theneedle was placed in close proximity to the nerve, at which point, under ultrasound guidance, local anesthetic was injected around but not in the nerve and spread of the anesthesia was seen on ultrasound in close proximity thereto.  There were no abnormalities seen on ultrasound; a digital image was taken; and the patient tolerated the procedure with no complications.

## 2021-12-17 LAB
LAB AP CASE REPORT: NORMAL
LAB AP CLINICAL INFORMATION: NORMAL
PATH REPORT.FINAL DX SPEC: NORMAL
PATH REPORT.GROSS SPEC: NORMAL

## 2021-12-21 ENCOUNTER — OFFICE VISIT (OUTPATIENT)
Dept: CARDIOLOGY | Facility: CLINIC | Age: 86
End: 2021-12-21

## 2021-12-21 VITALS
SYSTOLIC BLOOD PRESSURE: 221 MMHG | HEART RATE: 49 BPM | DIASTOLIC BLOOD PRESSURE: 63 MMHG | HEIGHT: 65 IN | BODY MASS INDEX: 24.16 KG/M2 | WEIGHT: 145 LBS

## 2021-12-21 DIAGNOSIS — Z98.890 STATUS POST IMPLANTATION OF MITRAL VALVE LEAFLET CLIP: ICD-10-CM

## 2021-12-21 DIAGNOSIS — I34.0 NONRHEUMATIC MITRAL VALVE REGURGITATION: Primary | ICD-10-CM

## 2021-12-21 DIAGNOSIS — I10 ESSENTIAL HYPERTENSION: ICD-10-CM

## 2021-12-21 DIAGNOSIS — Z95.1 S/P CABG (CORONARY ARTERY BYPASS GRAFT): ICD-10-CM

## 2021-12-21 DIAGNOSIS — Z95.818 STATUS POST IMPLANTATION OF MITRAL VALVE LEAFLET CLIP: ICD-10-CM

## 2021-12-21 PROCEDURE — 93000 ELECTROCARDIOGRAM COMPLETE: CPT | Performed by: INTERNAL MEDICINE

## 2021-12-21 PROCEDURE — 99214 OFFICE O/P EST MOD 30 MIN: CPT | Performed by: INTERNAL MEDICINE

## 2021-12-21 RX ORDER — AMLODIPINE BESYLATE 5 MG/1
5 TABLET ORAL DAILY
Qty: 90 TABLET | Refills: 3 | Status: SHIPPED | OUTPATIENT
Start: 2021-12-21 | End: 2022-12-07

## 2021-12-21 NOTE — PROGRESS NOTES
Date of Office Visit: 21  Encounter Provider: José Hatch MD  Place of Service: [facility] CARDIOLOGY  Patient Name: Brodie Mo  :10/31/1932  [id number]    Chief Complaint   Patient presents with   • Coronary Artery Disease   :     HPI: Brodie Mo is a 89 y.o. female she had a 5 vessel bypass in  with Dr. Prabhakar.  She had a stress test in May 2019 that was normal she had an echo which showed moderate to severe MR and severe pulmonary hypertension.  Since last seen she has had a MitraClip procedure done.  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  She is not any chest pain no syncope PND orthopnea    Past Medical History:   Diagnosis Date   • CAD (coronary artery disease)    • Cancer (HCC)     bladder    • Deep vein thrombosis (HCC)     Left leg-   • Health care maintenance    • HTN (hypertension)    • Hyperlipidemia    • LVH (left ventricular hypertrophy)    • Pneumonia    • S/P CABG (coronary artery bypass graft)        Past Surgical History:   Procedure Laterality Date   • BLADDER SURGERY      cancer   • CARPAL TUNNEL RELEASE Right 12/15/2021    Procedure: RIGHT CARPAL TUNNEL RELEASE WITH POSSIBLE SYNOVECTOMY;  Surgeon: Georgie Hines MD;  Location: [facility] [place] OR;  Service: Plastics;  Laterality: Right;   • CORONARY ARTERY BYPASS GRAFT       with Dr Prabhakar; AGUAYO to LAD and SVG to OM2;  cath all grafts open and 30% dx in RCA; nl stress in ,    • HYSTERECTOMY     • MITRAL VALVE REPAIR/REPLACEMENT N/A 2020    Procedure: TRANSCATHETER MITRAL VALVE REPAIR;  Surgeon: Edward Humphreys MD;  Location:  INVASIVE LOCATION;  Service: Cardiovascular;  Laterality: N/A;       Social History     Socioeconomic History   • Marital  status:    Tobacco Use   • Smoking status: Former Smoker   • Smokeless tobacco: Never Used   • Tobacco comment: CAFFEINE USE: 2  CUPS COFFEE DAILY   Vaping Use   • Vaping Use: Never used   Substance and Sexual Activity   • Alcohol use: Yes     Alcohol/week: 1.0 standard drink     Types: 1 Shots of liquor per week     Comment: 1 DRINK DAILY   • Drug use: No   • Sexual activity: Defer       Family History   Problem Relation Age of Onset   • Heart disease Mother    • Kidney cancer Father    • No Known Problems Maternal Grandmother    • No Known Problems Maternal Grandfather    • No Known Problems Paternal Grandmother    • No Known Problems Paternal Grandfather    • Heart disease Sister    • Heart disease Brother        Review of Systems   Constitutional: Negative for decreased appetite, fever, malaise/fatigue and weight loss.   HENT: Negative for nosebleeds.    Eyes: Negative for double vision.   Cardiovascular: Negative for chest pain, claudication, cyanosis, dyspnea on exertion, irregular heartbeat, leg swelling, near-syncope, orthopnea, palpitations, paroxysmal nocturnal dyspnea and syncope.   Respiratory: Negative for cough, hemoptysis and shortness of breath.    Hematologic/Lymphatic: Negative for bleeding problem.   Skin: Negative for rash.   Musculoskeletal: Negative for falls and myalgias.   Gastrointestinal: Negative for hematochezia, jaundice, melena, nausea and vomiting.   Genitourinary: Negative for hematuria.   Neurological: Negative for dizziness and seizures.   Psychiatric/Behavioral: Negative for altered mental status and memory loss.       Allergies   Allergen Reactions   • No Known Drug Allergy          Current Outpatient Medications:   •  aspirin 81 MG tablet, Take 81 mg by mouth Daily., Disp: , Rfl:   •  atenolol (TENORMIN) 25 MG tablet, Take 25 mg by mouth Daily., Disp: , Rfl:   •  atorvastatin (LIPITOR) 40 MG tablet, TAKE 1 TABLET BY MOUTH EVERY DAY, Disp: 90 tablet, Rfl: 3  •  B Complex  "Vitamins (vitamin b complex) capsule capsule, Take 1 capsule by mouth 2 (Two) Times a Week., Disp: , Rfl:   •  cholecalciferol (VITAMIN D3) 1000 units tablet, Take 1,000 Units by mouth Daily., Disp: , Rfl:   •  coenzyme Q10 100 MG capsule, Take 100 mg by mouth Daily., Disp: , Rfl:   •  lisinopril (PRINIVIL,ZESTRIL) 40 MG tablet, TAKE 1 TABLET BY MOUTH EVERY DAY, Disp: 90 tablet, Rfl: 2  •  amLODIPine (NORVASC) 5 MG tablet, Take 1 tablet by mouth Daily., Disp: 90 tablet, Rfl: 3      Objective:     Vitals:    12/21/21 1313   BP: (!) 221/63   Pulse: (!) 49   Weight: 65.8 kg (145 lb)   Height: 165.1 cm (65\")     Body mass index is 24.13 kg/m².    Physical Exam  Constitutional:       Appearance: She is well-developed.   HENT:      Head: Normocephalic.   Eyes:      General: No scleral icterus.  Neck:      Thyroid: No thyromegaly.      Vascular: No JVD.   Cardiovascular:      Rate and Rhythm: Normal rate and regular rhythm.      Heart sounds: Normal heart sounds. No murmur heard.  No friction rub. No gallop.    Pulmonary:      Effort: Pulmonary effort is normal.      Breath sounds: Normal breath sounds. No wheezing or rales.   Abdominal:      Palpations: Abdomen is soft.      Tenderness: There is no abdominal tenderness.   Musculoskeletal:         General: Normal range of motion.   Lymphadenopathy:      Cervical: No cervical adenopathy.   Skin:     General: Skin is warm and dry.      Findings: No rash.   Neurological:      Mental Status: She is alert and oriented to person, place, and time.           ECG 12 Lead    Date/Time: 12/21/2021 1:41 PM  Performed by: José Hatch MD  Authorized by: José Hatch MD   Comparison: compared with previous ECG   Similar to previous ECG  Rhythm: sinus rhythm  Ectopy: atrial premature contractions  Other findings: left ventricular hypertrophy with strain    Clinical impression: abnormal EKG             Assessment:       Diagnosis Plan   1. Nonrheumatic mitral valve regurgitation  " Adult Transthoracic Echo Complete W/ Cont if Necessary Per Protocol   2. S/P CABG (coronary artery bypass graft)  Adult Transthoracic Echo Complete W/ Cont if Necessary Per Protocol   3. Status post implantation of mitral valve leaflet clip  Adult Transthoracic Echo Complete W/ Cont if Necessary Per Protocol   4. Essential hypertension  Adult Transthoracic Echo Complete W/ Cont if Necessary Per Protocol          Plan:       She is really doing well I reviewed her echo studies from the MitraClip procedure and that looks like it was highly successful symptomatically she is back with leg class II heart failure symptoms and doing well her blood pressure is really high today I reviewed her medicines we will get a start her on 5 mg of amlodipine and I have asked her to start measuring her blood pressure at home I will have her come back and see me in a year we will reecho her at that time    As always, it has been a pleasure to participate in your patient's care.      Sincerely,       José Hatch MD

## 2022-01-11 RX ORDER — LISINOPRIL 40 MG/1
TABLET ORAL
Qty: 90 TABLET | Refills: 2 | Status: SHIPPED | OUTPATIENT
Start: 2022-01-11 | End: 2022-09-13

## 2022-02-02 ENCOUNTER — HOSPITAL ENCOUNTER (OUTPATIENT)
Dept: CARDIOLOGY | Facility: HOSPITAL | Age: 87
Discharge: HOME | End: 2022-02-02
Admitting: INTERNAL MEDICINE

## 2022-02-02 VITALS
HEIGHT: 65 IN | DIASTOLIC BLOOD PRESSURE: 60 MMHG | BODY MASS INDEX: 24.16 KG/M2 | HEART RATE: 70 BPM | WEIGHT: 145 LBS | SYSTOLIC BLOOD PRESSURE: 152 MMHG

## 2022-02-02 DIAGNOSIS — I10 ESSENTIAL HYPERTENSION: ICD-10-CM

## 2022-02-02 DIAGNOSIS — Z95.818 STATUS POST IMPLANTATION OF MITRAL VALVE LEAFLET CLIP: ICD-10-CM

## 2022-02-02 DIAGNOSIS — Z95.1 S/P CABG (CORONARY ARTERY BYPASS GRAFT): ICD-10-CM

## 2022-02-02 DIAGNOSIS — Z98.890 STATUS POST IMPLANTATION OF MITRAL VALVE LEAFLET CLIP: ICD-10-CM

## 2022-02-02 DIAGNOSIS — I34.0 NONRHEUMATIC MITRAL VALVE REGURGITATION: ICD-10-CM

## 2022-02-02 PROCEDURE — 93306 TTE W/DOPPLER COMPLETE: CPT | Performed by: INTERNAL MEDICINE

## 2022-02-02 PROCEDURE — 93306 TTE W/DOPPLER COMPLETE: CPT

## 2022-02-03 LAB
AORTIC DIMENSIONLESS INDEX: 0.7 (DI)
BH CV ECHO MEAS - ACS: 1.6 CM
BH CV ECHO MEAS - AO MAX PG (FULL): 6.5 MMHG
BH CV ECHO MEAS - AO MAX PG: 10.4 MMHG
BH CV ECHO MEAS - AO MEAN PG (FULL): 3.3 MMHG
BH CV ECHO MEAS - AO MEAN PG: 5.5 MMHG
BH CV ECHO MEAS - AO ROOT AREA (BSA CORRECTED): 1.5
BH CV ECHO MEAS - AO ROOT AREA: 5.2 CM^2
BH CV ECHO MEAS - AO ROOT DIAM: 2.6 CM
BH CV ECHO MEAS - AO V2 MAX: 161.5 CM/SEC
BH CV ECHO MEAS - AO V2 MEAN: 107.8 CM/SEC
BH CV ECHO MEAS - AO V2 VTI: 42.9 CM
BH CV ECHO MEAS - AVA(I,A): 2 CM^2
BH CV ECHO MEAS - AVA(I,D): 2 CM^2
BH CV ECHO MEAS - AVA(V,A): 1.8 CM^2
BH CV ECHO MEAS - AVA(V,D): 1.8 CM^2
BH CV ECHO MEAS - BSA(HAYCOCK): 1.7 M^2
BH CV ECHO MEAS - BSA: 1.7 M^2
BH CV ECHO MEAS - BZI_BMI: 24.1 KILOGRAMS/M^2
BH CV ECHO MEAS - BZI_METRIC_HEIGHT: 165.1 CM
BH CV ECHO MEAS - BZI_METRIC_WEIGHT: 65.8 KG
BH CV ECHO MEAS - EDV(CUBED): 86.5 ML
BH CV ECHO MEAS - EDV(MOD-SP4): 50 ML
BH CV ECHO MEAS - EDV(TEICH): 88.7 ML
BH CV ECHO MEAS - EF(CUBED): 70.8 %
BH CV ECHO MEAS - EF(MOD-SP4): 54 %
BH CV ECHO MEAS - EF(TEICH): 62.7 %
BH CV ECHO MEAS - ESV(MOD-SP4): 23 ML
BH CV ECHO MEAS - ESV(TEICH): 33.1 ML
BH CV ECHO MEAS - IVS/LVPW: 0.99
BH CV ECHO MEAS - IVSD: 0.8 CM
BH CV ECHO MEAS - LAT PEAK E' VEL: 6 CM/SEC
BH CV ECHO MEAS - LV DIASTOLIC VOL/BSA (35-75): 29 ML/M^2
BH CV ECHO MEAS - LV MASS(C)D: 110.4 GRAMS
BH CV ECHO MEAS - LV MASS(C)DI: 64 GRAMS/M^2
BH CV ECHO MEAS - LV MAX PG: 3.9 MMHG
BH CV ECHO MEAS - LV MEAN PG: 2.3 MMHG
BH CV ECHO MEAS - LV SYSTOLIC VOL/BSA (12-30): 13.3 ML/M^2
BH CV ECHO MEAS - LV V1 MAX: 99.4 CM/SEC
BH CV ECHO MEAS - LV V1 MEAN: 70.8 CM/SEC
BH CV ECHO MEAS - LV V1 VTI: 28.7 CM
BH CV ECHO MEAS - LVIDD: 4.4 CM
BH CV ECHO MEAS - LVIDS: 2.9 CM
BH CV ECHO MEAS - LVLD AP4: 5.6 CM
BH CV ECHO MEAS - LVLS AP4: 5.1 CM
BH CV ECHO MEAS - LVOT AREA (M): 2.8 CM^2
BH CV ECHO MEAS - LVOT AREA: 2.9 CM^2
BH CV ECHO MEAS - LVOT DIAM: 2 CM
BH CV ECHO MEAS - LVPWD: 0.8 CM
BH CV ECHO MEAS - MED PEAK E' VEL: 5.4 CM/SEC
BH CV ECHO MEAS - MR MAX PG: 130 MMHG
BH CV ECHO MEAS - MR MAX VEL: 570.1 CM/SEC
BH CV ECHO MEAS - MR MEAN PG: 84.3 MMHG
BH CV ECHO MEAS - MR MEAN VEL: 433.1 CM/SEC
BH CV ECHO MEAS - MR VTI: 202.8 CM
BH CV ECHO MEAS - MV A DUR: 0.13 SEC
BH CV ECHO MEAS - MV A MAX VEL: 105.6 CM/SEC
BH CV ECHO MEAS - MV AREA (1 DIAM): 11.8 CM^2
BH CV ECHO MEAS - MV DEC SLOPE: 616 CM/SEC^2
BH CV ECHO MEAS - MV DEC TIME: 0.39 SEC
BH CV ECHO MEAS - MV DIAM: 3.9 CM
BH CV ECHO MEAS - MV E MAX VEL: 182 CM/SEC
BH CV ECHO MEAS - MV E/A: 1.7
BH CV ECHO MEAS - MV FLOW AREA(1DIAM): 11.8 CM^2
BH CV ECHO MEAS - MV MAX PG: 9.4 MMHG
BH CV ECHO MEAS - MV MEAN PG: 7 MMHG
BH CV ECHO MEAS - MV P1/2T MAX VEL: 182.2 CM/SEC
BH CV ECHO MEAS - MV P1/2T: 86.6 MSEC
BH CV ECHO MEAS - MV V2 MAX: 127.9 CM/SEC
BH CV ECHO MEAS - MV V2 MEAN: 120 CM/SEC
BH CV ECHO MEAS - MV V2 VTI: 52.8 CM
BH CV ECHO MEAS - MVA P1/2T LCG: 1.2 CM^2
BH CV ECHO MEAS - MVA(P1/2T): 2.5 CM^2
BH CV ECHO MEAS - MVA(VTI): 1.6 CM^2
BH CV ECHO MEAS - PA ACC TIME: 0.17 SEC
BH CV ECHO MEAS - PA MAX PG (FULL): 2.3 MMHG
BH CV ECHO MEAS - PA MAX PG: 3.6 MMHG
BH CV ECHO MEAS - PA PR(ACCEL): 1.4 MMHG
BH CV ECHO MEAS - PA V2 MAX: 95.3 CM/SEC
BH CV ECHO MEAS - PULM A REVS DUR: 0.15 SEC
BH CV ECHO MEAS - PULM A REVS VEL: 32.6 CM/SEC
BH CV ECHO MEAS - PULM DIAS VEL: 33.8 CM/SEC
BH CV ECHO MEAS - PULM S/D: 1
BH CV ECHO MEAS - PULM SYS VEL: 35.1 CM/SEC
BH CV ECHO MEAS - PVA(V,A): 1.7 CM^2
BH CV ECHO MEAS - PVA(V,D): 1.7 CM^2
BH CV ECHO MEAS - QP/QS: 0.52
BH CV ECHO MEAS - RAP SYSTOLE: 3 MMHG
BH CV ECHO MEAS - RF(MV,AO)(1 DIAM): 0.64
BH CV ECHO MEAS - RF(MV,LVOT)(1DIAM): 0.86
BH CV ECHO MEAS - RV MAX PG: 1.3 MMHG
BH CV ECHO MEAS - RV MEAN PG: 0.81 MMHG
BH CV ECHO MEAS - RV V1 MAX: 57.4 CM/SEC
BH CV ECHO MEAS - RV V1 MEAN: 43.1 CM/SEC
BH CV ECHO MEAS - RV V1 VTI: 15.8 CM
BH CV ECHO MEAS - RVOT AREA: 2.8 CM^2
BH CV ECHO MEAS - RVOT DIAM: 1.9 CM
BH CV ECHO MEAS - RVSP: 39.3 MMHG
BH CV ECHO MEAS - SI(AO): 129.7 ML/M^2
BH CV ECHO MEAS - SI(CUBED): 35.5 ML/M^2
BH CV ECHO MEAS - SI(LVOT): 48.9 ML/M^2
BH CV ECHO MEAS - SI(MOD-SP4): 15.6 ML/M^2
BH CV ECHO MEAS - SI(MV 1 DIAM): 361.1 ML/M^2
BH CV ECHO MEAS - SI(TEICH): 32.2 ML/M^2
BH CV ECHO MEAS - SUP REN AO DIAM: 1.5 CM
BH CV ECHO MEAS - SV(AO): 223.7 ML
BH CV ECHO MEAS - SV(CUBED): 61.3 ML
BH CV ECHO MEAS - SV(LVOT): 84.5 ML
BH CV ECHO MEAS - SV(MOD-SP4): 27 ML
BH CV ECHO MEAS - SV(MV 1 DIAM): 623.1 ML
BH CV ECHO MEAS - SV(RVOT): 44.2 ML
BH CV ECHO MEAS - SV(TEICH): 55.6 ML
BH CV ECHO MEAS - TAPSE (>1.6): 1.7 CM
BH CV ECHO MEAS - TR MAX VEL: 301.3 CM/SEC
BH CV ECHO MEASUREMENTS AVERAGE E/E' RATIO: 31.93
BH CV XLRA - RV BASE: 2.9 CM
BH CV XLRA - RV LENGTH: 5.9 CM
BH CV XLRA - RV MID: 2.2 CM
BH CV XLRA - TDI S': 8.8 CM/SEC
LEFT ATRIUM VOLUME INDEX: 35 ML/M2
MAXIMAL PREDICTED HEART RATE: 131 BPM
MR PISA EROA: 0.09 CM2
PISA ALIASING VEL: 30.8 M/S
PISA RADIUS: 0.5 CM
STRESS TARGET HR: 111 BPM

## 2022-06-06 RX ORDER — ATORVASTATIN CALCIUM 40 MG/1
TABLET, FILM COATED ORAL
Qty: 90 TABLET | Refills: 3 | Status: SHIPPED | OUTPATIENT
Start: 2022-06-06

## 2022-09-13 RX ORDER — LISINOPRIL 40 MG/1
TABLET ORAL
Qty: 90 TABLET | Refills: 3 | Status: SHIPPED | OUTPATIENT
Start: 2022-09-13

## 2022-10-28 ENCOUNTER — OFFICE VISIT (OUTPATIENT)
Dept: CARDIOLOGY | Facility: CLINIC | Age: 87
End: 2022-10-28

## 2022-10-28 VITALS
BODY MASS INDEX: 23.82 KG/M2 | SYSTOLIC BLOOD PRESSURE: 152 MMHG | HEIGHT: 65 IN | WEIGHT: 143 LBS | HEART RATE: 68 BPM | DIASTOLIC BLOOD PRESSURE: 60 MMHG

## 2022-10-28 DIAGNOSIS — Z95.1 S/P CABG (CORONARY ARTERY BYPASS GRAFT): ICD-10-CM

## 2022-10-28 DIAGNOSIS — R06.09 DYSPNEA ON EXERTION: ICD-10-CM

## 2022-10-28 DIAGNOSIS — I34.0 NONRHEUMATIC MITRAL VALVE REGURGITATION: Primary | ICD-10-CM

## 2022-10-28 DIAGNOSIS — I10 ESSENTIAL HYPERTENSION: ICD-10-CM

## 2022-10-28 DIAGNOSIS — C80.1 CANCER: ICD-10-CM

## 2022-10-28 PROCEDURE — 93000 ELECTROCARDIOGRAM COMPLETE: CPT | Performed by: INTERNAL MEDICINE

## 2022-10-28 PROCEDURE — 99214 OFFICE O/P EST MOD 30 MIN: CPT | Performed by: INTERNAL MEDICINE

## 2022-10-28 NOTE — PROGRESS NOTES
Date of Office Visit: 10/28/22  Encounter Provider: José Hatch MD  Place of Service: [facility] CARDIOLOGY  Patient Name: Brodie Mo  :10/31/1932  [id number]    Chief Complaint   Patient presents with   • Nonrheumatic mitral valve regurgitation     S/P MitraClip   • Coronary Artery Disease     S/P CABG   • Follow-up     1 year   :     HPI: Brodie Mo is a 89 y.o. female she had a 5 vessel bypass in  with Dr. Prabhakar.  She had a stress test in May 2019 that was normal she had an echo which showed moderate to severe MR and severe pulmonary hypertension.  She had a MitraClip done in 2020.  She had COVID back in 2022 and it really knocked her down and she is really been slow to recover.  She had seen her primary care physician who was worried about her because she usually has a high activity level she nola a CRP on her and it was 11 she was concerned it might indicate a cardiac issue.  She says in the last 5 days she is really gotten a lot better she still has some breathlessness with exertion but nothing like what she had before the MitraClip was on no PND no orthopnea little bit of edema but she is on amlodipine at this change no chest pain.  She has not had any fevers or chills but she has some night sweats    Past Medical History:   Diagnosis Date   • CAD (coronary artery disease)    • Cancer (HCC)     bladder    • Deep vein thrombosis (HCC)     Left leg-   • Health care maintenance    • HTN (hypertension)    • Hyperlipidemia    • LVH (left ventricular hypertrophy)    • Pneumonia    • S/P CABG (coronary artery bypass graft)        Past Surgical History:   Procedure Laterality Date   • BLADDER SURGERY      cancer   • CARPAL TUNNEL RELEASE Right 12/15/2021    Procedure: RIGHT CARPAL TUNNEL RELEASE WITH POSSIBLE SYNOVECTOMY;  Surgeon: Georgie Hines MD;  [place] [facility] [place]  Service: Plastics;  Laterality: Right;   • CORONARY  ARTERY BYPASS GRAFT      2002 with Dr Prabhakar; AGUAYO to LAD and SVG to OM2; 2005 cath all grafts open and 30% dx in RCA; nl stress in 2010, 2013   • HYSTERECTOMY     • MITRAL VALVE REPAIR/REPLACEMENT N/A 9/2/2020    Procedure: TRANSCATHETER MITRAL VALVE REPAIR;  Surgeon: Edward Humphreys MD;  Location: [facility] CATH INVASIVE LOCATION;  Service: Cardiovascular;  Laterality: N/A;       Social History     Socioeconomic History   • Marital status:    Tobacco Use   • Smoking status: Former   • Smokeless tobacco: Never   • Tobacco comments:     CAFFEINE USE: 2  CUPS COFFEE DAILY   Vaping Use   • Vaping Use: Never used   Substance and Sexual Activity   • Alcohol use: Yes     Alcohol/week: 1.0 standard drink     Types: 1 Shots of liquor per week     Comment: 1 DRINK DAILY   • Drug use: No   • Sexual activity: Defer       Family History   Problem Relation Age of Onset   • Heart disease Mother    • Kidney cancer Father    • No Known Problems Maternal Grandmother    • No Known Problems Maternal Grandfather    • No Known Problems Paternal Grandmother    • No Known Problems Paternal Grandfather    • Heart disease Sister    • Heart disease Brother        Review of Systems   Constitutional: Negative for decreased appetite, fever, malaise/fatigue and weight loss.   HENT: Negative for nosebleeds.    Eyes: Negative for double vision.   Cardiovascular: Negative for chest pain, claudication, cyanosis, dyspnea on exertion, irregular heartbeat, leg swelling, near-syncope, orthopnea, palpitations, paroxysmal nocturnal dyspnea and syncope.   Respiratory: Negative for cough, hemoptysis and shortness of breath.    Hematologic/Lymphatic: Negative for bleeding problem.   Skin: Negative for rash.   Musculoskeletal: Negative for falls and myalgias.   Gastrointestinal: Negative for hematochezia, jaundice, melena, nausea and vomiting.   Genitourinary: Negative for hematuria.   Neurological: Negative for dizziness and seizures.  "  Psychiatric/Behavioral: Negative for altered mental status and memory loss.       Allergies   Allergen Reactions   • No Known Drug Allergy          Current Outpatient Medications:   •  amLODIPine (NORVASC) 5 MG tablet, Take 1 tablet by mouth Daily., Disp: 90 tablet, Rfl: 3  •  aspirin 81 MG tablet, Take 81 mg by mouth Daily., Disp: , Rfl:   •  atenolol (TENORMIN) 25 MG tablet, Take 25 mg by mouth Daily., Disp: , Rfl:   •  atorvastatin (LIPITOR) 40 MG tablet, TAKE 1 TABLET BY MOUTH EVERY DAY, Disp: 90 tablet, Rfl: 3  •  B Complex Vitamins (vitamin b complex) capsule capsule, Take 1 capsule by mouth 2 (Two) Times a Week., Disp: , Rfl:   •  cholecalciferol (VITAMIN D3) 1000 units tablet, Take 1,000 Units by mouth Daily., Disp: , Rfl:   •  coenzyme Q10 100 MG capsule, Take 100 mg by mouth Daily., Disp: , Rfl:   •  lisinopril (PRINIVIL,ZESTRIL) 40 MG tablet, TAKE 1 TABLET BY MOUTH EVERY DAY, Disp: 90 tablet, Rfl: 3      Objective:     Vitals:    10/28/22 1301   BP: 152/60   Pulse: 68   Weight: 64.9 kg (143 lb)   Height: 165.1 cm (65\")     Body mass index is 23.8 kg/m².    Physical Exam  Constitutional:       Appearance: She is well-developed.   HENT:      Head: Normocephalic.   Eyes:      General: No scleral icterus.  Neck:      Thyroid: No thyromegaly.      Vascular: No JVD.   Cardiovascular:      Rate and Rhythm: Normal rate and regular rhythm.      Heart sounds: Normal heart sounds. No murmur heard.    No friction rub. No gallop.   Pulmonary:      Effort: Pulmonary effort is normal.      Breath sounds: Normal breath sounds. No wheezing or rales.   Abdominal:      Palpations: Abdomen is soft.      Tenderness: There is no abdominal tenderness.   Musculoskeletal:         General: Normal range of motion.   Lymphadenopathy:      Cervical: No cervical adenopathy.   Skin:     General: Skin is warm and dry.      Findings: No rash.   Neurological:      Mental Status: She is alert and oriented to person, place, and time. "           ECG 12 Lead    Date/Time: 10/28/2022 1:43 PM  Performed by: José Hatch MD  Authorized by: José Hatch MD   Comparison: compared with previous ECG   Similar to previous ECG  Rhythm: sinus rhythm  Other findings: non-specific ST-T wave changes    Clinical impression: abnormal EKG             Assessment:       Diagnosis Plan   1. Nonrheumatic mitral valve regurgitation        2. S/P CABG (coronary artery bypass graft)        3. History of bladder cancer        4. Dyspnea on exertion        5. Essential hypertension               Plan:       I think she is doing okay I think all of her symptoms are probably related to COVID I think the elevated CRP is probably almost for sure that way too high to use as a marker for vascular disease.  She is going to be 89 in a week and really is not interested in doing a lot of aggressive things anyway.  I think we can watch things I Ernestine set her up to get an echo in February which will be a year from the last time but if she has problems before that we will move forward quicker I Ernestine have her see Sheryl londono in 6 months and then see me in a year    As always, it has been a pleasure to participate in your patient's care.      Sincerely,       José Hatch MD

## 2022-11-14 ENCOUNTER — HOSPITAL ENCOUNTER (OUTPATIENT)
Dept: CARDIOLOGY | Facility: HOSPITAL | Age: 87
Discharge: HOME | End: 2022-11-14
Admitting: INTERNAL MEDICINE

## 2022-11-14 DIAGNOSIS — I10 ESSENTIAL HYPERTENSION: ICD-10-CM

## 2022-11-14 DIAGNOSIS — R06.09 DYSPNEA ON EXERTION: ICD-10-CM

## 2022-11-14 DIAGNOSIS — I34.0 NONRHEUMATIC MITRAL VALVE REGURGITATION: ICD-10-CM

## 2022-11-14 DIAGNOSIS — C80.1 CANCER: ICD-10-CM

## 2022-11-14 DIAGNOSIS — Z95.1 S/P CABG (CORONARY ARTERY BYPASS GRAFT): ICD-10-CM

## 2022-11-14 PROCEDURE — 93306 TTE W/DOPPLER COMPLETE: CPT | Performed by: INTERNAL MEDICINE

## 2022-11-14 PROCEDURE — 93306 TTE W/DOPPLER COMPLETE: CPT

## 2022-11-15 LAB
AORTIC ARCH: 1.6 CM
ASCENDING AORTA: 2.1 CM
BH CV ECHO MEAS - ACS: 1.53 CM
BH CV ECHO MEAS - AO MAX PG: 8.7 MMHG
BH CV ECHO MEAS - AO MEAN PG: 5.2 MMHG
BH CV ECHO MEAS - AO ROOT DIAM: 2.43 CM
BH CV ECHO MEAS - AO V2 MAX: 147.4 CM/SEC
BH CV ECHO MEAS - AO V2 VTI: 39.6 CM
BH CV ECHO MEAS - AVA(I,D): 1.57 CM2
BH CV ECHO MEAS - EDV(CUBED): 70 ML
BH CV ECHO MEAS - EDV(MOD-SP2): 77 ML
BH CV ECHO MEAS - EDV(MOD-SP4): 66 ML
BH CV ECHO MEAS - EF(MOD-BP): 62.9 %
BH CV ECHO MEAS - EF(MOD-SP2): 63.6 %
BH CV ECHO MEAS - EF(MOD-SP4): 65.2 %
BH CV ECHO MEAS - ESV(CUBED): 13.8 ML
BH CV ECHO MEAS - ESV(MOD-SP2): 28 ML
BH CV ECHO MEAS - ESV(MOD-SP4): 23 ML
BH CV ECHO MEAS - FS: 41.8 %
BH CV ECHO MEAS - IVS/LVPW: 0.93 CM
BH CV ECHO MEAS - IVSD: 0.68 CM
BH CV ECHO MEAS - LAT PEAK E' VEL: 4.9 CM/SEC
BH CV ECHO MEAS - LV DIASTOLIC VOL/BSA (35-75): 38.5 CM2
BH CV ECHO MEAS - LV MASS(C)D: 83.7 GRAMS
BH CV ECHO MEAS - LV MAX PG: 4.4 MMHG
BH CV ECHO MEAS - LV MEAN PG: 2.8 MMHG
BH CV ECHO MEAS - LV SYSTOLIC VOL/BSA (12-30): 13.4 CM2
BH CV ECHO MEAS - LV V1 MAX: 104.9 CM/SEC
BH CV ECHO MEAS - LV V1 VTI: 29.8 CM
BH CV ECHO MEAS - LVIDD: 4.1 CM
BH CV ECHO MEAS - LVIDS: 2.4 CM
BH CV ECHO MEAS - LVOT AREA: 2.09 CM2
BH CV ECHO MEAS - LVOT DIAM: 1.63 CM
BH CV ECHO MEAS - LVPWD: 0.73 CM
BH CV ECHO MEAS - MED PEAK E' VEL: 5.1 CM/SEC
BH CV ECHO MEAS - MR MAX PG: 139.2 MMHG
BH CV ECHO MEAS - MR MAX VEL: 589.8 CM/SEC
BH CV ECHO MEAS - MR MEAN PG: 98.5 MMHG
BH CV ECHO MEAS - MR MEAN VEL: 483 CM/SEC
BH CV ECHO MEAS - MR VTI: 195.6 CM
BH CV ECHO MEAS - MV A DUR: 0.17 SEC
BH CV ECHO MEAS - MV A MAX VEL: 110.7 CM/SEC
BH CV ECHO MEAS - MV DEC SLOPE: 521.7 CM/SEC2
BH CV ECHO MEAS - MV DEC TIME: 0.36 MSEC
BH CV ECHO MEAS - MV E MAX VEL: 179 CM/SEC
BH CV ECHO MEAS - MV E/A: 1.62
BH CV ECHO MEAS - MV MAX PG: 16 MMHG
BH CV ECHO MEAS - MV MEAN PG: 5 MMHG
BH CV ECHO MEAS - MV P1/2T: 116.6 MSEC
BH CV ECHO MEAS - MV V2 VTI: 70.2 CM
BH CV ECHO MEAS - MVA(P1/2T): 1.89 CM2
BH CV ECHO MEAS - MVA(VTI): 0.89 CM2
BH CV ECHO MEAS - PA ACC TIME: 0.12 SEC
BH CV ECHO MEAS - PA PR(ACCEL): 26.7 MMHG
BH CV ECHO MEAS - PA V2 MAX: 86.3 CM/SEC
BH CV ECHO MEAS - PI END-D VEL: 107.6 CM/SEC
BH CV ECHO MEAS - PULM A REVS DUR: 0.15 SEC
BH CV ECHO MEAS - PULM A REVS VEL: 16.2 CM/SEC
BH CV ECHO MEAS - PULM DIAS VEL: 49.5 CM/SEC
BH CV ECHO MEAS - PULM S/D: 1.01
BH CV ECHO MEAS - PULM SYS VEL: 50 CM/SEC
BH CV ECHO MEAS - QP/QS: 1.07
BH CV ECHO MEAS - RV MAX PG: 2.41 MMHG
BH CV ECHO MEAS - RV V1 MAX: 77.7 CM/SEC
BH CV ECHO MEAS - RV V1 VTI: 20.3 CM
BH CV ECHO MEAS - RVOT DIAM: 2.05 CM
BH CV ECHO MEAS - SI(MOD-SP2): 28.6 ML/M2
BH CV ECHO MEAS - SI(MOD-SP4): 25.1 ML/M2
BH CV ECHO MEAS - SUP REN AO DIAM: 1.9 CM
BH CV ECHO MEAS - SV(LVOT): 62.2 ML
BH CV ECHO MEAS - SV(MOD-SP2): 49 ML
BH CV ECHO MEAS - SV(MOD-SP4): 43 ML
BH CV ECHO MEAS - SV(RVOT): 66.7 ML
BH CV ECHO MEAS - TAPSE (>1.6): 1.71 CM
BH CV ECHO MEAS - TR MAX PG: 43.5 MMHG
BH CV ECHO MEAS - TR MAX VEL: 329.8 CM/SEC
BH CV ECHO MEASUREMENTS AVERAGE E/E' RATIO: 35.8
BH CV XLRA - RV BASE: 2.7 CM
BH CV XLRA - RV LENGTH: 5.7 CM
BH CV XLRA - RV MID: 2.44 CM
BH CV XLRA - TDI S': 9.5 CM/SEC
LEFT ATRIUM VOLUME INDEX: 41 ML/M2
MAXIMAL PREDICTED HEART RATE: 130 BPM
SINUS: 2.6 CM
STJ: 1.82 CM
STRESS TARGET HR: 111 BPM

## 2022-12-07 RX ORDER — AMLODIPINE BESYLATE 5 MG/1
TABLET ORAL
Qty: 90 TABLET | Refills: 3 | Status: SHIPPED | OUTPATIENT
Start: 2022-12-07

## 2023-01-11 ENCOUNTER — APPOINTMENT (OUTPATIENT)
Dept: GENERAL RADIOLOGY | Facility: HOSPITAL | Age: 88
End: 2023-01-11
Payer: MEDICARE

## 2023-01-11 ENCOUNTER — HOSPITAL ENCOUNTER (EMERGENCY)
Facility: HOSPITAL | Age: 88
Discharge: HOME | End: 2023-01-11
Attending: EMERGENCY MEDICINE | Admitting: EMERGENCY MEDICINE
Payer: MEDICARE

## 2023-01-11 VITALS
TEMPERATURE: 98.2 F | BODY MASS INDEX: 23.99 KG/M2 | HEIGHT: 65 IN | RESPIRATION RATE: 18 BRPM | WEIGHT: 144 LBS | SYSTOLIC BLOOD PRESSURE: 161 MMHG | HEART RATE: 55 BPM | DIASTOLIC BLOOD PRESSURE: 61 MMHG | OXYGEN SATURATION: 94 %

## 2023-01-11 DIAGNOSIS — I50.9 ACUTE ON CHRONIC CONGESTIVE HEART FAILURE, UNSPECIFIED HEART FAILURE TYPE: ICD-10-CM

## 2023-01-11 DIAGNOSIS — R06.09 EXERTIONAL DYSPNEA: Primary | ICD-10-CM

## 2023-01-11 LAB
ALBUMIN SERPL-MCNC: 4.3 G/DL (ref 3.5–5.2)
ALBUMIN/GLOB SERPL: 1.5 G/DL
ALP SERPL-CCNC: 95 U/L (ref 39–117)
ALT SERPL W P-5'-P-CCNC: 28 U/L (ref 1–33)
ANION GAP SERPL CALCULATED.3IONS-SCNC: 12 MMOL/L (ref 5–15)
AST SERPL-CCNC: 32 U/L (ref 1–32)
B PARAPERT DNA SPEC QL NAA+PROBE: NOT DETECTED
B PERT DNA SPEC QL NAA+PROBE: NOT DETECTED
BASOPHILS # BLD AUTO: 0.08 10*3/MM3 (ref 0–0.2)
BASOPHILS NFR BLD AUTO: 0.9 % (ref 0–1.5)
BILIRUB SERPL-MCNC: 0.4 MG/DL (ref 0–1.2)
BUN SERPL-MCNC: 25 MG/DL (ref 8–23)
BUN/CREAT SERPL: 22.1 (ref 7–25)
C PNEUM DNA NPH QL NAA+NON-PROBE: NOT DETECTED
CALCIUM SPEC-SCNC: 9.5 MG/DL (ref 8.2–9.6)
CHLORIDE SERPL-SCNC: 101 MMOL/L (ref 98–107)
CO2 SERPL-SCNC: 24 MMOL/L (ref 22–29)
CREAT SERPL-MCNC: 1.13 MG/DL (ref 0.57–1)
DEPRECATED RDW RBC AUTO: 40.1 FL (ref 37–54)
EGFRCR SERPLBLD CKD-EPI 2021: 46.3 ML/MIN/1.73
EOSINOPHIL # BLD AUTO: 0.14 10*3/MM3 (ref 0–0.4)
EOSINOPHIL NFR BLD AUTO: 1.6 % (ref 0.3–6.2)
ERYTHROCYTE [DISTWIDTH] IN BLOOD BY AUTOMATED COUNT: 12.3 % (ref 12.3–15.4)
FLUAV SUBTYP SPEC NAA+PROBE: NOT DETECTED
FLUBV RNA ISLT QL NAA+PROBE: NOT DETECTED
GLOBULIN UR ELPH-MCNC: 2.8 GM/DL
GLUCOSE SERPL-MCNC: 109 MG/DL (ref 65–99)
HADV DNA SPEC NAA+PROBE: NOT DETECTED
HCOV 229E RNA SPEC QL NAA+PROBE: NOT DETECTED
HCOV HKU1 RNA SPEC QL NAA+PROBE: NOT DETECTED
HCOV NL63 RNA SPEC QL NAA+PROBE: NOT DETECTED
HCOV OC43 RNA SPEC QL NAA+PROBE: NOT DETECTED
HCT VFR BLD AUTO: 36.9 % (ref 34–46.6)
HGB BLD-MCNC: 12.3 G/DL (ref 12–15.9)
HMPV RNA NPH QL NAA+NON-PROBE: NOT DETECTED
HPIV1 RNA ISLT QL NAA+PROBE: NOT DETECTED
HPIV2 RNA SPEC QL NAA+PROBE: NOT DETECTED
HPIV3 RNA NPH QL NAA+PROBE: NOT DETECTED
HPIV4 P GENE NPH QL NAA+PROBE: NOT DETECTED
IMM GRANULOCYTES # BLD AUTO: 0.02 10*3/MM3 (ref 0–0.05)
IMM GRANULOCYTES NFR BLD AUTO: 0.2 % (ref 0–0.5)
LYMPHOCYTES # BLD AUTO: 1.27 10*3/MM3 (ref 0.7–3.1)
LYMPHOCYTES NFR BLD AUTO: 14.4 % (ref 19.6–45.3)
M PNEUMO IGG SER IA-ACNC: NOT DETECTED
MCH RBC QN AUTO: 29.7 PG (ref 26.6–33)
MCHC RBC AUTO-ENTMCNC: 33.3 G/DL (ref 31.5–35.7)
MCV RBC AUTO: 89.1 FL (ref 79–97)
MONOCYTES # BLD AUTO: 0.99 10*3/MM3 (ref 0.1–0.9)
MONOCYTES NFR BLD AUTO: 11.2 % (ref 5–12)
NEUTROPHILS NFR BLD AUTO: 6.33 10*3/MM3 (ref 1.7–7)
NEUTROPHILS NFR BLD AUTO: 71.7 % (ref 42.7–76)
NRBC BLD AUTO-RTO: 0 /100 WBC (ref 0–0.2)
NT-PROBNP SERPL-MCNC: 2061 PG/ML (ref 0–1800)
PLATELET # BLD AUTO: 260 10*3/MM3 (ref 140–450)
PMV BLD AUTO: 10.7 FL (ref 6–12)
POTASSIUM SERPL-SCNC: 4.4 MMOL/L (ref 3.5–5.2)
PROT SERPL-MCNC: 7.1 G/DL (ref 6–8.5)
RBC # BLD AUTO: 4.14 10*6/MM3 (ref 3.77–5.28)
RHINOVIRUS RNA SPEC NAA+PROBE: NOT DETECTED
RSV RNA NPH QL NAA+NON-PROBE: NOT DETECTED
SARS-COV-2 RNA NPH QL NAA+NON-PROBE: NOT DETECTED
SODIUM SERPL-SCNC: 137 MMOL/L (ref 136–145)
TROPONIN T SERPL-MCNC: <0.01 NG/ML (ref 0–0.03)
TROPONIN T SERPL-MCNC: <0.01 NG/ML (ref 0–0.03)
WBC NRBC COR # BLD: 8.83 10*3/MM3 (ref 3.4–10.8)

## 2023-01-11 PROCEDURE — 83880 ASSAY OF NATRIURETIC PEPTIDE: CPT | Performed by: EMERGENCY MEDICINE

## 2023-01-11 PROCEDURE — 0202U NFCT DS 22 TRGT SARS-COV-2: CPT | Performed by: EMERGENCY MEDICINE

## 2023-01-11 PROCEDURE — 84484 ASSAY OF TROPONIN QUANT: CPT | Performed by: EMERGENCY MEDICINE

## 2023-01-11 PROCEDURE — 93010 ELECTROCARDIOGRAM REPORT: CPT | Performed by: INTERNAL MEDICINE

## 2023-01-11 PROCEDURE — 80053 COMPREHEN METABOLIC PANEL: CPT | Performed by: EMERGENCY MEDICINE

## 2023-01-11 PROCEDURE — 96374 THER/PROPH/DIAG INJ IV PUSH: CPT

## 2023-01-11 PROCEDURE — 93005 ELECTROCARDIOGRAM TRACING: CPT

## 2023-01-11 PROCEDURE — 85025 COMPLETE CBC W/AUTO DIFF WBC: CPT | Performed by: EMERGENCY MEDICINE

## 2023-01-11 PROCEDURE — 25010000002 FUROSEMIDE PER 20 MG: Performed by: EMERGENCY MEDICINE

## 2023-01-11 PROCEDURE — 36415 COLL VENOUS BLD VENIPUNCTURE: CPT

## 2023-01-11 PROCEDURE — 99284 EMERGENCY DEPT VISIT MOD MDM: CPT

## 2023-01-11 PROCEDURE — 71045 X-RAY EXAM CHEST 1 VIEW: CPT

## 2023-01-11 RX ORDER — ASPIRIN 81 MG/1
324 TABLET, CHEWABLE ORAL ONCE
Status: COMPLETED | OUTPATIENT
Start: 2023-01-11 | End: 2023-01-11

## 2023-01-11 RX ORDER — FUROSEMIDE 10 MG/ML
40 INJECTION INTRAMUSCULAR; INTRAVENOUS ONCE
Status: COMPLETED | OUTPATIENT
Start: 2023-01-11 | End: 2023-01-11

## 2023-01-11 RX ORDER — FUROSEMIDE 20 MG/1
20 TABLET ORAL DAILY
Qty: 5 TABLET | Refills: 0 | Status: SHIPPED | OUTPATIENT
Start: 2023-01-11 | End: 2023-01-12 | Stop reason: SDUPTHER

## 2023-01-11 RX ORDER — SODIUM CHLORIDE 0.9 % (FLUSH) 0.9 %
10 SYRINGE (ML) INJECTION AS NEEDED
Status: DISCONTINUED | OUTPATIENT
Start: 2023-01-11 | End: 2023-01-11 | Stop reason: HOSPADM

## 2023-01-11 RX ADMIN — FUROSEMIDE 40 MG: 10 INJECTION, SOLUTION INTRAMUSCULAR; INTRAVENOUS at 18:46

## 2023-01-11 RX ADMIN — ASPIRIN 324 MG: 81 TABLET, CHEWABLE ORAL at 16:41

## 2023-01-11 NOTE — ED PROVIDER NOTES
EMERGENCY DEPARTMENT ENCOUNTER    Room Number:  11/11  Date of encounter:  1/11/2023  PCP: Georgie Diane MD  Historian: Patient and son who is at bedside    Patient was placed in face mask during triage process. Patient was wearing facemask when I entered the room and throughout our encounter. I wore full protective equipment throughout this patient encounter including a face mask, eye protection, and gloves. Hand hygiene was performed before donning protective equipment and again following doffing of PPE after leaving the room.    HPI:  Chief Complaint: Exertional dyspnea  A complete HPI/ROS/PMH/PSH/SH/FH are unobtainable due to: N/A   Context: Brodie Mo is a 89 y.o. female with history of coronary disease status post CABG, LVH, hypertension, hyperlipidemia and DVT though she does not appear to be chronically anticoagulated who presents to the ED c/o progressive exertional dyspnea and mild fatigue over the last 2 weeks.  No cough, fevers, in the office.  No orthopnea.  No chest pain or syncope.  Patient does feel lightheaded occasionally when she for stands.  Patient also notes that she has had some very mild intermittent headaches which is a new thing for her over the same period of time.  She denies any vision change, focal numbness weakness, abdominal pain, black or bloody stools, nausea or vomiting associated with any of this.  Patient is very comfortable at rest at this time.  Patient reports that she was seen by her primary care provider yesterday for evaluation of her glucose control, mention these things to her primary care who ordered blood test and was contacted today with abnormal studies and told to report to the emergency department.      MEDICAL HISTORY REVIEW  [facility]    [facility]E 11/15/2022:  Interpretation Summary       •  Left ventricular systolic function is normal. Left ventricular ejection fraction appears to be 61 - 65%.  •  Left ventricular diastolic  function is consistent with (grade II w/high LAP) pseudonormalization.  •  Moderately reduced right ventricular systolic function noted.  •  The right ventricular cavity is moderately dilated.  •  Mild mitral valve stenosis is present. The mitral valve mean gradient is 5 mmHg.  •  There is a MitraClip in stable position on the central aspect of A2-P2.  Mean transvalvular gradient is 5 mmHg.  Mitral valve area by pressure half-time is 1.9 cm².  Left atrial volume 63.5 mL. Mild to moderate mitral valve regurgitation is present.  •  Moderate to severe tricuspid valve regurgitation is present.  •  Estimated right ventricular systolic pressure from tricuspid regurgitation is moderately elevated (45-55 mmHg).      PAST MEDICAL HISTORY  Active Ambulatory Problems     Diagnosis Date Noted   • S/P CABG (coronary artery bypass graft) 12/09/2016   • Essential hypertension 12/15/2017   • History of bladder cancer 04/05/2018   • CAD (coronary artery disease) 04/05/2018   • CKD (chronic kidney disease) stage 3, GFR 30-59 ml/min ([facility]) 04/06/2018   • Nonrheumatic mitral valve regurgitation 04/09/2020   • Dyspnea on exertion 04/09/2020   • Mitral valve insufficiency 09/02/2020   • Status post implantation of mitral valve leaflet clip 09/10/2020     Resolved Ambulatory Problems     Diagnosis Date Noted   • Community acquired pneumonia 04/05/2018   • Acute on chronic diastolic congestive heart failure (HCC) 04/08/2018     Past Medical History:   Diagnosis Date   • Deep vein thrombosis (HCC)    • Health care maintenance    • HTN (hypertension)    • Hyperlipidemia    • LVH (left ventricular hypertrophy)    • Pneumonia          PAST SURGICAL HISTORY  Past Surgical History:   Procedure Laterality Date   • BLADDER SURGERY      cancer   • CARPAL TUNNEL RELEASE Right 12/15/2021    Procedure: RIGHT CARPAL TUNNEL RELEASE WITH POSSIBLE SYNOVECTOMY;  Surgeon: Georgie Hines MD;  Location: [facility] MAIN OR;  Service: Plastics;  Laterality:  Right;   • CORONARY ARTERY BYPASS GRAFT      2002 with Dr Prabhakar; AGUAYO to LAD and SVG to OM2; 2005 cath all grafts open and 30% dx in RCA; nl stress in 2010, 2013   • HYSTERECTOMY     • MITRAL VALVE REPAIR/REPLACEMENT N/A 9/2/2020    Procedure: TRANSCATHETER MITRAL VALVE REPAIR;  Surgeon: Edward Humphreys MD;  Location: [facility] CATH INVASIVE LOCATION;  Service: Cardiovascular;  Laterality: N/A;         FAMILY HISTORY  Family History   Problem Relation Age of Onset   • Heart disease Mother    • Kidney cancer Father    • No Known Problems Maternal Grandmother    • No Known Problems Maternal Grandfather    • No Known Problems Paternal Grandmother    • No Known Problems Paternal Grandfather    • Heart disease Sister    • Heart disease Brother          SOCIAL HISTORY  Social History     Socioeconomic History   • Marital status:    Tobacco Use   • Smoking status: Former   • Smokeless tobacco: Never   • Tobacco comments:     CAFFEINE USE: 2  CUPS COFFEE DAILY   Vaping Use   • Vaping Use: Never used   Substance and Sexual Activity   • Alcohol use: Yes     Alcohol/week: 1.0 standard drink     Types: 1 Shots of liquor per week     Comment: 1 DRINK DAILY   • Drug use: No   • Sexual activity: Defer         ALLERGIES  No known drug allergy        REVIEW OF SYSTEMS  Review of Systems     All systems reviewed and negative except for those discussed in HPI.       PHYSICAL EXAM    I have reviewed the triage vital signs and nursing notes.    ED Triage Vitals   Temp Heart Rate Resp BP SpO2   01/11/23 1558 01/11/23 1558 01/11/23 1558 01/11/23 1609 01/11/23 1558   98.2 °F (36.8 °C) 70 18 180/62 94 %      Temp src Heart Rate Source Patient Position BP Location FiO2 (%)   01/11/23 1558 01/11/23 1558 -- -- --   Tympanic Monitor          Physical Exam    Physical Exam   Constitutional: No distress.  Very pleasant  HENT:  Head: Normocephalic and atraumatic.   Oropharynx: Mucous membranes are moist.   Eyes: No scleral icterus.  No conjunctival pallor.  Neck: Painless range of motion noted. Neck supple.   Cardiovascular: Normal rate, regular rhythm and intact distal pulses.  Pulmonary/Chest: No respiratory distress. There are no wheezes, no rhonchi, and no rales.  No tachypnea or increased work of breathing  Abdominal: Soft. There is no tenderness. There is no rebound and no guarding.   Musculoskeletal: Moves all extremities equally. There is no pedal edema or calf tenderness.   Neurological: Alert.  Baseline strength and sensation noted.   Skin: Skin is pink, warm, and dry. No pallor.   Psychiatric: Mood and affect normal.   Nursing note and vitals reviewed.    LAB RESULTS  Recent Results (from the past 24 hour(s))   ECG 12 Lead Chest Pain    Collection Time: 01/11/23  4:05 PM   Result Value Ref Range    QT Interval 417 ms   Comprehensive Metabolic Panel    Collection Time: 01/11/23  4:36 PM    Specimen: Blood   Result Value Ref Range    Glucose 109 (H) 65 - 99 mg/dL    BUN 25 (H) 8 - 23 mg/dL    Creatinine 1.13 (H) 0.57 - 1.00 mg/dL    Sodium 137 136 - 145 mmol/L    Potassium 4.4 3.5 - 5.2 mmol/L    Chloride 101 98 - 107 mmol/L    CO2 24.0 22.0 - 29.0 mmol/L    Calcium 9.5 8.2 - 9.6 mg/dL    Total Protein 7.1 6.0 - 8.5 g/dL    Albumin 4.3 3.5 - 5.2 g/dL    ALT (SGPT) 28 1 - 33 U/L    AST (SGOT) 32 1 - 32 U/L    Alkaline Phosphatase 95 39 - 117 U/L    Total Bilirubin 0.4 0.0 - 1.2 mg/dL    Globulin 2.8 gm/dL    A/G Ratio 1.5 g/dL    BUN/Creatinine Ratio 22.1 7.0 - 25.0    Anion Gap 12.0 5.0 - 15.0 mmol/L    eGFR 46.3 (L) >60.0 mL/min/1.73   BNP    Collection Time: 01/11/23  4:36 PM    Specimen: Blood   Result Value Ref Range    proBNP 2,061.0 (H) 0.0 - 1,800.0 pg/mL   Troponin    Collection Time: 01/11/23  4:36 PM    Specimen: Blood   Result Value Ref Range    Troponin T <0.010 0.000 - 0.030 ng/mL   CBC Auto Differential    Collection Time: 01/11/23  4:36 PM    Specimen: Blood   Result Value Ref Range    WBC 8.83 3.40 - 10.80 10*3/mm3     RBC 4.14 3.77 - 5.28 10*6/mm3    Hemoglobin 12.3 12.0 - 15.9 g/dL    Hematocrit 36.9 34.0 - 46.6 %    MCV 89.1 79.0 - 97.0 fL    MCH 29.7 26.6 - 33.0 pg    MCHC 33.3 31.5 - 35.7 g/dL    RDW 12.3 12.3 - 15.4 %    RDW-SD 40.1 37.0 - 54.0 fl    MPV 10.7 6.0 - 12.0 fL    Platelets 260 140 - 450 10*3/mm3    Neutrophil % 71.7 42.7 - 76.0 %    Lymphocyte % 14.4 (L) 19.6 - 45.3 %    Monocyte % 11.2 5.0 - 12.0 %    Eosinophil % 1.6 0.3 - 6.2 %    Basophil % 0.9 0.0 - 1.5 %    Immature Grans % 0.2 0.0 - 0.5 %    Neutrophils, Absolute 6.33 1.70 - 7.00 10*3/mm3    Lymphocytes, Absolute 1.27 0.70 - 3.10 10*3/mm3    Monocytes, Absolute 0.99 (H) 0.10 - 0.90 10*3/mm3    Eosinophils, Absolute 0.14 0.00 - 0.40 10*3/mm3    Basophils, Absolute 0.08 0.00 - 0.20 10*3/mm3    Immature Grans, Absolute 0.02 0.00 - 0.05 10*3/mm3    nRBC 0.0 0.0 - 0.2 /100 WBC   Respiratory Panel PCR w/COVID-19(SARS-CoV-2) MINNIE/SCOTT/TIFFANY/PAD/COR/MAD/AYANNA In-House, NP Swab in [facility], 3-4 HR TAT - Swab, Nasopharynx    Collection Time: 01/11/23  4:38 PM    Specimen: Nasopharynx; Swab   Result Value Ref Range    ADENOVIRUS, PCR Not Detected Not Detected    Coronavirus 229E Not Detected Not Detected    Coronavirus HKU1 Not Detected Not Detected    Coronavirus NL63 Not Detected Not Detected    Coronavirus OC43 Not Detected Not Detected    COVID19 Not Detected Not Detected - Ref. Range    Human Metapneumovirus Not Detected Not Detected    Human Rhinovirus/Enterovirus Not Detected Not Detected    Influenza A PCR Not Detected Not Detected    Influenza B PCR Not Detected Not Detected    Parainfluenza Virus 1 Not Detected Not Detected    Parainfluenza Virus 2 Not Detected Not Detected    Parainfluenza Virus 3 Not Detected Not Detected    Parainfluenza Virus 4 Not Detected Not Detected    RSV, PCR Not Detected Not Detected    Bordetella pertussis pcr Not Detected Not Detected    Bordetella parapertussis PCR Not Detected Not Detected    Chlamydophila pneumoniae PCR Not  Detected Not Detected    Mycoplasma pneumo by PCR Not Detected Not Detected       Ordered the above labs and independently reviewed the results.        RADIOLOGY  XR Chest 1 View    Result Date: 1/11/2023  XR CHEST 1 VW-  HISTORY: Female who is 89 years-old,  dyspnea  TECHNIQUE: Frontal view of the chest  COMPARISON: 12/05/2018  FINDINGS: The heart size is borderline. Aorta is tortuous, calcified. Sternotomy wires are present. Pulmonary vasculature is unremarkable. Atelectasis or infiltrate is apparent in the lower lungs, follow-up suggested. No pleural effusion or pneumothorax. No acute osseous process.      Atelectasis or infiltrate in the lungs, follow-up suggested. Borderline heart size. Tortuous aorta.  This report was finalized on 1/11/2023 5:18 PM by Dr. Tom Leong M.D.        I ordered the above noted radiological studies. Reviewed by me and discussed with radiologist.  See dictation for official radiology interpretation.      PROCEDURES    Procedures        MEDICATIONS GIVEN IN ER    Medications   sodium chloride 0.9 % flush 10 mL (has no administration in time range)   aspirin chewable tablet 324 mg (324 mg Oral Given 1/11/23 1641)   furosemide (LASIX) injection 40 mg (40 mg Intravenous Given 1/11/23 1846)         PROGRESS, DATA ANALYSIS, CONSULTS, AND MEDICAL DECISION MAKING    My differential diagnosis for dyspnea includes but is not limited to:  Asthma, COPD, pneumonia, pulmonary embolus, acute respiratory distress syndrome, pneumothorax, pleural effusion, pulmonary fibrosis, congestive heart failure, myocardial infarction, DKA, uremia, acidosis, sepsis, anemia, drug related, hyperventilation, CNS disease      All labs have been independently reviewed by me.  All radiology studies have been reviewed by me and discussed with radiologist dictating the report.   EKG's independently viewed and interpreted by me.  Discussion below represents my analysis of pertinent findings related to patient's  condition, differential diagnosis, treatment plan and final disposition.      ED Course as of 01/11/23 1848   Wed Jan 11, 2023   1621 EKG           EKG time: 1605  Rhythm/Rate: Sinus, 60  P waves and PA: PELON within normal limits  QRS, axis: Narrow complex  ST and T waves: Inferolateral ST depression with no clear evidence of STEMI; QTC within normal limits    Interpreted Contemporaneously by me, independently viewed  Comparison: 9/3/2020-similar pattern of ST depression inferolaterally [RS]   1823 I personally viewed the patient's single view portable chest x-ray, treatment.  Evidence of prior sternotomy noted, mild interstitial abnormalities bilateral lower lung fields without large single focus of dense infiltrate.  Perhaps small bilateral effusions noted. [RS]   1824 Creatinine(!): 1.13 [RS]   1824 BUN(!): 25 [RS]   1824 Sodium: 137 [RS]   1824 Potassium: 4.4 [RS]   1825 proBNP(!): 2,061.0 [RS]   1825 Troponin T: <0.010 [RS]   1825 WBC: 8.83 [RS]   1825 Hemoglobin: 12.3 [RS]   1825 COVID19: Not Detected [RS]   1825 Influenza A PCR: Not Detected [RS]   1825 Influenza B PCR: Not Detected [RS]   1826 Elderly Lasix naïve patient with little bit of decompensated congestive heart failure by history, exam and evaluation.  Plan small dose furosemide and discussion of disposition with cardiology. [RS]   1837 Reviewed all findings with patient and son who is at bedside.  No acute life threats identified.  Plan Lasix and consult with cardiology.  Patient and family all agreeable.  Her preference would be for discharge home and outpatient follow-up if possible. [RS]   1846 CONSULT        Provider: Dr. Grace -cardiology    Discussion: Reviewed patient history, ED presentation and evaluation as well as plan.  She is agreeable with this plan.    Agreeable c treatment and planned disposition.         [RS]      ED Course User Index  [RS] Martin Bryant MD       AS OF 18:48 EST VITALS:    BP - 160/56  HR - 58  TEMP - 98.2 °F (36.8  °C) (Tympanic)  O2 SATS - 94%        DIAGNOSIS  Final diagnoses:   Exertional dyspnea   Acute on chronic congestive heart failure, unspecified heart failure type (HCC)         DISPOSITION  DISCHARGE    Patient discharged in stable condition.    Reviewed implications of results, diagnosis, meds, responsibility to follow up, warning signs and symptoms of possible worsening, potential complications and reasons to return to ER.    Patient/Family voiced understanding of above instructions.    Discussed plan for discharge, as there is no emergent indication for admission. Patient referred to primary care provider for regular health maintenance. Pt/family is agreeable and understands need for follow up and possible repeat testing.  Pt is aware that discharge does not mean that nothing is wrong but it indicates no emergency is present that requires admission and they must continue care with follow-up as given below or physician of their choice.     FOLLOW-UP  Georgie Diane MD  [address]  [place] [id number] [phone]    Schedule an appointment as soon as possible for a visit   As needed    José Hatch MD  [address]  [place] [address]  [phone]    Schedule an appointment as soon as possible for a visit in 1 week           Medication List      New Prescriptions    furosemide 20 MG tablet  Commonly known as: LASIX  Take 1 tablet by mouth Daily.           Where to Get Your Medications      These medications were sent to [facility]/pharmacy #[id number] - [facility], [place] - [address] AT [facility] [phone] [facility] [phone]   [address], [facility] [address]    Phone: [phone]   · furosemide 20 MG tablet            Martin Bryant MD  01/11/23 2427

## 2023-01-11 NOTE — ED NOTES
Pt c/o increased shortness of breath and leg swelling that has progressively gotten worse over the past month. Pt c/o right shoulder pain that started today. Pt denies chest pain. Pt states her PCP wanted her to come to the ER due to a Troponin result of 15. Pt has hx of CABG and mitral valve clip.     This RN wore mask, gloves, eyewear and all other appropriate PPE while performing patient care.

## 2023-01-11 NOTE — ED TRIAGE NOTES
Patient to ed from  with complaints of an abnormal lab of a troponin of 15. Patient denies CP but is SOA and has increase swelling

## 2023-01-12 ENCOUNTER — OFFICE VISIT (OUTPATIENT)
Dept: CARDIOLOGY | Facility: CLINIC | Age: 88
End: 2023-01-12
Payer: MEDICARE

## 2023-01-12 VITALS
HEIGHT: 65 IN | WEIGHT: 142 LBS | SYSTOLIC BLOOD PRESSURE: 148 MMHG | HEART RATE: 58 BPM | DIASTOLIC BLOOD PRESSURE: 76 MMHG | BODY MASS INDEX: 23.66 KG/M2

## 2023-01-12 DIAGNOSIS — Z95.818 STATUS POST IMPLANTATION OF MITRAL VALVE LEAFLET CLIP: Primary | ICD-10-CM

## 2023-01-12 DIAGNOSIS — Z98.890 STATUS POST IMPLANTATION OF MITRAL VALVE LEAFLET CLIP: Primary | ICD-10-CM

## 2023-01-12 DIAGNOSIS — R06.09 DYSPNEA ON EXERTION: ICD-10-CM

## 2023-01-12 DIAGNOSIS — I34.0 NONRHEUMATIC MITRAL VALVE REGURGITATION: ICD-10-CM

## 2023-01-12 DIAGNOSIS — R06.09 DYSPNEA ON EXERTION: Primary | ICD-10-CM

## 2023-01-12 DIAGNOSIS — Z95.1 S/P CABG (CORONARY ARTERY BYPASS GRAFT): ICD-10-CM

## 2023-01-12 DIAGNOSIS — Z98.890 STATUS POST IMPLANTATION OF MITRAL VALVE LEAFLET CLIP: ICD-10-CM

## 2023-01-12 DIAGNOSIS — I25.10 CORONARY ARTERY DISEASE INVOLVING NATIVE CORONARY ARTERY OF NATIVE HEART WITHOUT ANGINA PECTORIS: ICD-10-CM

## 2023-01-12 DIAGNOSIS — Z95.818 STATUS POST IMPLANTATION OF MITRAL VALVE LEAFLET CLIP: ICD-10-CM

## 2023-01-12 LAB — QT INTERVAL: 417 MS

## 2023-01-12 PROCEDURE — 99215 OFFICE O/P EST HI 40 MIN: CPT | Performed by: NURSE PRACTITIONER

## 2023-01-12 PROCEDURE — 93000 ELECTROCARDIOGRAM COMPLETE: CPT | Performed by: NURSE PRACTITIONER

## 2023-01-12 RX ORDER — FUROSEMIDE 40 MG/1
40 TABLET ORAL DAILY
Qty: 30 TABLET | Refills: 1 | Status: SHIPPED | OUTPATIENT
Start: 2023-01-12 | End: 2023-02-07

## 2023-01-12 NOTE — PROGRESS NOTES
Date of Office Visit: 2023  Encounter Provider: STACIA Guaman  Place of Service: [facility] CARDIOLOGY  Patient Name: Brodie Mo  :10/31/1932    Chief Complaint   Patient presents with   • Coronary Artery Disease   :     HPI: Brodie Mo is a 89 y.o. female.  She is a patient of Dr. Hatch's whom we follow for coronary artery disease, pulmonary hypertension, and mitral insufficiency.  In , she had a CABG x 2 with a LIMA to the LAD and a vein graft to the OM 2.  In 2020, she underwent a MitraClip.   In 2022, she had COVID.   She was last seen in the office by Dr. Hatch in October.  Evidently she had recently seen her primary care doctor who was worried about a high CRP.  Symptomatically the patient reported feeling a lot better.  She reported some breathlessness on exertion.  Dr. Hatch felt more than likely her symptoms were related to COVID.  Of note, the patient was not interested in doing pursuing any aggressive treatments or therapies.  He ordered an echocardiogram.  Otherwise, she was advised to follow-up in 6 months.   Echocardiogram from 11/15/2022 demonstrated normal LV systolic function, grade 2 diastolic dysfunction, mild mitral stenosis, and well-functioning mitral clip with mild to moderate MR, moderate to severe TR, and moderate pulmonary hypertension.   She was seen by her PCP on Monday at which time she reported increased shortness of breath.  Her PCP ordered a troponin which was reportedly 15.  However, this was a highly sensitive troponin and a second one was not checked.  Ultimately, she was directed to the ED where she was evaluated yesterday.  At that time, the troponin was normal.  Her proBNP was 2000.  She was given a one-time dose of IV Lasix and discharged in stable condition.  She is here today for follow-up.   Symptom wise, she does not report much of a change.  She is still reporting dyspnea on exertion  "and lower extremity edema.  She denies any PND or orthopnea.  She does seem to have responded to the Lasix as she she did lose about 6 pounds overnight.  She also reports fatigue.  She felt a little dizzy this morning and also had pretty severe leg cramps overnight.  She reports an episode on Friday morning which she describes as a \"flash of confusion\" lasting for only seconds.  She is really not sure what happened.  She denies any chest pain, palpitations, or syncope.    Past Medical History:   Diagnosis Date   • CAD (coronary artery disease)    • Cancer (HCC)     bladder    • Deep vein thrombosis (HCC)     Left leg-2002   • Health care maintenance    • HTN (hypertension)    • Hyperlipidemia    • LVH (left ventricular hypertrophy)    • Pneumonia    • S/P CABG (coronary artery bypass graft)        Past Surgical History:   Procedure Laterality Date   • BLADDER SURGERY      cancer   • CARPAL TUNNEL RELEASE Right 12/15/2021    Procedure: RIGHT CARPAL TUNNEL RELEASE WITH POSSIBLE SYNOVECTOMY;  Surgeon: Georgie Hines MD;  Location: [facility] OR;  Service: Plastics;  Laterality: Right;   • CORONARY ARTERY BYPASS GRAFT      2002 with Dr Prabhakar; AGUAYO to LAD and SVG to OM2; 2005 cath all grafts open and 30% dx in RCA; nl stress in 2010, 2013   • HYSTERECTOMY     • MITRAL VALVE REPAIR/REPLACEMENT N/A 9/2/2020    Procedure: TRANSCATHETER MITRAL VALVE REPAIR;  Surgeon: Edward Humphreys MD;  Location:  INVASIVE LOCATION;  Service: Cardiovascular;  Laterality: N/A;       Social History     Socioeconomic History   • Marital status:    Tobacco Use   • Smoking status: Former   • Smokeless tobacco: Never   • Tobacco comments:     CAFFEINE USE: 2  CUPS COFFEE DAILY   Vaping Use   • Vaping Use: Never used   Substance and Sexual Activity   • Alcohol use: Yes     Alcohol/week: 1.0 standard drink     Types: 1 Shots of liquor per week     Comment: 1 DRINK DAILY   • Drug use: No   • Sexual activity: Defer " "      Family History   Problem Relation Age of Onset   • Heart disease Mother    • Kidney cancer Father    • No Known Problems Maternal Grandmother    • No Known Problems Maternal Grandfather    • No Known Problems Paternal Grandmother    • No Known Problems Paternal Grandfather    • Heart disease Sister    • Heart disease Brother        Review of Systems   Constitutional: Positive for malaise/fatigue.   Cardiovascular: Positive for dyspnea on exertion and leg swelling. Negative for chest pain, orthopnea, paroxysmal nocturnal dyspnea and syncope.   Respiratory: Negative.    Hematologic/Lymphatic: Negative for bleeding problem.   Musculoskeletal: Negative for falls.   Gastrointestinal: Negative for melena.   Neurological: Negative for dizziness and light-headedness.       Allergies   Allergen Reactions   • No Known Drug Allergy          Current Outpatient Medications:   •  amLODIPine (NORVASC) 5 MG tablet, TAKE 1 TABLET BY MOUTH EVERY DAY, Disp: 90 tablet, Rfl: 3  •  aspirin 81 MG tablet, Take 81 mg by mouth Daily., Disp: , Rfl:   •  atenolol (TENORMIN) 25 MG tablet, Take 25 mg by mouth 2 (Two) Times a Day., Disp: , Rfl:   •  atorvastatin (LIPITOR) 40 MG tablet, TAKE 1 TABLET BY MOUTH EVERY DAY, Disp: 90 tablet, Rfl: 3  •  B Complex Vitamins (vitamin b complex) capsule capsule, Take 1 capsule by mouth 2 (Two) Times a Week., Disp: , Rfl:   •  cholecalciferol (VITAMIN D3) 1000 units tablet, Take 1,000 Units by mouth Daily., Disp: , Rfl:   •  coenzyme Q10 100 MG capsule, Take 100 mg by mouth Daily., Disp: , Rfl:   •  furosemide (LASIX) 40 MG tablet, Take 1 tablet by mouth Daily., Disp: 30 tablet, Rfl: 1  •  lisinopril (PRINIVIL,ZESTRIL) 40 MG tablet, TAKE 1 TABLET BY MOUTH EVERY DAY, Disp: 90 tablet, Rfl: 3  No current facility-administered medications for this visit.      Objective:     Vitals:    01/12/23 1223   BP: 148/76   Pulse: 58   Weight: 64.4 kg (142 lb)   Height: 165.1 cm (65\")     Body mass index is 23.63 " kg/m².    PHYSICAL EXAM:    Neck:      Vascular: No JVD.   Pulmonary:      Effort: Pulmonary effort is normal.      Breath sounds: Normal breath sounds.   Cardiovascular:      Normal rate. Regular rhythm.      Murmurs: There is no murmur.      No gallop. No click. No rub.   Pulses:     Intact distal pulses.   Edema:     Peripheral edema present.          ECG 12 Lead    Date/Time: 1/12/2023 12:43 PM  Performed by: Brenda Gutiérrez APRN  Authorized by: Brenda Gutiérrez APRN   Comparison: compared with previous ECG from 10/28/2022  Similar to previous ECG  Rhythm: sinus rhythm  Rate: normal  BPM: 58  Other findings: non-specific ST-T wave changes              Assessment:       Diagnosis Plan   1. Dyspnea on exertion        2. Coronary artery disease involving native coronary artery of native heart without angina pectoris  ECG 12 Lead      3. S/P CABG (coronary artery bypass graft)        4. Nonrheumatic mitral valve regurgitation        5. Status post implantation of mitral valve leaflet clip          Orders Placed This Encounter   Procedures   • ECG 12 Lead     This order was created via procedure documentation     Order Specific Question:   Release to patient     Answer:   Routine Release          Plan:       1.  Dyspnea on exertion.  BNP in the ED yesterday was mildly elevated.  She does not appear overtly volume overloaded on exam.  However, she experienced a pretty significant response to the one-time dose of IV Lasix yesterday.  I would like to increase the oral Lasix to 40 mg daily.  She will follow-up with me in 1 week at which time she will need repeat labs.      2.  Coronary artery disease.  History of CABG x 2 in 2002.Her last cardiac catheterization was in 2015 which demonstrated severe native coronary disease with patent bypass grafts.  Her last stress test was in 2019 which was negative for ischemia.  Certainly her dyspnea could be anginal.  If she does not respond to the Lasix, I would recommend  trialing isosorbide and/or proceeding with an ischemic evaluation.      3.  Mitral regurgitation.  Status post mitral clip in September 2020.  Echocardiogram from November demonstrated a mitral clip in stable position with mild to moderate MR.       Overall, I think she is stable.  The plan of care was discussed with Dr. Hatch, and he is in agreement.  She will follow-up with me in 1 week with a repeat echocardiogram.      I spent 25 minutes in total including but not limited to the 55 minutes spent in direct conversation with this patient.       As always, it has been a pleasure to participate in your patient's care.      Sincerely,         STACIA Dawson

## 2023-01-24 ENCOUNTER — HOSPITAL ENCOUNTER (OUTPATIENT)
Dept: CARDIOLOGY | Facility: HOSPITAL | Age: 88
Discharge: HOME | End: 2023-01-24
Payer: MEDICARE

## 2023-01-24 ENCOUNTER — OFFICE VISIT (OUTPATIENT)
Dept: CARDIOLOGY | Facility: CLINIC | Age: 88
End: 2023-01-24
Payer: MEDICARE

## 2023-01-24 VITALS
HEIGHT: 65 IN | DIASTOLIC BLOOD PRESSURE: 60 MMHG | BODY MASS INDEX: 23.49 KG/M2 | HEART RATE: 58 BPM | SYSTOLIC BLOOD PRESSURE: 148 MMHG | WEIGHT: 141 LBS

## 2023-01-24 VITALS
SYSTOLIC BLOOD PRESSURE: 153 MMHG | HEIGHT: 65 IN | HEART RATE: 62 BPM | DIASTOLIC BLOOD PRESSURE: 56 MMHG | WEIGHT: 142 LBS | BODY MASS INDEX: 23.66 KG/M2

## 2023-01-24 DIAGNOSIS — R06.09 DYSPNEA ON EXERTION: Primary | ICD-10-CM

## 2023-01-24 DIAGNOSIS — Z95.818 STATUS POST IMPLANTATION OF MITRAL VALVE LEAFLET CLIP: ICD-10-CM

## 2023-01-24 DIAGNOSIS — R06.09 DYSPNEA ON EXERTION: ICD-10-CM

## 2023-01-24 DIAGNOSIS — Z98.890 STATUS POST IMPLANTATION OF MITRAL VALVE LEAFLET CLIP: ICD-10-CM

## 2023-01-24 DIAGNOSIS — I34.0 NONRHEUMATIC MITRAL VALVE REGURGITATION: ICD-10-CM

## 2023-01-24 LAB
ANION GAP SERPL CALCULATED.3IONS-SCNC: 8.6 MMOL/L (ref 5–15)
AORTIC ARCH: 1.6 CM
ASCENDING AORTA: 2.7 CM
BH CV ECHO MEAS - ACS: 1.6 CM
BH CV ECHO MEAS - AO MAX PG: 7.5 MMHG
BH CV ECHO MEAS - AO MEAN PG: 4 MMHG
BH CV ECHO MEAS - AO ROOT DIAM: 2.6 CM
BH CV ECHO MEAS - AO V2 MAX: 136.5 CM/SEC
BH CV ECHO MEAS - AO V2 VTI: 34.5 CM
BH CV ECHO MEAS - AVA(I,D): 2.11 CM2
BH CV ECHO MEAS - EDV(CUBED): 73.3 ML
BH CV ECHO MEAS - EDV(MOD-SP2): 67 ML
BH CV ECHO MEAS - EDV(MOD-SP4): 63 ML
BH CV ECHO MEAS - EF(MOD-BP): 63.1 %
BH CV ECHO MEAS - EF(MOD-SP2): 65.7 %
BH CV ECHO MEAS - EF(MOD-SP4): 61.9 %
BH CV ECHO MEAS - ESV(CUBED): 24.7 ML
BH CV ECHO MEAS - ESV(MOD-SP2): 23 ML
BH CV ECHO MEAS - ESV(MOD-SP4): 24 ML
BH CV ECHO MEAS - FS: 30.5 %
BH CV ECHO MEAS - IVS/LVPW: 1.07 CM
BH CV ECHO MEAS - IVSD: 0.87 CM
BH CV ECHO MEAS - LAT PEAK E' VEL: 5.5 CM/SEC
BH CV ECHO MEAS - LV DIASTOLIC VOL/BSA (35-75): 36.8 CM2
BH CV ECHO MEAS - LV MASS(C)D: 107.4 GRAMS
BH CV ECHO MEAS - LV MAX PG: 4.5 MMHG
BH CV ECHO MEAS - LV MEAN PG: 2.6 MMHG
BH CV ECHO MEAS - LV SYSTOLIC VOL/BSA (12-30): 14 CM2
BH CV ECHO MEAS - LV V1 MAX: 106.2 CM/SEC
BH CV ECHO MEAS - LV V1 VTI: 29.3 CM
BH CV ECHO MEAS - LVIDD: 4.2 CM
BH CV ECHO MEAS - LVIDS: 2.9 CM
BH CV ECHO MEAS - LVOT AREA: 2.49 CM2
BH CV ECHO MEAS - LVOT DIAM: 1.78 CM
BH CV ECHO MEAS - LVPWD: 0.81 CM
BH CV ECHO MEAS - MED PEAK E' VEL: 5 CM/SEC
BH CV ECHO MEAS - MR MAX PG: 128.9 MMHG
BH CV ECHO MEAS - MR MAX VEL: 567.7 CM/SEC
BH CV ECHO MEAS - MV A DUR: 0.15 SEC
BH CV ECHO MEAS - MV A MAX VEL: 121.8 CM/SEC
BH CV ECHO MEAS - MV DEC SLOPE: 698.8 CM/SEC2
BH CV ECHO MEAS - MV DEC TIME: 0.42 MSEC
BH CV ECHO MEAS - MV E MAX VEL: 145.8 CM/SEC
BH CV ECHO MEAS - MV E/A: 1.2
BH CV ECHO MEAS - MV MAX PG: 12.8 MMHG
BH CV ECHO MEAS - MV MEAN PG: 3.5 MMHG
BH CV ECHO MEAS - MV P1/2T: 77.1 MSEC
BH CV ECHO MEAS - MV V2 VTI: 59 CM
BH CV ECHO MEAS - MVA(P1/2T): 2.9 CM2
BH CV ECHO MEAS - MVA(VTI): 1.24 CM2
BH CV ECHO MEAS - PA ACC TIME: 0.16 SEC
BH CV ECHO MEAS - PA PR(ACCEL): 5.3 MMHG
BH CV ECHO MEAS - PA V2 MAX: 100.4 CM/SEC
BH CV ECHO MEAS - PI END-D VEL: 108.6 CM/SEC
BH CV ECHO MEAS - PULM A REVS DUR: 0.09 SEC
BH CV ECHO MEAS - PULM A REVS VEL: 22.3 CM/SEC
BH CV ECHO MEAS - PULM DIAS VEL: 49.1 CM/SEC
BH CV ECHO MEAS - PULM S/D: 1.12
BH CV ECHO MEAS - PULM SYS VEL: 54.8 CM/SEC
BH CV ECHO MEAS - QP/QS: 0.73
BH CV ECHO MEAS - RAP SYSTOLE: 3 MMHG
BH CV ECHO MEAS - RV MAX PG: 2.6 MMHG
BH CV ECHO MEAS - RV V1 MAX: 81 CM/SEC
BH CV ECHO MEAS - RV V1 VTI: 22.1 CM
BH CV ECHO MEAS - RVOT DIAM: 1.75 CM
BH CV ECHO MEAS - RVSP: 48 MMHG
BH CV ECHO MEAS - SI(MOD-SP2): 25.7 ML/M2
BH CV ECHO MEAS - SI(MOD-SP4): 22.8 ML/M2
BH CV ECHO MEAS - SUP REN AO DIAM: 1.5 CM
BH CV ECHO MEAS - SV(LVOT): 72.9 ML
BH CV ECHO MEAS - SV(MOD-SP2): 44 ML
BH CV ECHO MEAS - SV(MOD-SP4): 39 ML
BH CV ECHO MEAS - SV(RVOT): 53.1 ML
BH CV ECHO MEAS - TAPSE (>1.6): 1.83 CM
BH CV ECHO MEAS - TR MAX PG: 45 MMHG
BH CV ECHO MEAS - TR MAX VEL: 335.3 CM/SEC
BH CV ECHO MEASUREMENTS AVERAGE E/E' RATIO: 27.77
BH CV XLRA - RV BASE: 3.7 CM
BH CV XLRA - RV LENGTH: 6.3 CM
BH CV XLRA - RV MID: 3 CM
BH CV XLRA - TDI S': 12.5 CM/SEC
BUN SERPL-MCNC: 26 MG/DL (ref 8–23)
BUN/CREAT SERPL: 16.7 (ref 7–25)
CALCIUM SPEC-SCNC: 9.7 MG/DL (ref 8.2–9.6)
CHLORIDE SERPL-SCNC: 98 MMOL/L (ref 98–107)
CO2 SERPL-SCNC: 30.4 MMOL/L (ref 22–29)
CREAT SERPL-MCNC: 1.56 MG/DL (ref 0.57–1)
EGFRCR SERPLBLD CKD-EPI 2021: 31.5 ML/MIN/1.73
GLUCOSE SERPL-MCNC: 109 MG/DL (ref 65–99)
LEFT ATRIUM VOLUME INDEX: 48 ML/M2
LV DP/DT: 1731 MMHG/SEC
MAXIMAL PREDICTED HEART RATE: 130 BPM
MR PISA EROA: 0.11 CM2
NT-PROBNP SERPL-MCNC: 1397 PG/ML (ref 0–1800)
PISA ALIASING VEL: 0.04 M/S
PISA RADIUS: 0.5 CM
POTASSIUM SERPL-SCNC: 4.7 MMOL/L (ref 3.5–5.2)
SINUS: 2.6 CM
SODIUM SERPL-SCNC: 137 MMOL/L (ref 136–145)
STJ: 2.24 CM
STRESS TARGET HR: 111 BPM

## 2023-01-24 PROCEDURE — 80048 BASIC METABOLIC PNL TOTAL CA: CPT | Performed by: NURSE PRACTITIONER

## 2023-01-24 PROCEDURE — 93000 ELECTROCARDIOGRAM COMPLETE: CPT | Performed by: NURSE PRACTITIONER

## 2023-01-24 PROCEDURE — 36415 COLL VENOUS BLD VENIPUNCTURE: CPT

## 2023-01-24 PROCEDURE — 99214 OFFICE O/P EST MOD 30 MIN: CPT | Performed by: NURSE PRACTITIONER

## 2023-01-24 PROCEDURE — 83880 ASSAY OF NATRIURETIC PEPTIDE: CPT | Performed by: NURSE PRACTITIONER

## 2023-01-24 PROCEDURE — 93306 TTE W/DOPPLER COMPLETE: CPT | Performed by: INTERNAL MEDICINE

## 2023-01-24 PROCEDURE — 93306 TTE W/DOPPLER COMPLETE: CPT

## 2023-01-24 NOTE — PROGRESS NOTES
Date of Office Visit: 2023  Encounter Provider: STACIA Guaman  Place of Service: [facility] CARDIOLOGY  Patient Name: Brodie Mo  :10/31/1932    Chief Complaint   Patient presents with   • Coronary Artery Disease   :     HPI: Brodie Mo is a 89 y.o. female.   She is a patient of Dr. Hatch's whom we follow for coronary artery disease, pulmonary hypertension, and mitral insufficiency.  In , she had a CABG x 2 with a LIMA to the LAD and a vein graft to the OM 2.  In 2020, she underwent a MitraClip.              In 2022, she had COVID.              She was last seen in the office by Dr. Hatch in October.  Evidently she had recently seen her primary care doctor who was worried about a high CRP.  Symptomatically the patient reported feeling a lot better.  She reported some breathlessness on exertion.  Dr. Hatch felt more than likely her symptoms were related to COVID.  Of note, the patient was not interested in doing pursuing any aggressive treatments or therapies.  He ordered an echocardiogram which demonstrated normal LV systolic function, grade 2 diastolic dysfunction, moderately reduced RV systolic function, mild mitral stenosis, a stable mitral clip with mild to moderate MR, and moderate to severe TR with moderate pulmonary hypertension.   Earlier this month, she was seen by her PCP at which time she reported increased shortness of breath.  Her PCP ordered a troponin which was reportedly 15.  However, this was a highly sensitive troponin and a second one was not checked.  Ultimately, she was directed to the ED where the troponin was normal.  Her proBNP was 2000.  She was given a one-time dose of IV Lasix and discharged in stable condition.     I saw her in the office the day after the ED visit at which time she felt about the same.  She was still reporting dyspnea on exertion, fatigue, and lower extremity edema.  She had lost  about 6 pounds overnight following the dose of IV Lasix.  Ultimately, I recommended increasing the Lasix dose, repeating an echocardiogram, and following up with me in 1 week.   Overall, she is feeling a little better.  She has noticed decreased swelling.  Its difficult for her to comment on the dyspnea given that it is mostly noticeable with exertion and she has not been exerting herself much.  She denies any PND orthopnea.  She does report daily headaches which are constant and dull in nature.  She denies any chest pain, palpitations, dizziness, or syncope.    Past Medical History:   Diagnosis Date   • CAD (coronary artery disease)    • Cancer (HCC)     bladder    • Deep vein thrombosis (HCC)     Left leg-2002   • Health care maintenance    • HTN (hypertension)    • Hyperlipidemia    • LVH (left ventricular hypertrophy)    • Pneumonia    • S/P CABG (coronary artery bypass graft)        Past Surgical History:   Procedure Laterality Date   • BLADDER SURGERY      cancer   • CARPAL TUNNEL RELEASE Right 12/15/2021    Procedure: RIGHT CARPAL TUNNEL RELEASE WITH POSSIBLE SYNOVECTOMY;  Surgeon: Georgie iHnes MD;  Location: [facility] OR;  Service: Plastics;  Laterality: Right;   • CORONARY ARTERY BYPASS GRAFT      2002 with Dr Prabhakar; AGUAYO to LAD and SVG to OM2; 2005 cath all grafts open and 30% dx in RCA; nl stress in 2010, 2013   • HYSTERECTOMY     • MITRAL VALVE REPAIR/REPLACEMENT N/A 9/2/2020    Procedure: TRANSCATHETER MITRAL VALVE REPAIR;  Surgeon: Edward Humphreys MD;  Location: [facility] INVASIVE LOCATION;  Service: Cardiovascular;  Laterality: N/A;       Social History     Socioeconomic History   • Marital status:    Tobacco Use   • Smoking status: Former   • Smokeless tobacco: Never   • Tobacco comments:     CAFFEINE USE: 2  CUPS COFFEE DAILY   Vaping Use   • Vaping Use: Never used   Substance and Sexual Activity   • Alcohol use: Yes     Alcohol/week: 1.0 standard drink     Types: 1  "Shots of liquor per week     Comment: 1 DRINK DAILY   • Drug use: No   • Sexual activity: Defer       Family History   Problem Relation Age of Onset   • Heart disease Mother    • Kidney cancer Father    • No Known Problems Maternal Grandmother    • No Known Problems Maternal Grandfather    • No Known Problems Paternal Grandmother    • No Known Problems Paternal Grandfather    • Heart disease Sister    • Heart disease Brother        Review of Systems   Constitutional: Negative.   Cardiovascular: Positive for dyspnea on exertion. Negative for chest pain, leg swelling, orthopnea, paroxysmal nocturnal dyspnea and syncope.   Respiratory: Negative.    Hematologic/Lymphatic: Negative for bleeding problem.   Musculoskeletal: Negative for falls.   Gastrointestinal: Negative for melena.   Neurological: Positive for headaches. Negative for dizziness and light-headedness.       Allergies   Allergen Reactions   • No Known Drug Allergy          Current Outpatient Medications:   •  amLODIPine (NORVASC) 5 MG tablet, TAKE 1 TABLET BY MOUTH EVERY DAY, Disp: 90 tablet, Rfl: 3  •  aspirin 81 MG tablet, Take 81 mg by mouth Daily., Disp: , Rfl:   •  atenolol (TENORMIN) 25 MG tablet, Take 25 mg by mouth 2 (Two) Times a Day., Disp: , Rfl:   •  atorvastatin (LIPITOR) 40 MG tablet, TAKE 1 TABLET BY MOUTH EVERY DAY, Disp: 90 tablet, Rfl: 3  •  B Complex Vitamins (vitamin b complex) capsule capsule, Take 1 capsule by mouth 2 (Two) Times a Week., Disp: , Rfl:   •  cholecalciferol (VITAMIN D3) 1000 units tablet, Take 1,000 Units by mouth Daily., Disp: , Rfl:   •  coenzyme Q10 100 MG capsule, Take 100 mg by mouth Daily., Disp: , Rfl:   •  furosemide (LASIX) 40 MG tablet, Take 1 tablet by mouth Daily., Disp: 30 tablet, Rfl: 1  •  lisinopril (PRINIVIL,ZESTRIL) 40 MG tablet, TAKE 1 TABLET BY MOUTH EVERY DAY, Disp: 90 tablet, Rfl: 3      Objective:     Vitals:    01/24/23 1039   BP: 148/60   Pulse: 58   Weight: 64 kg (141 lb)   Height: 165.1 cm (65\") "     Body mass index is 23.46 kg/m².    PHYSICAL EXAM:    Neck:      Vascular: No JVD.   Pulmonary:      Effort: Pulmonary effort is normal.      Breath sounds: Normal breath sounds.   Cardiovascular:      Normal rate. Regular rhythm.      Murmurs: There is no murmur.      No gallop. No click. No rub.   Pulses:     Intact distal pulses.           ECG 12 Lead    Date/Time: 1/24/2023 10:46 AM  Performed by: Brenda Gutiérrez APRN  Authorized by: Brenda Gutiérrez APRN   Comparison: compared with previous ECG from 1/12/2023  Similar to previous ECG  Rhythm: sinus rhythm  Rate: normal  BPM: 58  Other findings: non-specific ST-T wave changes              Assessment:       Diagnosis Plan   1. Dyspnea on exertion  Basic Metabolic Panel      2. Nonrheumatic mitral valve regurgitation  ECG 12 Lead      3. Status post implantation of mitral valve leaflet clip          Orders Placed This Encounter   Procedures   • Basic Metabolic Panel     Standing Status:   Future     Standing Expiration Date:   1/24/2024     Order Specific Question:   Release to patient     Answer:   Routine Release   • ECG 12 Lead     This order was created via procedure documentation     Order Specific Question:   Release to patient     Answer:   Routine Release          Plan:       1.  Dyspnea on exertion.   This seems to have improved although it is difficult to say for sure given her lack of exertional activity.  I will repeat a BMP and a proBNP today.  Repeat echocardiogram today is pending.        2.  Mitral regurgitation.  Status post mitral clip in September 2020.  Echocardiogram today pending.      Overall, I am pleased with how she is doing.  I will review her echocardiogram with Dr. Hatch.  Additionally, I will review her labs once resulted.  Further recommendations will be made at that time      As always, it has been a pleasure to participate in your patient's care.      Sincerely,         STACIA Dawson

## 2023-01-25 DIAGNOSIS — I25.718 CORONARY ARTERY DISEASE OF AUTOLOGOUS VEIN BYPASS GRAFT WITH STABLE ANGINA PECTORIS: Primary | ICD-10-CM

## 2023-02-07 RX ORDER — FUROSEMIDE 40 MG/1
TABLET ORAL
Qty: 30 TABLET | Refills: 1 | Status: SHIPPED | OUTPATIENT
Start: 2023-02-07 | End: 2023-03-06

## 2023-02-09 ENCOUNTER — HOSPITAL ENCOUNTER (OUTPATIENT)
Dept: CARDIOLOGY | Facility: HOSPITAL | Age: 88
Discharge: HOME | End: 2023-02-09
Admitting: INTERNAL MEDICINE
Payer: MEDICARE

## 2023-02-09 VITALS — WEIGHT: 141.09 LBS | HEIGHT: 65 IN | BODY MASS INDEX: 23.51 KG/M2

## 2023-02-09 DIAGNOSIS — I25.718 CORONARY ARTERY DISEASE OF AUTOLOGOUS VEIN BYPASS GRAFT WITH STABLE ANGINA PECTORIS: ICD-10-CM

## 2023-02-09 LAB
BH CV NUCLEAR PRIOR STUDY: 1
BH CV REST NUCLEAR ISOTOPE DOSE: 41.8 MCI
BH CV STRESS BP STAGE 1: NORMAL
BH CV STRESS COMMENTS STAGE 1: NORMAL
BH CV STRESS DOSE REGADENOSON STAGE 1: 0.4
BH CV STRESS DURATION MIN STAGE 1: 0
BH CV STRESS DURATION SEC STAGE 1: 10
BH CV STRESS HR STAGE 1: 62
BH CV STRESS NUCLEAR ISOTOPE DOSE: 41.8 MCI
BH CV STRESS PROTOCOL 1: NORMAL
BH CV STRESS RECOVERY BP: NORMAL MMHG
BH CV STRESS RECOVERY HR: 60 BPM
BH CV STRESS STAGE 1: 1
LV EF NUC BP: 73 %
MAXIMAL PREDICTED HEART RATE: 130 BPM
PERCENT MAX PREDICTED HR: 47.69 %
STRESS BASELINE BP: NORMAL MMHG
STRESS BASELINE HR: 54 BPM
STRESS PERCENT HR: 56 %
STRESS POST EXERCISE DUR SEC: 10 SEC
STRESS POST PEAK BP: NORMAL MMHG
STRESS POST PEAK HR: 62 BPM
STRESS TARGET HR: 111 BPM

## 2023-02-09 PROCEDURE — 93017 CV STRESS TEST TRACING ONLY: CPT

## 2023-02-09 PROCEDURE — 78492 MYOCRD IMG PET MLT RST&STRS: CPT

## 2023-02-09 PROCEDURE — 93018 CV STRESS TEST I&R ONLY: CPT | Performed by: INTERNAL MEDICINE

## 2023-02-09 PROCEDURE — 25010000002 REGADENOSON 0.4 MG/5ML SOLUTION: Performed by: INTERNAL MEDICINE

## 2023-02-09 PROCEDURE — 78492 MYOCRD IMG PET MLT RST&STRS: CPT | Performed by: INTERNAL MEDICINE

## 2023-02-09 PROCEDURE — 0 RUBIDIUM CHLORIDE: Performed by: INTERNAL MEDICINE

## 2023-02-09 PROCEDURE — A9555 RB82 RUBIDIUM: HCPCS | Performed by: INTERNAL MEDICINE

## 2023-02-09 PROCEDURE — 93016 CV STRESS TEST SUPVJ ONLY: CPT | Performed by: INTERNAL MEDICINE

## 2023-02-09 RX ADMIN — REGADENOSON 0.4 MG: 0.08 INJECTION, SOLUTION INTRAVENOUS at 12:27

## 2023-03-06 RX ORDER — FUROSEMIDE 40 MG/1
TABLET ORAL
Qty: 90 TABLET | Refills: 1 | Status: SHIPPED | OUTPATIENT
Start: 2023-03-06

## 2023-04-11 ENCOUNTER — APPOINTMENT (OUTPATIENT)
Dept: GENERAL RADIOLOGY | Facility: HOSPITAL | Age: 88
End: 2023-04-11
Payer: MEDICARE

## 2023-04-11 ENCOUNTER — HOSPITAL ENCOUNTER (OUTPATIENT)
Facility: HOSPITAL | Age: 88
Setting detail: OBSERVATION
Discharge: HOME | End: 2023-04-13
Attending: EMERGENCY MEDICINE | Admitting: INTERNAL MEDICINE
Payer: MEDICARE

## 2023-04-11 DIAGNOSIS — N18.9 CHRONIC RENAL IMPAIRMENT, UNSPECIFIED CKD STAGE: ICD-10-CM

## 2023-04-11 DIAGNOSIS — R50.9 FEVER, UNSPECIFIED FEVER CAUSE: ICD-10-CM

## 2023-04-11 DIAGNOSIS — J96.01 SEPSIS WITH ACUTE HYPOXIC RESPIRATORY FAILURE WITHOUT SEPTIC SHOCK, DUE TO UNSPECIFIED ORGANISM: ICD-10-CM

## 2023-04-11 DIAGNOSIS — J18.9 PNEUMONIA OF BOTH LUNGS DUE TO INFECTIOUS ORGANISM, UNSPECIFIED PART OF LUNG: Primary | ICD-10-CM

## 2023-04-11 DIAGNOSIS — R65.20 SEPSIS WITH ACUTE HYPOXIC RESPIRATORY FAILURE WITHOUT SEPTIC SHOCK, DUE TO UNSPECIFIED ORGANISM: ICD-10-CM

## 2023-04-11 DIAGNOSIS — A41.9 SEPSIS WITH ACUTE HYPOXIC RESPIRATORY FAILURE WITHOUT SEPTIC SHOCK, DUE TO UNSPECIFIED ORGANISM: ICD-10-CM

## 2023-04-11 DIAGNOSIS — R73.9 HYPERGLYCEMIA: ICD-10-CM

## 2023-04-11 PROBLEM — E83.52 HYPERCALCEMIA: Status: ACTIVE | Noted: 2023-04-11

## 2023-04-11 PROBLEM — I10 ESSENTIAL HYPERTENSION: Status: ACTIVE | Noted: 2023-04-11

## 2023-04-11 PROBLEM — J90 BILATERAL PLEURAL EFFUSION: Status: ACTIVE | Noted: 2023-04-11

## 2023-04-11 PROBLEM — I05.9 RHEUMATIC MITRAL VALVE DISEASE: Status: ACTIVE | Noted: 2023-04-11

## 2023-04-11 PROBLEM — Z86.718 HISTORY OF DVT (DEEP VEIN THROMBOSIS): Status: ACTIVE | Noted: 2023-04-11

## 2023-04-11 PROBLEM — E78.5 HYPERLIPIDEMIA: Status: ACTIVE | Noted: 2023-04-11

## 2023-04-11 PROBLEM — U07.1 PNEUMONIA DUE TO COVID-19 VIRUS: Status: ACTIVE | Noted: 2023-04-11

## 2023-04-11 PROBLEM — I50.32 CHRONIC DIASTOLIC CHF (CONGESTIVE HEART FAILURE): Status: ACTIVE | Noted: 2018-04-08

## 2023-04-11 PROBLEM — J12.82 PNEUMONIA DUE TO COVID-19 VIRUS: Status: ACTIVE | Noted: 2023-04-11

## 2023-04-11 LAB
ALBUMIN SERPL-MCNC: 4.2 G/DL (ref 3.5–5.2)
ALBUMIN/GLOB SERPL: 1.3 G/DL
ALP SERPL-CCNC: 79 U/L (ref 39–117)
ALT SERPL W P-5'-P-CCNC: 15 U/L (ref 1–33)
ANION GAP SERPL CALCULATED.3IONS-SCNC: 16.2 MMOL/L (ref 5–15)
AST SERPL-CCNC: 31 U/L (ref 1–32)
B PARAPERT DNA SPEC QL NAA+PROBE: NOT DETECTED
B PERT DNA SPEC QL NAA+PROBE: NOT DETECTED
BASOPHILS # BLD AUTO: 0.03 10*3/MM3 (ref 0–0.2)
BASOPHILS NFR BLD AUTO: 0.3 % (ref 0–1.5)
BILIRUB SERPL-MCNC: 0.4 MG/DL (ref 0–1.2)
BUN SERPL-MCNC: 28 MG/DL (ref 8–23)
BUN/CREAT SERPL: 18.3 (ref 7–25)
C PNEUM DNA NPH QL NAA+NON-PROBE: NOT DETECTED
CALCIUM SPEC-SCNC: 9.8 MG/DL (ref 8.2–9.6)
CHLORIDE SERPL-SCNC: 92 MMOL/L (ref 98–107)
CO2 SERPL-SCNC: 24.8 MMOL/L (ref 22–29)
CREAT SERPL-MCNC: 1.53 MG/DL (ref 0.57–1)
D-LACTATE SERPL-SCNC: 1.3 MMOL/L (ref 0.5–2)
DEPRECATED RDW RBC AUTO: 43.1 FL (ref 37–54)
EGFRCR SERPLBLD CKD-EPI 2021: 32.2 ML/MIN/1.73
EOSINOPHIL # BLD AUTO: 0.04 10*3/MM3 (ref 0–0.4)
EOSINOPHIL NFR BLD AUTO: 0.4 % (ref 0.3–6.2)
ERYTHROCYTE [DISTWIDTH] IN BLOOD BY AUTOMATED COUNT: 13.3 % (ref 12.3–15.4)
FLUAV SUBTYP SPEC NAA+PROBE: NOT DETECTED
FLUBV RNA ISLT QL NAA+PROBE: NOT DETECTED
GLOBULIN UR ELPH-MCNC: 3.3 GM/DL
GLUCOSE SERPL-MCNC: 160 MG/DL (ref 65–99)
HADV DNA SPEC NAA+PROBE: NOT DETECTED
HCOV 229E RNA SPEC QL NAA+PROBE: NOT DETECTED
HCOV HKU1 RNA SPEC QL NAA+PROBE: NOT DETECTED
HCOV NL63 RNA SPEC QL NAA+PROBE: NOT DETECTED
HCOV OC43 RNA SPEC QL NAA+PROBE: NOT DETECTED
HCT VFR BLD AUTO: 38.2 % (ref 34–46.6)
HGB BLD-MCNC: 12.5 G/DL (ref 12–15.9)
HMPV RNA NPH QL NAA+NON-PROBE: NOT DETECTED
HOLD SPECIMEN: NORMAL
HPIV1 RNA ISLT QL NAA+PROBE: NOT DETECTED
HPIV2 RNA SPEC QL NAA+PROBE: NOT DETECTED
HPIV3 RNA NPH QL NAA+PROBE: NOT DETECTED
HPIV4 P GENE NPH QL NAA+PROBE: NOT DETECTED
IMM GRANULOCYTES # BLD AUTO: 0.04 10*3/MM3 (ref 0–0.05)
IMM GRANULOCYTES NFR BLD AUTO: 0.4 % (ref 0–0.5)
LYMPHOCYTES # BLD AUTO: 0.55 10*3/MM3 (ref 0.7–3.1)
LYMPHOCYTES NFR BLD AUTO: 5.2 % (ref 19.6–45.3)
M PNEUMO IGG SER IA-ACNC: NOT DETECTED
MCH RBC QN AUTO: 29.4 PG (ref 26.6–33)
MCHC RBC AUTO-ENTMCNC: 32.7 G/DL (ref 31.5–35.7)
MCV RBC AUTO: 89.9 FL (ref 79–97)
MONOCYTES # BLD AUTO: 0.98 10*3/MM3 (ref 0.1–0.9)
MONOCYTES NFR BLD AUTO: 9.3 % (ref 5–12)
NEUTROPHILS NFR BLD AUTO: 8.85 10*3/MM3 (ref 1.7–7)
NEUTROPHILS NFR BLD AUTO: 84.4 % (ref 42.7–76)
NRBC BLD AUTO-RTO: 0 /100 WBC (ref 0–0.2)
NT-PROBNP SERPL-MCNC: 2320 PG/ML (ref 0–1800)
PLATELET # BLD AUTO: 188 10*3/MM3 (ref 140–450)
PMV BLD AUTO: 11.1 FL (ref 6–12)
POTASSIUM SERPL-SCNC: 4.3 MMOL/L (ref 3.5–5.2)
PROCALCITONIN SERPL-MCNC: 0.2 NG/ML (ref 0–0.25)
PROT SERPL-MCNC: 7.5 G/DL (ref 6–8.5)
QT INTERVAL: 344 MS
RBC # BLD AUTO: 4.25 10*6/MM3 (ref 3.77–5.28)
RHINOVIRUS RNA SPEC NAA+PROBE: NOT DETECTED
RSV RNA NPH QL NAA+NON-PROBE: NOT DETECTED
SARS-COV-2 RNA NPH QL NAA+NON-PROBE: DETECTED
SODIUM SERPL-SCNC: 133 MMOL/L (ref 136–145)
TROPONIN T SERPL HS-MCNC: 23 NG/L
WBC NRBC COR # BLD: 10.49 10*3/MM3 (ref 3.4–10.8)
WHOLE BLOOD HOLD COAG: NORMAL
WHOLE BLOOD HOLD SPECIMEN: NORMAL

## 2023-04-11 PROCEDURE — 96365 THER/PROPH/DIAG IV INF INIT: CPT

## 2023-04-11 PROCEDURE — 83036 HEMOGLOBIN GLYCOSYLATED A1C: CPT | Performed by: INTERNAL MEDICINE

## 2023-04-11 PROCEDURE — 93010 ELECTROCARDIOGRAM REPORT: CPT | Performed by: STUDENT IN AN ORGANIZED HEALTH CARE EDUCATION/TRAINING PROGRAM

## 2023-04-11 PROCEDURE — 87070 CULTURE OTHR SPECIMN AEROBIC: CPT | Performed by: INTERNAL MEDICINE

## 2023-04-11 PROCEDURE — 36415 COLL VENOUS BLD VENIPUNCTURE: CPT

## 2023-04-11 PROCEDURE — 80053 COMPREHEN METABOLIC PANEL: CPT

## 2023-04-11 PROCEDURE — 25010000002 CEFTRIAXONE PER 250 MG: Performed by: EMERGENCY MEDICINE

## 2023-04-11 PROCEDURE — 87205 SMEAR GRAM STAIN: CPT | Performed by: INTERNAL MEDICINE

## 2023-04-11 PROCEDURE — 84145 PROCALCITONIN (PCT): CPT | Performed by: EMERGENCY MEDICINE

## 2023-04-11 PROCEDURE — G0378 HOSPITAL OBSERVATION PER HR: HCPCS

## 2023-04-11 PROCEDURE — 93005 ELECTROCARDIOGRAM TRACING: CPT

## 2023-04-11 PROCEDURE — 71045 X-RAY EXAM CHEST 1 VIEW: CPT

## 2023-04-11 PROCEDURE — 99285 EMERGENCY DEPT VISIT HI MDM: CPT

## 2023-04-11 PROCEDURE — 83605 ASSAY OF LACTIC ACID: CPT | Performed by: EMERGENCY MEDICINE

## 2023-04-11 PROCEDURE — 85025 COMPLETE CBC W/AUTO DIFF WBC: CPT

## 2023-04-11 PROCEDURE — 83880 ASSAY OF NATRIURETIC PEPTIDE: CPT

## 2023-04-11 PROCEDURE — 84484 ASSAY OF TROPONIN QUANT: CPT

## 2023-04-11 PROCEDURE — 0202U NFCT DS 22 TRGT SARS-COV-2: CPT | Performed by: EMERGENCY MEDICINE

## 2023-04-11 PROCEDURE — 87040 BLOOD CULTURE FOR BACTERIA: CPT | Performed by: EMERGENCY MEDICINE

## 2023-04-11 RX ORDER — ACETAMINOPHEN 650 MG/1
650 SUPPOSITORY RECTAL EVERY 4 HOURS PRN
Status: DISCONTINUED | OUTPATIENT
Start: 2023-04-11 | End: 2023-04-13 | Stop reason: HOSPADM

## 2023-04-11 RX ORDER — SODIUM CHLORIDE 9 MG/ML
40 INJECTION, SOLUTION INTRAVENOUS AS NEEDED
Status: DISCONTINUED | OUTPATIENT
Start: 2023-04-11 | End: 2023-04-13 | Stop reason: HOSPADM

## 2023-04-11 RX ORDER — FAMOTIDINE 10 MG/ML
20 INJECTION, SOLUTION INTRAVENOUS DAILY
Status: DISCONTINUED | OUTPATIENT
Start: 2023-04-12 | End: 2023-04-13 | Stop reason: HOSPADM

## 2023-04-11 RX ORDER — ACETAMINOPHEN 325 MG/1
650 TABLET ORAL EVERY 4 HOURS PRN
Status: DISCONTINUED | OUTPATIENT
Start: 2023-04-11 | End: 2023-04-13 | Stop reason: HOSPADM

## 2023-04-11 RX ORDER — LORAZEPAM 0.5 MG/1
0.5 TABLET ORAL EVERY 8 HOURS PRN
Status: DISCONTINUED | OUTPATIENT
Start: 2023-04-11 | End: 2023-04-13 | Stop reason: HOSPADM

## 2023-04-11 RX ORDER — SODIUM CHLORIDE 0.9 % (FLUSH) 0.9 %
10 SYRINGE (ML) INJECTION AS NEEDED
Status: DISCONTINUED | OUTPATIENT
Start: 2023-04-11 | End: 2023-04-13 | Stop reason: HOSPADM

## 2023-04-11 RX ORDER — ACETAMINOPHEN 160 MG/5ML
650 SOLUTION ORAL EVERY 4 HOURS PRN
Status: DISCONTINUED | OUTPATIENT
Start: 2023-04-11 | End: 2023-04-13 | Stop reason: HOSPADM

## 2023-04-11 RX ORDER — ASPIRIN 81 MG/1
81 TABLET ORAL DAILY
Status: DISCONTINUED | OUTPATIENT
Start: 2023-04-12 | End: 2023-04-13 | Stop reason: HOSPADM

## 2023-04-11 RX ORDER — GUAIFENESIN 600 MG/1
600 TABLET, EXTENDED RELEASE ORAL EVERY 12 HOURS SCHEDULED
Status: DISCONTINUED | OUTPATIENT
Start: 2023-04-12 | End: 2023-04-13 | Stop reason: HOSPADM

## 2023-04-11 RX ORDER — ATORVASTATIN CALCIUM 20 MG/1
40 TABLET, FILM COATED ORAL DAILY
Status: DISCONTINUED | OUTPATIENT
Start: 2023-04-12 | End: 2023-04-13 | Stop reason: HOSPADM

## 2023-04-11 RX ORDER — BUDESONIDE AND FORMOTEROL FUMARATE DIHYDRATE 160; 4.5 UG/1; UG/1
2 AEROSOL RESPIRATORY (INHALATION)
Status: DISCONTINUED | OUTPATIENT
Start: 2023-04-12 | End: 2023-04-13 | Stop reason: HOSPADM

## 2023-04-11 RX ORDER — ACETAMINOPHEN 500 MG
1000 TABLET ORAL ONCE
Status: COMPLETED | OUTPATIENT
Start: 2023-04-11 | End: 2023-04-11

## 2023-04-11 RX ORDER — NITROGLYCERIN 0.4 MG/1
0.4 TABLET SUBLINGUAL
Status: DISCONTINUED | OUTPATIENT
Start: 2023-04-11 | End: 2023-04-13 | Stop reason: HOSPADM

## 2023-04-11 RX ORDER — ATENOLOL 25 MG/1
25 TABLET ORAL 2 TIMES DAILY
Status: DISCONTINUED | OUTPATIENT
Start: 2023-04-12 | End: 2023-04-13 | Stop reason: HOSPADM

## 2023-04-11 RX ORDER — CHOLECALCIFEROL (VITAMIN D3) 125 MCG
5 CAPSULE ORAL NIGHTLY PRN
Status: DISCONTINUED | OUTPATIENT
Start: 2023-04-11 | End: 2023-04-13 | Stop reason: HOSPADM

## 2023-04-11 RX ORDER — AMLODIPINE BESYLATE 5 MG/1
5 TABLET ORAL DAILY
Status: DISCONTINUED | OUTPATIENT
Start: 2023-04-12 | End: 2023-04-13 | Stop reason: HOSPADM

## 2023-04-11 RX ORDER — SODIUM CHLORIDE 0.9 % (FLUSH) 0.9 %
10 SYRINGE (ML) INJECTION EVERY 12 HOURS SCHEDULED
Status: DISCONTINUED | OUTPATIENT
Start: 2023-04-12 | End: 2023-04-13 | Stop reason: HOSPADM

## 2023-04-11 RX ORDER — ENOXAPARIN SODIUM 100 MG/ML
30 INJECTION SUBCUTANEOUS EVERY 24 HOURS
Status: DISCONTINUED | OUTPATIENT
Start: 2023-04-11 | End: 2023-04-13 | Stop reason: HOSPADM

## 2023-04-11 RX ORDER — ONDANSETRON 2 MG/ML
4 INJECTION INTRAMUSCULAR; INTRAVENOUS EVERY 6 HOURS PRN
Status: DISCONTINUED | OUTPATIENT
Start: 2023-04-11 | End: 2023-04-13 | Stop reason: HOSPADM

## 2023-04-11 RX ORDER — HYDROCODONE BITARTRATE AND ACETAMINOPHEN 5; 325 MG/1; MG/1
1 TABLET ORAL EVERY 4 HOURS PRN
Status: DISCONTINUED | OUTPATIENT
Start: 2023-04-11 | End: 2023-04-13 | Stop reason: HOSPADM

## 2023-04-11 RX ORDER — FUROSEMIDE 40 MG/1
40 TABLET ORAL DAILY
Status: DISCONTINUED | OUTPATIENT
Start: 2023-04-12 | End: 2023-04-12

## 2023-04-11 RX ADMIN — ACETAMINOPHEN 1000 MG: 500 TABLET ORAL at 19:03

## 2023-04-11 RX ADMIN — SODIUM CHLORIDE 1000 ML: 9 INJECTION, SOLUTION INTRAVENOUS at 19:04

## 2023-04-11 RX ADMIN — CEFTRIAXONE 2 G: 2 INJECTION, POWDER, FOR SOLUTION INTRAMUSCULAR; INTRAVENOUS at 19:03

## 2023-04-11 NOTE — ED PROVIDER NOTES
EMERGENCY DEPARTMENT ENCOUNTER    Room Number:  19/19  Date of encounter:  4/11/2023  PCP: Georgie Diane MD  Patient Care Team:  Georgie Diane MD as PCP - General (Internal Medicine)   Independent Historians: Patient, family    HPI:  Chief Complaint: Acute fever, cough, shortness of breath    A complete HPI/ROS/PMH/PSH/SH/FH are unobtainable due to: Nothing    Chronic or social conditions impacting patient care (Social Determinants of Health): None  (Financial Resource Strain / Food Insecurity / Transportation Needs / Physical Activity / Stress / Social Connections / Intimate Partner Violence / Housing Stability)    Context: Brodie Mo is a 89 y.o. female who presents to the ED c/o having shortness of breath, cough, fever for the last 2 days.  She denies any sick contacts.  She reports she is coughing up thick yellow sputum.  She has had Tylenol earlier this afternoon.  She reports it is not any better.  She reports she feels dehydrated.  She states she has not been eating or drinking very well in the last few days.  She is not on oxygen at home.  She states her symptoms are severe.    Review of prior external notes (non-ED): Discharge summary dated 9/3/2020 with severe mitral regurgitation and status post CABG.  She had had a transfemoral transcatheter mitral clip for mitral valve repair.  Her lisinopril was changed to 40 mg daily.    Review of prior external test results outside of this encounter: She had a BMP dated 1/24/2023 that showed an elevated creatinine of 1.56.  She had a CBC dated 1/11/2023 which was normal    Prescription drug monitoring program review: [facility] reviewed by Wilder Schreiber MD, Kyle Sharpe MD   My review reveals  no controlled substance prescriptions reported.    PAST MEDICAL HISTORY  Active Ambulatory Problems     Diagnosis Date Noted   • S/P CABG (coronary artery bypass graft) 12/09/2016   • Essential hypertension 12/15/2017   • History of bladder cancer  04/05/2018   • CAD (coronary artery disease) 04/05/2018   • CKD (chronic kidney disease) stage 3, GFR 30-59 ml/min 04/06/2018   • Nonrheumatic mitral valve regurgitation 04/09/2020   • Dyspnea on exertion 04/09/2020   • Mitral valve insufficiency 09/02/2020   • Status post implantation of mitral valve leaflet clip 09/10/2020     Resolved Ambulatory Problems     Diagnosis Date Noted   • Community acquired pneumonia 04/05/2018   • Acute on chronic diastolic congestive heart failure 04/08/2018     Past Medical History:   Diagnosis Date   • Deep vein thrombosis    • Health care maintenance    • HTN (hypertension)    • Hyperlipidemia    • LVH (left ventricular hypertrophy)    • Pneumonia        The patient qualifies to receive the vaccine, but they have not yet received it.    PAST SURGICAL HISTORY  Past Surgical History:   Procedure Laterality Date   • BLADDER SURGERY      cancer   • CARPAL TUNNEL RELEASE Right 12/15/2021    Procedure: RIGHT CARPAL TUNNEL RELEASE WITH POSSIBLE SYNOVECTOMY;  Surgeon: Georgie Hines MD;  Location: [facility] OR;  Service: Plastics;  Laterality: Right;   • CORONARY ARTERY BYPASS GRAFT      2002 with Dr Prabhakar; AGUAYO to LAD and SVG to OM2; 2005 cath all grafts open and 30% dx in RCA; nl stress in 2010, 2013   • HYSTERECTOMY     • MITRAL VALVE REPAIR/REPLACEMENT N/A 9/2/2020    Procedure: TRANSCATHETER MITRAL VALVE REPAIR;  Surgeon: Edward Humphreys MD;  Location: [facility] INVASIVE LOCATION;  Service: Cardiovascular;  Laterality: N/A;         FAMILY HISTORY  Family History   Problem Relation Age of Onset   • Heart disease Mother    • Kidney cancer Father    • No Known Problems Maternal Grandmother    • No Known Problems Maternal Grandfather    • No Known Problems Paternal Grandmother    • No Known Problems Paternal Grandfather    • Heart disease Sister    • Heart disease Brother          SOCIAL HISTORY  Social History     Socioeconomic History   • Marital status:     Tobacco Use   • Smoking status: Former   • Smokeless tobacco: Never   • Tobacco comments:     CAFFEINE USE: 2  CUPS COFFEE DAILY   Vaping Use   • Vaping Use: Never used   Substance and Sexual Activity   • Alcohol use: Yes     Alcohol/week: 1.0 standard drink     Types: 1 Shots of liquor per week     Comment: 1 DRINK DAILY   • Drug use: No   • Sexual activity: Defer         ALLERGIES  No known drug allergy        REVIEW OF SYSTEMS  Review of Systems   No chest pain, positive shortness of breath, no nausea, vomiting, abdominal pain, positive cough, positive sputum reduction, no headache  All systems reviewed and negative except for those discussed in HPI.       PHYSICAL EXAM    I have reviewed the triage vital signs and nursing notes.    ED Triage Vitals   Temp Heart Rate Resp BP SpO2   04/11/23 1710 04/11/23 1710 04/11/23 1710 04/11/23 1733 04/11/23 1710   (!) 103.9 °F (39.9 °C) 99 20 172/63 90 %      Temp src Heart Rate Source Patient Position BP Location FiO2 (%)   04/11/23 1710 04/11/23 1710 -- -- --   Tympanic Monitor          Physical Exam  GENERAL: Awake, alert, no acute distress  SKIN: Warm, dry  HENT: Normocephalic, atraumatic  EYES: no scleral icterus  CV: regular rhythm, regular rate  RESPIRATORY: normal effort, lungs with rhonchi and some or wheezes  ABDOMEN: soft, nontender, nondistended  MUSCULOSKELETAL: no deformity, no edema  NEURO: alert, moves all extremities, follows commands                                                               LAB RESULTS  Recent Results (from the past 24 hour(s))   Comprehensive Metabolic Panel    Collection Time: 04/11/23  5:45 PM    Specimen: Blood   Result Value Ref Range    Glucose 160 (H) 65 - 99 mg/dL    BUN 28 (H) 8 - 23 mg/dL    Creatinine 1.53 (H) 0.57 - 1.00 mg/dL    Sodium 133 (L) 136 - 145 mmol/L    Potassium 4.3 3.5 - 5.2 mmol/L    Chloride 92 (L) 98 - 107 mmol/L    CO2 24.8 22.0 - 29.0 mmol/L    Calcium 9.8 (H) 8.2 - 9.6 mg/dL    Total Protein 7.5 6.0 -  8.5 g/dL    Albumin 4.2 3.5 - 5.2 g/dL    ALT (SGPT) 15 1 - 33 U/L    AST (SGOT) 31 1 - 32 U/L    Alkaline Phosphatase 79 39 - 117 U/L    Total Bilirubin 0.4 0.0 - 1.2 mg/dL    Globulin 3.3 gm/dL    A/G Ratio 1.3 g/dL    BUN/Creatinine Ratio 18.3 7.0 - 25.0    Anion Gap 16.2 (H) 5.0 - 15.0 mmol/L    eGFR 32.2 (L) >60.0 mL/min/1.73   BNP    Collection Time: 04/11/23  5:45 PM    Specimen: Blood   Result Value Ref Range    proBNP 2,320.0 (H) 0.0 - 1,800.0 pg/mL   Single High Sensitivity Troponin T    Collection Time: 04/11/23  5:45 PM    Specimen: Blood   Result Value Ref Range    HS Troponin T 23 (H) <10 ng/L   Green Top (Gel)    Collection Time: 04/11/23  5:45 PM   Result Value Ref Range    Extra Tube Hold for add-ons.    Lavender Top    Collection Time: 04/11/23  5:45 PM   Result Value Ref Range    Extra Tube hold for add-on    Light Blue Top    Collection Time: 04/11/23  5:45 PM   Result Value Ref Range    Extra Tube Hold for add-ons.    CBC Auto Differential    Collection Time: 04/11/23  5:45 PM    Specimen: Blood   Result Value Ref Range    WBC 10.49 3.40 - 10.80 10*3/mm3    RBC 4.25 3.77 - 5.28 10*6/mm3    Hemoglobin 12.5 12.0 - 15.9 g/dL    Hematocrit 38.2 34.0 - 46.6 %    MCV 89.9 79.0 - 97.0 fL    MCH 29.4 26.6 - 33.0 pg    MCHC 32.7 31.5 - 35.7 g/dL    RDW 13.3 12.3 - 15.4 %    RDW-SD 43.1 37.0 - 54.0 fl    MPV 11.1 6.0 - 12.0 fL    Platelets 188 140 - 450 10*3/mm3    Neutrophil % 84.4 (H) 42.7 - 76.0 %    Lymphocyte % 5.2 (L) 19.6 - 45.3 %    Monocyte % 9.3 5.0 - 12.0 %    Eosinophil % 0.4 0.3 - 6.2 %    Basophil % 0.3 0.0 - 1.5 %    Immature Grans % 0.4 0.0 - 0.5 %    Neutrophils, Absolute 8.85 (H) 1.70 - 7.00 10*3/mm3    Lymphocytes, Absolute 0.55 (L) 0.70 - 3.10 10*3/mm3    Monocytes, Absolute 0.98 (H) 0.10 - 0.90 10*3/mm3    Eosinophils, Absolute 0.04 0.00 - 0.40 10*3/mm3    Basophils, Absolute 0.03 0.00 - 0.20 10*3/mm3    Immature Grans, Absolute 0.04 0.00 - 0.05 10*3/mm3    nRBC 0.0 0.0 - 0.2 /100 WBC    ECG 12 Lead ED Triage Standing Order; SOA    Collection Time: 04/11/23  5:46 PM   Result Value Ref Range    QT Interval 344 ms       Ordered the above labs and independently reviewed the results.        RADIOLOGY  XR Chest 1 View    Result Date: 4/11/2023  CHEST SINGLE VIEW  HISTORY: Shortness of air with cough  COMPARISON: AP chest 01/11/2023  FINDINGS: Heart size is enlarged. The patient is rotated to the right. Sternotomy wires are present. There are small-to-moderate bilateral pleural effusions and there is adjacent basilar opacity either atelectasis or infiltrates. There is no evidence for perihilar edema and the mid to upper lungs are comparatively clear.      Small-to-moderate pleural effusions with adjacent basilar atelectasis or infiltrates. Cardiomegaly.        I ordered the above noted radiological studies. Reviewed by me and discussed with radiologist.  See dictation for official radiology interpretation.      PROCEDURES    Procedures      MEDICATIONS GIVEN IN ER    Medications   sodium chloride 0.9 % flush 10 mL (has no administration in time range)   acetaminophen (TYLENOL) tablet 1,000 mg (has no administration in time range)   sodium chloride 0.9 % bolus 1,000 mL (has no administration in time range)   cefTRIAXone (ROCEPHIN) 2 g in sodium chloride 0.9 % 100 mL IVPB-VTB (has no administration in time range)         ORDERS PLACED DURING THIS VISIT:  Orders Placed This Encounter   Procedures   • Respiratory Panel PCR w/COVID-19(SARS-CoV-2) MINNIE/SCOTT/TIFFANY/PAD/COR/MAD/AYANNA In-House, NP Swab in UTM/VTM, 3-4 HR TAT - Swab, Nasopharynx   • Blood Culture - Blood,   • Blood Culture - Blood,   • XR Chest 1 View   • [place] Draw   • Comprehensive Metabolic Panel   • BNP   • Single High Sensitivity Troponin T   • CBC Auto Differential   • Lactic Acid, Plasma   • Procalcitonin   • NPO Diet NPO Type: Strict NPO   • Undress & Gown   • Cardiac Monitoring   • Continuous Pulse Oximetry   • Vital Signs   • LHA (on-call MD  unless specified) Details   • Oxygen Therapy- Nasal Cannula; 2 LPM; Titrate for SPO2: equal to or greater than, 92%   • ECG 12 Lead ED Triage Standing Order; SOA   • Insert Peripheral IV   • Initiate Observation Status   • CBC & Differential   • Green Top (Gel)   • Lavender Top   • Gold Top - SST   • Light Blue Top         PROGRESS, DATA ANALYSIS, CONSULTS, AND MEDICAL DECISION MAKING    All labs have been independently interpreted by me.  All radiology studies have been reviewed by me and discussed with radiologist dictating the report.   EKG's independently viewed and interpreted by me.  Discussion below represents my analysis of pertinent findings related to patient's condition, differential diagnosis, treatment plan and final disposition.    Differential diagnosis includes but is not limited to pneumonia, sepsis, CHF, hypoxia, respiratory failure.    ED Course as of 04/11/23 1852 Tue Apr 11, 2023   1835 EKG          EKG time: 1746  Rhythm/Rate: Normal sinus, rate 82  P waves and KS: Normal P, normal KS  QRS, axis: Narrow QRS, right axis  ST and T waves: Inferior lateral ST depression with T wave inversion with LVH    Independently Interpreted by me  Not significantly changed compared to prior 1/11/2023   [TR]   1842 XR Chest 1 View  My independent interpretation of the chest x-ray has bilateral infiltrates [TR]   1842 The patient CBC is normal.  She has an elevated BNP as well as an elevated troponin in the indeterminate range.  Her BNP is only mildly elevated over her prior levels.  She has a chronic renal insufficiency which is stable from prior.  She does have an elevated blood glucose. [TR]   1845 Patient has had some hypoxia here.  She is gone down as low as 90% on room air.  This is new for her.  Between her neurolyse weakness, fever, bilateral pneumonia, and sepsis, plan to admit her to the hospital for further management.  They are agreeable. [TR]   1850 Discussing with Dr. Schreiber with A.  Agrees to  admit. [TR]      ED Course User Index  [TR] Kyle Sharpe MD       I interpreted the cardiac monitor rhythm and my independent interpretation is: normal sinus rhythm.     PPE: The patient wore a mask and I wore an N95 mask throughout the entire patient encounter.       AS OF 18:52 EDT VITALS:    BP - 172/63  HR - 84  TEMP - (!) 103.9 °F (39.9 °C) (Tympanic)  O2 SATS - 95%        DIAGNOSIS  Final diagnoses:   Pneumonia of both lungs due to infectious organism, unspecified part of lung   Sepsis with acute hypoxic respiratory failure without septic shock, due to unspecified organism   Chronic renal impairment, unspecified CKD stage   Hyperglycemia   Fever, unspecified fever cause         DISPOSITION  ED Disposition     ED Disposition   Decision to Admit    Condition   --    Comment   Level of Care: Telemetry [5]   Diagnosis: Pneumonia of both lungs due to infectious organism, unspecified part of lung [[id number]]   Admitting Physician: CATE SAINI [[id number]]   Attending Physician: CATE SAINI [[id number]]                  Note Disclaimer: At [facility], we believe that sharing information builds trust and better relationships. You are receiving this note because you recently visited [facility]. It is possible you will see health information before a provider has talked with you about it. This kind of information can be easy to misunderstand. To help you fully understand what it means for your health, we urge you to discuss this note with your provider.       Kyle Sharpe MD  04/11/23 3113

## 2023-04-11 NOTE — ED NOTES
".Nursing report ED to floor  Aydenmine X Naive  89 y.o.  female    HPI :   Chief Complaint   Patient presents with    Shortness of Breath    Fever       Admitting doctor:   Wilder Schreiber MD    Admitting diagnosis:   The primary encounter diagnosis was Pneumonia of both lungs due to infectious organism, unspecified part of lung. Diagnoses of Sepsis with acute hypoxic respiratory failure without septic shock, due to unspecified organism, Chronic renal impairment, unspecified CKD stage, Hyperglycemia, and Fever, unspecified fever cause were also pertinent to this visit.    Code status:   Current Code Status       Date Active Code Status Order ID Comments User Context       Prior            Allergies:   No known drug allergy    Isolation:   No active isolations    Intake and Output  No intake or output data in the 24 hours ending 04/11/23 1949    Weight:       04/11/23 1734   Weight: 63 kg (139 lb)       Most recent vitals:   Vitals:    04/11/23 1710 04/11/23 1733 04/11/23 1734   BP:  172/63    Pulse: 99 84    Resp: 20     Temp: (!) 103.9 °F (39.9 °C)     TempSrc: Tympanic     SpO2: 90% 95%    Weight:   63 kg (139 lb)   Height:   165.1 cm (65\")       Active LDAs/IV Access:   Lines, Drains & Airways       Active LDAs       Name Placement date Placement time Site Days    Peripheral IV 04/11/23 1744 Right Antecubital 04/11/23 1744  Antecubital  less than 1                    Labs (abnormal labs have a star):   Labs Reviewed   COMPREHENSIVE METABOLIC PANEL - Abnormal; Notable for the following components:       Result Value    Glucose 160 (*)     BUN 28 (*)     Creatinine 1.53 (*)     Sodium 133 (*)     Chloride 92 (*)     Calcium 9.8 (*)     Anion Gap 16.2 (*)     eGFR 32.2 (*)     All other components within normal limits    Narrative:     GFR Normal >60  Chronic Kidney Disease <60  Kidney Failure <15    The GFR formula is only valid for adults with stable renal function between ages 18 and 70.   BNP (IN-HOUSE) - " Abnormal; Notable for the following components:    proBNP 2,320.0 (*)     All other components within normal limits    Narrative:     Among patients with dyspnea, NT-proBNP is highly sensitive for the detection of acute congestive heart failure. In addition NT-proBNP of <300 pg/ml effectively rules out acute congestive heart failure with 99% negative predictive value.    Results may be falsely decreased if patient taking Biotin.     SINGLE HSTROPONIN T - Abnormal; Notable for the following components:    HS Troponin T 23 (*)     All other components within normal limits    Narrative:     High Sensitive Troponin T Reference Range:  <10.0 ng/L- Negative Female for AMI  <15.0 ng/L- Negative Male for AMI  >=10 - Abnormal Female indicating possible myocardial injury.  >=15 - Abnormal Male indicating possible myocardial injury.   Clinicians would have to utilize clinical acumen, EKG, Troponin, and serial changes to determine if it is an Acute Myocardial Infarction or myocardial injury due to an underlying chronic condition.        CBC WITH AUTO DIFFERENTIAL - Abnormal; Notable for the following components:    Neutrophil % 84.4 (*)     Lymphocyte % 5.2 (*)     Neutrophils, Absolute 8.85 (*)     Lymphocytes, Absolute 0.55 (*)     Monocytes, Absolute 0.98 (*)     All other components within normal limits   PROCALCITONIN - Normal    Narrative:     As a Marker for Sepsis (Non-Neonates):    1. <0.5 ng/mL represents a low risk of severe sepsis and/or septic shock.  2. >2 ng/mL represents a high risk of severe sepsis and/or septic shock.    As a Marker for Lower Respiratory Tract Infections that require antibiotic therapy:    PCT on Admission    Antibiotic Therapy       6-12 Hrs later    >0.5                Strongly Recommended  >0.25 - <0.5        Recommended   0.1 - 0.25          Discouraged              Remeasure/reassess PCT  <0.1                Strongly Discouraged     Remeasure/reassess PCT    As 28 day mortality risk  "marker: \"Change in Procalcitonin Result\" (>80% or <=80%) if Day 0 (or Day 1) and Day 4 values are available. Refer to http://www.[facility]-pct-calculator.com    Change in PCT <=80%  A decrease of PCT levels below or equal to 80% defines a positive change in PCT test result representing a higher risk for 28-day all-cause mortality of patients diagnosed with severe sepsis for septic shock.    Change in PCT >80%  A decrease of PCT levels of more than 80% defines a negative change in PCT result representing a lower risk for 28-day all-cause mortality of patients diagnosed with severe sepsis or septic shock.      RESPIRATORY PANEL PCR W/ COVID-19 (SARS-COV-2) MINNIE/SCOTT/TIFFANY/PAD/COR/MAD/AYANNA IN-HOUSE, NP SWAB IN UT/VTP, 3-4 HR TAT   BLOOD CULTURE   BLOOD CULTURE   RAINBOW DRAW    Narrative:     The following orders were created for panel order [place] Draw.  Procedure                               Abnormality         Status                     ---------                               -----------         ------                     Green Top (Gel)[[id number]]                                  Final result               Lavender Top[[id number]]                                     Final result               Gold Top - SST[[id number]]                                                              Light Blue Top[[id number]]                                   Final result                 Please view results for these tests on the individual orders.   LACTIC ACID, PLASMA   CBC AND DIFFERENTIAL    Narrative:     The following orders were created for panel order CBC & Differential.  Procedure                               Abnormality         Status                     ---------                               -----------         ------                     CBC Auto Differential[[id number]]        Abnormal            Final result                 Please view results for these tests on the individual orders.   GREEN TOP   LAVENDER TOP   LIGHT BLUE TOP "   [facility]       EKG:   ECG 12 Lead ED Triage Standing Order; SOA   Final Result   HEART RATE= 82  bpm   RR Interval= 732  ms   ID Interval= 153  ms   P Horizontal Axis= 30  deg   P Front Axis= 95  deg   QRSD Interval= 75  ms   QT Interval= 344  ms   QRS Axis= 98  deg   T Wave Axis= -64  deg   - ABNORMAL ECG -   Sinus rhythm   Consider left atrial enlargement   Right axis deviation   Consider left ventricular hypertrophy   Repol abnrm suggests ischemia, diffuse leads   When compared with ECG of 11-Jan-2023 16:05:47,   Significant rate increase   Inferolateral T wave inversions are new   Electronically Signed By: Kamari Smith ([facility]) 11-Apr-2023 18:13:23   Date and Time of Study: 2023-04-11 17:46:22          Meds given in ED:   Medications   sodium chloride 0.9 % flush 10 mL (has no administration in time range)   acetaminophen (TYLENOL) tablet 1,000 mg (1,000 mg Oral Given 4/11/23 1903)   sodium chloride 0.9 % bolus 1,000 mL (1,000 mL Intravenous New Bag 4/11/23 1904)   cefTRIAXone (ROCEPHIN) 2 g in sodium chloride 0.9 % 100 mL IVPB-VTB (0 g Intravenous Stopped 4/11/23 1948)       Imaging results:  XR Chest 1 View    Result Date: 4/11/2023  Small-to-moderate pleural effusions with adjacent basilar atelectasis or infiltrates. Cardiomegaly.  This report was finalized on 4/11/2023 7:35 PM by Dr. Coleman Murphy M.D.       Ambulatory status:   - standby    Social issues:   Social History     Socioeconomic History    Marital status:    Tobacco Use    Smoking status: Former    Smokeless tobacco: Never    Tobacco comments:     CAFFEINE USE: 2  CUPS COFFEE DAILY   Vaping Use    Vaping Use: Never used   Substance and Sexual Activity    Alcohol use: Yes     Alcohol/week: 1.0 standard drink     Types: 1 Shots of liquor per week     Comment: 1 DRINK DAILY    Drug use: No    Sexual activity: Defer       NIH Stroke Scale:         Kalina Segura RN  04/11/23 19:49 EDT

## 2023-04-12 LAB
25(OH)D3 SERPL-MCNC: 59.5 NG/ML (ref 30–100)
ALBUMIN SERPL-MCNC: 3.3 G/DL (ref 3.5–5.2)
ALBUMIN/GLOB SERPL: 1.2 G/DL
ALP SERPL-CCNC: 60 U/L (ref 39–117)
ALT SERPL W P-5'-P-CCNC: 16 U/L (ref 1–33)
ANION GAP SERPL CALCULATED.3IONS-SCNC: 11 MMOL/L (ref 5–15)
AST SERPL-CCNC: 35 U/L (ref 1–32)
BASOPHILS # BLD AUTO: 0.04 10*3/MM3 (ref 0–0.2)
BASOPHILS NFR BLD AUTO: 0.3 % (ref 0–1.5)
BILIRUB CONJ SERPL-MCNC: <0.2 MG/DL (ref 0–0.3)
BILIRUB SERPL-MCNC: 0.3 MG/DL (ref 0–1.2)
BUN SERPL-MCNC: 26 MG/DL (ref 8–23)
BUN/CREAT SERPL: 17.2 (ref 7–25)
CALCIUM SPEC-SCNC: 8.1 MG/DL (ref 8.2–9.6)
CHLORIDE SERPL-SCNC: 98 MMOL/L (ref 98–107)
CK SERPL-CCNC: 494 U/L (ref 20–180)
CO2 SERPL-SCNC: 25 MMOL/L (ref 22–29)
CREAT SERPL-MCNC: 1.51 MG/DL (ref 0.57–1)
CRP SERPL-MCNC: 7.52 MG/DL (ref 0–0.5)
D DIMER PPP FEU-MCNC: 3.05 MCGFEU/ML (ref 0–0.9)
DEPRECATED RDW RBC AUTO: 44 FL (ref 37–54)
EGFRCR SERPLBLD CKD-EPI 2021: 32.7 ML/MIN/1.73
EOSINOPHIL # BLD AUTO: 0.01 10*3/MM3 (ref 0–0.4)
EOSINOPHIL NFR BLD AUTO: 0.1 % (ref 0.3–6.2)
ERYTHROCYTE [DISTWIDTH] IN BLOOD BY AUTOMATED COUNT: 13.4 % (ref 12.3–15.4)
GLOBULIN UR ELPH-MCNC: 2.7 GM/DL
GLUCOSE SERPL-MCNC: 123 MG/DL (ref 65–99)
HBA1C MFR BLD: 6.6 % (ref 4.8–5.6)
HCT VFR BLD AUTO: 32.5 % (ref 34–46.6)
HGB BLD-MCNC: 10.6 G/DL (ref 12–15.9)
IMM GRANULOCYTES # BLD AUTO: 0.14 10*3/MM3 (ref 0–0.05)
IMM GRANULOCYTES NFR BLD AUTO: 0.9 % (ref 0–0.5)
L PNEUMO1 AG UR QL IA: NEGATIVE
LYMPHOCYTES # BLD AUTO: 1.01 10*3/MM3 (ref 0.7–3.1)
LYMPHOCYTES NFR BLD AUTO: 6.7 % (ref 19.6–45.3)
MCH RBC QN AUTO: 29.1 PG (ref 26.6–33)
MCHC RBC AUTO-ENTMCNC: 32.6 G/DL (ref 31.5–35.7)
MCV RBC AUTO: 89.3 FL (ref 79–97)
MONOCYTES # BLD AUTO: 1.14 10*3/MM3 (ref 0.1–0.9)
MONOCYTES NFR BLD AUTO: 7.6 % (ref 5–12)
NEUTROPHILS NFR BLD AUTO: 12.75 10*3/MM3 (ref 1.7–7)
NEUTROPHILS NFR BLD AUTO: 84.4 % (ref 42.7–76)
NRBC BLD AUTO-RTO: 0 /100 WBC (ref 0–0.2)
PLATELET # BLD AUTO: 150 10*3/MM3 (ref 140–450)
PMV BLD AUTO: 11.3 FL (ref 6–12)
POTASSIUM SERPL-SCNC: 3.8 MMOL/L (ref 3.5–5.2)
PROT SERPL-MCNC: 6 G/DL (ref 6–8.5)
PTH-INTACT SERPL-MCNC: 55.9 PG/ML (ref 15–65)
RBC # BLD AUTO: 3.64 10*6/MM3 (ref 3.77–5.28)
S PNEUM AG SPEC QL LA: NEGATIVE
SODIUM SERPL-SCNC: 134 MMOL/L (ref 136–145)
WBC NRBC COR # BLD: 15.09 10*3/MM3 (ref 3.4–10.8)

## 2023-04-12 PROCEDURE — 25010000002 ENOXAPARIN PER 10 MG: Performed by: INTERNAL MEDICINE

## 2023-04-12 PROCEDURE — 80053 COMPREHEN METABOLIC PANEL: CPT | Performed by: INTERNAL MEDICINE

## 2023-04-12 PROCEDURE — 96372 THER/PROPH/DIAG INJ SC/IM: CPT

## 2023-04-12 PROCEDURE — 82248 BILIRUBIN DIRECT: CPT | Performed by: INTERNAL MEDICINE

## 2023-04-12 PROCEDURE — 94799 UNLISTED PULMONARY SVC/PX: CPT

## 2023-04-12 PROCEDURE — 94761 N-INVAS EAR/PLS OXIMETRY MLT: CPT

## 2023-04-12 PROCEDURE — 85379 FIBRIN DEGRADATION QUANT: CPT | Performed by: INTERNAL MEDICINE

## 2023-04-12 PROCEDURE — 82550 ASSAY OF CK (CPK): CPT | Performed by: INTERNAL MEDICINE

## 2023-04-12 PROCEDURE — 94640 AIRWAY INHALATION TREATMENT: CPT

## 2023-04-12 PROCEDURE — G0378 HOSPITAL OBSERVATION PER HR: HCPCS

## 2023-04-12 PROCEDURE — 96367 TX/PROPH/DG ADDL SEQ IV INF: CPT

## 2023-04-12 PROCEDURE — 94760 N-INVAS EAR/PLS OXIMETRY 1: CPT

## 2023-04-12 PROCEDURE — 83970 ASSAY OF PARATHORMONE: CPT | Performed by: INTERNAL MEDICINE

## 2023-04-12 PROCEDURE — 87449 NOS EACH ORGANISM AG IA: CPT | Performed by: INTERNAL MEDICINE

## 2023-04-12 PROCEDURE — 85025 COMPLETE CBC W/AUTO DIFF WBC: CPT | Performed by: INTERNAL MEDICINE

## 2023-04-12 PROCEDURE — 87899 AGENT NOS ASSAY W/OPTIC: CPT | Performed by: INTERNAL MEDICINE

## 2023-04-12 PROCEDURE — 86140 C-REACTIVE PROTEIN: CPT | Performed by: INTERNAL MEDICINE

## 2023-04-12 PROCEDURE — 25010000002 REMDESIVIR 100 MG/20ML SOLUTION 1 EACH VIAL: Performed by: INTERNAL MEDICINE

## 2023-04-12 PROCEDURE — 96375 TX/PRO/DX INJ NEW DRUG ADDON: CPT

## 2023-04-12 PROCEDURE — 82306 VITAMIN D 25 HYDROXY: CPT | Performed by: INTERNAL MEDICINE

## 2023-04-12 RX ADMIN — ATENOLOL 25 MG: 25 TABLET ORAL at 01:21

## 2023-04-12 RX ADMIN — GUAIFENESIN 600 MG: 600 TABLET, EXTENDED RELEASE ORAL at 20:26

## 2023-04-12 RX ADMIN — Medication 10 ML: at 20:28

## 2023-04-12 RX ADMIN — ATENOLOL 25 MG: 25 TABLET ORAL at 20:26

## 2023-04-12 RX ADMIN — ACETAMINOPHEN 650 MG: 325 TABLET, FILM COATED ORAL at 15:34

## 2023-04-12 RX ADMIN — BUDESONIDE AND FORMOTEROL FUMARATE DIHYDRATE 2 PUFF: 160; 4.5 AEROSOL RESPIRATORY (INHALATION) at 21:03

## 2023-04-12 RX ADMIN — GUAIFENESIN 600 MG: 600 TABLET, EXTENDED RELEASE ORAL at 09:40

## 2023-04-12 RX ADMIN — AMLODIPINE BESYLATE 5 MG: 5 TABLET ORAL at 09:40

## 2023-04-12 RX ADMIN — REMDESIVIR 100 MG: 100 INJECTION, POWDER, LYOPHILIZED, FOR SOLUTION INTRAVENOUS at 23:54

## 2023-04-12 RX ADMIN — Medication 10 ML: at 09:42

## 2023-04-12 RX ADMIN — ASPIRIN 81 MG: 81 TABLET, COATED ORAL at 09:40

## 2023-04-12 RX ADMIN — BUDESONIDE AND FORMOTEROL FUMARATE DIHYDRATE 2 PUFF: 160; 4.5 AEROSOL RESPIRATORY (INHALATION) at 07:59

## 2023-04-12 RX ADMIN — ATORVASTATIN CALCIUM 40 MG: 20 TABLET, FILM COATED ORAL at 09:39

## 2023-04-12 RX ADMIN — GUAIFENESIN 600 MG: 600 TABLET, EXTENDED RELEASE ORAL at 01:21

## 2023-04-12 RX ADMIN — ENOXAPARIN SODIUM 30 MG: 30 INJECTION SUBCUTANEOUS at 23:53

## 2023-04-12 RX ADMIN — Medication 10 ML: at 01:24

## 2023-04-12 RX ADMIN — FUROSEMIDE 40 MG: 40 TABLET ORAL at 09:40

## 2023-04-12 RX ADMIN — ATENOLOL 25 MG: 25 TABLET ORAL at 09:40

## 2023-04-12 RX ADMIN — REMDESIVIR 200 MG: 100 INJECTION, POWDER, LYOPHILIZED, FOR SOLUTION INTRAVENOUS at 02:19

## 2023-04-12 RX ADMIN — FAMOTIDINE 20 MG: 10 INJECTION INTRAVENOUS at 15:29

## 2023-04-12 RX ADMIN — ENOXAPARIN SODIUM 30 MG: 30 INJECTION SUBCUTANEOUS at 01:22

## 2023-04-12 NOTE — PROGRESS NOTES
" LOS: 0 days     Name: Brodie DANIELSON Naive  Age: 89 y.o.  Sex: female  :  10/31/1932  MRN: [id number]         Primary Care Physician: Georgie Diane MD    Subjective   Subjective  Patient reports feeling significantly better today.  Fever has subsided.  No loose stools.  No abdominal pain.  She has little bit of shortness of breath with exertion but none with rest.  On room air.  Denies chest pain or palpitations.    Objective   Vital Signs  Temp:  [99.6 °F (37.6 °C)-103.9 °F (39.9 °C)] 99.6 °F (37.6 °C)  Heart Rate:  [67-99] 80  Resp:  [18-20] 18  BP: (113-172)/(48-65) 136/53  Body mass index is 23.85 kg/m².    Objective:  General Appearance:  Comfortable and in no acute distress (She appears younger than stated age.  Nontoxic).    Vital signs: (most recent): Blood pressure 136/53, pulse 80, temperature 99.6 °F (37.6 °C), temperature source Oral, resp. rate 18, height 165.1 cm (65\"), weight 65 kg (143 lb 4.8 oz), SpO2 94 %.    Lungs:  Normal effort and normal respiratory rate.  There are decreased breath sounds.    Heart: Normal rate.  Regular rhythm.    Abdomen: Abdomen is soft.  Bowel sounds are normal.   There is no abdominal tenderness.     Extremities: There is no dependent edema or local swelling.    Neurological: Patient is alert and oriented to person, place and time.    Skin:  Warm and dry.              Results Review:       I reviewed the patient's new clinical results.    Results from last 7 days   Lab Units 23  0404 23  1745   WBC 10*3/mm3 15.09* 10.49   HEMOGLOBIN g/dL 10.6* 12.5   PLATELETS 10*3/mm3 150 188     Results from last 7 days   Lab Units 23  0404 23  1745   SODIUM mmol/L 134* 133*   POTASSIUM mmol/L 3.8 4.3   CHLORIDE mmol/L 98 92*   CO2 mmol/L 25.0 24.8   BUN mg/dL 26* 28*   CREATININE mg/dL 1.51* 1.53*   CALCIUM mg/dL 8.1* 9.8*   GLUCOSE mg/dL 123* 160*                 Scheduled Meds:   amLODIPine, 5 mg, Oral, Daily  aspirin, 81 mg, Oral, Daily  atenolol, 25 " mg, Oral, BID  atorvastatin, 40 mg, Oral, Daily  budesonide-formoterol, 2 puff, Inhalation, BID - RT  enoxaparin, 30 mg, Subcutaneous, Q24H  famotidine, 20 mg, Intravenous, Daily  furosemide, 40 mg, Oral, Daily  guaiFENesin, 600 mg, Oral, Q12H  [START ON 4/13/2023] remdesivir, 100 mg, Intravenous, Q24H  sodium chloride, 10 mL, Intravenous, Q12H      PRN Meds:   •  acetaminophen **OR** acetaminophen **OR** acetaminophen  •  HYDROcodone-acetaminophen  •  LORazepam  •  melatonin  •  nitroglycerin  •  ondansetron  •  Pharmacy Consult - Remdesivir  •  sodium chloride  •  sodium chloride  •  sodium chloride  Continuous Infusions:  Pharmacy Consult - Remdesivir,         Assessment & Plan   Active Hospital Problems    Diagnosis  POA   • **Pneumonia due to COVID-19 virus [U07.1, J12.82]  Yes   • Bilateral pleural effusion [J90]  Yes   • Hypercalcemia [E83.52]  Yes   • Hyperglycemia [R73.9]  Yes   • Essential hypertension [I10]  Yes   • 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       Assessment & Plan    -Continue remdesivir for nonhypoxic COVID-19 infection given her advanced age.  -Add pulmonary hygiene measures with Mucinex, flutter valve, incentive spirometer  -No evidence of bacterial infection at this time but will repeat white count tomorrow given moderate leukocytosis today  -Renal function appears at baseline with chronic kidney disease stage III  -D-dimer elevated at 3.0.  She does have a prior history of DVT.  Will check lower extremity Doppler.  Hold off on CT angiogram of the chest given she is not requiring oxygen, not tachycardic, and given her chronic kidney disease stage  IIIb.  -Hold Lasix for now  -Repeat COVID progression labs tomorrow    Dispo  Possible discharge tomorrow         Efrain Mooney MD  [facility]  04/12/23  12:28 EDT

## 2023-04-12 NOTE — H&P
Patient Name:  Brodie Mo  YOB: 1932  MRN:  [id number]  Admit Date:  4/11/2023  Patient Care Team:  Georgie Diane MD as PCP - General (Internal Medicine)        Chief Complaint   Patient presents with   • Shortness of Breath   • Fever   Duration of 1 day.    History Present Illness     A very pleasant 89 years old white female with a past history of coronary artery disease status post CABG/hypertension/mitral valve regurgitation status post clipping/chronic diastolic congestive heart failure/stage IIIb chronic renal failure/bladder cancer/DVT/dyslipidemia who has received her 2 COVID vaccination but no boosters she presents to the emergency department with fever/chills/cough productive of yellowish sputum/exertional dyspnea over the last 24 hours associated with weakness.  No body ache.  No chest pain.  No PND.  No palpitation.  No ankle edema.  No hemoptysis.  No wheezing.  In the emergency department respiratory PCR panel was positive for COVID.  Blood cultures were taken.  Lactic acid and procalcitonin were normal.  CMP normal except a random blood sugar of 160, BUN 28, creatinine 1.53, sodium 133, chloride 92, calcium 9.8.  EKG with normal sinus rhythm and left atrial enlargement with right axis deviation and ST-T wave depression in the inferior and lateral leads that are old.  Chest x-ray with bilateral basilar pneumonia and a small to moderate pleural fluid bilaterally.  Patient was subsequently admitted.        Review of Systems   Cardiovascular/respiratory.  As above  GI.  No dysphagia or odynophagia.  No abdominal pain.  No heartburn.  No nausea or vomiting.  Positive loose stools without fresh bright blood per rectum or melena.  .  Positive stress incontinence.  No dysuria or hematuria.  No urgency or frequency.  CNS.  No loss of consciousness.  No dizziness.  No focal neurological symptoms.      Personal History     Past Medical History:   Diagnosis Date   • CAD  MD Notification    Notified Person: MD    Notified Person Name: Alexys    Notification Date/Time: 1327 10/29    Notification Interaction: Web page    Purpose of Notification: Pt pulling at IV line with IV vanco infusion. IV infiltrated, IV stopped and cold compress applied. Can we get midline or PICC placed? Pt has gone through multiple PIV.     Orders Received: PRN Zyprex daily. Vacuolar access consult.     Comments:   (coronary artery disease)    • Cancer     bladder    • Deep vein thrombosis     Left leg-2002   • Health care maintenance    • HTN (hypertension)    • Hyperlipidemia    • LVH (left ventricular hypertrophy)    • Pneumonia    • S/P CABG (coronary artery bypass graft)      Past Surgical History:   Procedure Laterality Date   • BLADDER SURGERY      cancer   • CARPAL TUNNEL RELEASE Right 12/15/2021    Procedure: RIGHT CARPAL TUNNEL RELEASE WITH POSSIBLE SYNOVECTOMY;  Surgeon: Georgie Hines MD;  Location: [facility] MAIN OR;  Service: Plastics;  Laterality: Right;   • CORONARY ARTERY BYPASS GRAFT      2002 with Dr Prabhakar; AGUAYO to LAD and SVG to OM2; 2005 cath all grafts open and 30% dx in RCA; nl stress in 2010, 2013   • HYSTERECTOMY     • MITRAL VALVE REPAIR/REPLACEMENT N/A 9/2/2020    Procedure: TRANSCATHETER MITRAL VALVE REPAIR;  Surgeon: Edward Humphreys MD;  Location: [facility] INVASIVE LOCATION;  Service: Cardiovascular;  Laterality: N/A;     Family History   Problem Relation Age of Onset   • Heart disease Mother    • Kidney cancer Father    • No Known Problems Maternal Grandmother    • No Known Problems Maternal Grandfather    • No Known Problems Paternal Grandmother    • No Known Problems Paternal Grandfather    • Heart disease Sister    • Heart disease Brother      Social History     Tobacco Use   • Smoking status: Former   • Smokeless tobacco: Never   • Tobacco comments:     CAFFEINE USE: 2  CUPS COFFEE DAILY   Vaping Use   • Vaping Use: Never used   Substance Use Topics   • Alcohol use: Yes     Alcohol/week: 1.0 standard drink     Types: 1 Shots of liquor per week     Comment: 1 DRINK DAILY   • Drug use: No     No current facility-administered medications on file prior to encounter.     Current Outpatient Medications on File Prior to Encounter   Medication Sig Dispense Refill   • amLODIPine (NORVASC) 5 MG tablet TAKE 1 TABLET BY MOUTH EVERY DAY 90 tablet 3   • aspirin 81 MG tablet Take 1 tablet by  mouth Daily.     • atenolol (TENORMIN) 25 MG tablet Take 1 tablet by mouth 2 (Two) Times a Day.     • atorvastatin (LIPITOR) 40 MG tablet TAKE 1 TABLET BY MOUTH EVERY DAY 90 tablet 3   • B Complex Vitamins (vitamin b complex) capsule capsule Take 1 capsule by mouth 2 (Two) Times a Week.     • cholecalciferol (VITAMIN D3) 1000 units tablet Take 1 tablet by mouth Daily.     • coenzyme Q10 100 MG capsule Take 1 capsule by mouth Daily.     • furosemide (LASIX) 40 MG tablet TAKE 1 TABLET BY MOUTH EVERY DAY 90 tablet 1   • lisinopril (PRINIVIL,ZESTRIL) 40 MG tablet TAKE 1 TABLET BY MOUTH EVERY DAY 90 tablet 3     Allergies   Allergen Reactions   • No Known Drug Allergy        Objective    Objective     Vital Signs  Temp:  [102.5 °F (39.2 °C)-103.9 °F (39.9 °C)] 102.5 °F (39.2 °C)  Heart Rate:  [67-99] 67  Resp:  [18-20] 18  BP: (113-172)/(48-65) 113/65  SpO2:  [90 %-95 %] 95 %  on   ;   Device (Oxygen Therapy): room air  Body mass index is 23.13 kg/m².    Physical Exam  General.  Elderly female.  She is alert and oriented x3.  In no apparent pain/distress/diaphoresis.  Does not appear toxic or acutely ill.  Eyes.  Is equal round and reactive.  Intact extraocular musculature.  No pallor or jaundice.  Oral cavity.  Moist mucous membrane with no lesions.  Neck.  Supple.  No JVD.  No lymphadenopathy or thyromegaly  Cardiovascular.  Regular rate and rhythm and grade 2 systolic murmur.  Chest.  Poor bilateral air entry with bilateral inspiratory and expiratory rhonchi and scattered bilateral crackles  Abdomen.  Soft lax.  No tenderness.  No organomegaly.  No guarding or rebound  Extremities.  No clubbing/cyanosis/edema.  CNS.  No acute focal neurological deficits      Results Review:  I reviewed the patient's new clinical results.  I reviewed the patient's new imaging results and agree with the interpretation.  I reviewed the patient's other test results and agree with the interpretation  I personally viewed and interpreted the  patient's EKG/Telemetry data  Discussed with ED provider.    Lab Results (last 24 hours)     Procedure Component Value Units Date/Time    CBC & Differential [[id number]]  (Abnormal) Collected: 04/11/23 1745    Specimen: Blood Updated: 04/11/23 1759    Narrative:      The following orders were created for panel order CBC & Differential.  Procedure                               Abnormality         Status                     ---------                               -----------         ------                     CBC Auto Differential[[id number]]        Abnormal            Final result                 Please view results for these tests on the individual orders.    Comprehensive Metabolic Panel [[id number]]  (Abnormal) Collected: 04/11/23 1745    Specimen: Blood Updated: 04/11/23 1822     Glucose 160 mg/dL      BUN 28 mg/dL      Creatinine 1.53 mg/dL      Sodium 133 mmol/L      Potassium 4.3 mmol/L      Comment: Slight hemolysis detected by analyzer. Results may be affected.        Chloride 92 mmol/L      CO2 24.8 mmol/L      Calcium 9.8 mg/dL      Total Protein 7.5 g/dL      Albumin 4.2 g/dL      ALT (SGPT) 15 U/L      AST (SGOT) 31 U/L      Alkaline Phosphatase 79 U/L      Total Bilirubin 0.4 mg/dL      Globulin 3.3 gm/dL      A/G Ratio 1.3 g/dL      BUN/Creatinine Ratio 18.3     Anion Gap 16.2 mmol/L      eGFR 32.2 mL/min/1.73     Narrative:      GFR Normal >60  Chronic Kidney Disease <60  Kidney Failure <15    The GFR formula is only valid for adults with stable renal function between ages 18 and 70.    BNP [[id number]]  (Abnormal) Collected: 04/11/23 1745    Specimen: Blood Updated: 04/11/23 1819     proBNP 2,320.0 pg/mL     Narrative:      Among patients with dyspnea, NT-proBNP is highly sensitive for the detection of acute congestive heart failure. In addition NT-proBNP of <300 pg/ml effectively rules out acute congestive heart failure with 99% negative predictive value.    Results may be falsely decreased if patient  taking Biotin.      Single High Sensitivity Troponin T [[id number]]  (Abnormal) Collected: 04/11/23 1745    Specimen: Blood Updated: 04/11/23 1822     HS Troponin T 23 ng/L     Narrative:      High Sensitive Troponin T Reference Range:  <10.0 ng/L- Negative Female for AMI  <15.0 ng/L- Negative Male for AMI  >=10 - Abnormal Female indicating possible myocardial injury.  >=15 - Abnormal Male indicating possible myocardial injury.   Clinicians would have to utilize clinical acumen, EKG, Troponin, and serial changes to determine if it is an Acute Myocardial Infarction or myocardial injury due to an underlying chronic condition.         CBC Auto Differential [[id number]]  (Abnormal) Collected: 04/11/23 1745    Specimen: Blood Updated: 04/11/23 1759     WBC 10.49 10*3/mm3      RBC 4.25 10*6/mm3      Hemoglobin 12.5 g/dL      Hematocrit 38.2 %      MCV 89.9 fL      MCH 29.4 pg      MCHC 32.7 g/dL      RDW 13.3 %      RDW-SD 43.1 fl      MPV 11.1 fL      Platelets 188 10*3/mm3      Neutrophil % 84.4 %      Lymphocyte % 5.2 %      Monocyte % 9.3 %      Eosinophil % 0.4 %      Basophil % 0.3 %      Immature Grans % 0.4 %      Neutrophils, Absolute 8.85 10*3/mm3      Lymphocytes, Absolute 0.55 10*3/mm3      Monocytes, Absolute 0.98 10*3/mm3      Eosinophils, Absolute 0.04 10*3/mm3      Basophils, Absolute 0.03 10*3/mm3      Immature Grans, Absolute 0.04 10*3/mm3      nRBC 0.0 /100 WBC     Procalcitonin [[id number]]  (Normal) Collected: 04/11/23 1745    Specimen: Blood Updated: 04/11/23 1902     Procalcitonin 0.20 ng/mL     Narrative:      As a Marker for Sepsis (Non-Neonates):    1. <0.5 ng/mL represents a low risk of severe sepsis and/or septic shock.  2. >2 ng/mL represents a high risk of severe sepsis and/or septic shock.    As a Marker for Lower Respiratory Tract Infections that require antibiotic therapy:    PCT on Admission    Antibiotic Therapy       6-12 Hrs later    >0.5                Strongly Recommended  >0.25 - <0.5   "      Recommended   0.1 - 0.25          Discouraged              Remeasure/reassess PCT  <0.1                Strongly Discouraged     Remeasure/reassess PCT    As 28 day mortality risk marker: \"Change in Procalcitonin Result\" (>80% or <=80%) if Day 0 (or Day 1) and Day 4 values are available. Refer to http://www.[employer] [facility] [place]pct[place]calculator[facility]    Change in PCT <=80%  A decrease of PCT levels below or equal to 80% defines a positive change in PCT test result representing a higher risk for 28-day all-cause mortality of patients diagnosed with severe sepsis for septic shock.    Change in PCT >80%  A decrease of PCT levels of more than 80% defines a negative change in PCT result representing a lower risk for 28-day all-cause mortality of patients diagnosed with severe sepsis or septic shock.       Blood Culture - Blood, Arm, Right [[id number]] Collected: 04/11/23 1902    Specimen: Blood from Arm, Right Updated: 04/11/23 1919    Blood Culture - Blood, Arm, Left [[id number]] Collected: 04/11/23 1902    Specimen: Blood from Arm, Left Updated: 04/11/23 1919    Lactic Acid, Plasma [[id number]]  (Normal) Collected: 04/11/23 1902    Specimen: Blood from Arm, Right Updated: 04/11/23 2014     Lactate 1.3 mmol/L     Respiratory Panel PCR w/COVID-19(SARS-CoV-2) MINNIE/SCOTT/TIFFANY/PAD/COR/MAD/AYANNA In-House, NP Swab in UTM/VTM, 3-4 HR TAT - Swab, Nasopharynx [[id number]]  (Abnormal) Collected: 04/11/23 1903    Specimen: Swab from Nasopharynx Updated: 04/11/23 2018     ADENOVIRUS, PCR Not Detected     Coronavirus 229E Not Detected     Coronavirus HKU1 Not Detected     Coronavirus NL63 Not Detected     Coronavirus OC43 Not Detected     COVID19 Detected     Human Metapneumovirus Not Detected     Human Rhinovirus/Enterovirus Not Detected     Influenza A PCR Not Detected     Influenza B PCR Not Detected     Parainfluenza Virus 1 Not Detected     Parainfluenza Virus 2 Not Detected     Parainfluenza Virus 3 Not Detected     Parainfluenza Virus 4 Not " Detected     RSV, PCR Not Detected     Bordetella pertussis pcr Not Detected     Bordetella parapertussis PCR Not Detected     Chlamydophila pneumoniae PCR Not Detected     Mycoplasma pneumo by PCR Not Detected    Narrative:      In the setting of a positive respiratory panel with a viral infection PLUS a negative procalcitonin without other underlying concern for bacterial infection, consider observing off antibiotics or discontinuation of antibiotics and continue supportive care. If the respiratory panel is positive for atypical bacterial infection (Bordetella pertussis, Chlamydophila pneumoniae, or Mycoplasma pneumoniae), consider antibiotic de-escalation to target atypical bacterial infection.          Imaging Results (Last 24 Hours)     Procedure Component Value Units Date/Time    XR Chest 1 View [[id number]] Collected: 04/11/23 1841     Updated: 04/11/23 1938    Narrative:      CHEST SINGLE VIEW     HISTORY: Shortness of air with cough     COMPARISON: AP chest 01/11/2023     FINDINGS: Heart size is enlarged. The patient is rotated to the right.  Sternotomy wires are present. There are small-to-moderate bilateral  pleural effusions and there is adjacent basilar opacity either  atelectasis or infiltrates. There is no evidence for perihilar edema and  the mid to upper lungs are comparatively clear.       Impression:      Small-to-moderate pleural effusions with adjacent basilar  atelectasis or infiltrates. Cardiomegaly.     This report was finalized on 4/11/2023 7:35 PM by Dr. Coleman Murphy M.D.             Results for orders placed during the hospital encounter of 01/24/23    Adult Transthoracic Echo Complete W/ Cont if Necessary Per Protocol    Interpretation Summary  •  Left ventricular systolic function is normal. Left ventricular ejection fraction appears to be 61 - 65%.  •  Left ventricular diastolic function is consistent with (grade II w/high LAP) pseudonormalization.  •  Estimated right ventricular  systolic pressure from tricuspid regurgitation is moderately elevated (45-55 mmHg).  •  There is a MitraClip in stable position on the central aspect of A2-P2. Mean transvalvular gradient 4 mmHg. Mitral valve area by pressure half-time 2.9 cm². Mild mitral valve regurgitation is present. No significant mitral valve stenosis is present.  Left atrial volume is 82 mL.      ECG 12 Lead ED Triage Standing Order; SOA   Final Result   HEART RATE= 82  bpm   RR Interval= 732  ms   WY Interval= 153  ms   P Horizontal Axis= 30  deg   P Front Axis= 95  deg   QRSD Interval= 75  ms   QT Interval= 344  ms   QRS Axis= 98  deg   T Wave Axis= -64  deg   - ABNORMAL ECG -   Sinus rhythm   Consider left atrial enlargement   Right axis deviation   Consider left ventricular hypertrophy   Repol abnrm suggests ischemia, diffuse leads   When compared with ECG of 11-Jan-2023 16:05:47,   Significant rate increase   Inferolateral T wave inversions are new   Electronically Signed By: Kamari Smith ([facility]) 11-Apr-2023 18:13:23   Date and Time of Study: 2023-04-11 17:46:22               Assessment/Plan     Active Hospital Problems    Diagnosis  POA   • **Pneumonia due to COVID-19 virus [U07.1, J12.82]  Yes   • Bilateral pleural effusion [J90]  Yes   • Hypercalcemia [E83.52]  Yes   • Hyperglycemia [R73.9]  Yes   • Essential hypertension [I10]  Yes   • 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           · Bilateral COVID-pneumonia/small to moderate bilateral pleural effusion.  There is no evidence of hypoxemia.  Patient is vaccinated but no boosters.  Procalcitonin and lactic acid are normal.  No leukocytosis.  Plan  remdesivir and monitor renal function closely.  No indications for steroids.  We will check blood and sputum cultures.  We will check urine Legionella and Streptococcus antigen.  No antibiotics will be employed at this time.  I believe the pleural effusion is secondary to history of chronic diastolic congestive heart failure.  · Stage IIIb chronic renal failure/hypercalcemia.  Volume status appears to be normal.  Renal function and high calcium are stable from before.  We will stop vitamin D supplementation.  We will check parathyroid hormone and vitamin D level.  Will hold Prinivil at this time.  · Hyperglycemia.  Will check A1c.  · History of coronary artery disease/chronic diastolic congestive heart failure/hypertension/mitral regurgitation s/p mitral clip.  Last 2D echo on 1/23 revealing a normal ejection fraction/grade 2 diastolic dysfunction/tricuspid regurgitation/right ventricular increased pressure/mitral clip with no stenosis.  Troponin and proBNP are elevated.  There is no evidence of angina and no clinical evidence of congestive heart failure.  EKG without acute new ischemic changes.  We will continue Norvasc/aspirin/atenolol/Lasix.  Pleural fluids needs to be monitored.  I will stop ACE inhibitors because of the renal failure and normal ejection fraction.  · Hyperlipidemia.  Continue aspirin and Lipitor  · History of DVT.  No acute evidence of PE or DVT.  · VTE prophylaxis.  Lovenox.      Discussed my findings and plan of treatment with the patient/ER provider    Code Status -full code       Wilder Schrieber MD  [facility]  04/11/23  22:37 EDT

## 2023-04-12 NOTE — PROGRESS NOTES
[facility]  Clinical Pharmacy Department     Remdesivir Review Note    Aydenmine X Naive is a 89 y.o. female with confirmed COVID-19 infection on day 1 of hospitalization.     Consulting Provider:  Dr Schreiber  Date of Confirmed SARS-CoV-2: 4/11/23  Date of Symptom Onset: 4/10/23  Other Antimicrobials: No  Hydroxychloroquine or chloroquine prior to arrival: No    Allergies  Allergies as of 04/11/2023 - Reviewed 04/11/2023   Allergen Reaction Noted    No known drug allergy  12/07/2016       Microbiology:  Microbiology Results (last 10 days)       Procedure Component Value - Date/Time    Respiratory Panel PCR w/COVID-19(SARS-CoV-2) MINNIE/SCOTT/TIFFANY/PAD/COR/MAD/AYANNA In-House, NP Swab in UTM/VTM, 3-4 HR TAT - Swab, Nasopharynx [[id number]]  (Abnormal) Collected: 04/11/23 1903    Lab Status: Final result Specimen: Swab from Nasopharynx Updated: 04/11/23 2018     ADENOVIRUS, PCR Not Detected     Coronavirus 229E Not Detected     Coronavirus HKU1 Not Detected     Coronavirus NL63 Not Detected     Coronavirus OC43 Not Detected     COVID19 Detected     Human Metapneumovirus Not Detected     Human Rhinovirus/Enterovirus Not Detected     Influenza A PCR Not Detected     Influenza B PCR Not Detected     Parainfluenza Virus 1 Not Detected     Parainfluenza Virus 2 Not Detected     Parainfluenza Virus 3 Not Detected     Parainfluenza Virus 4 Not Detected     RSV, PCR Not Detected     Bordetella pertussis pcr Not Detected     Bordetella parapertussis PCR Not Detected     Chlamydophila pneumoniae PCR Not Detected     Mycoplasma pneumo by PCR Not Detected    Narrative:      In the setting of a positive respiratory panel with a viral infection PLUS a negative procalcitonin without other underlying concern for bacterial infection, consider observing off antibiotics or discontinuation of antibiotics and continue supportive care. If the respiratory panel is positive for atypical bacterial infection (Bordetella pertussis, Chlamydophila  "pneumoniae, or Mycoplasma pneumoniae), consider antibiotic de-escalation to target atypical bacterial infection.            Vitals/Labs/I&O  Visit Vitals  /53 (BP Location: Left arm, Patient Position: Sitting)   Pulse 70   Temp 99.6 °F (37.6 °C) (Oral)   Resp 18   Ht 165.1 cm (65\")   Wt 65 kg (143 lb 4.8 oz)   SpO2 94%   BMI 23.85 kg/m²       Results from last 7 days   Lab Units 04/11/23  1745   WBC 10*3/mm3 10.49     Results from last 7 days   Lab Units 04/11/23  1745   PROCALCITONIN ng/mL 0.20     Results from last 7 days   Lab Units 04/11/23  1745   AST (SGOT) U/L 31      Results from last 7 days   Lab Units 04/11/23  1745   ALT (SGPT) U/L 15       Estimated Creatinine Clearance: 25.1 mL/min (A) (by C-G formula based on SCr of 1.53 mg/dL (H)).  Results from last 7 days   Lab Units 04/11/23  1745   BUN mg/dL 28*   CREATININE mg/dL 1.53*     Intake & Output (last 3 days)         04/09 0701  04/10 0700 04/10 0701  04/11 0700 04/11 0701 04/12 0700    IV Piggyback   1000    Total Intake(mL/kg)   1000 (15.4)    Net   +1000           Urine Unmeasured Occurrence   1 x            Assessment/Plan:    Patient meets the following inclusion/exclusion criteria:  Patient is hospitalized with confirmed COVID-19 infection (and accompanying symptoms)  Patient meets one of the two below criteria:  Patient is symptomatic but not requiring supplemental oxygen or an increase in baseline oxygen AND has at least one of the below high risk criteria for progression to severe illness. High risk criteria: I,iii,v  Age >/= 65  Cancer  Cardiovascular diseases (HF, CAD, or cardiomyopathies)  CKD  Chronic lung disease (COPD, CF, interstitial lung disease, PE, pulmonary hypertension, bronchopulmonary dysplasia, bronchiectasis)  Diabetes mellitis (insulin dependent or poorly controlled)  Immunocompromising conditions or receipt of immunosuppressive medications  Obesity (BMI > 35 kg/m2)  Pregnancy and recent pregnancy (within 7 days of " delivery)  Sickle cell disease  Baseline and daily LFTs and Scr have been ordered prior to remdesivir initiation  ALT is not ? 10 times the upper limit of normal  Patient is not on concomitant hydroxychloroquine or chloroquine  Patient does not require invasive mechanical ventilation (IMV) or ECMO  Patient is within 10 days from symptom onset (for criteria 2a) or within 7 days of symptom onset (for criteria 2b)    Remdesivir 200 mg IV x 1 dose, followed by 100 mg IV q24h for 2 days or until hospital discharge (whichever comes first) has been ordered.    Thank you for involving pharmacy in this patient's care. Please contact pharmacy with any questions or concerns.                           Mehran Griffin [facility]  Clinical Pharmacist

## 2023-04-12 NOTE — PLAN OF CARE
Goal Outcome Evaluation:  Plan of Care Reviewed With: patient        Progress: improving  Outcome Evaluation: A&Ox4, vss, 1.5L nc, SR, ad jessica, denies pain.

## 2023-04-13 ENCOUNTER — READMISSION MANAGEMENT (OUTPATIENT)
Dept: CALL CENTER | Facility: HOSPITAL | Age: 88
End: 2023-04-13
Payer: MEDICARE

## 2023-04-13 ENCOUNTER — APPOINTMENT (OUTPATIENT)
Dept: CARDIOLOGY | Facility: HOSPITAL | Age: 88
End: 2023-04-13
Payer: MEDICARE

## 2023-04-13 VITALS
DIASTOLIC BLOOD PRESSURE: 57 MMHG | OXYGEN SATURATION: 95 % | HEIGHT: 65 IN | SYSTOLIC BLOOD PRESSURE: 142 MMHG | BODY MASS INDEX: 23.88 KG/M2 | TEMPERATURE: 99.1 F | HEART RATE: 67 BPM | WEIGHT: 143.3 LBS | RESPIRATION RATE: 18 BRPM

## 2023-04-13 LAB
ALBUMIN SERPL-MCNC: 3.2 G/DL (ref 3.5–5.2)
ALBUMIN/GLOB SERPL: 1.1 G/DL
ALP SERPL-CCNC: 63 U/L (ref 39–117)
ALT SERPL W P-5'-P-CCNC: 15 U/L (ref 1–33)
ANION GAP SERPL CALCULATED.3IONS-SCNC: 9 MMOL/L (ref 5–15)
AST SERPL-CCNC: 36 U/L (ref 1–32)
BACTERIA SPEC RESP CULT: NORMAL
BASOPHILS # BLD AUTO: 0.03 10*3/MM3 (ref 0–0.2)
BASOPHILS NFR BLD AUTO: 0.2 % (ref 0–1.5)
BH CV LOWER VASCULAR LEFT COMMON FEMORAL AUGMENT: NORMAL
BH CV LOWER VASCULAR LEFT COMMON FEMORAL COMPETENT: NORMAL
BH CV LOWER VASCULAR LEFT COMMON FEMORAL COMPRESS: NORMAL
BH CV LOWER VASCULAR LEFT COMMON FEMORAL PHASIC: NORMAL
BH CV LOWER VASCULAR LEFT COMMON FEMORAL SPONT: NORMAL
BH CV LOWER VASCULAR LEFT DISTAL FEMORAL COMPRESS: NORMAL
BH CV LOWER VASCULAR LEFT GASTRONEMIUS COMPRESS: NORMAL
BH CV LOWER VASCULAR LEFT GREATER SAPH AK COMPRESS: NORMAL
BH CV LOWER VASCULAR LEFT GREATER SAPH BK COMPRESS: NORMAL
BH CV LOWER VASCULAR LEFT LESSER SAPH COMPRESS: NORMAL
BH CV LOWER VASCULAR LEFT MID FEMORAL AUGMENT: NORMAL
BH CV LOWER VASCULAR LEFT MID FEMORAL COMPETENT: NORMAL
BH CV LOWER VASCULAR LEFT MID FEMORAL COMPRESS: NORMAL
BH CV LOWER VASCULAR LEFT MID FEMORAL PHASIC: NORMAL
BH CV LOWER VASCULAR LEFT MID FEMORAL SPONT: NORMAL
BH CV LOWER VASCULAR LEFT PERONEAL COMPRESS: NORMAL
BH CV LOWER VASCULAR LEFT POPLITEAL AUGMENT: NORMAL
BH CV LOWER VASCULAR LEFT POPLITEAL COMPETENT: NORMAL
BH CV LOWER VASCULAR LEFT POPLITEAL COMPRESS: NORMAL
BH CV LOWER VASCULAR LEFT POPLITEAL PHASIC: NORMAL
BH CV LOWER VASCULAR LEFT POPLITEAL SPONT: NORMAL
BH CV LOWER VASCULAR LEFT POSTERIOR TIBIAL COMPRESS: NORMAL
BH CV LOWER VASCULAR LEFT PROFUNDA FEMORAL COMPRESS: NORMAL
BH CV LOWER VASCULAR LEFT PROXIMAL FEMORAL COMPRESS: NORMAL
BH CV LOWER VASCULAR LEFT SAPHENOFEMORAL JUNCTION COMPRESS: NORMAL
BH CV LOWER VASCULAR RIGHT COMMON FEMORAL AUGMENT: NORMAL
BH CV LOWER VASCULAR RIGHT COMMON FEMORAL COMPETENT: NORMAL
BH CV LOWER VASCULAR RIGHT COMMON FEMORAL COMPRESS: NORMAL
BH CV LOWER VASCULAR RIGHT COMMON FEMORAL PHASIC: NORMAL
BH CV LOWER VASCULAR RIGHT COMMON FEMORAL SPONT: NORMAL
BH CV LOWER VASCULAR RIGHT DISTAL FEMORAL COMPRESS: NORMAL
BH CV LOWER VASCULAR RIGHT GASTRONEMIUS COMPRESS: NORMAL
BH CV LOWER VASCULAR RIGHT GREATER SAPH AK COMPRESS: NORMAL
BH CV LOWER VASCULAR RIGHT GREATER SAPH BK COMPRESS: NORMAL
BH CV LOWER VASCULAR RIGHT LESSER SAPH COMPRESS: NORMAL
BH CV LOWER VASCULAR RIGHT LESSER SAPH THROMBUS: NORMAL
BH CV LOWER VASCULAR RIGHT MID FEMORAL AUGMENT: NORMAL
BH CV LOWER VASCULAR RIGHT MID FEMORAL COMPETENT: NORMAL
BH CV LOWER VASCULAR RIGHT MID FEMORAL COMPRESS: NORMAL
BH CV LOWER VASCULAR RIGHT MID FEMORAL PHASIC: NORMAL
BH CV LOWER VASCULAR RIGHT MID FEMORAL SPONT: NORMAL
BH CV LOWER VASCULAR RIGHT PERONEAL COMPRESS: NORMAL
BH CV LOWER VASCULAR RIGHT POPLITEAL AUGMENT: NORMAL
BH CV LOWER VASCULAR RIGHT POPLITEAL COMPETENT: NORMAL
BH CV LOWER VASCULAR RIGHT POPLITEAL COMPRESS: NORMAL
BH CV LOWER VASCULAR RIGHT POPLITEAL PHASIC: NORMAL
BH CV LOWER VASCULAR RIGHT POPLITEAL SPONT: NORMAL
BH CV LOWER VASCULAR RIGHT POSTERIOR TIBIAL COMPRESS: NORMAL
BH CV LOWER VASCULAR RIGHT PROFUNDA FEMORAL COMPRESS: NORMAL
BH CV LOWER VASCULAR RIGHT PROXIMAL FEMORAL COMPRESS: NORMAL
BH CV LOWER VASCULAR RIGHT SAPHENOFEMORAL JUNCTION COMPRESS: NORMAL
BILIRUB SERPL-MCNC: 0.3 MG/DL (ref 0–1.2)
BUN SERPL-MCNC: 26 MG/DL (ref 8–23)
BUN/CREAT SERPL: 20.3 (ref 7–25)
CALCIUM SPEC-SCNC: 8.5 MG/DL (ref 8.2–9.6)
CHLORIDE SERPL-SCNC: 99 MMOL/L (ref 98–107)
CO2 SERPL-SCNC: 25 MMOL/L (ref 22–29)
CREAT SERPL-MCNC: 1.28 MG/DL (ref 0.57–1)
CRP SERPL-MCNC: 16.13 MG/DL (ref 0–0.5)
D DIMER PPP FEU-MCNC: 3.2 MCGFEU/ML (ref 0–0.9)
DEPRECATED RDW RBC AUTO: 43 FL (ref 37–54)
EGFRCR SERPLBLD CKD-EPI 2021: 39.9 ML/MIN/1.73
EOSINOPHIL # BLD AUTO: 0.11 10*3/MM3 (ref 0–0.4)
EOSINOPHIL NFR BLD AUTO: 0.9 % (ref 0.3–6.2)
ERYTHROCYTE [DISTWIDTH] IN BLOOD BY AUTOMATED COUNT: 13.4 % (ref 12.3–15.4)
GLOBULIN UR ELPH-MCNC: 2.9 GM/DL
GLUCOSE SERPL-MCNC: 102 MG/DL (ref 65–99)
GRAM STN SPEC: NORMAL
HCT VFR BLD AUTO: 31.7 % (ref 34–46.6)
HGB BLD-MCNC: 10.6 G/DL (ref 12–15.9)
IMM GRANULOCYTES # BLD AUTO: 0.07 10*3/MM3 (ref 0–0.05)
IMM GRANULOCYTES NFR BLD AUTO: 0.6 % (ref 0–0.5)
LYMPHOCYTES # BLD AUTO: 1.04 10*3/MM3 (ref 0.7–3.1)
LYMPHOCYTES NFR BLD AUTO: 8.5 % (ref 19.6–45.3)
MAXIMAL PREDICTED HEART RATE: 130 BPM
MCH RBC QN AUTO: 29.4 PG (ref 26.6–33)
MCHC RBC AUTO-ENTMCNC: 33.4 G/DL (ref 31.5–35.7)
MCV RBC AUTO: 87.8 FL (ref 79–97)
MONOCYTES # BLD AUTO: 0.92 10*3/MM3 (ref 0.1–0.9)
MONOCYTES NFR BLD AUTO: 7.5 % (ref 5–12)
NEUTROPHILS NFR BLD AUTO: 10.02 10*3/MM3 (ref 1.7–7)
NEUTROPHILS NFR BLD AUTO: 82.3 % (ref 42.7–76)
NRBC BLD AUTO-RTO: 0 /100 WBC (ref 0–0.2)
PLATELET # BLD AUTO: 152 10*3/MM3 (ref 140–450)
PMV BLD AUTO: 11.6 FL (ref 6–12)
POTASSIUM SERPL-SCNC: 3.8 MMOL/L (ref 3.5–5.2)
PROT SERPL-MCNC: 6.1 G/DL (ref 6–8.5)
RBC # BLD AUTO: 3.61 10*6/MM3 (ref 3.77–5.28)
SODIUM SERPL-SCNC: 133 MMOL/L (ref 136–145)
STRESS TARGET HR: 111 BPM
WBC NRBC COR # BLD: 12.19 10*3/MM3 (ref 3.4–10.8)

## 2023-04-13 PROCEDURE — 94760 N-INVAS EAR/PLS OXIMETRY 1: CPT

## 2023-04-13 PROCEDURE — 86140 C-REACTIVE PROTEIN: CPT | Performed by: INTERNAL MEDICINE

## 2023-04-13 PROCEDURE — 96376 TX/PRO/DX INJ SAME DRUG ADON: CPT

## 2023-04-13 PROCEDURE — 85025 COMPLETE CBC W/AUTO DIFF WBC: CPT | Performed by: INTERNAL MEDICINE

## 2023-04-13 PROCEDURE — 93970 EXTREMITY STUDY: CPT

## 2023-04-13 PROCEDURE — 94664 DEMO&/EVAL PT USE INHALER: CPT

## 2023-04-13 PROCEDURE — 85379 FIBRIN DEGRADATION QUANT: CPT | Performed by: INTERNAL MEDICINE

## 2023-04-13 PROCEDURE — 94799 UNLISTED PULMONARY SVC/PX: CPT

## 2023-04-13 PROCEDURE — 80053 COMPREHEN METABOLIC PANEL: CPT | Performed by: INTERNAL MEDICINE

## 2023-04-13 PROCEDURE — 94761 N-INVAS EAR/PLS OXIMETRY MLT: CPT

## 2023-04-13 PROCEDURE — G0378 HOSPITAL OBSERVATION PER HR: HCPCS

## 2023-04-13 RX ORDER — GUAIFENESIN 600 MG/1
600 TABLET, EXTENDED RELEASE ORAL EVERY 12 HOURS SCHEDULED
Start: 2023-04-13

## 2023-04-13 RX ADMIN — ATENOLOL 25 MG: 25 TABLET ORAL at 09:55

## 2023-04-13 RX ADMIN — ASPIRIN 81 MG: 81 TABLET, COATED ORAL at 09:55

## 2023-04-13 RX ADMIN — Medication 10 ML: at 09:56

## 2023-04-13 RX ADMIN — BUDESONIDE AND FORMOTEROL FUMARATE DIHYDRATE 2 PUFF: 160; 4.5 AEROSOL RESPIRATORY (INHALATION) at 07:18

## 2023-04-13 RX ADMIN — ATORVASTATIN CALCIUM 40 MG: 20 TABLET, FILM COATED ORAL at 09:55

## 2023-04-13 RX ADMIN — AMLODIPINE BESYLATE 5 MG: 5 TABLET ORAL at 09:55

## 2023-04-13 RX ADMIN — FAMOTIDINE 20 MG: 10 INJECTION INTRAVENOUS at 09:56

## 2023-04-13 RX ADMIN — GUAIFENESIN 600 MG: 600 TABLET, EXTENDED RELEASE ORAL at 09:56

## 2023-04-13 NOTE — DISCHARGE SUMMARY
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       DETAILS OF HOSPITAL STAY     Pertinent Test Results and Procedures Performed    CXR:  Small-to-moderate pleural effusions with adjacent basilar   atelectasis or infiltrates. Cardiomegaly.     Bilateral lower extremity Doppler:  •  Normal bilateral lower extremity venous duplex scan.   •  Venous pulsatility is noted bilaterally.     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  She presented to the emergency room with fever, chills, productive cough, and exertional dyspnea ongoing for 24 hours.  She was found to be positive for COVID-19.  Please see H&P for full details.  She briefly required oxygen upon initial day of hospitalization.  Given her advanced age she was started on a short course of remdesivir.  She did not require any steroids.  She was quickly weaned off of oxygen and feels significantly better at this point with resolution of her presenting symptoms.  She has been afebrile for over 24 hours.  Her cough is mildly productive and she denies any shortness of breath.  Her lungs sound clear today.  She looks and feels very well today.  As part of her work-up she did receive a  bilateral lower extremity Doppler for mildly elevated D-dimer.  This was negative.  Given her rapid improvement along with the fact that she is not having any tachycardia, chest pain, palpitations, hypoxia and with her chronic kidney disease stage III I elected not to expose her to contrast dye for purposes of CT angiogram of the chest.  I have instructed the patient on what to look out for at home with regards to need for reevaluation by medical professional including increasing shortness of breath, recurrence and persistence of fever, red/hot/swollen extremities, or inability to tolerate p.o.  Again, she is doing extremely well and looks great today.  She request discharge home and I will release her in stable condition.      Issues for follow-up  I recommend she have a repeat chest x-ray in 3 to 4 weeks      Physical Exam at Discharge:  General: No acute distress, AAOx3, appears younger than stated age  HEENT: EOMI, PERRL  Cardiovascular: +s1 and s2, RRR  Lungs: No rhonchi or wheezing  Abdomen: soft, nontender    Consults:   Consults     Date and Time Order Name Status Description    4/11/2023  6:43 PM [facility] (on-call MD unless specified) Details              Condition on Discharge: Stable, improved    Discharge Disposition  Home or Self Care    Discharge Medications     Discharge Medications      New Medications      Instructions Start Date   guaiFENesin 600 MG 12 hr tablet  Commonly known as: MUCINEX   600 mg, Oral, Every 12 Hours Scheduled         Continue These Medications      Instructions Start Date   amLODIPine 5 MG tablet  Commonly known as: NORVASC   TAKE 1 TABLET BY MOUTH EVERY DAY      aspirin 81 MG tablet   81 mg, Oral, Daily      atenolol 25 MG tablet  Commonly known as: TENORMIN   25 mg, Oral, 2 Times Daily      atorvastatin 40 MG tablet  Commonly known as: LIPITOR   TAKE 1 TABLET BY MOUTH EVERY DAY      cholecalciferol 25 MCG (1000 UT) tablet  Commonly known as: VITAMIN D3   1,000 Units, Oral, Daily       coenzyme Q10 100 MG capsule   100 mg, Oral, Daily      furosemide 40 MG tablet  Commonly known as: LASIX   TAKE 1 TABLET BY MOUTH EVERY DAY      lisinopril 40 MG tablet  Commonly known as: PRINIVIL,ZESTRIL   TAKE 1 TABLET BY MOUTH EVERY DAY      vitamin b complex capsule capsule  Notes to patient: Take as directed   1 capsule, Oral, 2 Times Weekly             Discharge Diet:   Diet Instructions     Diet: Regular/House Diet; Regular Texture (IDDSI 7); Thin (IDDSI 0)      Discharge Diet: Regular/House Diet    Texture: Regular Texture (IDDSI 7)    Fluid Consistency: Thin (IDDSI 0)          Activity at Discharge:   Activity Instructions     Activity as Tolerated            Follow-up Appointments  No future appointments.  Additional Instructions for the Follow-ups that You Need to Schedule     Discharge Follow-up with PCP   As directed       Currently Documented PCP:    Georgie Diane MD    PCP Phone Number:    [phone]     Follow Up Details: 1 week               Test Results Pending at Discharge  Pending Labs     Order Current Status    Blood Culture - Blood, Arm, Left Preliminary result    Blood Culture - Blood, Arm, Right Preliminary result          I have examined and discussed discharge planning with the patient today.    I wore full protective equipment throughout the patient encounter including eye protection and facemask.  Hand hygiene was performed before donning protective equipment and after removal when leaving the room.     Efrain Mooney MD  04/13/23  11:53 EDT    Time: Discharge greater than 30 min

## 2023-04-13 NOTE — CASE MANAGEMENT/SOCIAL WORK
Case Management Discharge Note      Final Note: Home via family, no additional CCP needs. Francesco RN/CCP         Selected Continued Care - Admitted Since 4/11/2023     Destination    No services have been selected for the patient.              Durable Medical Equipment    No services have been selected for the patient.              Dialysis/Infusion    No services have been selected for the patient.              Home Medical Care    No services have been selected for the patient.              Therapy    No services have been selected for the patient.              Community Resources    No services have been selected for the patient.              Community & DME    No services have been selected for the patient.                       Final Discharge Disposition Code: 01 - home or self-care

## 2023-04-13 NOTE — PLAN OF CARE
Goal Outcome Evaluation:  Plan of Care Reviewed With: patient        Progress: improving  Outcome Evaluation: VSS, RA, SR, ad jessica, denies pain.

## 2023-04-14 NOTE — OUTREACH NOTE
Prep Survey    Flowsheet Row Responses   [facility] facility patient discharged from? [place]   Is LACE score < 7 ? No   Eligibility Readm Mgmt   Discharge diagnosis COVID/PNA   Does the patient have one of the following disease processes/diagnoses(primary or secondary)? Pneumonia   Does the patient have Home health ordered? No   Is there a DME ordered? No   General alerts for this patient HX CHF   Prep survey completed? Yes          Mikaela PERKINS - Registered Nurse

## 2023-04-16 LAB
BACTERIA SPEC AEROBE CULT: NORMAL
BACTERIA SPEC AEROBE CULT: NORMAL

## 2023-04-18 ENCOUNTER — READMISSION MANAGEMENT (OUTPATIENT)
Dept: CALL CENTER | Facility: HOSPITAL | Age: 88
End: 2023-04-18
Payer: MEDICARE

## 2023-04-18 NOTE — OUTREACH NOTE
COPD/PN Week 1 Survey    Flowsheet Row Responses   [facility] facility patient discharged from? [place]   Does the patient have one of the following disease processes/diagnoses(primary or secondary)? Pneumonia   Week 1 attempt successful? No   Unsuccessful attempts Attempt 1          Ruperto CHONG - Registered Nurse

## 2023-04-25 ENCOUNTER — READMISSION MANAGEMENT (OUTPATIENT)
Dept: CALL CENTER | Facility: HOSPITAL | Age: 88
End: 2023-04-25
Payer: MEDICARE

## 2023-04-25 NOTE — OUTREACH NOTE
COPD/PN Week 2 Survey    Flowsheet Row Responses   Shinto facility patient discharged from? [place]   Does the patient have one of the following disease processes/diagnoses(primary or secondary)? Pneumonia   Week 2 attempt successful? No   Unsuccessful attempts Attempt 1          Bonita Moreno Licensed Nurse

## 2023-05-02 ENCOUNTER — TELEPHONE (OUTPATIENT)
Dept: CARDIOLOGY | Facility: CLINIC | Age: 88
End: 2023-05-02
Payer: MEDICARE

## 2023-05-02 NOTE — TELEPHONE ENCOUNTER
----- Message from José Hatch MD sent at 5/2/2023  3:56 PM EDT -----  Can you have her come in tomorrow

## 2023-05-03 ENCOUNTER — OFFICE VISIT (OUTPATIENT)
Dept: CARDIOLOGY | Facility: CLINIC | Age: 88
End: 2023-05-03
Payer: MEDICARE

## 2023-05-03 VITALS
SYSTOLIC BLOOD PRESSURE: 120 MMHG | HEART RATE: 67 BPM | WEIGHT: 139 LBS | BODY MASS INDEX: 23.16 KG/M2 | HEIGHT: 65 IN | DIASTOLIC BLOOD PRESSURE: 58 MMHG

## 2023-05-03 DIAGNOSIS — I10 ESSENTIAL HYPERTENSION: Primary | ICD-10-CM

## 2023-05-03 DIAGNOSIS — R42 DIZZINESS: ICD-10-CM

## 2023-05-03 DIAGNOSIS — Z95.1 S/P CABG (CORONARY ARTERY BYPASS GRAFT): ICD-10-CM

## 2023-05-03 DIAGNOSIS — I25.10 CORONARY ARTERY DISEASE INVOLVING NATIVE CORONARY ARTERY OF NATIVE HEART WITHOUT ANGINA PECTORIS: ICD-10-CM

## 2023-05-03 PROCEDURE — 99214 OFFICE O/P EST MOD 30 MIN: CPT | Performed by: INTERNAL MEDICINE

## 2023-05-03 PROCEDURE — 93000 ELECTROCARDIOGRAM COMPLETE: CPT | Performed by: INTERNAL MEDICINE

## 2023-05-03 NOTE — PROGRESS NOTES
Date of Office Visit: 23  Encounter Provider: José Hatch MD  Place of Service: [facility] CARDIOLOGY  Patient Name: Brodie Mo  :10/31/1932  [id number]    Chief Complaint   Patient presents with   • Coronary Artery Disease   :     HPI: Brodie Mo is a 89 y.o. female she had a 5 vessel bypass in  with Dr. Prabhakar.  She had a stress test in May 2019 that was normal she had an echo which showed moderate to severe MR and severe pulmonary hypertension.  She had a MitraClip done in 2020.  She had COVID back in 2022 and it really knocked her down and she is really been slow to recover.  She was in the hospital with pneumonia in April I think of  and since that time she has noticed that she has intermittent periods of dizziness throughout the day.  Yesterday she was at her PCPs office they did an ECG and it looks like she was in [facility] [place] so we asked her to come over today.  She has not had any shad syncope no other changes to her medicines no chest pain no shortness of breath no PND orthopnea edema    Past Medical History:   Diagnosis Date   • CAD (coronary artery disease)    • Cancer     bladder    • Deep vein thrombosis     Left leg-   • Health care maintenance    • HTN (hypertension)    • Hyperlipidemia    • LVH (left ventricular hypertrophy)    • Pneumonia    • S/P CABG (coronary artery bypass graft)        Past Surgical History:   Procedure Laterality Date   • BLADDER SURGERY      cancer   • CARPAL TUNNEL RELEASE Right 12/15/2021    Procedure: RIGHT CARPAL TUNNEL RELEASE WITH POSSIBLE SYNOVECTOMY;  Surgeon: Georgie Hines MD;  Location: [facility];  Service: Plastics;  Laterality: Right;   • CORONARY ARTERY BYPASS GRAFT       with Dr Prabhakar; AGUAYO to LAD and SVG to OM2;  cath all grafts open and 30% dx in RCA; nl stress in ,    • HYSTERECTOMY     • MITRAL VALVE REPAIR/REPLACEMENT N/A 2020    Procedure:  TRANSCATHETER MITRAL VALVE REPAIR;  Surgeon: Edward Humphreys MD;  Location: [facility] INVASIVE LOCATION;  Service: Cardiovascular;  Laterality: N/A;       Social History     Socioeconomic History   • Marital status:    Tobacco Use   • Smoking status: Former   • Smokeless tobacco: Never   • Tobacco comments:     CAFFEINE USE: 2  CUPS COFFEE DAILY   Vaping Use   • Vaping Use: Never used   Substance and Sexual Activity   • Alcohol use: Yes     Alcohol/week: 1.0 standard drink     Types: 1 Shots of liquor per week     Comment: 1 DRINK DAILY   • Drug use: No   • Sexual activity: Defer       Family History   Problem Relation Age of Onset   • Heart disease Mother    • Kidney cancer Father    • No Known Problems Maternal Grandmother    • No Known Problems Maternal Grandfather    • No Known Problems Paternal Grandmother    • No Known Problems Paternal Grandfather    • Heart disease Sister    • Heart disease Brother        Review of Systems   Constitutional: Negative for decreased appetite, fever, malaise/fatigue and weight loss.   HENT: Negative for nosebleeds.    Eyes: Negative for double vision.   Cardiovascular: Negative for chest pain, claudication, cyanosis, dyspnea on exertion, irregular heartbeat, leg swelling, near-syncope, orthopnea, palpitations, paroxysmal nocturnal dyspnea and syncope.   Respiratory: Negative for cough, hemoptysis and shortness of breath.    Hematologic/Lymphatic: Negative for bleeding problem.   Skin: Negative for rash.   Musculoskeletal: Negative for falls and myalgias.   Gastrointestinal: Negative for hematochezia, jaundice, melena, nausea and vomiting.   Genitourinary: Negative for hematuria.   Neurological: Negative for dizziness and seizures.   Psychiatric/Behavioral: Negative for altered mental status and memory loss.       Allergies   Allergen Reactions   • No Known Drug Allergy          Current Outpatient Medications:   •  amLODIPine (NORVASC) 5 MG tablet, TAKE 1  "TABLET BY MOUTH EVERY DAY, Disp: 90 tablet, Rfl: 3  •  aspirin 81 MG tablet, Take 1 tablet by mouth Daily., Disp: , Rfl:   •  atenolol (TENORMIN) 25 MG tablet, Take 1 tablet by mouth 2 (Two) Times a Day., Disp: , Rfl:   •  atorvastatin (LIPITOR) 40 MG tablet, TAKE 1 TABLET BY MOUTH EVERY DAY, Disp: 90 tablet, Rfl: 3  •  B Complex Vitamins (vitamin b complex) capsule capsule, Take 1 capsule by mouth 2 (Two) Times a Week., Disp: , Rfl:   •  cholecalciferol (VITAMIN D3) 1000 units tablet, Take 1 tablet by mouth Daily., Disp: , Rfl:   •  coenzyme Q10 100 MG capsule, Take 1 capsule by mouth Daily., Disp: , Rfl:   •  furosemide (LASIX) 40 MG tablet, TAKE 1 TABLET BY MOUTH EVERY DAY, Disp: 90 tablet, Rfl: 1  •  lisinopril (PRINIVIL,ZESTRIL) 40 MG tablet, TAKE 1 TABLET BY MOUTH EVERY DAY, Disp: 90 tablet, Rfl: 3  •  guaiFENesin (MUCINEX) 600 MG 12 hr tablet, Take 1 tablet by mouth Every 12 (Twelve) Hours. (Patient not taking: Reported on 5/3/2023), Disp: , Rfl:       Objective:     Vitals:    05/03/23 1338   BP: 120/58   Pulse: 67   Weight: 63 kg (139 lb)   Height: 165.1 cm (65\")     Body mass index is 23.13 kg/m².    Physical Exam  Constitutional:       Appearance: She is well-developed.   HENT:      Head: Normocephalic.   Eyes:      General: No scleral icterus.  Neck:      Thyroid: No thyromegaly.      Vascular: No JVD.   Cardiovascular:      Rate and Rhythm: Normal rate and regular rhythm.      Heart sounds: Normal heart sounds. No murmur heard.    No friction rub. No gallop.   Pulmonary:      Effort: Pulmonary effort is normal.      Breath sounds: Normal breath sounds. No wheezing or rales.   Abdominal:      Palpations: Abdomen is soft.      Tenderness: There is no abdominal tenderness.   Musculoskeletal:         General: Normal range of motion.   Lymphadenopathy:      Cervical: No cervical adenopathy.   Skin:     General: Skin is warm and dry.      Findings: No rash.   Neurological:      Mental Status: She is alert and " oriented to person, place, and time.           ECG 12 Lead    Date/Time: 5/3/2023 1:50 PM  Performed by: José Hatch MD  Authorized by: José Hatch MD   Rhythm: sinus rhythm  Other findings: non-specific ST-T wave changes    Clinical impression: abnormal EKG             Assessment:       Diagnosis Plan   1. Essential hypertension        2. Coronary artery disease involving native coronary artery of native heart without angina pectoris        3. S/P CABG (coronary artery bypass graft)               Plan:       Well I am can concerned she is having periods of AV block that may be symptomatic we did see some winky block on her ECG yesterday I Ernestine cut her atenolol in half have her wear a Holter she seems like these are happening daily she is not passing think we can take a little bit of a measured approach here and see how she does if there is block she will need a pacemaker    As always, it has been a pleasure to participate in your patient's care.      Sincerely,       José Hatch MD

## 2023-05-05 LAB
MAXIMAL PREDICTED HEART RATE: 130 BPM
STRESS TARGET HR: 111 BPM

## 2023-05-08 ENCOUNTER — TRANSCRIBE ORDERS (OUTPATIENT)
Dept: ADMINISTRATIVE | Facility: HOSPITAL | Age: 88
End: 2023-05-08
Payer: MEDICARE

## 2023-05-08 ENCOUNTER — HOSPITAL ENCOUNTER (OUTPATIENT)
Dept: GENERAL RADIOLOGY | Facility: HOSPITAL | Age: 88
Discharge: HOME | End: 2023-05-08
Admitting: INTERNAL MEDICINE
Payer: MEDICARE

## 2023-05-08 DIAGNOSIS — N18.9 CHRONIC KIDNEY DISEASE, UNSPECIFIED CKD STAGE: ICD-10-CM

## 2023-05-08 DIAGNOSIS — I10 LABILE DIASTOLIC HYPERTENSION: ICD-10-CM

## 2023-05-08 DIAGNOSIS — R94.4 NONSPECIFIC ABNORMAL RESULTS OF KIDNEY FUNCTION STUDY: Primary | ICD-10-CM

## 2023-05-08 DIAGNOSIS — Z09 FOLLOW-UP EXAM: ICD-10-CM

## 2023-05-08 DIAGNOSIS — N28.9 URETERAL SLUDGE: ICD-10-CM

## 2023-05-08 DIAGNOSIS — I70.1 ATHEROSCLEROSIS OF RENAL ARTERY: ICD-10-CM

## 2023-05-08 PROCEDURE — 71046 X-RAY EXAM CHEST 2 VIEWS: CPT

## 2023-05-12 ENCOUNTER — TELEPHONE (OUTPATIENT)
Dept: CARDIOLOGY | Facility: CLINIC | Age: 88
End: 2023-05-12
Payer: MEDICARE

## 2023-05-12 ENCOUNTER — HOSPITAL ENCOUNTER (OUTPATIENT)
Dept: CARDIOLOGY | Facility: HOSPITAL | Age: 88
Discharge: HOME | End: 2023-05-12
Payer: MEDICARE

## 2023-05-12 DIAGNOSIS — I70.1 ATHEROSCLEROSIS OF RENAL ARTERY: ICD-10-CM

## 2023-05-12 DIAGNOSIS — I10 LABILE DIASTOLIC HYPERTENSION: ICD-10-CM

## 2023-05-12 LAB
BH CV ECHO MEAS - DIST REN A EDV LEFT: -11.7 CM/S
BH CV ECHO MEAS - DIST REN A PSV LEFT: -148 CM/S
BH CV ECHO MEAS - MID REN A EDV LEFT: -17.7 CM/S
BH CV ECHO MEAS - MID REN A PSV LEFT: -149 CM/S
BH CV ECHO MEAS - PROX REN A EDV LEFT: -14.5 CM/S
BH CV ECHO MEAS - PROX REN A PSV LEFT: -154 CM/S
BH CV VAS BP LEFT ARM: NORMAL MMHG
BH CV VAS BP RIGHT ARM: NORMAL MMHG
BH CV VAS RENAL AORTIC MID EDV: 0 CM/S
BH CV VAS RENAL AORTIC MID PSV: 134 CM/S
BH CV VAS RENAL HILUM LEFT EDV: 6 CM/S
BH CV VAS RENAL HILUM LEFT PSV: 47 CM/S
BH CV VAS RENAL HILUM RIGHT EDV: 11 CM/S
BH CV VAS RENAL HILUM RIGHT PSV: 59 CM/S
BH CV XLRA MEAS - KID L LEFT: 11.3 CM
BH CV XLRA MEAS DIST REN A EDV RIGHT: 8.1 CM/S
BH CV XLRA MEAS DIST REN A PSV RIGHT: 97.4 CM/S
BH CV XLRA MEAS KID L RIGHT: 9.6 CM
BH CV XLRA MEAS KID W RIGHT: 4.2 CM
BH CV XLRA MEAS MID REN A EDV RIGHT: 14.3 CM/S
BH CV XLRA MEAS MID REN A PSV RIGHT: 150 CM/S
BH CV XLRA MEAS PROX REN A EDV RIGHT: 10 CM/S
BH CV XLRA MEAS PROX REN A PSV RIGHT: 125 CM/S
BH CV XLRA MEAS RAR LEFT: 1.82
BH CV XLRA MEAS RAR RIGHT: 1.11
BH CV XLRA MEAS RENAL A ORG EDV LEFT: -12.9 CM/S
BH CV XLRA MEAS RENAL A ORG EDV RIGHT: 14.3 CM/S
BH CV XLRA MEAS RENAL A ORG PSV LEFT: -245 CM/S
BH CV XLRA MEAS RENAL A ORG PSV RIGHT: 144 CM/S
LEFT KIDNEY WIDTH: 4.6 CM
LEFT RENAL UPPER PARENCHYMA MAX: 36 CM/S
LEFT RENAL UPPER PARENCHYMA MIN: 5 CM/S
LEFT RENAL UPPER PARENCHYMA RI: 0.86
MAXIMAL PREDICTED HEART RATE: 130 BPM
MAXIMAL PREDICTED HEART RATE: 130 BPM
RIGHT ACCESSORY RENAL DIST DIAS: 13 CM/S
RIGHT ACCESSORY RENAL DIST SYS: 105 CM/S
RIGHT ACCESSORY RENAL MID DIAS: 9 CM/S
RIGHT ACCESSORY RENAL MID SYS: 81 CM/S
RIGHT ACCESSORY RENAL ORIGIN DIAS: 7 CM/S
RIGHT ACCESSORY RENAL ORIGIN SYS: 129 CM/S
RIGHT ACCESSORY RENAL PROX DIAS: 7 CM/S
RIGHT ACCESSORY RENAL PROX SYS: 85 CM/S
RIGHT RENAL UPPER PARENCHYMA MAX: 20 CM/S
RIGHT RENAL UPPER PARENCHYMA MIN: 3 CM/S
RIGHT RENAL UPPER PARENCHYMA RI: 0.85
STRESS TARGET HR: 111 BPM
STRESS TARGET HR: 111 BPM

## 2023-05-12 PROCEDURE — 93975 VASCULAR STUDY: CPT

## 2023-05-12 NOTE — TELEPHONE ENCOUNTER
Called and followed up with patient about her dizziness.  Not completely convinced this is all cardiac, seems to come on when she is moving.  Discussed general precautions for dizziness.  Will discuss beta-blocker dose with Dr. Hatch

## 2023-05-12 NOTE — TELEPHONE ENCOUNTER
----- Message from STACIA Booker sent at 5/12/2023  3:20 PM EDT -----  Please let the patient know that her heart monitor looked okay.  There was no evidence of any heart block which is what we are concerned about.  Has she had any dizziness?

## 2023-05-12 NOTE — TELEPHONE ENCOUNTER
I spoke with Brodie Mo and updated pt on results from provider.  Pt verbalized understanding.    She reports that she has definitely been having some dizziness, and says that it is usually worse in the mornings.    Thank you,    Yesy Esteban, HUNTER  Triage [facility]  05/12/23 15:28 EDT

## 2023-05-18 NOTE — TELEPHONE ENCOUNTER
Pt called the office to know what the plan is for her dizziness.  She said she's been waiting for a return call but hasn't received one.  I informed her that Dr. Hatch is out of the office today so there may be a delay in getting our recommendations to her.    Do you have any recommendations for this patient?    Thank you,    Yesy Esteban RN  [facility] Triage  05/18/23  13:47 EDT

## 2023-05-19 NOTE — TELEPHONE ENCOUNTER
Called and spoke with patient    Monitor looks okay, her BP ranges have been normal    I would not recommend any changes right now. I will have Dr Holden shukla

## 2023-06-05 RX ORDER — ATORVASTATIN CALCIUM 40 MG/1
TABLET, FILM COATED ORAL
Qty: 90 TABLET | Refills: 3 | Status: SHIPPED | OUTPATIENT
Start: 2023-06-05

## 2023-08-21 ENCOUNTER — TELEPHONE (OUTPATIENT)
Dept: CARDIOLOGY | Facility: CLINIC | Age: 88
End: 2023-08-21
Payer: MEDICARE

## 2023-08-21 NOTE — TELEPHONE ENCOUNTER
Patient needing refill on Amlodipine  Dr. Ja Brunner seen her on 8/1/23 and changed her Amlodipine from 5 mg to 10mg   I told her since this doctor changed her medication he should be the one to refill.  She has an appt with toya in sept. She will talk with her  about the amlodipine when she comes in.

## 2023-09-07 RX ORDER — FUROSEMIDE 40 MG/1
TABLET ORAL
Qty: 90 TABLET | Refills: 1 | Status: SHIPPED | OUTPATIENT
Start: 2023-09-07

## 2023-09-26 ENCOUNTER — OFFICE VISIT (OUTPATIENT)
Dept: CARDIOLOGY | Facility: CLINIC | Age: 88
End: 2023-09-26
Payer: MEDICARE

## 2023-09-26 VITALS
BODY MASS INDEX: 22.99 KG/M2 | WEIGHT: 138 LBS | OXYGEN SATURATION: 95 % | DIASTOLIC BLOOD PRESSURE: 52 MMHG | HEART RATE: 67 BPM | HEIGHT: 65 IN | SYSTOLIC BLOOD PRESSURE: 130 MMHG

## 2023-09-26 DIAGNOSIS — I10 ESSENTIAL HYPERTENSION: ICD-10-CM

## 2023-09-26 DIAGNOSIS — I25.10 CORONARY ARTERY DISEASE INVOLVING NATIVE CORONARY ARTERY OF NATIVE HEART WITHOUT ANGINA PECTORIS: Primary | ICD-10-CM

## 2023-09-26 DIAGNOSIS — Z95.1 S/P CABG (CORONARY ARTERY BYPASS GRAFT): ICD-10-CM

## 2023-09-26 DIAGNOSIS — I50.32 CHRONIC DIASTOLIC CHF (CONGESTIVE HEART FAILURE): ICD-10-CM

## 2023-09-26 RX ORDER — AMLODIPINE BESYLATE 5 MG/1
5 TABLET ORAL DAILY
Qty: 90 TABLET | Refills: 3 | Status: SHIPPED | OUTPATIENT
Start: 2023-09-26

## 2023-09-26 RX ORDER — HYDRALAZINE HYDROCHLORIDE 25 MG/1
25 TABLET, FILM COATED ORAL 2 TIMES DAILY
Qty: 60 TABLET | Refills: 3 | Status: SHIPPED | OUTPATIENT
Start: 2023-09-26

## 2023-09-26 NOTE — PROGRESS NOTES
[facility] Follow Up Office Note     Encounter Date:23  Patient:Brodie Mo  :10/31/1932  MRN:[id number]      Chief Complaint:   Chief Complaint   Patient presents with    Hypertension         History of Presenting Illness:        Brodie Mo is a 89 y.o. female who is here for follow-up.  She is a patient of Dr Hatch.    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    Patient had 5 vessel CABG in  with Dr Prabhakar. Subsequently she had a MitraClip 2020.    Patient saw Dr Hatch back in May at which time she was having some dizziness. Her EKG at PCP was concerning for Weinkebach. Dr Hatch decreased her atenolol and had her wear a Holter. The Holter showed no heart block and an average HR of 63. However, patient continued to feel dizzy so her atenolol was completely stopped and she seemed to do better.    Patient is here today for follow-up.  Patient's primary concern is swelling in her lower extremities that started over the past month or so.  She is not having increased dyspnea on exertion, chest pain, palpitations, PND orthopnea.  She was changed to 10 mg of amlodipine by Dr. Brunner back in August.  On review of labs, her creatinine was up to 2 at this time.    Review of Systems:  Review of Systems   Constitutional: Positive for malaise/fatigue.   Cardiovascular:  Positive for leg swelling. Negative for chest pain, dyspnea on exertion, orthopnea and palpitations.   Respiratory:  Negative for shortness of breath.      Current Outpatient Medications on File Prior to Visit   Medication Sig Dispense Refill    aspirin 81 MG tablet Take 1 tablet by mouth Daily.      atorvastatin (LIPITOR) 40 MG tablet TAKE 1 TABLET BY MOUTH EVERY DAY 90 tablet 3    B Complex Vitamins (vitamin b  complex) capsule capsule Take 1 capsule by mouth 2 (Two) Times a Week.      cholecalciferol (VITAMIN D3) 1000 units tablet Take 1 tablet by mouth Daily.      coenzyme Q10 100 MG capsule Take 1 capsule by mouth Daily.      furosemide (LASIX) 40 MG tablet TAKE 1 TABLET BY MOUTH EVERY DAY 90 tablet 1    [DISCONTINUED] amLODIPine (NORVASC) 5 MG tablet TAKE 1 TABLET BY MOUTH EVERY DAY 90 tablet 3    [DISCONTINUED] atenolol (TENORMIN) 25 MG tablet Take 1 tablet by mouth 2 (Two) Times a Day.      [DISCONTINUED] guaiFENesin (MUCINEX) 600 MG 12 hr tablet Take 1 tablet by mouth Every 12 (Twelve) Hours. (Patient not taking: Reported on 5/3/2023)      [DISCONTINUED] lisinopril (PRINIVIL,ZESTRIL) 40 MG tablet TAKE 1 TABLET BY MOUTH EVERY DAY 90 tablet 3     No current facility-administered medications on file prior to visit.       Allergies   Allergen Reactions    No Known Drug Allergy        Past Medical History:   Diagnosis Date    CAD (coronary artery disease)     Cancer     bladder     Deep vein thrombosis     Left leg-2002    Health care maintenance     HTN (hypertension)     Hyperlipidemia     LVH (left ventricular hypertrophy)     Pneumonia     S/P CABG (coronary artery bypass graft)        Past Surgical History:   Procedure Laterality Date    BLADDER SURGERY      cancer    CARPAL TUNNEL RELEASE Right 12/15/2021    Procedure: RIGHT CARPAL TUNNEL RELEASE WITH POSSIBLE SYNOVECTOMY;  Surgeon: Georgie Hines MD;  Location: [facility] MAIN OR;  Service: Plastics;  Laterality: Right;    CORONARY ARTERY BYPASS GRAFT      2002 with Dr Prabhakar; AGUAYO to LAD and SVG to OM2; 2005 cath all grafts open and 30% dx in RCA; nl stress in 2010, 2013    HYSTERECTOMY      MITRAL VALVE REPAIR/REPLACEMENT N/A 9/2/2020    Procedure: TRANSCATHETER MITRAL VALVE REPAIR;  Surgeon: Edward Humphreys MD;  Location: [facility] INVASIVE LOCATION;  Service: Cardiovascular;  Laterality: N/A;       Social History     Socioeconomic History     "Marital status:    Tobacco Use    Smoking status: Former    Smokeless tobacco: Never    Tobacco comments:     CAFFEINE USE: 2  CUPS COFFEE DAILY   Vaping Use    Vaping Use: Never used   Substance and Sexual Activity    Alcohol use: Yes     Alcohol/week: 1.0 standard drink     Types: 1 Shots of liquor per week     Comment: 1 DRINK DAILY    Drug use: No    Sexual activity: Defer       Family History   Problem Relation Age of Onset    Heart disease Mother     Kidney cancer Father     No Known Problems Maternal Grandmother     No Known Problems Maternal Grandfather     No Known Problems Paternal Grandmother     No Known Problems Paternal Grandfather     Heart disease Sister     Heart disease Brother        The following portions of the patient's history were reviewed and updated as appropriate: allergies, current medications, past family history, past medical history, past social history, past surgical history and problem list.       Objective:       Vitals:    09/26/23 1154   BP: 130/52   Pulse: 67   SpO2: 95%   Weight: 62.6 kg (138 lb)   Height: 165.1 cm (65\")         Physical Exam:  Constitutional: Pleasant and conversant  HENT: Oropharynx clear and membrane moist  Eyes: Normal conjunctiva, no sclera icterus  Neck: Supple, no carotid bruit bilaterally  Cardiovascular: Regular rate and rhythm, Late peaking systolic murmur over the right upper sternal border, 1+ bilateral lower extremity edema  Pulmonary: Normal respiratory effort, normal lung sounds, no wheezing  Neurological: Alert and orient x 3  Skin: Warm, dry, no ecchymosis, no rash  Psych: Appropriate mood and affect. Normal judgment and insight         Lab Results   Component Value Date     (L) 04/13/2023     (L) 04/12/2023    K 3.8 04/13/2023    K 3.8 04/12/2023    CL 99 04/13/2023    CL 98 04/12/2023    CO2 25.0 04/13/2023    CO2 25.0 04/12/2023    BUN 26 (H) 04/13/2023    BUN 26 (H) 04/12/2023    CREATININE 1.28 (H) 04/13/2023    " CREATININE 1.51 (H) 04/12/2023    EGFRIFNONA 30 (L) 08/18/2021    EGFRIFNONA 40 (L) 09/03/2020    GLUCOSE 102 (H) 04/13/2023    GLUCOSE 123 (H) 04/12/2023    CALCIUM 8.5 04/13/2023    CALCIUM 8.1 (L) 04/12/2023    ALBUMIN 3.2 (L) 04/13/2023    ALBUMIN 3.3 (L) 04/12/2023    BILITOT 0.3 04/13/2023    BILITOT 0.3 04/12/2023    AST 36 (H) 04/13/2023    AST 35 (H) 04/12/2023    ALT 15 04/13/2023    ALT 16 04/12/2023     Lab Results   Component Value Date    WBC 12.19 (H) 04/13/2023    WBC 15.09 (H) 04/12/2023    HGB 10.6 (L) 04/13/2023    HGB 10.6 (L) 04/12/2023    HCT 31.7 (L) 04/13/2023    HCT 32.5 (L) 04/12/2023    MCV 87.8 04/13/2023    MCV 89.3 04/12/2023     04/13/2023     04/12/2023     Lab Results   Component Value Date    CHOL 141 04/06/2018    TRIG 66 04/06/2018    HDL 76 (H) 04/06/2018    LDL 52 04/06/2018     Lab Results   Component Value Date    PROBNP 2,320.0 (H) 04/11/2023    PROBNP 1,397.0 01/24/2023     Lab Results   Component Value Date    CKTOTAL 494 (H) 04/12/2023    TROPONINT 23 (H) 04/11/2023     Lab Results   Component Value Date    TSH 6.250 (H) 04/06/2018    TSH 3.87 10/07/2015           ECG 12 Lead    Date/Time: 9/26/2023 12:04 PM  Performed by: Sheryl Cervantes APRN  Authorized by: Sheryl Cervantes APRN   Comparison: compared with previous ECG from 5/3/2023  Similar to previous ECG  Rhythm: sinus rhythm  Rate: normal  BPM: 72  Other findings: non-specific ST-T wave changes           Assessment:          Diagnosis Plan   1. Coronary artery disease involving native coronary artery of native heart without angina pectoris  ECG 12 Lead      2. S/P CABG (coronary artery bypass graft)        3. Chronic diastolic CHF (congestive heart failure)        4. Essential hypertension               Plan:       Coronary artery disease - hx CABG x5 2002. Normal stress 2/2023.  Continue aspirin,  Symptomatic bradycardia - medication induced. Now off beta blocker with resolution of  dizziness  Essential hypertension -blood pressure is controlled.  I do think her lower extremity swelling could be related to increased amlodipine.  Decrease this to 5 mg.  Blood pressure medications limited due to CKD and intolerance of beta-blocker.  We will start low-dose hydralazine, she will call with blood pressure update in about 2 weeks  Mixed hyperlipidemia -continue statin  Nonrheumatic mitral regurgitation - s/p MitraClip 2020. Mild on most recent echo  Pulmonary hypertension - s/p MitraClip. Continue diuretics  Lower extremities edema -she has no increase in shortness of breath.  Offered labs, echo.  She would like to conservatively manage, will trial decreased dose of amlodipine and see if this helps.  Without improvement, would repeat echo    Patient is seen today for follow-up.  Trial on decreased amlodipine to see if this is etiology of swelling.  Without improvement we will repeat echo and check labs.  Symptom wise she is doing the same.  She will call me in about 2 weeks for a blood pressure update. She does not like to leave home if she can help it.  Follow-up in 6 months with Dr Hatch, earlier with problems      Orders Placed This Encounter   Procedures    ECG 12 Lead     This order was created via procedure documentation     Order Specific Question:   Release to patient     Answer:   Routine Release [[id number]]            STACIA Saeed  [facility]  09/26/23  12:27 EDT

## 2023-10-03 ENCOUNTER — TELEPHONE (OUTPATIENT)
Dept: CARDIOLOGY | Facility: CLINIC | Age: 88
End: 2023-10-03
Payer: MEDICARE

## 2023-10-03 RX ORDER — LISINOPRIL 40 MG/1
TABLET ORAL
Qty: 90 TABLET | Refills: 3 | OUTPATIENT
Start: 2023-10-03

## 2023-12-04 RX ORDER — HYDRALAZINE HYDROCHLORIDE 25 MG/1
25 TABLET, FILM COATED ORAL 2 TIMES DAILY
Qty: 180 TABLET | Refills: 1 | Status: SHIPPED | OUTPATIENT
Start: 2023-12-04

## 2024-03-05 RX ORDER — FUROSEMIDE 40 MG/1
TABLET ORAL
Qty: 90 TABLET | Refills: 1 | Status: SHIPPED | OUTPATIENT
Start: 2024-03-05

## 2024-03-06 ENCOUNTER — OFFICE VISIT (OUTPATIENT)
Dept: CARDIOLOGY | Facility: CLINIC | Age: 89
End: 2024-03-06
Payer: MEDICARE

## 2024-03-06 VITALS
SYSTOLIC BLOOD PRESSURE: 130 MMHG | HEART RATE: 74 BPM | BODY MASS INDEX: 23.32 KG/M2 | HEIGHT: 65 IN | WEIGHT: 140 LBS | DIASTOLIC BLOOD PRESSURE: 60 MMHG

## 2024-03-06 DIAGNOSIS — Z95.1 S/P CABG (CORONARY ARTERY BYPASS GRAFT): Primary | ICD-10-CM

## 2024-03-06 DIAGNOSIS — I10 ESSENTIAL HYPERTENSION: ICD-10-CM

## 2024-03-06 DIAGNOSIS — I34.0 NONRHEUMATIC MITRAL VALVE REGURGITATION: ICD-10-CM

## 2024-03-06 DIAGNOSIS — C80.1 CANCER: ICD-10-CM

## 2024-03-06 DIAGNOSIS — R06.09 DYSPNEA ON EXERTION: ICD-10-CM

## 2024-03-06 NOTE — PROGRESS NOTES
Date of Office Visit: 24  Encounter Provider: José Hatch MD  Place of Service: [facility] CARDIOLOGY  Patient Name: Brodie Mo  :10/31/1932  [id number]    Chief Complaint   Patient presents with    Coronary Artery Disease   :     HPI: Brodie Mo is a 89 y.o. female she had a 5 vessel bypass in  with Dr. Prabhakar.  She had a stress test in May 2019 that was normal she had an echo which showed moderate to severe MR and severe pulmonary hypertension.  She had a MitraClip done in 2020.  She had COVID back in 2022 and it really knocked her down and she is really been slow to recover.  She was in the hospital with pneumonia in April I think of  and since that time she has noticed that she has intermittent periods of dizziness throughout the day.  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  We saw her at that time and evaluate her never saw any high degree heart block and she was really asymptomatic with that so we chose to watch things.    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  She is may be a little bit more short of breath she says not having PND orthopnea no change in her edema but if she exerts herself a little bit more she has some shortness of breath she has not had a change in her weight no fever chills sweats or cough no syncope no chest pain    Past Medical History:   Diagnosis Date    CAD (coronary artery disease)     Cancer     bladder     Deep vein thrombosis     Left leg-    Health care maintenance     HTN (hypertension)     Hyperlipidemia     LVH (left ventricular hypertrophy)     Pneumonia     S/P CABG (coronary artery bypass graft)        Past Surgical History:   Procedure Laterality Date    BLADDER SURGERY      cancer    CARPAL TUNNEL RELEASE Right 12/15/2021     Procedure: RIGHT CARPAL TUNNEL RELEASE WITH POSSIBLE SYNOVECTOMY;  Surgeon: Georgie Hines MD;  Location: [facility] [place] MAIN OR;  Service: Plastics;  Laterality: Right;    CORONARY ARTERY BYPASS GRAFT      2002 with Dr Prabhakar; AGUAYO to LAD and SVG to OM2; 2005 cath all grafts open and 30% dx in RCA; nl stress in 2010, 2013    HYSTERECTOMY      MITRAL VALVE REPAIR/REPLACEMENT N/A 9/2/2020    Procedure: TRANSCATHETER MITRAL VALVE REPAIR;  Surgeon: Edward Humphreys MD;  Location: [facility] CATH INVASIVE LOCATION;  Service: Cardiovascular;  Laterality: N/A;       Social History     Socioeconomic History    Marital status:    Tobacco Use    Smoking status: Former    Smokeless tobacco: Never    Tobacco comments:     CAFFEINE USE: 2  CUPS COFFEE DAILY   Vaping Use    Vaping status: Never Used   Substance and Sexual Activity    Alcohol use: Yes     Alcohol/week: 1.0 standard drink of alcohol     Types: 1 Shots of liquor per week     Comment: 1 DRINK DAILY    Drug use: No    Sexual activity: Defer       Family History   Problem Relation Age of Onset    Heart disease Mother     Kidney cancer Father     No Known Problems Maternal Grandmother     No Known Problems Maternal Grandfather     No Known Problems Paternal Grandmother     No Known Problems Paternal Grandfather     Heart disease Sister     Heart disease Brother        Review of Systems   Constitutional: Negative for decreased appetite, fever, malaise/fatigue and weight loss.   HENT:  Negative for nosebleeds.    Eyes:  Negative for double vision.   Cardiovascular:  Negative for chest pain, claudication, cyanosis, dyspnea on exertion, irregular heartbeat, leg swelling, near-syncope, orthopnea, palpitations, paroxysmal nocturnal dyspnea and syncope.   Respiratory:  Negative for cough, hemoptysis and shortness of breath.    Hematologic/Lymphatic: Negative for bleeding problem.   Skin:  Negative for rash.   Musculoskeletal:  Negative for falls and myalgias.  "  Gastrointestinal:  Negative for hematochezia, jaundice, melena, nausea and vomiting.   Genitourinary:  Negative for hematuria.   Neurological:  Negative for dizziness and seizures.   Psychiatric/Behavioral:  Negative for altered mental status and memory loss.        Allergies   Allergen Reactions    No Known Drug Allergy          Current Outpatient Medications:     amLODIPine (NORVASC) 5 MG tablet, Take 1 tablet by mouth Daily., Disp: 90 tablet, Rfl: 3    aspirin 81 MG tablet, Take 1 tablet by mouth Daily., Disp: , Rfl:     atorvastatin (LIPITOR) 40 MG tablet, TAKE 1 TABLET BY MOUTH EVERY DAY, Disp: 90 tablet, Rfl: 3    B Complex Vitamins (vitamin b complex) capsule capsule, Take 1 capsule by mouth 2 (Two) Times a Week., Disp: , Rfl:     cholecalciferol (VITAMIN D3) 1000 units tablet, Take 1 tablet by mouth Daily., Disp: , Rfl:     coenzyme Q10 100 MG capsule, Take 1 capsule by mouth Daily., Disp: , Rfl:     furosemide (LASIX) 40 MG tablet, TAKE 1 TABLET BY MOUTH EVERY DAY, Disp: 90 tablet, Rfl: 1    hydrALAZINE (APRESOLINE) 25 MG tablet, TAKE 1 TABLET BY MOUTH TWICE A DAY (Patient taking differently: Take 1 tablet by mouth 3 (Three) Times a Day.), Disp: 180 tablet, Rfl: 1      Objective:     Vitals:    03/06/24 1133   BP: 130/60   Pulse: 74   Weight: 63.5 kg (140 lb)   Height: 165.1 cm (65\")     Body mass index is 23.3 kg/m².    Physical Exam  Constitutional:       Appearance: She is well-developed.   HENT:      Head: Normocephalic.   Eyes:      General: No scleral icterus.  Neck:      Thyroid: No thyromegaly.      Vascular: No JVD.   Cardiovascular:      Rate and Rhythm: Normal rate and regular rhythm.      Heart sounds: Murmur heard.      High-pitched blowing holosystolic murmur is present with a grade of 2/6 at the upper left sternal border.      No friction rub. No gallop.   Pulmonary:      Effort: Pulmonary effort is normal.      Breath sounds: Normal breath sounds. No wheezing or rales.   Abdominal:      " Palpations: Abdomen is soft.      Tenderness: There is no abdominal tenderness.   Musculoskeletal:         General: Normal range of motion.   Lymphadenopathy:      Cervical: No cervical adenopathy.   Skin:     General: Skin is warm and dry.      Findings: No rash.   Neurological:      Mental Status: She is alert and oriented to person, place, and time.           ECG 12 Lead    Date/Time: 3/6/2024 12:24 PM  Performed by: José Hatch MD    Authorized by: José Hatch MD  Comparison: compared with previous ECG   Rhythm: atrial fibrillation  Ectopy: unifocal PVCs  Other findings: non-specific ST-T wave changes    Clinical impression: abnormal EKG           Assessment:       Diagnosis Plan   1. S/P CABG (coronary artery bypass graft)        2. Essential hypertension        3. Nonrheumatic mitral valve regurgitation        4. History of bladder cancer               Plan:       I think that in general she is probably doing pretty well but with her complaining of a little more shortness of breath we should probably echo her get a chest film and some blood work and I will get that set up if that all ends up being okay then I would just watch things I will have her come back and see me in 6 months sooner if she has trouble    As always, it has been a pleasure to participate in your patient's care.      Sincerely,       José Hatch MD

## 2024-03-29 ENCOUNTER — HOSPITAL ENCOUNTER (OUTPATIENT)
Dept: GENERAL RADIOLOGY | Facility: HOSPITAL | Age: 89
Discharge: HOME | End: 2024-03-29
Payer: MEDICARE

## 2024-03-29 ENCOUNTER — HOSPITAL ENCOUNTER (OUTPATIENT)
Dept: CARDIOLOGY | Facility: HOSPITAL | Age: 89
Discharge: HOME | End: 2024-03-29
Payer: MEDICARE

## 2024-03-29 ENCOUNTER — LAB (OUTPATIENT)
Dept: LAB | Facility: HOSPITAL | Age: 89
End: 2024-03-29
Payer: MEDICARE

## 2024-03-29 ENCOUNTER — TELEPHONE (OUTPATIENT)
Dept: CARDIOLOGY | Facility: CLINIC | Age: 89
End: 2024-03-29
Payer: MEDICARE

## 2024-03-29 VITALS
BODY MASS INDEX: 23.32 KG/M2 | HEIGHT: 65 IN | DIASTOLIC BLOOD PRESSURE: 80 MMHG | SYSTOLIC BLOOD PRESSURE: 140 MMHG | WEIGHT: 140 LBS

## 2024-03-29 DIAGNOSIS — C80.1 CANCER: ICD-10-CM

## 2024-03-29 DIAGNOSIS — I34.0 NONRHEUMATIC MITRAL VALVE REGURGITATION: ICD-10-CM

## 2024-03-29 DIAGNOSIS — I10 ESSENTIAL HYPERTENSION: ICD-10-CM

## 2024-03-29 DIAGNOSIS — Z95.1 S/P CABG (CORONARY ARTERY BYPASS GRAFT): ICD-10-CM

## 2024-03-29 DIAGNOSIS — R06.09 DYSPNEA ON EXERTION: ICD-10-CM

## 2024-03-29 LAB
ANION GAP SERPL CALCULATED.3IONS-SCNC: 10.4 MMOL/L (ref 5–15)
ASCENDING AORTA: 2.5 CM
BASOPHILS # BLD AUTO: 0.07 10*3/MM3 (ref 0–0.2)
BASOPHILS NFR BLD AUTO: 0.9 % (ref 0–1.5)
BH CV ECHO MEAS - ACS: 1.63 CM
BH CV ECHO MEAS - AO MAX PG: 8.6 MMHG
BH CV ECHO MEAS - AO MEAN PG: 4.7 MMHG
BH CV ECHO MEAS - AO ROOT DIAM: 2.5 CM
BH CV ECHO MEAS - AO V2 MAX: 146.6 CM/SEC
BH CV ECHO MEAS - AO V2 VTI: 36.8 CM
BH CV ECHO MEAS - AVA(I,D): 2.05 CM2
BH CV ECHO MEAS - EDV(CUBED): 68.6 ML
BH CV ECHO MEAS - EDV(MOD-SP2): 57 ML
BH CV ECHO MEAS - EDV(MOD-SP4): 55 ML
BH CV ECHO MEAS - EF(MOD-BP): 61.3 %
BH CV ECHO MEAS - EF(MOD-SP2): 59.6 %
BH CV ECHO MEAS - EF(MOD-SP4): 60 %
BH CV ECHO MEAS - ESV(CUBED): 32.6 ML
BH CV ECHO MEAS - ESV(MOD-SP2): 23 ML
BH CV ECHO MEAS - ESV(MOD-SP4): 22 ML
BH CV ECHO MEAS - FS: 21.9 %
BH CV ECHO MEAS - IVS/LVPW: 0.87 CM
BH CV ECHO MEAS - IVSD: 0.73 CM
BH CV ECHO MEAS - LAT PEAK E' VEL: 6.7 CM/SEC
BH CV ECHO MEAS - LV DIASTOLIC VOL/BSA (35-75): 32.9 CM2
BH CV ECHO MEAS - LV MASS(C)D: 94.1 GRAMS
BH CV ECHO MEAS - LV MAX PG: 3.9 MMHG
BH CV ECHO MEAS - LV MEAN PG: 2.32 MMHG
BH CV ECHO MEAS - LV SYSTOLIC VOL/BSA (12-30): 13.1 CM2
BH CV ECHO MEAS - LV V1 MAX: 99.2 CM/SEC
BH CV ECHO MEAS - LV V1 VTI: 27.5 CM
BH CV ECHO MEAS - LVIDD: 4.1 CM
BH CV ECHO MEAS - LVIDS: 3.2 CM
BH CV ECHO MEAS - LVOT AREA: 2.7 CM2
BH CV ECHO MEAS - LVOT DIAM: 1.87 CM
BH CV ECHO MEAS - LVPWD: 0.83 CM
BH CV ECHO MEAS - MED PEAK E' VEL: 5.4 CM/SEC
BH CV ECHO MEAS - MV A DUR: 0.14 SEC
BH CV ECHO MEAS - MV A MAX VEL: 126 CM/SEC
BH CV ECHO MEAS - MV DEC SLOPE: 672.1 CM/SEC2
BH CV ECHO MEAS - MV DEC TIME: 0.37 SEC
BH CV ECHO MEAS - MV E MAX VEL: 167 CM/SEC
BH CV ECHO MEAS - MV E/A: 1.33
BH CV ECHO MEAS - MV MAX PG: 23.4 MMHG
BH CV ECHO MEAS - MV MEAN PG: 8.4 MMHG
BH CV ECHO MEAS - MV P1/2T: 99.4 MSEC
BH CV ECHO MEAS - MV V2 VTI: 67.1 CM
BH CV ECHO MEAS - MVA(P1/2T): 2.21 CM2
BH CV ECHO MEAS - MVA(VTI): 1.13 CM2
BH CV ECHO MEAS - PA ACC TIME: 0.11 SEC
BH CV ECHO MEAS - PA V2 MAX: 103.2 CM/SEC
BH CV ECHO MEAS - PI END-D VEL: 116.7 CM/SEC
BH CV ECHO MEAS - PULM A REVS DUR: 0.11 SEC
BH CV ECHO MEAS - PULM A REVS VEL: 23.6 CM/SEC
BH CV ECHO MEAS - PULM DIAS VEL: 45.6 CM/SEC
BH CV ECHO MEAS - PULM S/D: 1.22
BH CV ECHO MEAS - PULM SYS VEL: 55.6 CM/SEC
BH CV ECHO MEAS - RAP SYSTOLE: 8 MMHG
BH CV ECHO MEAS - RV MAX PG: 2.6 MMHG
BH CV ECHO MEAS - RV V1 MAX: 80.6 CM/SEC
BH CV ECHO MEAS - RV V1 VTI: 19.4 CM
BH CV ECHO MEAS - RVSP: 43 MMHG
BH CV ECHO MEAS - SI(MOD-SP2): 20.3 ML/M2
BH CV ECHO MEAS - SI(MOD-SP4): 19.7 ML/M2
BH CV ECHO MEAS - SV(LVOT): 75.6 ML
BH CV ECHO MEAS - SV(MOD-SP2): 34 ML
BH CV ECHO MEAS - SV(MOD-SP4): 33 ML
BH CV ECHO MEAS - TR MAX PG: 35.7 MMHG
BH CV ECHO MEAS - TR MAX VEL: 298.6 CM/SEC
BH CV ECHO MEASUREMENTS AVERAGE E/E' RATIO: 27.6
BH CV XLRA - RV BASE: 2.9 CM
BH CV XLRA - RV LENGTH: 5.8 CM
BH CV XLRA - RV MID: 2.2 CM
BH CV XLRA - TDI S': 10.9 CM/SEC
BUN SERPL-MCNC: 18 MG/DL (ref 8–23)
BUN/CREAT SERPL: 13 (ref 7–25)
CALCIUM SPEC-SCNC: 9.4 MG/DL (ref 8.2–9.6)
CHLORIDE SERPL-SCNC: 101 MMOL/L (ref 98–107)
CO2 SERPL-SCNC: 26.6 MMOL/L (ref 22–29)
CREAT SERPL-MCNC: 1.38 MG/DL (ref 0.57–1)
DEPRECATED RDW RBC AUTO: 43.9 FL (ref 37–54)
EGFRCR SERPLBLD CKD-EPI 2021: 36.2 ML/MIN/1.73
EOSINOPHIL # BLD AUTO: 0.21 10*3/MM3 (ref 0–0.4)
EOSINOPHIL NFR BLD AUTO: 2.7 % (ref 0.3–6.2)
ERYTHROCYTE [DISTWIDTH] IN BLOOD BY AUTOMATED COUNT: 12.8 % (ref 12.3–15.4)
GLUCOSE SERPL-MCNC: 112 MG/DL (ref 65–99)
HCT VFR BLD AUTO: 36.4 % (ref 34–46.6)
HGB BLD-MCNC: 11.8 G/DL (ref 12–15.9)
IMM GRANULOCYTES # BLD AUTO: 0.02 10*3/MM3 (ref 0–0.05)
IMM GRANULOCYTES NFR BLD AUTO: 0.3 % (ref 0–0.5)
LEFT ATRIUM VOLUME INDEX: 27.8 ML/M2
LYMPHOCYTES # BLD AUTO: 0.99 10*3/MM3 (ref 0.7–3.1)
LYMPHOCYTES NFR BLD AUTO: 12.8 % (ref 19.6–45.3)
MCH RBC QN AUTO: 30.3 PG (ref 26.6–33)
MCHC RBC AUTO-ENTMCNC: 32.4 G/DL (ref 31.5–35.7)
MCV RBC AUTO: 93.3 FL (ref 79–97)
MONOCYTES # BLD AUTO: 0.92 10*3/MM3 (ref 0.1–0.9)
MONOCYTES NFR BLD AUTO: 11.9 % (ref 5–12)
NEUTROPHILS NFR BLD AUTO: 5.53 10*3/MM3 (ref 1.7–7)
NEUTROPHILS NFR BLD AUTO: 71.4 % (ref 42.7–76)
NRBC BLD AUTO-RTO: 0 /100 WBC (ref 0–0.2)
NT-PROBNP SERPL-MCNC: 1899 PG/ML (ref 0–1800)
PLATELET # BLD AUTO: 204 10*3/MM3 (ref 140–450)
PMV BLD AUTO: 11.7 FL (ref 6–12)
POTASSIUM SERPL-SCNC: 3.4 MMOL/L (ref 3.5–5.2)
RBC # BLD AUTO: 3.9 10*6/MM3 (ref 3.77–5.28)
SINUS: 2.33 CM
SODIUM SERPL-SCNC: 138 MMOL/L (ref 136–145)
STJ: 2.32 CM
WBC NRBC COR # BLD AUTO: 7.74 10*3/MM3 (ref 3.4–10.8)

## 2024-03-29 PROCEDURE — 83880 ASSAY OF NATRIURETIC PEPTIDE: CPT

## 2024-03-29 PROCEDURE — 71046 X-RAY EXAM CHEST 2 VIEWS: CPT

## 2024-03-29 PROCEDURE — 93306 TTE W/DOPPLER COMPLETE: CPT

## 2024-03-29 PROCEDURE — 80048 BASIC METABOLIC PNL TOTAL CA: CPT

## 2024-03-29 PROCEDURE — 36415 COLL VENOUS BLD VENIPUNCTURE: CPT

## 2024-03-29 PROCEDURE — 85025 COMPLETE CBC W/AUTO DIFF WBC: CPT

## 2024-03-29 NOTE — TELEPHONE ENCOUNTER
----- Message from José Hatch MD sent at 3/29/2024 11:33 AM EDT -----  Please let her know that her blood work looks stable

## 2024-04-01 DIAGNOSIS — I50.31 ACUTE DIASTOLIC CHF (CONGESTIVE HEART FAILURE): Primary | ICD-10-CM

## 2024-04-01 RX ORDER — FUROSEMIDE 40 MG/1
40 TABLET ORAL 2 TIMES DAILY
Start: 2024-04-01

## 2024-04-09 ENCOUNTER — LAB (OUTPATIENT)
Dept: LAB | Facility: HOSPITAL | Age: 89
End: 2024-04-09
Payer: MEDICARE

## 2024-04-09 DIAGNOSIS — I50.31 ACUTE DIASTOLIC CHF (CONGESTIVE HEART FAILURE): ICD-10-CM

## 2024-04-09 LAB
ANION GAP SERPL CALCULATED.3IONS-SCNC: 9.8 MMOL/L (ref 5–15)
BUN SERPL-MCNC: 22 MG/DL (ref 8–23)
BUN/CREAT SERPL: 13.6 (ref 7–25)
CALCIUM SPEC-SCNC: 9.7 MG/DL (ref 8.2–9.6)
CHLORIDE SERPL-SCNC: 100 MMOL/L (ref 98–107)
CO2 SERPL-SCNC: 27.2 MMOL/L (ref 22–29)
CREAT SERPL-MCNC: 1.62 MG/DL (ref 0.57–1)
EGFRCR SERPLBLD CKD-EPI 2021: 29.9 ML/MIN/1.73
GLUCOSE SERPL-MCNC: 107 MG/DL (ref 65–99)
NT-PROBNP SERPL-MCNC: 1610 PG/ML (ref 0–1800)
POTASSIUM SERPL-SCNC: 3.8 MMOL/L (ref 3.5–5.2)
SODIUM SERPL-SCNC: 137 MMOL/L (ref 136–145)

## 2024-04-09 PROCEDURE — 80048 BASIC METABOLIC PNL TOTAL CA: CPT

## 2024-04-09 PROCEDURE — 36415 COLL VENOUS BLD VENIPUNCTURE: CPT

## 2024-04-09 PROCEDURE — 83880 ASSAY OF NATRIURETIC PEPTIDE: CPT

## 2024-04-09 RX ORDER — ISOSORBIDE MONONITRATE 30 MG/1
30 TABLET, EXTENDED RELEASE ORAL EVERY MORNING
Qty: 90 TABLET | Refills: 3 | Status: SHIPPED | OUTPATIENT
Start: 2024-04-09

## 2024-04-15 ENCOUNTER — TRANSCRIBE ORDERS (OUTPATIENT)
Dept: ADMINISTRATIVE | Facility: HOSPITAL | Age: 89
End: 2024-04-15
Payer: MEDICARE

## 2024-04-16 ENCOUNTER — TRANSCRIBE ORDERS (OUTPATIENT)
Dept: ADMINISTRATIVE | Facility: HOSPITAL | Age: 89
End: 2024-04-16
Payer: MEDICARE

## 2024-04-16 DIAGNOSIS — R06.02 SOB (SHORTNESS OF BREATH): Primary | ICD-10-CM

## 2024-04-16 DIAGNOSIS — R06.09 DOE (DYSPNEA ON EXERTION): ICD-10-CM

## 2024-04-19 ENCOUNTER — TELEPHONE (OUTPATIENT)
Dept: CARDIOLOGY | Facility: CLINIC | Age: 89
End: 2024-04-19
Payer: MEDICARE

## 2024-04-19 NOTE — TELEPHONE ENCOUNTER
Pt is calling today with an update.    The tightness in her chest is better, SOA is still the same on exertion, at the end of the day her hands and feets is tingling.     PT#: [phone]

## 2024-04-23 DIAGNOSIS — R93.1 ABNORMAL FINDINGS ON DIAGNOSTIC IMAGING OF HEART AND CORONARY CIRCULATION: ICD-10-CM

## 2024-04-23 DIAGNOSIS — I25.709 CORONARY ARTERY DISEASE INVOLVING CORONARY BYPASS GRAFT OF NATIVE HEART WITH ANGINA PECTORIS: ICD-10-CM

## 2024-04-23 DIAGNOSIS — R06.09 DOE (DYSPNEA ON EXERTION): Primary | ICD-10-CM

## 2024-04-24 ENCOUNTER — TRANSCRIBE ORDERS (OUTPATIENT)
Dept: CARDIOLOGY | Facility: CLINIC | Age: 89
End: 2024-04-24
Payer: MEDICARE

## 2024-04-24 DIAGNOSIS — Z01.810 PRE-OPERATIVE CARDIOVASCULAR EXAMINATION: ICD-10-CM

## 2024-04-24 DIAGNOSIS — Z13.6 SCREENING FOR ISCHEMIC HEART DISEASE: Primary | ICD-10-CM

## 2024-04-24 PROBLEM — R93.1 ABNORMAL FINDINGS ON DIAGNOSTIC IMAGING OF HEART AND CORONARY CIRCULATION: Status: ACTIVE | Noted: 2024-04-23

## 2024-04-24 PROBLEM — R06.09 DOE (DYSPNEA ON EXERTION): Status: ACTIVE | Noted: 2024-04-23

## 2024-04-26 ENCOUNTER — LAB (OUTPATIENT)
Dept: LAB | Facility: HOSPITAL | Age: 89
End: 2024-04-26
Payer: MEDICARE

## 2024-04-26 ENCOUNTER — TELEPHONE (OUTPATIENT)
Dept: CARDIOLOGY | Facility: CLINIC | Age: 89
End: 2024-04-26
Payer: MEDICARE

## 2024-04-26 DIAGNOSIS — Z13.6 SCREENING FOR ISCHEMIC HEART DISEASE: ICD-10-CM

## 2024-04-26 DIAGNOSIS — Z01.810 PRE-OPERATIVE CARDIOVASCULAR EXAMINATION: ICD-10-CM

## 2024-04-26 LAB
ANION GAP SERPL CALCULATED.3IONS-SCNC: 8 MMOL/L (ref 5–15)
BASOPHILS # BLD AUTO: 0.06 10*3/MM3 (ref 0–0.2)
BASOPHILS NFR BLD AUTO: 1.1 % (ref 0–1.5)
BUN SERPL-MCNC: 20 MG/DL (ref 8–23)
BUN/CREAT SERPL: 14.3 (ref 7–25)
CALCIUM SPEC-SCNC: 9.2 MG/DL (ref 8.2–9.6)
CHLORIDE SERPL-SCNC: 103 MMOL/L (ref 98–107)
CO2 SERPL-SCNC: 27 MMOL/L (ref 22–29)
CREAT SERPL-MCNC: 1.4 MG/DL (ref 0.57–1)
DEPRECATED RDW RBC AUTO: 41.6 FL (ref 37–54)
EGFRCR SERPLBLD CKD-EPI 2021: 35.6 ML/MIN/1.73
EOSINOPHIL # BLD AUTO: 0.27 10*3/MM3 (ref 0–0.4)
EOSINOPHIL NFR BLD AUTO: 4.8 % (ref 0.3–6.2)
ERYTHROCYTE [DISTWIDTH] IN BLOOD BY AUTOMATED COUNT: 12.5 % (ref 12.3–15.4)
GLUCOSE SERPL-MCNC: 89 MG/DL (ref 65–99)
HCT VFR BLD AUTO: 35.2 % (ref 34–46.6)
HGB BLD-MCNC: 11.5 G/DL (ref 12–15.9)
IMM GRANULOCYTES # BLD AUTO: 0.02 10*3/MM3 (ref 0–0.05)
IMM GRANULOCYTES NFR BLD AUTO: 0.4 % (ref 0–0.5)
LYMPHOCYTES # BLD AUTO: 1.17 10*3/MM3 (ref 0.7–3.1)
LYMPHOCYTES NFR BLD AUTO: 20.6 % (ref 19.6–45.3)
MCH RBC QN AUTO: 29.9 PG (ref 26.6–33)
MCHC RBC AUTO-ENTMCNC: 32.7 G/DL (ref 31.5–35.7)
MCV RBC AUTO: 91.7 FL (ref 79–97)
MONOCYTES # BLD AUTO: 0.93 10*3/MM3 (ref 0.1–0.9)
MONOCYTES NFR BLD AUTO: 16.4 % (ref 5–12)
NEUTROPHILS NFR BLD AUTO: 3.23 10*3/MM3 (ref 1.7–7)
NEUTROPHILS NFR BLD AUTO: 56.7 % (ref 42.7–76)
NRBC BLD AUTO-RTO: 0 /100 WBC (ref 0–0.2)
PLATELET # BLD AUTO: 197 10*3/MM3 (ref 140–450)
PMV BLD AUTO: 11 FL (ref 6–12)
POTASSIUM SERPL-SCNC: 4.1 MMOL/L (ref 3.5–5.2)
RBC # BLD AUTO: 3.84 10*6/MM3 (ref 3.77–5.28)
SODIUM SERPL-SCNC: 138 MMOL/L (ref 136–145)
WBC NRBC COR # BLD AUTO: 5.68 10*3/MM3 (ref 3.4–10.8)

## 2024-04-26 PROCEDURE — 36415 COLL VENOUS BLD VENIPUNCTURE: CPT

## 2024-04-26 PROCEDURE — 80048 BASIC METABOLIC PNL TOTAL CA: CPT

## 2024-04-26 PROCEDURE — 85025 COMPLETE CBC W/AUTO DIFF WBC: CPT

## 2024-04-26 NOTE — TELEPHONE ENCOUNTER
----- Message from Brenda Gutiérrez sent at 4/26/2024 11:33 AM EDT -----  Please let her know her labs are stable.

## 2024-04-26 NOTE — TELEPHONE ENCOUNTER
I spoke with Brodie Mo and updated pt on results from provider.  They verbalized understanding and have no further questions at this time.    Thank you,    Yesy MYERS, RN  Triage [facility] [place]  04/26/24 14:12 EDT

## 2024-04-26 NOTE — TELEPHONE ENCOUNTER
Called and left VM, will continue to try to reach pt.    HUB- please put patient straight through to triage    Karly Cabrera, RN  Triage RN  04/26/24 11:40 EDT

## 2024-04-27 ENCOUNTER — HOSPITAL ENCOUNTER (OUTPATIENT)
Facility: HOSPITAL | Age: 89
Discharge: HOME | End: 2024-04-27
Payer: MEDICARE

## 2024-04-27 DIAGNOSIS — R06.02 SOB (SHORTNESS OF BREATH): ICD-10-CM

## 2024-04-27 DIAGNOSIS — R06.09 DOE (DYSPNEA ON EXERTION): ICD-10-CM

## 2024-04-27 PROCEDURE — 71250 CT THORAX DX C-: CPT

## 2024-05-02 ENCOUNTER — HOSPITAL ENCOUNTER (OUTPATIENT)
Facility: HOSPITAL | Age: 89
Setting detail: HOSPITAL OUTPATIENT SURGERY
Discharge: HOME | End: 2024-05-02
Attending: INTERNAL MEDICINE | Admitting: INTERNAL MEDICINE
Payer: MEDICARE

## 2024-05-02 VITALS
HEIGHT: 65 IN | WEIGHT: 135.8 LBS | TEMPERATURE: 97.6 F | BODY MASS INDEX: 22.63 KG/M2 | HEART RATE: 73 BPM | SYSTOLIC BLOOD PRESSURE: 169 MMHG | RESPIRATION RATE: 16 BRPM | DIASTOLIC BLOOD PRESSURE: 53 MMHG | OXYGEN SATURATION: 96 %

## 2024-05-02 DIAGNOSIS — R06.09 DOE (DYSPNEA ON EXERTION): ICD-10-CM

## 2024-05-02 DIAGNOSIS — I25.709 CORONARY ARTERY DISEASE INVOLVING CORONARY BYPASS GRAFT OF NATIVE HEART WITH ANGINA PECTORIS: ICD-10-CM

## 2024-05-02 DIAGNOSIS — R93.1 ABNORMAL FINDINGS ON DIAGNOSTIC IMAGING OF HEART AND CORONARY CIRCULATION: ICD-10-CM

## 2024-05-02 DIAGNOSIS — I20.0 UNSTABLE ANGINA: Primary | ICD-10-CM

## 2024-05-02 PROCEDURE — 25010000002 HEPARIN (PORCINE) PER 1000 UNITS: Performed by: INTERNAL MEDICINE

## 2024-05-02 PROCEDURE — C1874 STENT, COATED/COV W/DEL SYS: HCPCS | Performed by: INTERNAL MEDICINE

## 2024-05-02 PROCEDURE — C1894 INTRO/SHEATH, NON-LASER: HCPCS | Performed by: INTERNAL MEDICINE

## 2024-05-02 PROCEDURE — C1769 GUIDE WIRE: HCPCS | Performed by: INTERNAL MEDICINE

## 2024-05-02 PROCEDURE — 85014 HEMATOCRIT: CPT

## 2024-05-02 PROCEDURE — C1725 CATH, TRANSLUMIN NON-LASER: HCPCS | Performed by: INTERNAL MEDICINE

## 2024-05-02 PROCEDURE — 93461 R&L HRT ART/VENTRICLE ANGIO: CPT | Performed by: INTERNAL MEDICINE

## 2024-05-02 PROCEDURE — 25010000002 MIDAZOLAM PER 1 MG: Performed by: INTERNAL MEDICINE

## 2024-05-02 PROCEDURE — 93571 IV DOP VEL&/PRESS C FLO 1ST: CPT | Performed by: INTERNAL MEDICINE

## 2024-05-02 PROCEDURE — 92928 PRQ TCAT PLMT NTRAC ST 1 LES: CPT | Performed by: INTERNAL MEDICINE

## 2024-05-02 PROCEDURE — 82810 BLOOD GASES O2 SAT ONLY: CPT

## 2024-05-02 PROCEDURE — S0260 H&P FOR SURGERY: HCPCS | Performed by: INTERNAL MEDICINE

## 2024-05-02 PROCEDURE — 25510000001 IOPAMIDOL PER 1 ML: Performed by: INTERNAL MEDICINE

## 2024-05-02 PROCEDURE — C9600 PERC DRUG-EL COR STENT SING: HCPCS | Performed by: INTERNAL MEDICINE

## 2024-05-02 PROCEDURE — 93799 UNLISTED CV SVC/PROCEDURE: CPT | Performed by: INTERNAL MEDICINE

## 2024-05-02 PROCEDURE — 85347 COAGULATION TIME ACTIVATED: CPT

## 2024-05-02 PROCEDURE — 25810000003 SODIUM CHLORIDE 0.9 % SOLUTION: Performed by: INTERNAL MEDICINE

## 2024-05-02 PROCEDURE — 25010000002 FENTANYL CITRATE (PF) 50 MCG/ML SOLUTION: Performed by: INTERNAL MEDICINE

## 2024-05-02 PROCEDURE — C1887 CATHETER, GUIDING: HCPCS | Performed by: INTERNAL MEDICINE

## 2024-05-02 PROCEDURE — 85018 HEMOGLOBIN: CPT

## 2024-05-02 DEVICE — XIENCE SKYPOINT™ EVEROLIMUS ELUTING CORONARY STENT SYSTEM 3.50 MM X 08 MM / RAPID-EXCHANGE
Type: IMPLANTABLE DEVICE | Site: CORONARY | Status: FUNCTIONAL
Brand: XIENCE SKYPOINT™

## 2024-05-02 DEVICE — XIENCE SKYPOINT™ EVEROLIMUS ELUTING CORONARY STENT SYSTEM 3.00 MM X 48 MM / RAPID-EXCHANGE
Type: IMPLANTABLE DEVICE | Site: CORONARY | Status: FUNCTIONAL
Brand: XIENCE SKYPOINT™

## 2024-05-02 DEVICE — XIENCE SKYPOINT™ EVEROLIMUS ELUTING CORONARY STENT SYSTEM 3.00 MM X 18 MM / RAPID-EXCHANGE
Type: IMPLANTABLE DEVICE | Site: CORONARY | Status: FUNCTIONAL
Brand: XIENCE SKYPOINT™

## 2024-05-02 RX ORDER — CLOPIDOGREL BISULFATE 75 MG/1
TABLET ORAL
Status: DISCONTINUED | OUTPATIENT
Start: 2024-05-02 | End: 2024-05-02 | Stop reason: HOSPADM

## 2024-05-02 RX ORDER — LIDOCAINE HYDROCHLORIDE 20 MG/ML
INJECTION, SOLUTION INFILTRATION; PERINEURAL
Status: DISCONTINUED | OUTPATIENT
Start: 2024-05-02 | End: 2024-05-02 | Stop reason: HOSPADM

## 2024-05-02 RX ORDER — SODIUM CHLORIDE 9 MG/ML
125 INJECTION, SOLUTION INTRAVENOUS CONTINUOUS
Status: DISCONTINUED | OUTPATIENT
Start: 2024-05-02 | End: 2024-05-02 | Stop reason: HOSPADM

## 2024-05-02 RX ORDER — SODIUM CHLORIDE 0.9 % (FLUSH) 0.9 %
10 SYRINGE (ML) INJECTION EVERY 12 HOURS SCHEDULED
Status: DISCONTINUED | OUTPATIENT
Start: 2024-05-02 | End: 2024-05-02 | Stop reason: HOSPADM

## 2024-05-02 RX ORDER — CLOPIDOGREL BISULFATE 75 MG/1
75 TABLET ORAL DAILY
Qty: 90 TABLET | Refills: 3 | Status: SHIPPED | OUTPATIENT
Start: 2024-05-02

## 2024-05-02 RX ORDER — VERAPAMIL HYDROCHLORIDE 2.5 MG/ML
INJECTION, SOLUTION INTRAVENOUS
Status: DISCONTINUED | OUTPATIENT
Start: 2024-05-02 | End: 2024-05-02 | Stop reason: HOSPADM

## 2024-05-02 RX ORDER — ACETAMINOPHEN 325 MG/1
650 TABLET ORAL EVERY 4 HOURS PRN
Status: DISCONTINUED | OUTPATIENT
Start: 2024-05-02 | End: 2024-05-02 | Stop reason: HOSPADM

## 2024-05-02 RX ORDER — SODIUM CHLORIDE 9 MG/ML
40 INJECTION, SOLUTION INTRAVENOUS AS NEEDED
Status: DISCONTINUED | OUTPATIENT
Start: 2024-05-02 | End: 2024-05-02 | Stop reason: HOSPADM

## 2024-05-02 RX ORDER — MIDAZOLAM HYDROCHLORIDE 1 MG/ML
INJECTION INTRAMUSCULAR; INTRAVENOUS
Status: DISCONTINUED | OUTPATIENT
Start: 2024-05-02 | End: 2024-05-02 | Stop reason: HOSPADM

## 2024-05-02 RX ORDER — SODIUM CHLORIDE 0.9 % (FLUSH) 0.9 %
10 SYRINGE (ML) INJECTION AS NEEDED
Status: DISCONTINUED | OUTPATIENT
Start: 2024-05-02 | End: 2024-05-02 | Stop reason: HOSPADM

## 2024-05-02 RX ORDER — HEPARIN SODIUM 1000 [USP'U]/ML
INJECTION, SOLUTION INTRAVENOUS; SUBCUTANEOUS
Status: DISCONTINUED | OUTPATIENT
Start: 2024-05-02 | End: 2024-05-02 | Stop reason: HOSPADM

## 2024-05-02 RX ORDER — FENTANYL CITRATE 50 UG/ML
INJECTION, SOLUTION INTRAMUSCULAR; INTRAVENOUS
Status: DISCONTINUED | OUTPATIENT
Start: 2024-05-02 | End: 2024-05-02 | Stop reason: HOSPADM

## 2024-05-02 RX ORDER — ASPIRIN 81 MG/1
81 TABLET, CHEWABLE ORAL DAILY
Start: 2024-05-02

## 2024-05-02 RX ADMIN — SODIUM CHLORIDE 125 ML/HR: 9 INJECTION, SOLUTION INTRAVENOUS at 08:38

## 2024-05-02 NOTE — Clinical Note
First balloon inflation max pressure = 20 enmanuel. First balloon inflation duration = 5 seconds. Second inflation of balloon - Max pressure = 20 enmanuel. 2nd Inflation of balloon - Duration = 6 seconds.

## 2024-05-02 NOTE — Clinical Note
First balloon inflation max pressure = 20 enmanuel. First balloon inflation duration = 6 seconds. Second inflation of balloon - Max pressure = 20 enmanuel. 2nd Inflation of balloon - Duration = 5 seconds. Third inflation of balloon - Max pressure = 20 enmanuel. 3rd Inflation of balloon - Duration = 5 seconds. Fourth inflation of balloon - Max pressure = 20 enmanuel. 4th Inflation of balloon - Duration = 6 seconds.

## 2024-05-02 NOTE — H&P
Date May 2, 2024  Encounter Provider: José Hatch MD  Place of Service: [facility] CARDIOLOGY  Patient Name: Brodie Mo  :10/31/1932  [id number]         Chief Complaint   Patient presents with    Coronary Artery Disease   :      HPI: Brodie Mo is a 89 y.o. female she had a 5 vessel bypass in  with Dr. Prabhakar.  She had a stress test in May 2019 that was normal she had an echo which showed moderate to severe MR and severe pulmonary hypertension.  She had a MitraClip done in 2020.  She had COVID back in 2022 and it really knocked her down and she is really been slow to recover.  She was in the hospital with pneumonia in April I think of  and since that time she has noticed that she has intermittent periods of dizziness throughout the day.  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  We saw her at that time and evaluate her never saw any high degree heart block and she was really asymptomatic with that so we chose to watch things.     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  She is may be a little bit more short of breath she says not having PND orthopnea no change in her edema but if she exerts herself a little bit more she has some shortness of breath she has not had a change in her weight no fever chills sweats or cough no syncope no chest pain     Medical History        Past Medical History:   Diagnosis Date    CAD (coronary artery disease)      Cancer       bladder     Deep vein thrombosis       Left leg-    Health care maintenance      HTN (hypertension)      Hyperlipidemia      LVH (left ventricular hypertrophy)      Pneumonia      S/P CABG (coronary artery bypass graft)              Surgical History         Past Surgical History:   Procedure Laterality Date    BLADDER SURGERY          cancer    CARPAL TUNNEL RELEASE Right 12/15/2021     Procedure: RIGHT CARPAL TUNNEL RELEASE WITH POSSIBLE SYNOVECTOMY;  Surgeon: Georgie Hines MD;  Location: [facility];  Service: Plastics;  Laterality: Right;    CORONARY ARTERY BYPASS GRAFT         2002 with Dr Prabhakar; AGUAYO to LAD and SVG to OM2; 2005 cath all grafts open and 30% dx in RCA; nl stress in 2010, 2013    HYSTERECTOMY        MITRAL VALVE REPAIR/REPLACEMENT N/A 9/2/2020     Procedure: TRANSCATHETER MITRAL VALVE REPAIR;  Surgeon: Edward Humphreys MD;  Location: [facility] CATH INVASIVE LOCATION;  Service: Cardiovascular;  Laterality: N/A;            Social History   Social History            Socioeconomic History    Marital status:    Tobacco Use    Smoking status: Former    Smokeless tobacco: Never    Tobacco comments:       CAFFEINE USE: 2  CUPS COFFEE DAILY   Vaping Use    Vaping status: Never Used   Substance and Sexual Activity    Alcohol use: Yes       Alcohol/week: 1.0 standard drink of alcohol       Types: 1 Shots of liquor per week       Comment: 1 DRINK DAILY    Drug use: No    Sexual activity: Defer                  Family History   Problem Relation Age of Onset    Heart disease Mother      Kidney cancer Father      No Known Problems Maternal Grandmother      No Known Problems Maternal Grandfather      No Known Problems Paternal Grandmother      No Known Problems Paternal Grandfather      Heart disease Sister      Heart disease Brother           Review of Systems   Constitutional: Negative for decreased appetite, fever, malaise/fatigue and weight loss.   HENT:  Negative for nosebleeds.    Eyes:  Negative for double vision.   Cardiovascular:  Negative for chest pain, claudication, cyanosis, dyspnea on exertion, irregular heartbeat, leg swelling, near-syncope, orthopnea, palpitations, paroxysmal nocturnal dyspnea and syncope.   Respiratory:  Negative for cough, hemoptysis and shortness of breath.   "  Hematologic/Lymphatic: Negative for bleeding problem.   Skin:  Negative for rash.   Musculoskeletal:  Negative for falls and myalgias.   Gastrointestinal:  Negative for hematochezia, jaundice, melena, nausea and vomiting.   Genitourinary:  Negative for hematuria.   Neurological:  Negative for dizziness and seizures.   Psychiatric/Behavioral:  Negative for altered mental status and memory loss.          Allergies        Allergies   Allergen Reactions    No Known Drug Allergy                Current Medications      Current Outpatient Medications:     amLODIPine (NORVASC) 5 MG tablet, Take 1 tablet by mouth Daily., Disp: 90 tablet, Rfl: 3    aspirin 81 MG tablet, Take 1 tablet by mouth Daily., Disp: , Rfl:     atorvastatin (LIPITOR) 40 MG tablet, TAKE 1 TABLET BY MOUTH EVERY DAY, Disp: 90 tablet, Rfl: 3    B Complex Vitamins (vitamin b complex) capsule capsule, Take 1 capsule by mouth 2 (Two) Times a Week., Disp: , Rfl:     cholecalciferol (VITAMIN D3) 1000 units tablet, Take 1 tablet by mouth Daily., Disp: , Rfl:     coenzyme Q10 100 MG capsule, Take 1 capsule by mouth Daily., Disp: , Rfl:     furosemide (LASIX) 40 MG tablet, TAKE 1 TABLET BY MOUTH EVERY DAY, Disp: 90 tablet, Rfl: 1    hydrALAZINE (APRESOLINE) 25 MG tablet, TAKE 1 TABLET BY MOUTH TWICE A DAY (Patient taking differently: Take 1 tablet by mouth 3 (Three) Times a Day.), Disp: 180 tablet, Rfl: 1         Objective:      Vitals       Vitals:     03/06/24 1133   BP: 130/60   Pulse: 74   Weight: 63.5 kg (140 lb)   Height: 165.1 cm (65\")         Body mass index is 23.3 kg/m².     Physical Exam  Constitutional:       Appearance: She is well-developed.   HENT:      Head: Normocephalic.   Eyes:      General: No scleral icterus.  Neck:      Thyroid: No thyromegaly.      Vascular: No JVD.   Cardiovascular:      Rate and Rhythm: Normal rate and regular rhythm.      Heart sounds: Murmur heard.      High-pitched blowing holosystolic murmur is present with a grade of " 2/6 at the upper left sternal border.      No friction rub. No gallop.   Pulmonary:      Effort: Pulmonary effort is normal.      Breath sounds: Normal breath sounds. No wheezing or rales.   Abdominal:      Palpations: Abdomen is soft.      Tenderness: There is no abdominal tenderness.   Musculoskeletal:         General: Normal range of motion.   Lymphadenopathy:      Cervical: No cervical adenopathy.   Skin:     General: Skin is warm and dry.      Findings: No rash.   Neurological:      Mental Status: She is alert and oriented to person, place, and time.               ECG 12 Lead     Date/Time: 3/6/2024 12:24 PM  Performed by: José Hatch MD     Authorized by: José Hatch MD  Comparison: compared with previous ECG   Rhythm: atrial fibrillation  Ectopy: unifocal PVCs  Other findings: non-specific ST-T wave changes     Clinical impression: abnormal EKG              Assessment:        Diagnosis Plan   1. S/P CABG (coronary artery bypass graft)          2. Essential hypertension          3. Nonrheumatic mitral valve regurgitation          4. History of bladder cancer                   Plan:       She has continued to have shortness of breath and fatigue chest discomfort we have tried to manage her medically but she continues to have symptoms.  We have echoed her valve looks pretty good LV functions good but were at a point now where she just really is not any better and so will come to the Cath Lab to look at her coronaries we will do a right and left heart cath on her also but no LV gram.    H&P reviewed. The patient was examined and there are no changes to the H&P. I have explained the risks and benefits of the procedure to the patient.  The patient understands and agrees to proceed         As always, it has been a pleasure to participate in your patient's care.        Sincerely,         José Hatch MD

## 2024-05-02 NOTE — Clinical Note
Hemostasis started on the left radial artery. R-Band was used in achieving hemostasis. Radial compression device applied to vessel. Hemostasis achieved successfully. Closure device additional comment: 17cc tr band

## 2024-05-02 NOTE — Clinical Note
First balloon inflation max pressure = 14 enmanuel. First balloon inflation duration = 6 seconds. Second inflation of balloon - Max pressure = 14 enmanuel. 2nd Inflation of balloon - Duration = 5 seconds. Third inflation of balloon - Max pressure = 14 enmanuel. 3rd Inflation of balloon - Duration = 4 seconds. Fourth inflation of balloon - Max pressure = 14 enmanuel. 4th Inflation of balloon - Duration = 5 seconds.

## 2024-05-02 NOTE — Clinical Note
Hemostasis started on the right brachial vein. Manual pressure applied to vessel. Manual pressure was held by Dio CHILDERS. Manual pressure was held for 8 min. Hemostasis achieved successfully.

## 2024-05-02 NOTE — Clinical Note
Per Dr. Hatch, stop blood pressure cuff and leave the cuff off.  Per Dr. Hatch don't take blood pressures at this time.

## 2024-05-02 NOTE — DISCHARGE INSTRUCTIONS
[facility]  [address] [place] [address]    Coronary Angioplasty with or without  Stent (Radial Approach) After Care    Refer to this sheet in the next few weeks. These instructions provide you with information on caring for yourself after your procedure. Your health care provider may also give you more specific instructions. Your treatment has been planned according to current medical practices, but problems sometimes occur. Call your health care provider if you have any problems or questions after your procedure.       Home Care Instructions:  You may shower the day after the procedure. Remove the bandage (dressing) and gently wash the site with plain soap and water. Gently pat the site dry. You may apply a band aid daily for 2 days if desired.    Do not apply powder or lotion to the site.  Do not submerge the affected site in water for 3 to 5 days or until the site is completely healed.   Do not flex or bend at the wrist with affected arm for 24 hours.  Do not lift, push or pull anything over 5 pounds for 5 days after your procedure or as directed by your physician.  For a reference, a gallon of milk weighs 8 pounds.    Do not operate machinery or power tools for 24 hours.  Inspect the site at least twice daily. You may notice some bruising at the site and it may be tender for 1 to 2 weeks.     Increase your fluid intake for the next 2 days.    Keep arm elevated for 24 hours.  For the remainder of the day, keep your arm in the “Pledge of Allegiance” position when up and about.    Limit your activity for the next 48 hours and avoid strenuous activity as directed by your physician.   Cardiac Rehab may or may not be ordered.  Please consult with your physician  You may drive 24 hours after the procedure unless otherwise instructed by your caregiver.  A responsible adult should be with you for the first 24 hours after you arrive home.   Do not make any important legal decisions or sign legal  papers for 24 hours. Do not drink alcohol for 24 hours.    Take medicines only as directed by your health care provider.  Blood thinners may be prescribed after your procedure to improve blood flow through the stent.    Metformin or any medications containing Metformin should not be taken for 48 hours after your procedure.    Eat a heart-healthy diet. This should include plenty of fresh fruits and vegetables. Meat should be lean cuts. Avoid the following types of food:    Food that is high in salt.    Canned or highly processed food.    Food that is high in saturated fat or sugar.    Fried food.    Make any other lifestyle changes recommended by your health care provider. This may include:    Not using any tobacco products including cigarettes, chewing tobacco, or electronic cigarettes.   Managing your weight.    Getting regular exercise.    Managing your blood pressure.    Limiting your alcohol intake.    Managing other health problems, such as diabetes.    If you need an MRI after your heart stent was placed, be sure to tell the health care provider who orders the MRI that you have a heart stent.    Keep all follow-up visits as directed by your health care provider.      Call Your Doctor If:    You have unusual pain at the radial/ulnar (wrist) site.  You have redness, warmth, swelling, or pain at the radial/ulnar (wrist) site.  You have drainage (other than a small amount of blood on the dressing).  `You have chills or a fever > 101.  Your arm becomes pale or dark, cool, tingly, or numb.  You develop chest pain, shortness of breath, feel faint or pass out.    You have heavy bleeding from the site.  If you do, hold pressure on the site for 20 minutes.  If the bleeding stops, apply a fresh bandage and call your cardiologist.  However, if you continue to have bleeding, maintain pressure and call 911.    You have any symptoms of a stroke.  Remember BE FAST  B-balance. Sudden trouble walking or loss of  balance.  E-eyes.  Sudden changes in how you see or a sudden onset of a very bad headache.   F-face. Sudden weakness or loss of feeling of the face or facial droop on one side.   A-arms Sudden weakness or numbness in one arm. One arm drifts down if they are both held out in front of you. This happens suddenly and usually on one side of the body.   S-speech.  Sudden trouble speaking, slurred speech or trouble understanding what people are saying.   T-time  Time to call emergency services.  Write down the symptoms and the time they started.

## 2024-05-02 NOTE — Clinical Note
Prepped: right groin and Left Wrist. Prepped with: ChloraPrep. The patient was draped in a sterile fashion.

## 2024-05-03 LAB
ACT BLD: 342 SECONDS (ref 82–152)
ACT BLD: 379 SECONDS (ref 82–152)
HCT VFR BLDA CALC: 31 % (ref 38–51)
HCT VFR BLDA CALC: 31 % (ref 38–51)
HCT VFR BLDA CALC: 34 % (ref 38–51)
HGB BLDA-MCNC: 10.5 G/DL (ref 12–17)
HGB BLDA-MCNC: 10.5 G/DL (ref 12–17)
HGB BLDA-MCNC: 11.6 G/DL (ref 12–17)
SAO2 % BLDA: 73 % (ref 95–98)
SAO2 % BLDA: 97 % (ref 95–98)
SAO2 % BLDA: 98 % (ref 95–98)

## 2024-05-08 ENCOUNTER — TELEPHONE (OUTPATIENT)
Dept: CARDIOLOGY | Facility: CLINIC | Age: 89
End: 2024-05-08

## 2024-05-08 NOTE — TELEPHONE ENCOUNTER
Caller: Brodie Mo    Relationship: Self    Best call back number: [phone]    What was the call regarding: PT RETURNED CALL AND SAID SHE WANTS TO KEEP THE APPT ON 5/15/24.

## 2024-05-08 NOTE — TELEPHONE ENCOUNTER
Pt needs appt with roby this week. I called and left her a vm. Please schedule this if she calls back

## 2024-05-08 NOTE — TELEPHONE ENCOUNTER
Caller: Brodie Mo    Relationship to patient: Self    Best call back number: [phone]    Chief complaint: NONE    Type of visit: HOSPITAL FOLLOW UP    Requested date: 6/2/24 IN NOTES         Additional notes:PT NOT AVAILABLE ON 6/7 OR 6/11 OR 6/19

## 2024-05-15 ENCOUNTER — OFFICE VISIT (OUTPATIENT)
Dept: CARDIOLOGY | Facility: CLINIC | Age: 89
End: 2024-05-15
Payer: MEDICARE

## 2024-05-15 ENCOUNTER — TRANSCRIBE ORDERS (OUTPATIENT)
Dept: ADMINISTRATIVE | Facility: HOSPITAL | Age: 89
End: 2024-05-15
Payer: MEDICARE

## 2024-05-15 VITALS
DIASTOLIC BLOOD PRESSURE: 60 MMHG | SYSTOLIC BLOOD PRESSURE: 130 MMHG | BODY MASS INDEX: 22.49 KG/M2 | WEIGHT: 135 LBS | HEART RATE: 76 BPM | HEIGHT: 65 IN

## 2024-05-15 DIAGNOSIS — I49.3 FREQUENT PVCS: ICD-10-CM

## 2024-05-15 DIAGNOSIS — R91.8 LUNG NODULES: Primary | ICD-10-CM

## 2024-05-15 DIAGNOSIS — Z95.1 S/P CABG (CORONARY ARTERY BYPASS GRAFT): ICD-10-CM

## 2024-05-15 DIAGNOSIS — I25.10 CORONARY ARTERY DISEASE INVOLVING NATIVE CORONARY ARTERY OF NATIVE HEART WITHOUT ANGINA PECTORIS: Primary | ICD-10-CM

## 2024-05-15 DIAGNOSIS — I10 ESSENTIAL HYPERTENSION: ICD-10-CM

## 2024-05-15 NOTE — PROGRESS NOTES
Date of Office Visit: 05/15/2024  Encounter Provider: STACIA Guaman  Place of Service: [facility] CARDIOLOGY  Patient Name: Brodie Mo  :10/31/1932    Chief Complaint   Patient presents with    Coronary Artery Disease   :     HPI: Brodie Mo is a 89 y.o. female patient of Dr. Hatch's with coronary artery disease and mitral insufficiency.  In , she had a CABG x 2 with a LIMA to the LAD and a vein graft to the OM 2.  In 2020, she underwent a MitraClip.    Early last year, she presented with dyspnea on exertion for which she underwent an echocardiogram and a stress test.  Both were unrevealing.  In May 2023, she was noted to be in Wenkebach at her PCPs office.  We never saw any evidence of high degree block.  She was never symptomatic, so we chose to watch things.    She was last seen in the office by Dr. Hatch on  at which time she continued reporting increased shortness of breath on exertion.  Ultimately, she was scheduled for left and right cardiac catheterization.  On , this demonstrated severe native disease with 2/2 bypasses patent and a 70% mid RCA for which she underwent drug-eluting stent placement.  She is here today for follow-up.    There has really been no improvement in her dyspnea.  If so it has been minimal.  Her PCP gave her a an inhaler to try.  She has been experiencing intermittent dizziness which mostly occurs in the mornings after taking her medications.  She saw her primary care doctor yesterday who advised her to take the amlodipine at nighttime.  She has not been monitoring her blood pressures at home.  She has been taking a chewable 81 mg aspirin in addition to the aspirin she was already taking.  Reportedly she is scheduled for a cystoscopy at the end of the month secondary to hematuria which she was experiencing prior to the procedure.  Fortunately she denies any since the procedure.  She is reporting that her  extremities are cold and tingly.  She does not want to participate in cardiac rehab.  She denies any chest pain, palpitations, edema, or syncope.     Past Medical History:   Diagnosis Date    CAD (coronary artery disease)     Cancer     bladder     Deep vein thrombosis     Left leg-2002    Health care maintenance     HTN (hypertension)     Hyperlipidemia     LVH (left ventricular hypertrophy)     Pneumonia     S/P CABG (coronary artery bypass graft)        Past Surgical History:   Procedure Laterality Date    BLADDER SURGERY      cancer    CARDIAC CATHETERIZATION N/A 5/2/2024    Procedure: Right Heart Cath;  Surgeon: José Hatch MD;  Location:  [facility] CATH INVASIVE LOCATION;  Service: Cardiovascular;  Laterality: N/A;    CARDIAC CATHETERIZATION N/A 5/2/2024    Procedure: Left Heart Cath;  Surgeon: José Hatch MD;  [facility];  Service: Cardiovascular;  Laterality: N/A;    CARDIAC CATHETERIZATION N/A 5/2/2024    Procedure: Native mammary injection;  Surgeon: José Hatch MD;  [facility] CATH [facility]VASIVE LOCATION;  Service: Cardiovascular;  Laterality: N/A;    CARDIAC CATHETERIZATION  5/2/2024    Procedure: Saphenous Vein Graft;  Surgeon: José Hatch MD;  Location: [facility] CATH INVASIVE LOCATION;  Service: Cardiovascular;;    CARDIAC CATHETERIZATION N/A 5/2/2024    Procedure: Percutaneous Coronary Intervention;  Surgeon: José Hatch MD;  [facility] CATH [facility]VASIVE LOC[facility];  Service: Cardiovascular;  Laterality: N/A;    CARDIAC CATHETERIZATION N/A 5/2/2024    Procedure: Stent DEBO coronary;  Surgeon: José Hatch MD;  Location: [facility] CATH INVASIVE LOCATION;  Service: Cardiovascular;  Laterality: N/A;    CARDIAC CATHETERIZATION N/A 5/2/2024    Procedure: Optical Coherence Tomography;  Surgeon: José Hatch MD;  [facility]: [facility] CATH INVASIVE LOCATION;  Service: Cardiovascular;  Laterality: N/A;    CARDIAC CATHETERIZATION N/A 5/2/2024    Procedure:  Resting Full Cycle Ratio;  Surgeon: José Hatch MD;  Location:  [facility] CATH INVASIVE LOCATION;  Service: Cardiovascular;  Laterality: N/A;    CARPAL TUNNEL RELEASE Right 12/15/2021    Procedure: RIGHT CARPAL TUNNEL RELEASE WITH POSSIBLE SYNOVECTOMY;  Surgeon: Georgie Hines MD;  Location: [facility] MAIN OR;  Service: Plastics;  Laterality: Right;    CORONARY ARTERY BYPASS GRAFT      2002 with Dr Prabhakar; AGUAYO to LAD and SVG to OM2; 2005 cath all grafts open and 30% dx in RCA; nl stress in 2010, 2013    HYSTERECTOMY      MITRAL VALVE REPAIR/REPLACEMENT N/A 9/2/2020    Procedure: TRANSCATHETER MITRAL VALVE REPAIR;  Surgeon: Edward Humphreys MD;  Location:  [facility] CATH INVASIVE LOCATION;  Service: Cardiovascular;  Laterality: N/A;       Social History     Socioeconomic History    Marital status:    Tobacco Use    Smoking status: Former    Smokeless tobacco: Never    Tobacco comments:     CAFFEINE USE: 2  CUPS COFFEE DAILY   Vaping Use    Vaping status: Never Used   Substance and Sexual Activity    Alcohol use: Yes     Alcohol/week: 1.0 standard drink of alcohol     Types: 1 Shots of liquor per week     Comment: 1 DRINK DAILY    Drug use: No    Sexual activity: Defer       Family History   Problem Relation Age of Onset    Heart disease Mother     Kidney cancer Father     No Known Problems Maternal Grandmother     No Known Problems Maternal Grandfather     No Known Problems Paternal Grandmother     No Known Problems Paternal Grandfather     Heart disease Sister     Heart disease Brother        Review of Systems   Constitutional: Negative.   Cardiovascular:  Positive for dyspnea on exertion. Negative for chest pain, leg swelling, orthopnea, paroxysmal nocturnal dyspnea and syncope.   Respiratory: Negative.     Hematologic/Lymphatic: Negative for bleeding problem.   Musculoskeletal:  Negative for falls.   Gastrointestinal:  Negative for melena.   Neurological:  Positive for dizziness. Negative for  "light-headedness.       Allergies   Allergen Reactions    No Known Drug Allergy          Current Outpatient Medications:     amLODIPine (NORVASC) 5 MG tablet, Take 1 tablet by mouth Daily., Disp: 90 tablet, Rfl: 3    aspirin 81 MG tablet, Take 1 tablet by mouth Daily., Disp: , Rfl:     atorvastatin (LIPITOR) 40 MG tablet, TAKE 1 TABLET BY MOUTH EVERY DAY, Disp: 90 tablet, Rfl: 3    B Complex Vitamins (vitamin b complex) capsule capsule, Take 1 capsule by mouth 2 (Two) Times a Week., Disp: , Rfl:     cholecalciferol (VITAMIN D3) 1000 units tablet, Take 1 tablet by mouth Daily., Disp: , Rfl:     clopidogrel (PLAVIX) 75 MG tablet, Take 1 tablet by mouth Daily., Disp: 90 tablet, Rfl: 3    coenzyme Q10 100 MG capsule, Take 1 capsule by mouth Daily., Disp: , Rfl:     furosemide (LASIX) 40 MG tablet, Take 1 tablet by mouth 2 (Two) Times a Day. (Patient taking differently: Take 1 tablet by mouth Daily.), Disp: , Rfl:     isosorbide mononitrate (IMDUR) 30 MG 24 hr tablet, Take 1 tablet by mouth Every Morning., Disp: 90 tablet, Rfl: 3      Objective:     Vitals:    05/15/24 1426   BP: 130/60   Pulse: 76   Weight: 61.2 kg (135 lb)   Height: 165.1 cm (65\")     Body mass index is 22.47 kg/m².    PHYSICAL EXAM:    Neck:      Vascular: No JVD.   Pulmonary:      Effort: Pulmonary effort is normal.      Breath sounds: Normal breath sounds.   Cardiovascular:      Normal rate. Regular rhythm.      Murmurs: There is no murmur.      No gallop.  No click. No rub.   Pulses:     Intact distal pulses.           ECG 12 Lead    Date/Time: 5/15/2024 2:38 PM  Performed by: Brenda Gutiérrez APRN    Authorized by: Brenda Gutiérrez APRN  Comparison: compared with previous ECG from 3/6/2024  Similar to previous ECG  Rhythm: sinus rhythm  Ectopy: unifocal PVCs and trigeminy  Rate: normal  BPM: 76  Other findings: non-specific ST-T wave changes            Assessment:       Diagnosis Plan   1. Coronary artery disease involving native coronary " artery of native heart without angina pectoris  ECG 12 Lead      2. S/P CABG (coronary artery bypass graft)        3. Essential hypertension        4. Frequent PVCs  Holter Monitor - 48 Hour        Orders Placed This Encounter   Procedures    Holter Monitor - 48 Hour     Standing Status:   Future     Standing Expiration Date:   5/15/2025     Order Specific Question:   Reason for exam?     Answer:   Dizziness     Order Specific Question:   Reason for exam?     Answer:   Ventricular Arrhythmia     Order Specific Question:   Ventricular arrhythmia specification?     Answer:   PVC     Order Specific Question:   Release to patient     Answer:   Routine Release [[id number]]    ECG 12 Lead     This order was created via procedure documentation     Order Specific Question:   Release to patient     Answer:   Routine Release [[id number]]          Plan:       1.  Coronary artery disease.  Status post PCI of the mid RCA.  Unfortunately this has not improved her dyspnea.  It is possible her shortness of breath is not cardiac.  I think it is reasonable to try the inhaler.  Otherwise, I think we could consider increasing the isosorbide.  Continue aspirin, clopidogrel, atorvastatin, and isosorbide.      2.  Hypertension/dizziness.  Her blood pressure is stable today.  She is on a lot of antihypertensive medication.  Secondary to the dizziness, I told her to discontinue the hydralazine which she is only taking at nighttime anyway.  She will continue monitoring her blood pressure.  Additionally, I have recommended a 48-hour Holter to rule out any conduction abnormalities.  She does have a history of Wenkebach.        3.  Frequent PVCs.  As above, I have ordered a 48-hour Holter.      Overall think she is stable.  I will call her with the results of the above.  Otherwise, she will follow-up with Dr. Hatch in 1 month.      As always, it has been a pleasure to participate in your patient's care.      Sincerely,         Brenda Gutiérrez  APRN

## 2024-05-31 RX ORDER — ATORVASTATIN CALCIUM 40 MG/1
TABLET, FILM COATED ORAL
Qty: 90 TABLET | Refills: 3 | Status: SHIPPED | OUTPATIENT
Start: 2024-05-31

## 2024-06-10 ENCOUNTER — TELEPHONE (OUTPATIENT)
Dept: CARDIOLOGY | Facility: CLINIC | Age: 89
End: 2024-06-10

## 2024-06-10 NOTE — TELEPHONE ENCOUNTER
Caller: Brodie Mo    Relationship to patient: Self    Best call back number:  [phone]    Patient is needing: PATIENT REQUESTING TO MOVE APPOINTMENT WITH KAILEY 7.2.24. PATIENT ONLY WANTS TO SEE DR BONNER BECAUSE SHE RECENTLY SEEN KEVON

## 2024-06-11 NOTE — TELEPHONE ENCOUNTER
06/11/2024    Called and left a voicemail for this patient to return call to reschedule this appointment.     Thanks  Edgar

## 2024-06-11 NOTE — TELEPHONE ENCOUNTER
Hub staff attempted to follow warm transfer process and was unsuccessful     Caller: Brodie Mo    Relationship to patient: Self    Best call back number:  [phone]    Patient is needing: PATIENT RETURNING CALL TO SCHEDULE

## 2024-06-18 NOTE — Clinical Note
Quality 111:Pneumonia Vaccination Status For Older Adults: Pneumococcal Vaccination Previously Received removed. Detail Level: Detailed No

## 2024-06-21 NOTE — TELEPHONE ENCOUNTER
Caller: Brodie Mo    Relationship to patient: Self    Best call back number:  [phone]    Patient is needing: PATIENT REQUESTING A CALL BACK TO SCHEDULE WITH APPOINTMENT. UNABLE TO WARM TO TRANSFER.

## 2024-06-27 NOTE — TELEPHONE ENCOUNTER
Brenda patient had an appt for 7/2/24 but is going out of town.  You seen her after her hosp stay and she was to follow up with [facility] in 1 mo  We have nothing else before hand.  They have her to come back and see you Sept 6th   Is this okay? She said she is doing fine other the bruising from the plavix.

## 2024-06-27 NOTE — TELEPHONE ENCOUNTER
Hub staff attempted to follow warm transfer process and was unsuccessful     Caller: Brodie Mo    Relationship to patient: Self    Best call back number: [phone]     Patient is needing: PT RETURNING CALL TO SCHEDULE - SHE STATES THIS IS HER TWELFTH CALL TRYING TO SETUP APPT - PT STATES SHE IS GOING OUT OF TOWN NEXT WEEK THE 29TH [place] TO [place] - PT IS FRUSTRATED AS SHE HAS TRIED TO CALL BACK AND HASN'T BEEN ABLE TO CONNECT - PT STATES SHE HAS NOT RECEIVED ANY VM AND IS NOT GOING TO REACH OUT ANYMORE AFTER THIS CALL TO TRY AND SCHEDULE - PT STATES SHE HAS BEEN FEELING FINE AND WAS FOLLOWING UP PER HER MD REQUEST - PT IS SCHEDULED 09.06.24 WITH APRN AND STATES SHE IS OKAY WITH THAT APPT STAYING AS HER FU

## 2024-06-27 NOTE — TELEPHONE ENCOUNTER
She is status post stent placement in May.  This is a hospital follow-up.  If Dr. Hatch has nothing sooner, she can see me again.  However, it should be before September.

## 2024-07-09 ENCOUNTER — HOSPITAL ENCOUNTER (OUTPATIENT)
Dept: CARDIOLOGY | Facility: HOSPITAL | Age: 89
Discharge: HOME | End: 2024-07-09
Admitting: NURSE PRACTITIONER
Payer: MEDICARE

## 2024-07-09 ENCOUNTER — OFFICE VISIT (OUTPATIENT)
Dept: CARDIOLOGY | Facility: CLINIC | Age: 89
End: 2024-07-09
Payer: MEDICARE

## 2024-07-09 VITALS
WEIGHT: 134 LBS | HEART RATE: 67 BPM | HEIGHT: 65 IN | SYSTOLIC BLOOD PRESSURE: 136 MMHG | BODY MASS INDEX: 22.33 KG/M2 | DIASTOLIC BLOOD PRESSURE: 64 MMHG

## 2024-07-09 DIAGNOSIS — M79.89 SWELLING OF LEFT LOWER EXTREMITY: ICD-10-CM

## 2024-07-09 DIAGNOSIS — I10 ESSENTIAL HYPERTENSION: ICD-10-CM

## 2024-07-09 DIAGNOSIS — I25.10 CORONARY ARTERY DISEASE INVOLVING NATIVE CORONARY ARTERY OF NATIVE HEART WITHOUT ANGINA PECTORIS: Primary | ICD-10-CM

## 2024-07-09 LAB
BH CV LOWER VASCULAR LEFT COMMON FEMORAL AUGMENT: NORMAL
BH CV LOWER VASCULAR LEFT COMMON FEMORAL COMPETENT: NORMAL
BH CV LOWER VASCULAR LEFT COMMON FEMORAL COMPRESS: NORMAL
BH CV LOWER VASCULAR LEFT COMMON FEMORAL PHASIC: NORMAL
BH CV LOWER VASCULAR LEFT COMMON FEMORAL SPONT: NORMAL
BH CV LOWER VASCULAR LEFT DISTAL FEMORAL COMPRESS: NORMAL
BH CV LOWER VASCULAR LEFT GASTRONEMIUS COMPRESS: NORMAL
BH CV LOWER VASCULAR LEFT GREATER SAPH AK COMPRESS: NORMAL
BH CV LOWER VASCULAR LEFT GREATER SAPH BK COMPRESS: NORMAL
BH CV LOWER VASCULAR LEFT LESSER SAPH COMPRESS: NORMAL
BH CV LOWER VASCULAR LEFT MID FEMORAL AUGMENT: NORMAL
BH CV LOWER VASCULAR LEFT MID FEMORAL COMPETENT: NORMAL
BH CV LOWER VASCULAR LEFT MID FEMORAL COMPRESS: NORMAL
BH CV LOWER VASCULAR LEFT MID FEMORAL PHASIC: NORMAL
BH CV LOWER VASCULAR LEFT MID FEMORAL SPONT: NORMAL
BH CV LOWER VASCULAR LEFT PERONEAL COMPRESS: NORMAL
BH CV LOWER VASCULAR LEFT POPLITEAL AUGMENT: NORMAL
BH CV LOWER VASCULAR LEFT POPLITEAL COMPETENT: NORMAL
BH CV LOWER VASCULAR LEFT POPLITEAL COMPRESS: NORMAL
BH CV LOWER VASCULAR LEFT POPLITEAL PHASIC: NORMAL
BH CV LOWER VASCULAR LEFT POPLITEAL SPONT: NORMAL
BH CV LOWER VASCULAR LEFT POSTERIOR TIBIAL COMPRESS: NORMAL
BH CV LOWER VASCULAR LEFT PROFUNDA FEMORAL COMPRESS: NORMAL
BH CV LOWER VASCULAR LEFT PROXIMAL FEMORAL COMPRESS: NORMAL
BH CV LOWER VASCULAR LEFT SAPHENOFEMORAL JUNCTION COMPRESS: NORMAL
BH CV LOWER VASCULAR RIGHT COMMON FEMORAL AUGMENT: NORMAL
BH CV LOWER VASCULAR RIGHT COMMON FEMORAL COMPETENT: NORMAL
BH CV LOWER VASCULAR RIGHT COMMON FEMORAL COMPRESS: NORMAL
BH CV LOWER VASCULAR RIGHT COMMON FEMORAL PHASIC: NORMAL
BH CV LOWER VASCULAR RIGHT COMMON FEMORAL SPONT: NORMAL

## 2024-07-09 PROCEDURE — 1160F RVW MEDS BY RX/DR IN RCRD: CPT | Performed by: NURSE PRACTITIONER

## 2024-07-09 PROCEDURE — 99214 OFFICE O/P EST MOD 30 MIN: CPT | Performed by: NURSE PRACTITIONER

## 2024-07-09 PROCEDURE — 93000 ELECTROCARDIOGRAM COMPLETE: CPT | Performed by: NURSE PRACTITIONER

## 2024-07-09 PROCEDURE — 93971 EXTREMITY STUDY: CPT | Performed by: SURGERY

## 2024-07-09 PROCEDURE — 93971 EXTREMITY STUDY: CPT

## 2024-07-09 PROCEDURE — 1159F MED LIST DOCD IN RCRD: CPT | Performed by: NURSE PRACTITIONER

## 2024-07-09 NOTE — PROGRESS NOTES
Date of Office Visit: 2024  Encounter Provider: STACIA Guaman  Place of Service: [facility] CARDIOLOGY  Patient Name: Brodie Mo  :10/31/1932    Chief Complaint   Patient presents with    Coronary Artery Disease   :     HPI: Brodie Mo is a 89 y.o. female patient of Dr. Hammer with coronary artery disease and mitral insufficiency.  In , she had a CABG x 2 with a LIMA to the LAD and a vein graft to the OM 2.  In 2020, she underwent a MitraClip.     Early last year, she presented with dyspnea on exertion for which she underwent an echocardiogram and a stress test.  Both were unrevealing.  In May 2023, she was noted to be in Wenkebach at her PCPs office.  We never saw any evidence of high degree block.  She was never symptomatic, so we chose to watch things    In May, she was referred for a left and right heart catheterization secondary to persistent shortness of breath on exertion.  On , this demonstrated severe native disease with 2/2 bypasses patent and a 70% mid RCA for which she underwent DEBO.    I last saw her in the office on 5/15 at which time there had been no improvement in her dyspnea.  He was reporting intermittent dizziness occurring in the mornings after taking her medication.  Her primary care doctor had prescribed an inhaler which I felt was reasonable to try.  Otherwise, would consider increasing the isosorbide.  Secondary to the dizziness, I also advised her to discontinue the hydralazine also ordered a 48-hour Holter.  A Holter demonstrated sinus rhythm with a moderate amount of PVCs.  No changes were recommended.    Overall, she is feeling better in a lot of ways.  Her dyspnea has improved.  She reports only mild dyspnea.  No PND or orthopnea.  She is reporting left lower extremity edema.  She recently returned from a trip to [place] where she admittedly ate a lot of hard shell crabs.  However, the swelling was  present even prior to the trip.  She reports a history of DVT.  She also continues experiencing intermittent dizziness despite stopping the hydralazine.  She has not been taking her blood pressures regularly.  However, the blood pressure she has been checking have been a little on the high side.  She has been struggling with a lot of bruising on her arms.  In addition, she has developed a rash on both arms and is experiencing pretty significant itching.  She denies any chest pain, palpitations, syncope, bleeding difficulties or melena.    Past Medical History:   Diagnosis Date    CAD (coronary artery disease)     Cancer     bladder     Deep vein thrombosis     Left leg-2002    Health care maintenance     HTN (hypertension)     Hyperlipidemia     LVH (left ventricular hypertrophy)     Pneumonia     S/P CABG (coronary artery bypass graft)        Past Surgical History:   Procedure Laterality Date    BLADDER SURGERY      cancer    CARDIAC CATHETERIZATION N/A 5/2/2024    Procedure: Right Heart Cath;  Surgeon: José Hatch MD;  Location: [facility] CATH INVASIVE LOCATION;  Service: Cardiovascular;  Laterality: N/A;    CARDIAC CATHETERIZATION N/A 5/2/2024    Procedure: Left Heart Cath;  Surgeon: José Hatch MD;  Location: [facility] CATH INVASIVE LOCATION;  Service: Cardiovascular;  Laterality: N/A;    CARDIAC CATHETERIZATION N/A 5/2/2024    Procedure: Native mammary injection;  Surgeon: José Hatch MD;  [facility] C[facility] INVASIVE LOC[facility];  Service: Cardiovascular;  Laterality: N/A;    CARDIAC CATHETERIZATION  5/2/2024    Procedure: Saphenous Vein Graft;  Surgeon: José Hatch MD;  Location: [facility] CATH INVASIVE LOCATION;  Service: Cardiovascular;;    CARDIAC CATHETERIZATION N/A 5/2/2024    Procedure: Percutaneous Coronary Intervention;  Surgeon: José Hatch MD;  Location: [facility] CATH INVASIVE LOCATION;  Service: Cardiovascular;  Laterality: N/A;    CARDIAC CATHETERIZATION N/A 5/2/2024    Procedure:  Stent DEBO coronary;  Surgeon: José Hatch MD;  Location: [facility] CATH INVASIVE LOCATION;  Service: Cardiovascular;  Laterality: N/A;    CARDIAC CATHETERIZATION N/A 5/2/2024    Procedure: Optical Coherence Tomography;  Surgeon: José Hatch MD;  Location: [facility] CATH INVASIVE LOCATION;  Service: Cardiovascular;  Laterality: N/A;    CARDIAC CATHETERIZATION N/A 5/2/2024    Procedure: Resting Full Cycle Ratio;  Surgeon: José Hatch MD;  Location: [facility] CATH INVASIVE LOCATION;  Service: Cardiovascular;  Laterality: N/A;    CARPAL TUNNEL RELEASE Right 12/15/2021    Procedure: RIGHT CARPAL TUNNEL RELEASE WITH POSSIBLE SYNOVECTOMY;  Surgeon: Georgie Hines MD;  [place] [facility] [place];  Service: Plastics;  Laterality: Right;    CORONARY ARTERY BYPASS GRAFT      2002 with Dr Prabhakar; AGUAYO to LAD and SVG to OM2; 2005 cath all grafts open and 30% dx in RCA; nl stress in 2010, 2013    HYSTERECTOMY      MITRAL VALVE REPAIR/REPLACEMENT N/A 9/2/2020    Procedure: TRANSCATHETER MITRAL VALVE REPAIR;  Surgeon: Edward Humphreys MD;  Location: [facility] CATH INVASIVE LOCATION;  Service: Cardiovascular;  Laterality: N/A;       Social History     Socioeconomic History    Marital status:    Tobacco Use    Smoking status: Former    Smokeless tobacco: Never    Tobacco comments:     CAFFEINE USE: 2  CUPS COFFEE DAILY   Vaping Use    Vaping status: Never Used   Substance and Sexual Activity    Alcohol use: Yes     Alcohol/week: 1.0 standard drink of alcohol     Types: 1 Shots of liquor per week     Comment: 1 DRINK DAILY    Drug use: No    Sexual activity: Defer       Family History   Problem Relation Age of Onset    Heart disease Mother     Kidney cancer Father     No Known Problems Maternal Grandmother     No Known Problems Maternal Grandfather     No Known Problems Paternal Grandmother     No Known Problems Paternal Grandfather     Heart disease Sister     Heart disease Brother        Review of Systems  "  Constitutional: Negative.   Cardiovascular:  Positive for dyspnea on exertion and leg swelling (LLE). Negative for chest pain, orthopnea, paroxysmal nocturnal dyspnea and syncope.   Respiratory: Negative.     Hematologic/Lymphatic: Negative for bleeding problem.   Skin:  Positive for itching and rash.   Musculoskeletal:  Negative for falls.   Gastrointestinal:  Negative for melena.   Neurological:  Positive for dizziness. Negative for light-headedness.       Allergies   Allergen Reactions    No Known Drug Allergy          Current Outpatient Medications:     amLODIPine (NORVASC) 5 MG tablet, Take 1 tablet by mouth Daily., Disp: 90 tablet, Rfl: 3    aspirin 81 MG tablet, Take 1 tablet by mouth Daily., Disp: , Rfl:     atorvastatin (LIPITOR) 40 MG tablet, TAKE 1 TABLET BY MOUTH EVERY DAY, Disp: 90 tablet, Rfl: 3    B Complex Vitamins (vitamin b complex) capsule capsule, Take 1 capsule by mouth 2 (Two) Times a Week., Disp: , Rfl:     cholecalciferol (VITAMIN D3) 1000 units tablet, Take 1 tablet by mouth Daily., Disp: , Rfl:     clopidogrel (PLAVIX) 75 MG tablet, Take 1 tablet by mouth Daily., Disp: 90 tablet, Rfl: 3    coenzyme Q10 100 MG capsule, Take 1 capsule by mouth Daily., Disp: , Rfl:     furosemide (LASIX) 40 MG tablet, Take 1 tablet by mouth 2 (Two) Times a Day. (Patient taking differently: Take 1 tablet by mouth Daily.), Disp: , Rfl:     isosorbide mononitrate (IMDUR) 30 MG 24 hr tablet, Take 1 tablet by mouth Every Morning., Disp: 90 tablet, Rfl: 3      Objective:     Vitals:    07/09/24 1125   BP: 136/64   Pulse: 67   Weight: 60.8 kg (134 lb)   Height: 165.1 cm (65\")     Body mass index is 22.3 kg/m².    PHYSICAL EXAM:    Neck:      Vascular: No JVD.   Pulmonary:      Effort: Pulmonary effort is normal.      Breath sounds: Normal breath sounds.   Cardiovascular:      Normal rate. Regular rhythm.      Murmurs: There is no murmur.      No gallop.  No click. No rub.   Pulses:     Intact distal pulses. "   Edema:     Peripheral edema (LLE) present.          ECG 12 Lead    Date/Time: 7/9/2024 11:34 AM  Performed by: Brenda Gutiérrez APRN    Authorized by: Brenda Gutiérrez APRN  Comparison: compared with previous ECG from 5/15/2024  Similar to previous ECG  Rhythm: sinus rhythm  Rate: normal  BPM: 67  Other findings: non-specific ST-T wave changes            Assessment:       Diagnosis Plan   1. Coronary artery disease involving native coronary artery of native heart without angina pectoris  ECG 12 Lead      2. Swelling of left lower extremity  Duplex Venous Lower Extremity - Left CAR      3. Essential hypertension          Orders Placed This Encounter   Procedures    ECG 12 Lead     This order was created via procedure documentation     Order Specific Question:   Release to patient     Answer:   Routine Release [[id number]]          Plan:       1.  Coronary artery disease.  Status post DEBO of the mid RCA in May of this year.  She denies any anginal symptoms.  Her dyspnea has actually improved which is reassuring.  Unfortunately it sounds like she is having a reaction to the Plavix.  Will discuss switching her to an alternative medication with Dr. Hatch.      2.  Left lower extremity swelling.  I am a little concerned about the unilateral swelling.  Recommended proceeding with a stat lower extremity Doppler to rule out a DVT.  If this is negative, likely a combination of venous insufficiency and amlodipine use.      3.  Hypertension.  Although her blood pressure is stable today at, it has been a little high at home.  I asked her to check her blood pressures regularly over the next week and send me an update.      Overall I think she is stable.  I will call her following the Doppler study today.      As always, it has been a pleasure to participate in your patient's care.      Sincerely,         STACIA Dawson

## 2024-07-10 ENCOUNTER — TELEPHONE (OUTPATIENT)
Dept: CARDIOLOGY | Facility: CLINIC | Age: 89
End: 2024-07-10
Payer: MEDICARE

## 2024-07-10 RX ORDER — FUROSEMIDE 40 MG/1
40 TABLET ORAL DAILY
COMMUNITY

## 2024-07-10 RX ORDER — PRASUGREL 10 MG/1
10 TABLET, FILM COATED ORAL DAILY
Qty: 30 TABLET | Refills: 11 | Status: SHIPPED | OUTPATIENT
Start: 2024-07-10

## 2024-07-10 NOTE — TELEPHONE ENCOUNTER
I spoke with Dr. Hatch regarding the bruising/itching/rash on her forearms.  He recommended switching the clopidogrel to prasugrel.    I spoke with the patient regarding this information.  In addition, we discussed her normal Doppler yesterday.  We discussed that the amlodipine could possibly be a contributing factor in her swelling.  We discussed switching the amlodipine to something else like carvedilol.  However, she declines at this time.

## 2024-07-16 ENCOUNTER — OFFICE VISIT (OUTPATIENT)
Dept: CARDIOLOGY | Facility: CLINIC | Age: 89
End: 2024-07-16
Payer: MEDICARE

## 2024-07-16 VITALS
BODY MASS INDEX: 22.53 KG/M2 | HEART RATE: 68 BPM | DIASTOLIC BLOOD PRESSURE: 60 MMHG | SYSTOLIC BLOOD PRESSURE: 160 MMHG | WEIGHT: 135.2 LBS | HEIGHT: 65 IN

## 2024-07-16 DIAGNOSIS — Z98.890 STATUS POST IMPLANTATION OF MITRAL VALVE LEAFLET CLIP: ICD-10-CM

## 2024-07-16 DIAGNOSIS — Z95.818 STATUS POST IMPLANTATION OF MITRAL VALVE LEAFLET CLIP: ICD-10-CM

## 2024-07-16 DIAGNOSIS — Z95.1 S/P CABG (CORONARY ARTERY BYPASS GRAFT): Primary | ICD-10-CM

## 2024-07-16 DIAGNOSIS — R06.09 DYSPNEA ON EXERTION: ICD-10-CM

## 2024-07-16 RX ORDER — CLOPIDOGREL BISULFATE 75 MG/1
75 TABLET ORAL DAILY
COMMUNITY

## 2024-07-16 RX ORDER — LOSARTAN POTASSIUM 100 MG/1
100 TABLET ORAL DAILY
Qty: 90 TABLET | Refills: 3 | Status: SHIPPED | OUTPATIENT
Start: 2024-07-16

## 2024-07-16 NOTE — PROGRESS NOTES
Date of Office Visit: 24  Encounter Provider: José Hatch MD  Place of Service: [facility] CARDIOLOGY  Patient Name: Brodie Mo  :10/31/1932  [id number]    Chief Complaint   Patient presents with    Coronary Artery Disease   :     HPI: Brodie Mo is a 89 y.o. female she had a 5 vessel bypass in  with Dr. Prabhakar.  She had a stress test in May 2019 that was normal she had an echo which showed moderate to severe MR and severe pulmonary hypertension.  She had a MitraClip done in 2020.  She had COVID back in 2022 and it really knocked her down and she is really been slow to recover.  She was in the hospital with pneumonia in April I think of  and since that time she has noticed that she has intermittent periods of dizziness throughout the day.  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  We saw her at that time and evaluate her never saw any high degree heart block and she was really asymptomatic with that so we chose to watch things.    She was having dyspnea on exertion and so we did an echo on her which looked pretty good the mitral valve looked good LV functions good ultimately we decided to do a left and right heart catheter right heart cath showed little bit of pulmonary hypertension with PA pressures in the 50-55 range wedge pressure was 17.  Her LIMA to the LAD was patent her native left vessel essentially subtotally occluded in that left main the RCA is occluded there is a vein graft to an OM 2 the OM 2 is occluded but it retrofilled the entire circumflex.  The RCA was not bypassed and there was a tight lesion in the midportion ended up being stented all the way back to the origin with 3.0  Xience stents.  In May 2024.    She is here for follow-up and she actually feels better she feels like her breathing is better that her she not having discomfort in her chest she  has a little bit of swelling in her legs left more than right she had an ultrasound done and it was negative she seems like she may be having a rash from Plavix but her insurance company denied her a change even though we think she might be allergic to Plavix she is taking it she is like I think I can tolerate it      Past Medical History:   Diagnosis Date    CAD (coronary artery disease)     Cancer     bladder     Deep vein thrombosis     Left leg-2002    Health care maintenance     HTN (hypertension)     Hyperlipidemia     LVH (left ventricular hypertrophy)     Pneumonia     S/P CABG (coronary artery bypass graft)        Past Surgical History:   Procedure Laterality Date    BLADDER SURGERY      cancer    CARDIAC CATHETERIZATION N/A 5/2/2024    Procedure: Right Heart Cath;  Surgeon: José Hatch MD;  Location:  [facility] CATH INVASIVE LOCATION;  Service: Cardiovascular;  Laterality: N/A;    CARDIAC CATHETERIZATION N/A 5/2/2024    Procedure: Left Heart Cath;  Surgeon: José Hatch MD;  [facility]: [facility] C[facility]IVE LOC[facility];  Service: Cardiovascular;  Laterality: N/A;    CARDIAC CATHETERIZATION N/A 5/2/2024    Procedure: Native mammary injection;  Surgeon: José Hatch MD;  [facility]: [facility] C[facility]IVE LOC[facility];  Service: Cardiovascular;  Laterality: N/A;    CARDIAC CATHETERIZATION  5/2/2024    Procedure: Saphenous Vein Graft;  Surgeon: José Hatch MD;  Location: [facility] CATH [facility]VASIVE LOC[facility];  Service: Cardiovascular;;    CARDIAC CATHETERIZATION N/A 5/2/2024    Procedure: Percutaneous Coronary Intervention;  Surgeon: José Hatch MD;  [facility]: [facility] CATH [facility]VASIVE LOC[facility];  Service: Cardiovascular;  Laterality: N/A;    CARDIAC CATHETERIZATION N/A 5/2/2024    Procedure: Stent DEBO coronary;  Surgeon: José Hatch MD;  Location: [facility] CATH [facility]VASIVE LOC[facility];  Service: Cardiovascular;  Laterality: N/A;    CARDIAC CATHETERIZATION N/A 5/2/2024    Procedure: Optical Coherence Tomography;   Surgeon: José Hatch MD;  Location:  [facility] CATH INVASIVE LOCATION;  Service: Cardiovascular;  Laterality: N/A;    CARDIAC CATHETERIZATION N/A 5/2/2024    Procedure: Resting Full Cycle Ratio;  Surgeon: José Hatch MD;  Location:  [facility] CATH INVASIVE LOCATION;  Service: Cardiovascular;  Laterality: N/A;    CARPAL TUNNEL RELEASE Right 12/15/2021    Procedure: RIGHT CARPAL TUNNEL RELEASE WITH POSSIBLE SYNOVECTOMY;  Surgeon: Georgie Hines MD;  [place] [facility] [place];  Service: Plastics;  Laterality: Right;    CORONARY ARTERY BYPASS GRAFT      2002 with Dr Prabhakar; AGUAYO to LAD and SVG to OM2; 2005 cath all grafts open and 30% dx in RCA; nl stress in 2010, 2013    HYSTERECTOMY      MITRAL VALVE REPAIR/REPLACEMENT N/A 9/2/2020    Procedure: TRANSCATHETER MITRAL VALVE REPAIR;  Surgeon: Edward Humphreys MD;  Location: [facility] CATH INVASIVE LOCATION;  Service: Cardiovascular;  Laterality: N/A;       Social History     Socioeconomic History    Marital status:    Tobacco Use    Smoking status: Former    Smokeless tobacco: Never    Tobacco comments:     CAFFEINE USE: 2  CUPS COFFEE DAILY   Vaping Use    Vaping status: Never Used   Substance and Sexual Activity    Alcohol use: Yes     Alcohol/week: 1.0 standard drink of alcohol     Types: 1 Shots of liquor per week     Comment: 1 DRINK DAILY    Drug use: No    Sexual activity: Defer       Family History   Problem Relation Age of Onset    Heart disease Mother     Kidney cancer Father     No Known Problems Maternal Grandmother     No Known Problems Maternal Grandfather     No Known Problems Paternal Grandmother     No Known Problems Paternal Grandfather     Heart disease Sister     Heart disease Brother        Review of Systems   Constitutional: Negative for decreased appetite, fever, malaise/fatigue and weight loss.   HENT:  Negative for nosebleeds.    Eyes:  Negative for double vision.   Cardiovascular:  Negative for chest pain, claudication,  "cyanosis, dyspnea on exertion, irregular heartbeat, leg swelling, near-syncope, orthopnea, palpitations, paroxysmal nocturnal dyspnea and syncope.   Respiratory:  Negative for cough, hemoptysis and shortness of breath.    Hematologic/Lymphatic: Negative for bleeding problem.   Skin:  Negative for rash.   Musculoskeletal:  Negative for falls and myalgias.   Gastrointestinal:  Negative for hematochezia, jaundice, melena, nausea and vomiting.   Genitourinary:  Negative for hematuria.   Neurological:  Negative for dizziness and seizures.   Psychiatric/Behavioral:  Negative for altered mental status and memory loss.        Allergies   Allergen Reactions    No Known Drug Allergy          Current Outpatient Medications:     amLODIPine (NORVASC) 5 MG tablet, Take 1 tablet by mouth Daily., Disp: 90 tablet, Rfl: 3    aspirin 81 MG tablet, Take 1 tablet by mouth Daily., Disp: , Rfl:     atorvastatin (LIPITOR) 40 MG tablet, TAKE 1 TABLET BY MOUTH EVERY DAY, Disp: 90 tablet, Rfl: 3    B Complex Vitamins (vitamin b complex) capsule capsule, Take 1 capsule by mouth 2 (Two) Times a Week., Disp: , Rfl:     cholecalciferol (VITAMIN D3) 1000 units tablet, Take 1 tablet by mouth Daily., Disp: , Rfl:     clopidogrel (PLAVIX) 75 MG tablet, Take 1 tablet by mouth Daily., Disp: , Rfl:     coenzyme Q10 100 MG capsule, Take 1 capsule by mouth Daily., Disp: , Rfl:     furosemide (LASIX) 40 MG tablet, Take 1 tablet by mouth Daily., Disp: , Rfl:     isosorbide mononitrate (IMDUR) 30 MG 24 hr tablet, Take 1 tablet by mouth Every Morning., Disp: 90 tablet, Rfl: 3    prasugrel (EFFIENT) 10 MG tablet, Take 1 tablet by mouth Daily. (Patient not taking: Reported on 7/16/2024), Disp: 30 tablet, Rfl: 11      Objective:     Vitals:    07/16/24 1232   BP: 160/60   Pulse: 68   Weight: 61.3 kg (135 lb 3.2 oz)   Height: 165.1 cm (65\")     Body mass index is 22.5 kg/m².    Physical Exam  Constitutional:       Appearance: She is well-developed.   HENT:      " Head: Normocephalic.   Eyes:      General: No scleral icterus.  Neck:      Thyroid: No thyromegaly.      Vascular: No JVD.   Cardiovascular:      Rate and Rhythm: Normal rate and regular rhythm.      Heart sounds: Murmur heard.      High-pitched blowing holosystolic murmur is present with a grade of 2/6 at the upper left sternal border.      No friction rub. No gallop.   Pulmonary:      Effort: Pulmonary effort is normal.      Breath sounds: Normal breath sounds. No wheezing or rales.   Abdominal:      Palpations: Abdomen is soft.      Tenderness: There is no abdominal tenderness.   Musculoskeletal:         General: Normal range of motion.   Lymphadenopathy:      Cervical: No cervical adenopathy.   Skin:     General: Skin is warm and dry.      Findings: No rash.   Neurological:      Mental Status: She is alert and oriented to person, place, and time.           ECG 12 Lead    Date/Time: 7/16/2024 1:16 PM  Performed by: José Hatch MD    Authorized by: José Hatch MD  Rhythm: sinus rhythm  Other findings: non-specific ST-T wave changes    Clinical impression: abnormal EKG           Assessment:       Diagnosis Plan   1. S/P CABG (coronary artery bypass graft)        2. Dyspnea on exertion        3. Status post implantation of mitral valve leaflet clip               Plan:       She is doing really well from an angina standpoint I think we helped her quite a bit when we did the stents.  Her blood pressure still is a little bit high and I think the swelling in her feet is from the amlodipine so I am going to stop the amlodipine and switch her over to losartan at 100 mg a day have her come back and see Brenda in 2 weeks we will check her blood at that point and if her blood pressure still stays high then I to either probably add hydrochlorothiazide to her regimen I will see her back in a year sooner if she has trouble    As always, it has been a pleasure to participate in your patient's care.      Sincerely,        José Hatch MD

## 2024-07-29 RX ORDER — FUROSEMIDE 40 MG/1
40 TABLET ORAL DAILY
Qty: 90 TABLET | Refills: 1 | Status: SHIPPED | OUTPATIENT
Start: 2024-07-29

## 2024-07-30 ENCOUNTER — HOSPITAL ENCOUNTER (OUTPATIENT)
Dept: CT IMAGING | Facility: HOSPITAL | Age: 89
Discharge: HOME | End: 2024-07-30
Admitting: INTERNAL MEDICINE
Payer: MEDICARE

## 2024-07-30 DIAGNOSIS — R91.8 LUNG NODULES: ICD-10-CM

## 2024-07-30 LAB — CREAT BLDA-MCNC: 1.7 MG/DL (ref 0.6–1.3)

## 2024-07-30 PROCEDURE — 82565 ASSAY OF CREATININE: CPT

## 2024-07-30 PROCEDURE — 71250 CT THORAX DX C-: CPT

## 2024-08-05 ENCOUNTER — HOSPITAL ENCOUNTER (OUTPATIENT)
Dept: CARDIOLOGY | Facility: HOSPITAL | Age: 89
Discharge: HOME | End: 2024-08-05
Admitting: INTERNAL MEDICINE
Payer: MEDICARE

## 2024-08-05 ENCOUNTER — OFFICE VISIT (OUTPATIENT)
Dept: CARDIOLOGY | Facility: CLINIC | Age: 89
End: 2024-08-05
Payer: MEDICARE

## 2024-08-05 VITALS
BODY MASS INDEX: 22.49 KG/M2 | WEIGHT: 135 LBS | HEIGHT: 65 IN | DIASTOLIC BLOOD PRESSURE: 58 MMHG | SYSTOLIC BLOOD PRESSURE: 172 MMHG | HEART RATE: 68 BPM

## 2024-08-05 DIAGNOSIS — Z95.1 S/P CABG (CORONARY ARTERY BYPASS GRAFT): ICD-10-CM

## 2024-08-05 DIAGNOSIS — I10 ESSENTIAL HYPERTENSION: ICD-10-CM

## 2024-08-05 DIAGNOSIS — I25.10 CORONARY ARTERY DISEASE INVOLVING NATIVE CORONARY ARTERY OF NATIVE HEART WITHOUT ANGINA PECTORIS: Primary | ICD-10-CM

## 2024-08-05 DIAGNOSIS — Z95.818 STATUS POST IMPLANTATION OF MITRAL VALVE LEAFLET CLIP: ICD-10-CM

## 2024-08-05 DIAGNOSIS — Z98.890 STATUS POST IMPLANTATION OF MITRAL VALVE LEAFLET CLIP: ICD-10-CM

## 2024-08-05 DIAGNOSIS — R06.09 DYSPNEA ON EXERTION: ICD-10-CM

## 2024-08-05 LAB
ANION GAP SERPL CALCULATED.3IONS-SCNC: 10 MMOL/L (ref 5–15)
BUN SERPL-MCNC: 22 MG/DL (ref 8–23)
BUN/CREAT SERPL: 14.9 (ref 7–25)
CALCIUM SPEC-SCNC: 9.5 MG/DL (ref 8.2–9.6)
CHLORIDE SERPL-SCNC: 103 MMOL/L (ref 98–107)
CO2 SERPL-SCNC: 27 MMOL/L (ref 22–29)
CREAT SERPL-MCNC: 1.48 MG/DL (ref 0.57–1)
EGFRCR SERPLBLD CKD-EPI 2021: 33.3 ML/MIN/1.73
GLUCOSE SERPL-MCNC: 150 MG/DL (ref 65–99)
POTASSIUM SERPL-SCNC: 4.5 MMOL/L (ref 3.5–5.2)
SODIUM SERPL-SCNC: 140 MMOL/L (ref 136–145)

## 2024-08-05 PROCEDURE — 36415 COLL VENOUS BLD VENIPUNCTURE: CPT

## 2024-08-05 PROCEDURE — 1159F MED LIST DOCD IN RCRD: CPT | Performed by: NURSE PRACTITIONER

## 2024-08-05 PROCEDURE — 93000 ELECTROCARDIOGRAM COMPLETE: CPT | Performed by: NURSE PRACTITIONER

## 2024-08-05 PROCEDURE — 1160F RVW MEDS BY RX/DR IN RCRD: CPT | Performed by: NURSE PRACTITIONER

## 2024-08-05 PROCEDURE — 99214 OFFICE O/P EST MOD 30 MIN: CPT | Performed by: NURSE PRACTITIONER

## 2024-08-05 PROCEDURE — 80048 BASIC METABOLIC PNL TOTAL CA: CPT | Performed by: INTERNAL MEDICINE

## 2024-08-05 RX ORDER — LEVOTHYROXINE SODIUM 0.05 MG/1
50 TABLET ORAL DAILY
COMMUNITY
Start: 2024-08-01

## 2024-08-05 NOTE — PROGRESS NOTES
Date of Office Visit: 2024  Encounter Provider: STACIA Guaman  Place of Service: [facility] CARDIOLOGY  Patient Name: Brodie Mo  :10/31/1932    Chief Complaint   Patient presents with    Coronary Artery Disease   :     HPI: Brodie Mo is a 89 y.o. female patient of Dr. Hatch's with coronary artery disease and mitral insufficiency.  In , she had a CABG x 2 with a LIMA to the LAD and a vein graft to the OM 2.  In 2020, she underwent a MitraClip.     Early last year, she presented with dyspnea on exertion for which she underwent an echocardiogram and a stress test.  Both were unrevealing.  In May 2023, she was noted to be in [place] at her PCPs office.  We never saw any evidence of high degree block.  She was never symptomatic, so we chose to watch things     In May, she was referred for a left and right heart catheterization secondary to persistent shortness of breath on exertion.  On , this demonstrated severe native disease with 2/2 bypasses patent and a 70% mid RCA for which she underwent DEBO.    In early July, hydralazine was discontinued secondary to dizziness.      She was last seen in the office by Dr. Hatch on  at which time she was overall doing well.  Blood pressure was elevated and she had lower extremity swelling.  As such, he discontinued amlodipine and started her on losartan.  She is here today for follow-up.    The swelling has resolved.  However, her blood pressure remains poorly controlled.  Her systolics are ranging in the 150s to 170s systolic.  She denies any chest pain, shortness of breath, palpitations, dizziness, syncope, bleeding difficulties or melena.  She just started levothyroxine yesterday.    Past Medical History:   Diagnosis Date    CAD (coronary artery disease)     Cancer     bladder     Deep vein thrombosis     Left leg-    Health care maintenance     HTN (hypertension)     Hyperlipidemia      LVH (left ventricular hypertrophy)     Pneumonia     S/P CABG (coronary artery bypass graft)        Past Surgical History:   Procedure Laterality Date    BLADDER SURGERY      cancer    CARDIAC CATHETERIZATION N/A 5/2/2024    Procedure: Right Heart Cath;  Surgeon: José Hatch MD;  Location: [facility] CATH [facility]VASIVE LOC[facility];  Service: Cardiovascular;  Laterality: N/A;    CARDIAC CATHETERIZATION N/A 5/2/2024    Procedure: Left Heart Cath;  Surgeon: José Hatch MD;  [facility];  Service: Cardiovascular;  Laterality: N/A;    CARDIAC CATHETERIZATION N/A 5/2/2024    Procedure: Native mammary injection;  Surgeon: José Hatch MD;  Location: [facility] LOC[facility];  Service: Cardiovascular;  Laterality: N/A;    CARDIAC CATHETERIZATION  5/2/2024    Procedure: Saphenous Vein Graft;  Surgeon: José Hatch MD;  Location: [facility] LOC[facility];  Service: Cardiovascular;;    CARDIAC CATHETERIZATION N/A 5/2/2024    Procedure: Percutaneous Coronary Intervention;  Surgeon: José Hatch MD;  Location: [facility] CATH INVASIVE LOCATION;  Service: Cardiovascular;  Laterality: N/A;    CARDIAC CATHETERIZATION N/A 5/2/2024    Procedure: Stent DEBO coronary;  Surgeon: José Hatch MD;  Location: [facility] CATH INVASIVE LOCATION;  Service: Cardiovascular;  Laterality: N/A;    CARDIAC CATHETERIZATION N/A 5/2/2024    Procedure: Optical Coherence Tomography;  Surgeon: José Hatch MD;  Location: [facility] CATH INVASIVE LOCATION;  Service: Cardiovascular;  Laterality: N/A;    CARDIAC CATHETERIZATION N/A 5/2/2024    Procedure: Resting Full Cycle Ratio;  Surgeon: José Hatch MD;  Location: [facility] INVASIVE LOCATION;  Service: Cardiovascular;  Laterality: N/A;    CARPAL TUNNEL RELEASE Right 12/15/2021    Procedure: RIGHT CARPAL TUNNEL RELEASE WITH POSSIBLE SYNOVECTOMY;  Surgeon: Georgie Hines MD;  [place] [facility] [place];  Service: Plastics;  Laterality: Right;    CORONARY  ARTERY BYPASS GRAFT      2002 with Dr Prabhakar; AGUAYO to LAD and SVG to OM2; 2005 cath all grafts open and 30% dx in RCA; nl stress in 2010, 2013    HYSTERECTOMY      MITRAL VALVE REPAIR/REPLACEMENT N/A 9/2/2020    Procedure: TRANSCATHETER MITRAL VALVE REPAIR;  Surgeon: Edward Humphreys MD;  Location: [facility] CATH INVASIVE LOCATION;  Service: Cardiovascular;  Laterality: N/A;       Social History     Socioeconomic History    Marital status:    Tobacco Use    Smoking status: Former    Smokeless tobacco: Never    Tobacco comments:     CAFFEINE USE: 2  CUPS COFFEE DAILY   Vaping Use    Vaping status: Never Used   Substance and Sexual Activity    Alcohol use: Yes     Alcohol/week: 1.0 standard drink of alcohol     Types: 1 Shots of liquor per week     Comment: 1 DRINK DAILY    Drug use: No    Sexual activity: Defer       Family History   Problem Relation Age of Onset    Heart disease Mother     Kidney cancer Father     No Known Problems Maternal Grandmother     No Known Problems Maternal Grandfather     No Known Problems Paternal Grandmother     No Known Problems Paternal Grandfather     Heart disease Sister     Heart disease Brother        Review of Systems   Constitutional: Negative.   Cardiovascular: Negative.  Negative for chest pain, dyspnea on exertion, leg swelling, orthopnea, paroxysmal nocturnal dyspnea and syncope.   Respiratory: Negative.     Hematologic/Lymphatic: Negative for bleeding problem.   Musculoskeletal:  Negative for falls.   Gastrointestinal:  Negative for melena.   Neurological:  Negative for dizziness and light-headedness.       Allergies   Allergen Reactions    No Known Drug Allergy          Current Outpatient Medications:     aspirin 81 MG tablet, Take 1 tablet by mouth Daily., Disp: , Rfl:     atorvastatin (LIPITOR) 40 MG tablet, TAKE 1 TABLET BY MOUTH EVERY DAY, Disp: 90 tablet, Rfl: 3    B Complex Vitamins (vitamin b complex) capsule capsule, Take 1 capsule by mouth 2 (Two)  "Times a Week., Disp: , Rfl:     cholecalciferol (VITAMIN D3) 1000 units tablet, Take 1 tablet by mouth Daily., Disp: , Rfl:     clopidogrel (PLAVIX) 75 MG tablet, Take 1 tablet by mouth Daily., Disp: , Rfl:     coenzyme Q10 100 MG capsule, Take 1 capsule by mouth Daily., Disp: , Rfl:     furosemide (LASIX) 40 MG tablet, TAKE 1 TABLET BY MOUTH EVERY DAY, Disp: 90 tablet, Rfl: 1    isosorbide mononitrate (IMDUR) 30 MG 24 hr tablet, Take 1 tablet by mouth Every Morning., Disp: 90 tablet, Rfl: 3    levothyroxine (SYNTHROID, LEVOTHROID) 50 MCG tablet, Take 1 tablet by mouth Daily., Disp: , Rfl:     losartan (COZAAR) 100 MG tablet, Take 1 tablet by mouth Daily., Disp: 90 tablet, Rfl: 3      Objective:     Vitals:    08/05/24 1304   BP: 172/58   Pulse: 68   Weight: 61.2 kg (135 lb)   Height: 165.1 cm (65\")     Body mass index is 22.47 kg/m².    PHYSICAL EXAM:    Neck:      Vascular: No JVD.   Pulmonary:      Effort: Pulmonary effort is normal.      Breath sounds: Normal breath sounds.   Cardiovascular:      Normal rate. Regular rhythm.      Murmurs: There is no murmur.      No gallop.  No click. No rub.   Pulses:     Intact distal pulses.           ECG 12 Lead    Date/Time: 8/5/2024 1:12 PM  Performed by: Brenda Gutiérrez APRN    Authorized by: Brenda Gutiérrez APRN  Comparison: compared with previous ECG from 7/16/2024  Similar to previous ECG  Rhythm: sinus rhythm  Ectopy: unifocal PVCs  Rate: normal  BPM: 68  Other findings: non-specific ST-T wave changes            Assessment:       Diagnosis Plan   1. Coronary artery disease involving native coronary artery of native heart without angina pectoris  ECG 12 Lead      2. Essential hypertension          Orders Placed This Encounter   Procedures    ECG 12 Lead     This order was created via procedure documentation     Order Specific Question:   Release to patient     Answer:   Routine Release [[id number]]          Plan:       1.  Coronary artery disease.  She denies " any symptoms of angina.  Continue aspirin, clopidogrel, and atorvastatin.      2.  Hypertension.  Her blood pressure remains elevated.  Recommended a BMP today.  If her kidney function is stable, will add HCTZ.  Of note, she would be interested in the combination pill.      Overall, I think she is stable.  I will call her with the results of the labs.  Otherwise, she will follow-up with Dr. Hatch in 1 year.      As always, it has been a pleasure to participate in your patient's care.      Sincerely,         STACIA Dawson

## 2024-08-07 ENCOUNTER — TELEPHONE (OUTPATIENT)
Dept: CARDIOLOGY | Facility: CLINIC | Age: 89
End: 2024-08-07
Payer: MEDICARE

## 2024-08-07 DIAGNOSIS — I10 ESSENTIAL HYPERTENSION: Primary | ICD-10-CM

## 2024-08-07 RX ORDER — HYDROCHLOROTHIAZIDE 25 MG/1
25 TABLET ORAL DAILY
Qty: 30 TABLET | Refills: 2 | Status: SHIPPED | OUTPATIENT
Start: 2024-08-07

## 2024-08-07 NOTE — TELEPHONE ENCOUNTER
I spoke with her regarding the lab results which were stable.  Recommended initiating HCTZ.  She will have a repeat BMP in 1 week.

## 2024-08-20 ENCOUNTER — LAB (OUTPATIENT)
Dept: LAB | Facility: HOSPITAL | Age: 89
End: 2024-08-20
Payer: MEDICARE

## 2024-08-20 DIAGNOSIS — I10 ESSENTIAL HYPERTENSION: ICD-10-CM

## 2024-08-20 LAB
ANION GAP SERPL CALCULATED.3IONS-SCNC: 12.8 MMOL/L (ref 5–15)
BUN SERPL-MCNC: 35 MG/DL (ref 8–23)
BUN/CREAT SERPL: 20 (ref 7–25)
CALCIUM SPEC-SCNC: 10.1 MG/DL (ref 8.2–9.6)
CHLORIDE SERPL-SCNC: 94 MMOL/L (ref 98–107)
CO2 SERPL-SCNC: 28.2 MMOL/L (ref 22–29)
CREAT SERPL-MCNC: 1.75 MG/DL (ref 0.57–1)
EGFRCR SERPLBLD CKD-EPI 2021: 27.2 ML/MIN/1.73
GLUCOSE SERPL-MCNC: 90 MG/DL (ref 65–99)
POTASSIUM SERPL-SCNC: 4.6 MMOL/L (ref 3.5–5.2)
SODIUM SERPL-SCNC: 135 MMOL/L (ref 136–145)

## 2024-08-20 PROCEDURE — 36415 COLL VENOUS BLD VENIPUNCTURE: CPT

## 2024-08-20 PROCEDURE — 80048 BASIC METABOLIC PNL TOTAL CA: CPT

## 2024-08-21 ENCOUNTER — TELEPHONE (OUTPATIENT)
Dept: CARDIOLOGY | Facility: CLINIC | Age: 89
End: 2024-08-21
Payer: MEDICARE

## 2024-08-21 DIAGNOSIS — I10 ESSENTIAL HYPERTENSION: Primary | ICD-10-CM

## 2024-08-21 NOTE — TELEPHONE ENCOUNTER
Please let her know I reviewed her labs with Dr. Hatch.  Kidney function is a little worse, but we wouldn't recommend any changes for now. Repeat labs in 1 month.

## 2024-08-21 NOTE — TELEPHONE ENCOUNTER
----- Message from José Hatch sent at 8/21/2024  1:00 PM EDT -----  Yes  lets repeat in 1 month  ----- Message -----  From: Brenda Gutiérrez APRN  Sent: 8/20/2024   2:59 PM EDT  To: José Hatch MD    This is after starting her on HCTZ for her BP. Do you think it's okay?

## 2024-08-21 NOTE — TELEPHONE ENCOUNTER
Called and spoke with patient. Went over results, recommendations and orders. She verbalized understanding.    Brenda    Pt said she has felt confused since starting the HCTZ. The confusion is worse in the morning. She asked if this is a common side effect of HCTZ. (Her lowest B/P reading since starting it was 120/53.)    She also said, for the first time, she had chest pain this morning. She was hesitant to tell me. She said it lasted all morning, but wasn't intense.    Any recommendations for her?    Thank you,    Shyla Branch, RN  Triage [facility]  08/21/24 13:39 EDT

## 2024-08-22 NOTE — TELEPHONE ENCOUNTER
Brenda,    Called and spoke with patient. Went over your orders and recommendations. She verbalized understanding.    She said she has a heaviness in her chest that continues since yesterday. She wanted me to let you know. If you have any further recommendations, I will call her back.    Thank you,    Shyla Branch, RN  Triage [facility]  08/22/24 09:12 EDT

## 2024-08-23 ENCOUNTER — TRANSCRIBE ORDERS (OUTPATIENT)
Dept: CARDIOLOGY | Facility: CLINIC | Age: 89
End: 2024-08-23
Payer: MEDICARE

## 2024-08-23 ENCOUNTER — HOSPITAL ENCOUNTER (OUTPATIENT)
Dept: CARDIOLOGY | Facility: HOSPITAL | Age: 89
Discharge: HOME | End: 2024-08-23
Payer: MEDICARE

## 2024-08-23 ENCOUNTER — OFFICE VISIT (OUTPATIENT)
Dept: CARDIOLOGY | Facility: CLINIC | Age: 89
End: 2024-08-23
Payer: MEDICARE

## 2024-08-23 VITALS
SYSTOLIC BLOOD PRESSURE: 118 MMHG | BODY MASS INDEX: 22.16 KG/M2 | WEIGHT: 133 LBS | DIASTOLIC BLOOD PRESSURE: 60 MMHG | HEIGHT: 65 IN | OXYGEN SATURATION: 98 % | HEART RATE: 76 BPM

## 2024-08-23 DIAGNOSIS — I10 ESSENTIAL HYPERTENSION: ICD-10-CM

## 2024-08-23 DIAGNOSIS — I25.10 CORONARY ARTERY DISEASE INVOLVING NATIVE CORONARY ARTERY OF NATIVE HEART, UNSPECIFIED WHETHER ANGINA PRESENT: Primary | ICD-10-CM

## 2024-08-23 DIAGNOSIS — Z01.810 PRE-OPERATIVE CARDIOVASCULAR EXAMINATION: Primary | ICD-10-CM

## 2024-08-23 DIAGNOSIS — Z01.810 PRE-OPERATIVE CARDIOVASCULAR EXAMINATION: ICD-10-CM

## 2024-08-23 LAB
ANION GAP SERPL CALCULATED.3IONS-SCNC: 13.2 MMOL/L (ref 5–15)
BASOPHILS # BLD AUTO: 0.05 10*3/MM3 (ref 0–0.2)
BASOPHILS NFR BLD AUTO: 0.6 % (ref 0–1.5)
BUN SERPL-MCNC: 43 MG/DL (ref 8–23)
BUN/CREAT SERPL: 20.8 (ref 7–25)
CALCIUM SPEC-SCNC: 10.2 MG/DL (ref 8.2–9.6)
CHLORIDE SERPL-SCNC: 93 MMOL/L (ref 98–107)
CO2 SERPL-SCNC: 27.8 MMOL/L (ref 22–29)
CREAT SERPL-MCNC: 2.07 MG/DL (ref 0.57–1)
DEPRECATED RDW RBC AUTO: 40.3 FL (ref 37–54)
EGFRCR SERPLBLD CKD-EPI 2021: 22.3 ML/MIN/1.73
EOSINOPHIL # BLD AUTO: 0.06 10*3/MM3 (ref 0–0.4)
EOSINOPHIL NFR BLD AUTO: 0.8 % (ref 0.3–6.2)
ERYTHROCYTE [DISTWIDTH] IN BLOOD BY AUTOMATED COUNT: 12.6 % (ref 12.3–15.4)
GLUCOSE SERPL-MCNC: 136 MG/DL (ref 65–99)
HCT VFR BLD AUTO: 39 % (ref 34–46.6)
HGB BLD-MCNC: 12.9 G/DL (ref 12–15.9)
IMM GRANULOCYTES # BLD AUTO: 0.03 10*3/MM3 (ref 0–0.05)
IMM GRANULOCYTES NFR BLD AUTO: 0.4 % (ref 0–0.5)
LYMPHOCYTES # BLD AUTO: 1.14 10*3/MM3 (ref 0.7–3.1)
LYMPHOCYTES NFR BLD AUTO: 14.3 % (ref 19.6–45.3)
MCH RBC QN AUTO: 29.1 PG (ref 26.6–33)
MCHC RBC AUTO-ENTMCNC: 33.1 G/DL (ref 31.5–35.7)
MCV RBC AUTO: 87.8 FL (ref 79–97)
MONOCYTES # BLD AUTO: 1.06 10*3/MM3 (ref 0.1–0.9)
MONOCYTES NFR BLD AUTO: 13.3 % (ref 5–12)
NEUTROPHILS NFR BLD AUTO: 5.65 10*3/MM3 (ref 1.7–7)
NEUTROPHILS NFR BLD AUTO: 70.6 % (ref 42.7–76)
NRBC BLD AUTO-RTO: 0 /100 WBC (ref 0–0.2)
PLATELET # BLD AUTO: 222 10*3/MM3 (ref 140–450)
PMV BLD AUTO: 11.1 FL (ref 6–12)
POTASSIUM SERPL-SCNC: 4 MMOL/L (ref 3.5–5.2)
RBC # BLD AUTO: 4.44 10*6/MM3 (ref 3.77–5.28)
SODIUM SERPL-SCNC: 134 MMOL/L (ref 136–145)
WBC NRBC COR # BLD AUTO: 7.99 10*3/MM3 (ref 3.4–10.8)

## 2024-08-23 PROCEDURE — 36415 COLL VENOUS BLD VENIPUNCTURE: CPT

## 2024-08-23 PROCEDURE — 99214 OFFICE O/P EST MOD 30 MIN: CPT | Performed by: NURSE PRACTITIONER

## 2024-08-23 PROCEDURE — 1159F MED LIST DOCD IN RCRD: CPT | Performed by: NURSE PRACTITIONER

## 2024-08-23 PROCEDURE — 85025 COMPLETE CBC W/AUTO DIFF WBC: CPT | Performed by: INTERNAL MEDICINE

## 2024-08-23 PROCEDURE — 1160F RVW MEDS BY RX/DR IN RCRD: CPT | Performed by: NURSE PRACTITIONER

## 2024-08-23 PROCEDURE — 80048 BASIC METABOLIC PNL TOTAL CA: CPT | Performed by: NURSE PRACTITIONER

## 2024-08-23 PROCEDURE — 93000 ELECTROCARDIOGRAM COMPLETE: CPT | Performed by: NURSE PRACTITIONER

## 2024-08-23 RX ORDER — ISOSORBIDE MONONITRATE 60 MG/1
60 TABLET, EXTENDED RELEASE ORAL EVERY MORNING
Qty: 60 TABLET | Refills: 0 | Status: SHIPPED | OUTPATIENT
Start: 2024-08-23

## 2024-08-23 RX ORDER — ISOSORBIDE MONONITRATE 30 MG/1
30 TABLET, EXTENDED RELEASE ORAL EVERY MORNING
Qty: 90 TABLET | Refills: 3 | Status: SHIPPED | OUTPATIENT
Start: 2024-08-23 | End: 2024-08-23

## 2024-08-23 NOTE — H&P (VIEW-ONLY)
Date of Office Visit: 2024  Encounter Provider: STACIA Guaman  Place of Service: [facility] CARDIOLOGY  Patient Name: Brodie Mo  :10/31/1932    Chief Complaint   Patient presents with    Chest Pain   :     HPI: Brodie Mo is a 89 y.o. female patient of Dr. Hammer with coronary artery disease and mitral insufficiency.  In , she had a CABG x 2 with a LIMA to the LAD and a vein graft to the OM 2.  In 2020, she underwent a MitraClip.     Early last year, she presented with dyspnea on exertion for which she underwent an echocardiogram and a stress test.  Both were unrevealing.  In May 2023, she was noted to be in [place] at her PCPs office.  We never saw any evidence of high degree block.  She was never symptomatic, so we chose to watch things     In May, she was referred for a left and right heart catheterization secondary to persistent shortness of breath on exertion.  On , this demonstrated severe native disease with 2/2 bypasses patent and a 70% mid RCA for which she underwent DEBO.    In early July, hydralazine was discontinued secondary to dizziness.  Subsequently, amlodipine was discontinued secondary to lower extremity swelling.  She was started on losartan instead.    On , I saw her in the office for follow-up.  Her swelling had improved but her blood pressure remained elevated.  Ultimately, she was started on HCTZ.    Since starting the HCTZ, she has been intermittently confused, dizzy, and foggy headed.  She has also developed a heaviness in her chest as well as dyspnea on exertion.  She started noticing back pain yesterday.  She denies any palpitations, edema, syncope, bleeding difficulties or melena.    Past Medical History:   Diagnosis Date    CAD (coronary artery disease)     Cancer     bladder     Deep vein thrombosis     Left leg-    Health care maintenance     HTN (hypertension)     Hyperlipidemia     LVH (left  ventricular hypertrophy)     Pneumonia     S/P CABG (coronary artery bypass graft)        Past Surgical History:   Procedure Laterality Date    BLADDER SURGERY      cancer    CARDIAC CATHETERIZATION N/A 5/2/2024    Procedure: Right Heart Cath;  Surgeon: José Hatch MD;  Location: [facility] C[facility] LOC[facility];  Service: Cardiovascular;  Laterality: N/A;    CARDIAC CATHETERIZATION N/A 5/2/2024    Procedure: Left Heart Cath;  Surgeon: José Hatch MD;  Location: [facility] C[facility] LOC[facility];  Service: Cardiovascular;  Laterality: N/A;    CARDIAC CATHETERIZATION N/A 5/2/2024    Procedure: Native mammary injection;  Surgeon: José Hatch MD;  Location: [facility] CATH [facility]VASIVE LOCATION;  Service: Cardiovascular;  Laterality: N/A;    CARDIAC CATHETERIZATION  5/2/2024    Procedure: Saphenous Vein Graft;  Surgeon: José Hatch MD;  Location: [facility] CATH [facility]VASIVE LOC[facility];  Service: Cardiovascular;;    CARDIAC CATHETERIZATION N/A 5/2/2024    Procedure: Percutaneous Coronary Intervention;  Surgeon: José Hatch MD;  Location: [facility] CATH INVASIVE LOCATION;  Service: Cardiovascular;  Laterality: N/A;    CARDIAC CATHETERIZATION N/A 5/2/2024    Procedure: Stent DEBO coronary;  Surgeon: José Hatch MD;  Location: [facility] CATH [facility]VASIVE LOCATION;  Service: Cardiovascular;  Laterality: N/A;    CARDIAC CATHETERIZATION N/A 5/2/2024    Procedure: Optical Coherence Tomography;  Surgeon: José Hatch MD;  Location: [facility] CATH INVASIVE LOCATION;  Service: Cardiovascular;  Laterality: N/A;    CARDIAC CATHETERIZATION N/A 5/2/2024    Procedure: Resting Full Cycle Ratio;  Surgeon: José Hatch MD;  Location: [facility] INVASIVE LOCATION;  Service: Cardiovascular;  Laterality: N/A;    CARPAL TUNNEL RELEASE Right 12/15/2021    Procedure: RIGHT CARPAL TUNNEL RELEASE WITH POSSIBLE SYNOVECTOMY;  Surgeon: Georgie Hines MD;  Location: [facility] OR;  Service: Plastics;  Laterality: Right;    CORONARY ARTERY  BYPASS GRAFT      2002 with Dr Prabhakra; AGUAYO to LAD and SVG to OM2; 2005 cath all grafts open and 30% dx in RCA; nl stress in 2010, 2013    HYSTERECTOMY      MITRAL VALVE REPAIR/REPLACEMENT N/A 9/2/2020    Procedure: TRANSCATHETER MITRAL VALVE REPAIR;  Surgeon: Edward Humphreys MD;  Location: [facility] CATH INVASIVE LOCATION;  Service: Cardiovascular;  Laterality: N/A;       Social History     Socioeconomic History    Marital status:    Tobacco Use    Smoking status: Former    Smokeless tobacco: Never    Tobacco comments:     CAFFEINE USE: 2  CUPS COFFEE DAILY   Vaping Use    Vaping status: Never Used   Substance and Sexual Activity    Alcohol use: Yes     Alcohol/week: 1.0 standard drink of alcohol     Types: 1 Shots of liquor per week     Comment: 1 DRINK DAILY    Drug use: No    Sexual activity: Defer       Family History   Problem Relation Age of Onset    Heart disease Mother     Kidney cancer Father     No Known Problems Maternal Grandmother     No Known Problems Maternal Grandfather     No Known Problems Paternal Grandmother     No Known Problems Paternal Grandfather     Heart disease Sister     Heart disease Brother        Review of Systems   Constitutional: Negative.   Cardiovascular:  Positive for chest pain (heaviness/tightness) and dyspnea on exertion. Negative for leg swelling, orthopnea, paroxysmal nocturnal dyspnea and syncope.   Respiratory: Negative.     Hematologic/Lymphatic: Negative for bleeding problem.   Musculoskeletal:  Positive for back pain. Negative for falls.   Gastrointestinal:  Negative for melena.   Neurological:  Negative for dizziness and light-headedness.       Allergies   Allergen Reactions    No Known Drug Allergy          Current Outpatient Medications:     aspirin 81 MG tablet, Take 1 tablet by mouth Daily., Disp: , Rfl:     atorvastatin (LIPITOR) 40 MG tablet, TAKE 1 TABLET BY MOUTH EVERY DAY, Disp: 90 tablet, Rfl: 3    B Complex Vitamins (vitamin b complex) capsule  "capsule, Take 1 capsule by mouth 2 (Two) Times a Week., Disp: , Rfl:     cholecalciferol (VITAMIN D3) 1000 units tablet, Take 1 tablet by mouth Daily., Disp: , Rfl:     clopidogrel (PLAVIX) 75 MG tablet, Take 1 tablet by mouth Daily., Disp: , Rfl:     coenzyme Q10 100 MG capsule, Take 1 capsule by mouth Daily., Disp: , Rfl:     furosemide (LASIX) 40 MG tablet, TAKE 1 TABLET BY MOUTH EVERY DAY, Disp: 90 tablet, Rfl: 1    isosorbide mononitrate (IMDUR) 60 MG 24 hr tablet, Take 1 tablet by mouth Every Morning., Disp: 60 tablet, Rfl: 0    levothyroxine (SYNTHROID, LEVOTHROID) 50 MCG tablet, Take 1 tablet by mouth Daily., Disp: , Rfl:     losartan (COZAAR) 100 MG tablet, Take 1 tablet by mouth Daily., Disp: 90 tablet, Rfl: 3      Objective:     Vitals:    08/23/24 1431   BP: 118/60   Pulse: 76   SpO2: 98%   Weight: 60.3 kg (133 lb)   Height: 165.1 cm (65\")     Body mass index is 22.13 kg/m².    PHYSICAL EXAM:    Neck:      Vascular: No JVD.   Pulmonary:      Effort: Pulmonary effort is normal.      Breath sounds: Normal breath sounds.   Cardiovascular:      Normal rate. Frequent ectopic beats. Regular rhythm.      Murmurs: There is no murmur.      No gallop.  No click. No rub.   Pulses:     Intact distal pulses.           ECG 12 Lead    Date/Time: 8/23/2024 2:43 PM  Performed by: Brenda Gutiérrez APRN    Authorized by: Brenda Gutiérrez APRN  Comparison: compared with previous ECG from 8/5/2024  Similar to previous ECG  Rhythm: sinus rhythm  Ectopy: unifocal PVCs and trigeminy  Rate: normal  BPM: 73  ST Depression: V4, V5 and V6  T inversion: II, III, aVL, V4, V5 and V6            Assessment:       Diagnosis Plan   1. Coronary artery disease involving native coronary artery of native heart, unspecified whether angina present  ECG 12 Lead    Case Request Cath Lab: Coronary angiography      2. Essential hypertension          Orders Placed This Encounter   Procedures    ECG 12 Lead     This order was created via " procedure documentation     Order Specific Question:   Release to patient     Answer:   Routine Release [[id number]]          Plan:       1.  Coronary artery disease.  Her symptoms are definitely concerning.  On her EKG, she has ST-T wave changes, ST depression, and frequent PVCs.  I discussed with Dr. Hatch.  Ultimately, we are recommending proceeding with a repeat cardiac catheterization.  In the meantime, will increase the isosorbide to 60 mg.  Continue aspirin, clopidogrel, and atorvastatin.      2.  Hypertension.  I do not think she is tolerating the HCTZ and I have asked her to stop it.  Continue losartan.      Further recommendations will be made pending the results of the cath.  I told her if her symptoms were to worsen she needs to go to the ER.      As always, it has been a pleasure to participate in your patient's care.      Sincerely,         STACIA Dawson

## 2024-08-23 NOTE — TELEPHONE ENCOUNTER
Wes, will you see if she can come in and see me today at 2:30? You can put her on my 3 o'clock spot but I will see her at 2:30.

## 2024-08-23 NOTE — PROGRESS NOTES
Date of Office Visit: 2024  Encounter Provider: STACIA Guaman  Place of Service: [facility]  Patient Name: Brodie Mo  :10/31/1932    Chief Complaint   Patient presents with    Chest Pain   :     HPI: Brodie Mo is a 89 y.o. female patient of Dr. Hammer with coronary artery disease and mitral insufficiency.  In , she had a CABG x 2 with a LIMA to the LAD and a vein graft to the OM 2.  In 2020, she underwent a MitraClip.     Early last year, she presented with dyspnea on exertion for which she underwent an echocardiogram and a stress test.  Both were unrevealing.  In May 2023, she was noted to be in [place] at her PCPs office.  We never saw any evidence of high degree block.  She was never symptomatic, so we chose to watch things     In May, she was referred for a left and right heart catheterization secondary to persistent shortness of breath on exertion.  On , this demonstrated severe native disease with 2/2 bypasses patent and a 70% mid RCA for which she underwent DEBO.    In early July, hydralazine was discontinued secondary to dizziness.  Subsequently, amlodipine was discontinued secondary to lower extremity swelling.  She was started on losartan instead.    On , I saw her in the office for follow-up.  Her swelling had improved but her blood pressure remained elevated.  Ultimately, she was started on HCTZ.    Since starting the HCTZ, she has been intermittently confused, dizzy, and foggy headed.  She has also developed a heaviness in her chest as well as dyspnea on exertion.  She started noticing back pain yesterday.  She denies any palpitations, edema, syncope, bleeding difficulties or melena.    Past Medical History:   Diagnosis Date    CAD (coronary artery disease)     Cancer     bladder     Deep vein thrombosis     Left leg-    Health care maintenance     HTN (hypertension)     Hyperlipidemia     LVH (left  ventricular hypertrophy)     Pneumonia     S/P CABG (coronary artery bypass graft)        Past Surgical History:   Procedure Laterality Date    BLADDER SURGERY      cancer    CARDIAC CATHETERIZATION N/A 5/2/2024    Procedure: Right Heart Cath;  Surgeon: José Hatch MD;  Location: [facility] CATH INVAS[facility] LOC[facility];  Service: Cardiovascular;  Laterality: N/A;    CARDIAC CATHETERIZATION N/A 5/2/2024    Procedure: Left Heart Cath;  Surgeon: José Hatch MD;  Location: [facility] CATH INVASIVE LOCATION;  Service: Cardiovascular;  Laterality: N/A;    CARDIAC CATHETERIZATION N/A 5/2/2024    Procedure: Native mammary injection;  Surgeon: José Hatch MD;  Location: [facility] CATH INVASIVE LOCATION;  Service: Cardiovascular;  Laterality: N/A;    CARDIAC CATHETERIZATION  5/2/2024    Procedure: Saphenous Vein Graft;  Surgeon: José Hatch MD;  Location: [facility] CATH INVASIVE LOCATION;  Service: Cardiovascular;;    CARDIAC CATHETERIZATION N/A 5/2/2024    Procedure: Percutaneous Coronary Intervention;  Surgeon: José Hatch MD;  Location: [facility] CATH INVASIVE LOCATION;  Service: Cardiovascular;  Laterality: N/A;    CARDIAC CATHETERIZATION N/A 5/2/2024    Procedure: Stent DEBO coronary;  Surgeon: José Hatch MD;  Location: [facility] CATH INVASIVE LOCATION;  Service: Cardiovascular;  Laterality: N/A;    CARDIAC CATHETERIZATION N/A 5/2/2024    Procedure: Optical Coherence Tomography;  Surgeon: José Hatch MD;  Location: [facility] CATH INVASIVE LOCATION;  Service: Cardiovascular;  Laterality: N/A;    CARDIAC CATHETERIZATION N/A 5/2/2024    Procedure: Resting Full Cycle Ratio;  Surgeon: José Hatch MD;  Location: [facility] INVASIVE LOCATION;  Service: Cardiovascular;  Laterality: N/A;    CARPAL TUNNEL RELEASE Right 12/15/2021    Procedure: RIGHT CARPAL TUNNEL RELEASE WITH POSSIBLE SYNOVECTOMY;  Surgeon: Georgie Hines MD;  Location: [facility] OR;  Service: Plastics;  Laterality: Right;    CORONARY ARTERY  BYPASS GRAFT      2002 with Dr Prabhakar; AGUAYO to LAD and SVG to OM2; 2005 cath all grafts open and 30% dx in RCA; nl stress in 2010, 2013    HYSTERECTOMY      MITRAL VALVE REPAIR/REPLACEMENT N/A 9/2/2020    Procedure: TRANSCATHETER MITRAL VALVE REPAIR;  Surgeon: Edward Humphreys MD;  Location: [facility] CATH INVASIVE LOCATION;  Service: Cardiovascular;  Laterality: N/A;       Social History     Socioeconomic History    Marital status:    Tobacco Use    Smoking status: Former    Smokeless tobacco: Never    Tobacco comments:     CAFFEINE USE: 2  CUPS COFFEE DAILY   Vaping Use    Vaping status: Never Used   Substance and Sexual Activity    Alcohol use: Yes     Alcohol/week: 1.0 standard drink of alcohol     Types: 1 Shots of liquor per week     Comment: 1 DRINK DAILY    Drug use: No    Sexual activity: Defer       Family History   Problem Relation Age of Onset    Heart disease Mother     Kidney cancer Father     No Known Problems Maternal Grandmother     No Known Problems Maternal Grandfather     No Known Problems Paternal Grandmother     No Known Problems Paternal Grandfather     Heart disease Sister     Heart disease Brother        Review of Systems   Constitutional: Negative.   Cardiovascular:  Positive for chest pain (heaviness/tightness) and dyspnea on exertion. Negative for leg swelling, orthopnea, paroxysmal nocturnal dyspnea and syncope.   Respiratory: Negative.     Hematologic/Lymphatic: Negative for bleeding problem.   Musculoskeletal:  Positive for back pain. Negative for falls.   Gastrointestinal:  Negative for melena.   Neurological:  Negative for dizziness and light-headedness.       Allergies   Allergen Reactions    No Known Drug Allergy          Current Outpatient Medications:     aspirin 81 MG tablet, Take 1 tablet by mouth Daily., Disp: , Rfl:     atorvastatin (LIPITOR) 40 MG tablet, TAKE 1 TABLET BY MOUTH EVERY DAY, Disp: 90 tablet, Rfl: 3    B Complex Vitamins (vitamin b complex) capsule  "capsule, Take 1 capsule by mouth 2 (Two) Times a Week., Disp: , Rfl:     cholecalciferol (VITAMIN D3) 1000 units tablet, Take 1 tablet by mouth Daily., Disp: , Rfl:     clopidogrel (PLAVIX) 75 MG tablet, Take 1 tablet by mouth Daily., Disp: , Rfl:     coenzyme Q10 100 MG capsule, Take 1 capsule by mouth Daily., Disp: , Rfl:     furosemide (LASIX) 40 MG tablet, TAKE 1 TABLET BY MOUTH EVERY DAY, Disp: 90 tablet, Rfl: 1    isosorbide mononitrate (IMDUR) 60 MG 24 hr tablet, Take 1 tablet by mouth Every Morning., Disp: 60 tablet, Rfl: 0    levothyroxine (SYNTHROID, LEVOTHROID) 50 MCG tablet, Take 1 tablet by mouth Daily., Disp: , Rfl:     losartan (COZAAR) 100 MG tablet, Take 1 tablet by mouth Daily., Disp: 90 tablet, Rfl: 3      Objective:     Vitals:    08/23/24 1431   BP: 118/60   Pulse: 76   SpO2: 98%   Weight: 60.3 kg (133 lb)   Height: 165.1 cm (65\")     Body mass index is 22.13 kg/m².    PHYSICAL EXAM:    Neck:      Vascular: No JVD.   Pulmonary:      Effort: Pulmonary effort is normal.      Breath sounds: Normal breath sounds.   Cardiovascular:      Normal rate. Frequent ectopic beats. Regular rhythm.      Murmurs: There is no murmur.      No gallop.  No click. No rub.   Pulses:     Intact distal pulses.           ECG 12 Lead    Date/Time: 8/23/2024 2:43 PM  Performed by: Brenda Gutiérrez APRN    Authorized by: Brenda Gutiérrez APRN  Comparison: compared with previous ECG from 8/5/2024  Similar to previous ECG  Rhythm: sinus rhythm  Ectopy: unifocal PVCs and trigeminy  Rate: normal  BPM: 73  ST Depression: V4, V5 and V6  T inversion: II, III, aVL, V4, V5 and V6            Assessment:       Diagnosis Plan   1. Coronary artery disease involving native coronary artery of native heart, unspecified whether angina present  ECG 12 Lead    Case Request Cath Lab: Coronary angiography      2. Essential hypertension          Orders Placed This Encounter   Procedures    ECG 12 Lead     This order was created via " procedure documentation     Order Specific Question:   Release to patient     Answer:   Routine Release [[id number]]          Plan:       1.  Coronary artery disease.  Her symptoms are definitely concerning.  On her EKG, she has ST-T wave changes, ST depression, and frequent PVCs.  I discussed with Dr. Hatch.  Ultimately, we are recommending proceeding with a repeat cardiac catheterization.  In the meantime, will increase the isosorbide to 60 mg.  Continue aspirin, clopidogrel, and atorvastatin.      2.  Hypertension.  I do not think she is tolerating the HCTZ and I have asked her to stop it.  Continue losartan.      Further recommendations will be made pending the results of the cath.  I told her if her symptoms were to worsen she needs to go to the ER.      As always, it has been a pleasure to participate in your patient's care.      Sincerely,         STACIA Dawson

## 2024-08-26 ENCOUNTER — TELEPHONE (OUTPATIENT)
Dept: CARDIOLOGY | Facility: CLINIC | Age: 89
End: 2024-08-26
Payer: MEDICARE

## 2024-08-26 DIAGNOSIS — I25.10 CORONARY ARTERY DISEASE INVOLVING NATIVE CORONARY ARTERY OF NATIVE HEART, UNSPECIFIED WHETHER ANGINA PRESENT: Primary | ICD-10-CM

## 2024-08-26 NOTE — TELEPHONE ENCOUNTER
I spoke with her regarding the BMP results from Friday.  I already discontinued the HCTZ on Friday.  She will have repeat labs tomorrow.

## 2024-08-27 ENCOUNTER — TELEPHONE (OUTPATIENT)
Dept: CARDIOLOGY | Facility: CLINIC | Age: 89
End: 2024-08-27
Payer: MEDICARE

## 2024-08-27 ENCOUNTER — LAB (OUTPATIENT)
Dept: LAB | Facility: HOSPITAL | Age: 89
End: 2024-08-27
Payer: MEDICARE

## 2024-08-27 DIAGNOSIS — I25.10 CORONARY ARTERY DISEASE INVOLVING NATIVE CORONARY ARTERY OF NATIVE HEART, UNSPECIFIED WHETHER ANGINA PRESENT: ICD-10-CM

## 2024-08-27 LAB
ANION GAP SERPL CALCULATED.3IONS-SCNC: 10.8 MMOL/L (ref 5–15)
BUN SERPL-MCNC: 27 MG/DL (ref 8–23)
BUN/CREAT SERPL: 17.3 (ref 7–25)
CALCIUM SPEC-SCNC: 10 MG/DL (ref 8.2–9.6)
CHLORIDE SERPL-SCNC: 98 MMOL/L (ref 98–107)
CO2 SERPL-SCNC: 27.2 MMOL/L (ref 22–29)
CREAT SERPL-MCNC: 1.56 MG/DL (ref 0.57–1)
EGFRCR SERPLBLD CKD-EPI 2021: 31.3 ML/MIN/1.73
GLUCOSE SERPL-MCNC: 111 MG/DL (ref 65–99)
POTASSIUM SERPL-SCNC: 4.3 MMOL/L (ref 3.5–5.2)
SODIUM SERPL-SCNC: 136 MMOL/L (ref 136–145)

## 2024-08-27 PROCEDURE — 80048 BASIC METABOLIC PNL TOTAL CA: CPT

## 2024-08-27 PROCEDURE — 36415 COLL VENOUS BLD VENIPUNCTURE: CPT

## 2024-08-27 NOTE — TELEPHONE ENCOUNTER
----- Message from Brenda Gutiérrez sent at 8/27/2024 12:45 PM EDT -----  I left her a voicemail regarding her labs.  Fortunately her kidney function looks better.  Recommended remaining off HCTZ and proceeding with the procedure on Thursday as scheduled.

## 2024-08-27 NOTE — PROGRESS NOTES
I left her a voicemail regarding her labs.  Fortunately her kidney function looks better.  Recommended remaining off HCTZ and proceeding with the procedure on Thursday as scheduled.

## 2024-08-28 NOTE — TELEPHONE ENCOUNTER
Notified patient of results/recommendations. Patient verbalized understanding.    Yahaira Pratt RN  Triage [facility]

## 2024-08-29 ENCOUNTER — HOSPITAL ENCOUNTER (OUTPATIENT)
Facility: HOSPITAL | Age: 89
Setting detail: HOSPITAL OUTPATIENT SURGERY
Discharge: HOME | End: 2024-08-29
Attending: INTERNAL MEDICINE | Admitting: INTERNAL MEDICINE
Payer: MEDICARE

## 2024-08-29 VITALS
RESPIRATION RATE: 16 BRPM | BODY MASS INDEX: 22.33 KG/M2 | TEMPERATURE: 97.9 F | WEIGHT: 134 LBS | SYSTOLIC BLOOD PRESSURE: 131 MMHG | DIASTOLIC BLOOD PRESSURE: 44 MMHG | HEIGHT: 65 IN | HEART RATE: 73 BPM | OXYGEN SATURATION: 96 %

## 2024-08-29 DIAGNOSIS — I25.10 CORONARY ARTERY DISEASE INVOLVING NATIVE CORONARY ARTERY OF NATIVE HEART WITHOUT ANGINA PECTORIS: ICD-10-CM

## 2024-08-29 PROCEDURE — C1887 CATHETER, GUIDING: HCPCS | Performed by: INTERNAL MEDICINE

## 2024-08-29 PROCEDURE — C1725 CATH, TRANSLUMIN NON-LASER: HCPCS | Performed by: INTERNAL MEDICINE

## 2024-08-29 PROCEDURE — 25010000002 HEPARIN (PORCINE) PER 1000 UNITS: Performed by: INTERNAL MEDICINE

## 2024-08-29 PROCEDURE — C1874 STENT, COATED/COV W/DEL SYS: HCPCS | Performed by: INTERNAL MEDICINE

## 2024-08-29 PROCEDURE — C1894 INTRO/SHEATH, NON-LASER: HCPCS | Performed by: INTERNAL MEDICINE

## 2024-08-29 PROCEDURE — 25010000002 MIDAZOLAM PER 1 MG: Performed by: INTERNAL MEDICINE

## 2024-08-29 PROCEDURE — C9600 PERC DRUG-EL COR STENT SING: HCPCS | Performed by: INTERNAL MEDICINE

## 2024-08-29 PROCEDURE — 85347 COAGULATION TIME ACTIVATED: CPT

## 2024-08-29 PROCEDURE — C1753 CATH, INTRAVAS ULTRASOUND: HCPCS | Performed by: INTERNAL MEDICINE

## 2024-08-29 PROCEDURE — 25010000002 FENTANYL CITRATE (PF) 50 MCG/ML SOLUTION: Performed by: INTERNAL MEDICINE

## 2024-08-29 PROCEDURE — 25810000003 SODIUM CHLORIDE 0.9 % SOLUTION: Performed by: INTERNAL MEDICINE

## 2024-08-29 PROCEDURE — C1769 GUIDE WIRE: HCPCS | Performed by: INTERNAL MEDICINE

## 2024-08-29 PROCEDURE — 93459 L HRT ART/GRFT ANGIO: CPT | Performed by: INTERNAL MEDICINE

## 2024-08-29 PROCEDURE — 92978 ENDOLUMINL IVUS OCT C 1ST: CPT | Performed by: INTERNAL MEDICINE

## 2024-08-29 PROCEDURE — 25510000001 IOPAMIDOL PER 1 ML: Performed by: INTERNAL MEDICINE

## 2024-08-29 DEVICE — XIENCE SKYPOINT™ EVEROLIMUS ELUTING CORONARY STENT SYSTEM 3.50 MM X 18 MM / RAPID-EXCHANGE
Type: IMPLANTABLE DEVICE | Site: CORONARY | Status: FUNCTIONAL
Brand: XIENCE SKYPOINT™

## 2024-08-29 RX ORDER — VERAPAMIL HYDROCHLORIDE 2.5 MG/ML
INJECTION, SOLUTION INTRAVENOUS
Status: DISCONTINUED | OUTPATIENT
Start: 2024-08-29 | End: 2024-08-29 | Stop reason: HOSPADM

## 2024-08-29 RX ORDER — FENTANYL CITRATE 50 UG/ML
INJECTION, SOLUTION INTRAMUSCULAR; INTRAVENOUS
Status: DISCONTINUED | OUTPATIENT
Start: 2024-08-29 | End: 2024-08-29 | Stop reason: HOSPADM

## 2024-08-29 RX ORDER — SODIUM CHLORIDE 9 MG/ML
75 INJECTION, SOLUTION INTRAVENOUS CONTINUOUS
Status: DISCONTINUED | OUTPATIENT
Start: 2024-08-29 | End: 2024-08-29 | Stop reason: HOSPADM

## 2024-08-29 RX ORDER — IOPAMIDOL 755 MG/ML
INJECTION, SOLUTION INTRAVASCULAR
Status: DISCONTINUED | OUTPATIENT
Start: 2024-08-29 | End: 2024-08-29 | Stop reason: HOSPADM

## 2024-08-29 RX ORDER — SODIUM CHLORIDE 0.9 % (FLUSH) 0.9 %
10 SYRINGE (ML) INJECTION EVERY 12 HOURS SCHEDULED
Status: DISCONTINUED | OUTPATIENT
Start: 2024-08-29 | End: 2024-08-29 | Stop reason: HOSPADM

## 2024-08-29 RX ORDER — HEPARIN SODIUM 1000 [USP'U]/ML
INJECTION, SOLUTION INTRAVENOUS; SUBCUTANEOUS
Status: DISCONTINUED | OUTPATIENT
Start: 2024-08-29 | End: 2024-08-29 | Stop reason: HOSPADM

## 2024-08-29 RX ORDER — SODIUM CHLORIDE 9 MG/ML
40 INJECTION, SOLUTION INTRAVENOUS AS NEEDED
Status: DISCONTINUED | OUTPATIENT
Start: 2024-08-29 | End: 2024-08-29 | Stop reason: HOSPADM

## 2024-08-29 RX ORDER — LIDOCAINE HYDROCHLORIDE 20 MG/ML
INJECTION, SOLUTION INFILTRATION; PERINEURAL
Status: DISCONTINUED | OUTPATIENT
Start: 2024-08-29 | End: 2024-08-29 | Stop reason: HOSPADM

## 2024-08-29 RX ORDER — MIDAZOLAM HYDROCHLORIDE 1 MG/ML
INJECTION INTRAMUSCULAR; INTRAVENOUS
Status: DISCONTINUED | OUTPATIENT
Start: 2024-08-29 | End: 2024-08-29 | Stop reason: HOSPADM

## 2024-08-29 RX ORDER — ACETAMINOPHEN 325 MG/1
650 TABLET ORAL EVERY 4 HOURS PRN
Status: DISCONTINUED | OUTPATIENT
Start: 2024-08-29 | End: 2024-08-29 | Stop reason: HOSPADM

## 2024-08-29 RX ORDER — SODIUM CHLORIDE 0.9 % (FLUSH) 0.9 %
10 SYRINGE (ML) INJECTION AS NEEDED
Status: DISCONTINUED | OUTPATIENT
Start: 2024-08-29 | End: 2024-08-29 | Stop reason: HOSPADM

## 2024-08-29 RX ORDER — METOPROLOL TARTRATE 1 MG/ML
INJECTION, SOLUTION INTRAVENOUS
Status: DISCONTINUED | OUTPATIENT
Start: 2024-08-29 | End: 2024-08-29 | Stop reason: HOSPADM

## 2024-08-29 RX ORDER — CLOPIDOGREL BISULFATE 75 MG/1
TABLET ORAL
Status: DISCONTINUED | OUTPATIENT
Start: 2024-08-29 | End: 2024-08-29 | Stop reason: HOSPADM

## 2024-08-29 RX ADMIN — SODIUM CHLORIDE 180.9 ML: 9 INJECTION, SOLUTION INTRAVENOUS at 08:27

## 2024-08-29 RX ADMIN — ACETAMINOPHEN 325MG 650 MG: 325 TABLET ORAL at 16:58

## 2024-08-29 NOTE — Clinical Note
First balloon inflation max pressure = 20 enmanuel. Second inflation of balloon - Max pressure = 20 enmanuel. 2nd inflation was done at 10:14 EDT.

## 2024-08-29 NOTE — INTERVAL H&P NOTE
She recently had a PCI she is back with really concerning angina symptoms shortly afterward with some ECG changes to we have increased her isosorbide I did not really make any change in our going to have to unfortunately come back to the Cath Lab. H&P reviewed. The patient was examined and there are no changes to the H&P. I have explained the risks and benefits of the procedure to the patient.  The patient understands and agrees to proceed

## 2024-08-29 NOTE — Clinical Note
Hemostasis started on the left radial artery. R-Band was used in achieving hemostasis. Radial compression device applied to vessel. Hemostasis achieved successfully. Closure device additional comment: Tr band applied with 14 cc of air

## 2024-08-29 NOTE — DISCHARGE INSTRUCTIONS
[facility]  [address] [place] [address]    Coronary Angioplasty with or without  Stent (Radial Approach) After Care    Refer to this sheet in the next few weeks. These instructions provide you with information on caring for yourself after your procedure. Your health care provider may also give you more specific instructions. Your treatment has been planned according to current medical practices, but problems sometimes occur. Call your health care provider if you have any problems or questions after your procedure.       Home Care Instructions:  You may shower the day after the procedure. Remove the bandage (dressing) and gently wash the site with plain soap and water. Gently pat the site dry. You may apply a band aid daily for 2 days if desired.    Do not apply powder or lotion to the site.  Do not submerge the affected site in water for 3 to 5 days or until the site is completely healed.   Do not flex or bend at the wrist with affected arm for 24 hours.  Do not lift, push or pull anything over 5 pounds for 5 days after your procedure or as directed by your physician.  For a reference, a gallon of milk weighs 8 pounds.    Do not operate machinery or power tools for 24 hours.  Inspect the site at least twice daily. You may notice some bruising at the site and it may be tender for 1 to 2 weeks.     Increase your fluid intake for the next 2 days.    Keep arm elevated for 24 hours.  For the remainder of the day, keep your arm in the “Pledge of Allegiance” position when up and about.    Limit your activity for the next 48 hours and avoid strenuous activity as directed by your physician.   Cardiac Rehab may or may not be ordered.  Please consult with your physician  You may drive 24 hours after the procedure unless otherwise instructed by your caregiver.  A responsible adult should be with you for the first 24 hours after you arrive home.   Do not make any important legal decisions or sign legal  papers for 24 hours. Do not drink alcohol for 24 hours.    Take medicines only as directed by your health care provider.  Blood thinners may be prescribed after your procedure to improve blood flow through the stent.    Metformin or any medications containing Metformin should not be taken for 48 hours after your procedure.    Eat a heart-healthy diet. This should include plenty of fresh fruits and vegetables. Meat should be lean cuts. Avoid the following types of food:    Food that is high in salt.    Canned or highly processed food.    Food that is high in saturated fat or sugar.    Fried food.    Make any other lifestyle changes recommended by your health care provider. This may include:    Not using any tobacco products including cigarettes, chewing tobacco, or electronic cigarettes.   Managing your weight.    Getting regular exercise.    Managing your blood pressure.    Limiting your alcohol intake.    Managing other health problems, such as diabetes.    If you need an MRI after your heart stent was placed, be sure to tell the health care provider who orders the MRI that you have a heart stent.    Keep all follow-up visits as directed by your health care provider.      Call Your Doctor If:    You have unusual pain at the radial/ulnar (wrist) site.  You have redness, warmth, swelling, or pain at the radial/ulnar (wrist) site.  You have drainage (other than a small amount of blood on the dressing).  `You have chills or a fever > 101.  Your arm becomes pale or dark, cool, tingly, or numb.  You develop chest pain, shortness of breath, feel faint or pass out.    You have heavy bleeding from the site.  If you do, hold pressure on the site for 20 minutes.  If the bleeding stops, apply a fresh bandage and call your cardiologist.  However, if you continue to have bleeding, maintain pressure and call 911.    You have any symptoms of a stroke.  Remember BE FAST  B-balance. Sudden trouble walking or loss of  balance.  E-eyes.  Sudden changes in how you see or a sudden onset of a very bad headache.   F-face. Sudden weakness or loss of feeling of the face or facial droop on one side.   A-arms Sudden weakness or numbness in one arm. One arm drifts down if they are both held out in front of you. This happens suddenly and usually on one side of the body.   S-speech.  Sudden trouble speaking, slurred speech or trouble understanding what people are saying.   T-time  Time to call emergency services.  Write down the symptoms and the time they started.

## 2024-08-29 NOTE — Clinical Note
First balloon inflation max pressure = 14 enmanuel. Second inflation of balloon - Max pressure = 14 enmanuel. 2nd inflation was done at 10:00 EDT. Third inflation of balloon - Max pressure = 16 enmanuel. 3rd inflation was done at 10:00 EDT.

## 2024-08-31 LAB
ACT BLD: 262 SECONDS (ref 82–152)
ACT BLD: 330 SECONDS (ref 82–152)

## 2024-09-05 ENCOUNTER — HOSPITAL ENCOUNTER (OUTPATIENT)
Dept: CARDIOLOGY | Facility: HOSPITAL | Age: 89
Discharge: HOME | End: 2024-09-05
Admitting: NURSE PRACTITIONER
Payer: MEDICARE

## 2024-09-05 ENCOUNTER — TELEPHONE (OUTPATIENT)
Dept: CARDIOLOGY | Facility: CLINIC | Age: 89
End: 2024-09-05

## 2024-09-05 ENCOUNTER — OFFICE VISIT (OUTPATIENT)
Dept: CARDIOLOGY | Facility: CLINIC | Age: 89
End: 2024-09-05
Payer: MEDICARE

## 2024-09-05 VITALS
DIASTOLIC BLOOD PRESSURE: 78 MMHG | SYSTOLIC BLOOD PRESSURE: 118 MMHG | OXYGEN SATURATION: 96 % | HEART RATE: 79 BPM | BODY MASS INDEX: 22.63 KG/M2 | WEIGHT: 135.8 LBS | HEIGHT: 65 IN

## 2024-09-05 DIAGNOSIS — I25.10 CORONARY ARTERY DISEASE INVOLVING NATIVE CORONARY ARTERY OF NATIVE HEART WITHOUT ANGINA PECTORIS: Primary | ICD-10-CM

## 2024-09-05 DIAGNOSIS — I10 ESSENTIAL HYPERTENSION: Primary | ICD-10-CM

## 2024-09-05 DIAGNOSIS — R06.09 DYSPNEA ON EXERTION: ICD-10-CM

## 2024-09-05 DIAGNOSIS — I49.3 FREQUENT PVCS: ICD-10-CM

## 2024-09-05 LAB
ANION GAP SERPL CALCULATED.3IONS-SCNC: 11.5 MMOL/L (ref 5–15)
BUN SERPL-MCNC: 28 MG/DL (ref 8–23)
BUN/CREAT SERPL: 17.2 (ref 7–25)
CALCIUM SPEC-SCNC: 9.9 MG/DL (ref 8.2–9.6)
CHLORIDE SERPL-SCNC: 101 MMOL/L (ref 98–107)
CO2 SERPL-SCNC: 25.5 MMOL/L (ref 22–29)
CREAT SERPL-MCNC: 1.63 MG/DL (ref 0.57–1)
DEPRECATED RDW RBC AUTO: 43.7 FL (ref 37–54)
EGFRCR SERPLBLD CKD-EPI 2021: 29.7 ML/MIN/1.73
ERYTHROCYTE [DISTWIDTH] IN BLOOD BY AUTOMATED COUNT: 13.3 % (ref 12.3–15.4)
GLUCOSE SERPL-MCNC: 99 MG/DL (ref 65–99)
HCT VFR BLD AUTO: 32.5 % (ref 34–46.6)
HGB BLD-MCNC: 10.8 G/DL (ref 12–15.9)
MCH RBC QN AUTO: 29.5 PG (ref 26.6–33)
MCHC RBC AUTO-ENTMCNC: 33.2 G/DL (ref 31.5–35.7)
MCV RBC AUTO: 88.8 FL (ref 79–97)
PLATELET # BLD AUTO: 235 10*3/MM3 (ref 140–450)
PMV BLD AUTO: 11.1 FL (ref 6–12)
POTASSIUM SERPL-SCNC: 5 MMOL/L (ref 3.5–5.2)
RBC # BLD AUTO: 3.66 10*6/MM3 (ref 3.77–5.28)
SODIUM SERPL-SCNC: 138 MMOL/L (ref 136–145)
WBC NRBC COR # BLD AUTO: 5.72 10*3/MM3 (ref 3.4–10.8)

## 2024-09-05 PROCEDURE — 85027 COMPLETE CBC AUTOMATED: CPT | Performed by: NURSE PRACTITIONER

## 2024-09-05 PROCEDURE — 80048 BASIC METABOLIC PNL TOTAL CA: CPT | Performed by: NURSE PRACTITIONER

## 2024-09-05 PROCEDURE — 36415 COLL VENOUS BLD VENIPUNCTURE: CPT

## 2024-09-05 RX ORDER — METOPROLOL TARTRATE 25 MG/1
25 TABLET, FILM COATED ORAL 2 TIMES DAILY
Qty: 60 TABLET | Refills: 2 | Status: SHIPPED | OUTPATIENT
Start: 2024-09-05

## 2024-09-05 NOTE — PROGRESS NOTES
Date of Office Visit: 2024  Encounter Provider: STACIA Guaman  Place of Service: [facility] CARDIOLOGY  Patient Name: Brodie Mo  :10/31/1932    Chief Complaint   Patient presents with    Coronary Artery Disease     Patient is in the office today for her 1 week hospital follow up appointment.    :     HPI: Brodie Mo is a 89 y.o. female patient of Dr. Hammer with coronary artery disease and mitral insufficiency.  In , she had a CABG x 2 with a LIMA to the LAD and a vein graft to the OM 2.  In 2020, she underwent a MitraClip.     Early last year, she presented with dyspnea on exertion for which she underwent an echocardiogram and a stress test.  Both were unrevealing.  In May 2023, she was noted to be in [place] at her PCPs office.  We never saw any evidence of high degree block.  She was never symptomatic, so we chose to watch things     In May, she was referred for a left and right heart catheterization secondary to persistent shortness of breath on exertion.  On , this demonstrated severe native disease with 2/2 bypasses patent and a 70% mid RCA for which she underwent DEBO.    In early July, hydralazine was discontinued secondary to dizziness.  Subsequently, amlodipine was discontinued secondary to lower extremity swelling.  She was started on losartan instead.     On , I saw her in the office for follow-up.  Her swelling had improved but her blood pressure remained elevated.  Ultimately, she was started on HCTZ.    On , I saw her in the office for intermittent confusion, dizziness, dyspnea on exertion, and chest heaviness.  The HCTZ was discontinued.  Additionally, EKG demonstrated ST-T wave changes, ST depression, and frequent PVCs.  Ultimately, she was scheduled for repeat cardiac catheterization.  On , this demonstrated 90% in-stent stenosis of the proximal RCA for which she underwent angioplasty and  DEBO.  She is  here today for follow-up.    Unfortunately, there has essentially been no change.  She is still experiencing chest pressure and breathlessness with minimal activity.  She i also continues experiencing intermittent dizziness.  She reports occasional palpitations.  She denies any edema, syncope, bleeding difficulties or melena.    Past Medical History:   Diagnosis Date    CAD (coronary artery disease)     Cancer     bladder     Chronic kidney disease     Deep vein thrombosis     Left leg-2002    Disease of thyroid gland     Health care maintenance     HTN (hypertension)     Hyperlipidemia     LVH (left ventricular hypertrophy)     Pneumonia     S/P CABG (coronary artery bypass graft)        Past Surgical History:   Procedure Laterality Date    BLADDER SURGERY      cancer    CARDIAC CATHETERIZATION N/A 5/2/2024    Procedure: Right Heart Cath;  Surgeon: José Hatch MD;  [facility];  Service: Cardiovascular;  Laterality: N/A;    CARDIAC CATHETERIZATION N/A 5/2/2024    Procedure: Left Heart Cath;  Surgeon: José Hatch MD;  [facility];  Service: Cardiovascular;  Laterality: N/A;    CARDIAC CATHETERIZATION N/A 5/2/2024    Procedure: Native mammary injection;  Surgeon: José Hatch MD;  [facility];  Service: Cardiovascular;  Laterality: N/A;    CARDIAC CATHETERIZATION  5/2/2024    Procedure: Saphenous Vein Graft;  Surgeon: José Hatch MD;  [facility];  Service: Cardiovascular;;    CARDIAC CATHETERIZATION N/A 5/2/2024    Procedure: Percutaneous Coronary Intervention;  Surgeon: José Hatch MD;  [facility];  Service: Cardiovascular;  Laterality: N/A;    CARDIAC CATHETERIZATION N/A 5/2/2024    Procedure: Stent DEBO coronary;  Surgeon: José Hatch MD;  Location: [facility] CATH INVASIVE LOCATION;  Service: Cardiovascular;  Laterality: N/A;    CARDIAC  CATHETERIZATION N/A 5/2/2024    Procedure: Optical Coherence Tomography;  Surgeon: José Hatch MD;  Location: [facility] CATH INVASIVE LOCATION;  Service: Cardiovascular;  Laterality: N/A;    CARDIAC CATHETERIZATION N/A 5/2/2024    Procedure: Resting Full Cycle Ratio;  Surgeon: José Hatch MD;  [facility];  Service: Cardiovascular;  Laterality: N/A;    CARDIAC CATHETERIZATION N/A 8/29/2024    Procedure: Coronary angiography;  Surgeon: José Hatch MD;  Location: [facility] CATH INVASIVE LOCATION;  Service: Cardiovascular;  Laterality: N/A;    CARDIAC CATHETERIZATION N/A 8/29/2024    Procedure: Percutaneous Coronary Intervention;  Surgeon: José Hatch MD;  Location: [facility] CATH INVASIVE LOCATION;  Service: Cardiovascular;  Laterality: N/A;    CARDIAC CATHETERIZATION N/A 8/29/2024    Procedure: Optical Coherence Tomography;  Surgeon: José Hatch MD;  Location: [facility] CATH INVASIVE LOCATION;  Service: Cardiovascular;  Laterality: N/A;    CARDIAC CATHETERIZATION N/A 8/29/2024    Procedure: Stent DEBO coronary;  Surgeon: José Hatch MD;  Location: [facility] CATH INVASIVE LOCATION;  Service: Cardiovascular;  Laterality: N/A;    CARPAL TUNNEL RELEASE Right 12/15/2021    Procedure: RIGHT CARPAL TUNNEL RELEASE WITH POSSIBLE SYNOVECTOMY;  Surgeon: Georgie Hines MD;  Location: [facility] OR;  Service: Plastics;  Laterality: Right;    CORONARY ARTERY BYPASS GRAFT      2002 with Dr Prabhakar; AGUAYO to LAD and SVG to OM2; 2005 cath all grafts open and 30% dx in RCA; nl stress in 2010, 2013    HYSTERECTOMY      MITRAL VALVE REPAIR/REPLACEMENT N/A 9/2/2020    Procedure: TRANSCATHETER MITRAL VALVE REPAIR;  Surgeon: Edward Humphreys MD;  Location: [facility] CATH INVASIVE LOCATION;  Service: Cardiovascular;  Laterality: N/A;       Social History     Socioeconomic History    Marital status:    Tobacco Use    Smoking status: Former     Passive exposure: Past    Smokeless  tobacco: Never    Tobacco comments:     CAFFEINE USE: 2  CUPS COFFEE DAILY   Vaping Use    Vaping status: Never Used   Substance and Sexual Activity    Alcohol use: Yes     Alcohol/week: 7.0 standard drinks of alcohol     Types: 7 Shots of liquor per week     Comment: 1 DRINK DAILY    Drug use: No    Sexual activity: Defer       Family History   Problem Relation Age of Onset    Heart disease Mother     Kidney cancer Father     No Known Problems Maternal Grandmother     No Known Problems Maternal Grandfather     No Known Problems Paternal Grandmother     No Known Problems Paternal Grandfather     Heart disease Sister     Heart disease Brother        Review of Systems   Constitutional: Negative.   Cardiovascular:  Positive for chest pain, dyspnea on exertion and palpitations. Negative for leg swelling, orthopnea, paroxysmal nocturnal dyspnea and syncope.   Respiratory: Negative.     Hematologic/Lymphatic: Negative for bleeding problem.   Musculoskeletal:  Negative for falls.   Gastrointestinal:  Negative for melena.   Neurological:  Positive for dizziness. Negative for light-headedness.       Allergies   Allergen Reactions    No Known Drug Allergy          Current Outpatient Medications:     aspirin 81 MG tablet, Take 1 tablet by mouth Daily., Disp: , Rfl:     atorvastatin (LIPITOR) 40 MG tablet, TAKE 1 TABLET BY MOUTH EVERY DAY, Disp: 90 tablet, Rfl: 3    B Complex Vitamins (vitamin b complex) capsule capsule, Take 1 capsule by mouth 2 (Two) Times a Week., Disp: , Rfl:     cholecalciferol (VITAMIN D3) 1000 units tablet, Take 1 tablet by mouth Daily., Disp: , Rfl:     clopidogrel (PLAVIX) 75 MG tablet, Take 1 tablet by mouth Daily., Disp: , Rfl:     coenzyme Q10 100 MG capsule, Take 1 capsule by mouth Daily., Disp: , Rfl:     furosemide (LASIX) 40 MG tablet, TAKE 1 TABLET BY MOUTH EVERY DAY, Disp: 90 tablet, Rfl: 1    isosorbide mononitrate (IMDUR) 60 MG 24 hr tablet, Take 1 tablet by mouth Every Morning., Disp: 60  "tablet, Rfl: 0    levothyroxine (SYNTHROID, LEVOTHROID) 50 MCG tablet, Take 1 tablet by mouth Daily., Disp: , Rfl:     losartan (COZAAR) 100 MG tablet, Take 1 tablet by mouth Daily., Disp: 90 tablet, Rfl: 3    metoprolol tartrate (LOPRESSOR) 25 MG tablet, Take 1 tablet by mouth 2 (Two) Times a Day., Disp: 60 tablet, Rfl: 2      Objective:     Vitals:    09/05/24 1015   BP: 118/78   BP Location: Left arm   Patient Position: Sitting   Pulse: 79   SpO2: 96%   Weight: 61.6 kg (135 lb 12.8 oz)   Height: 165.1 cm (65\")     Body mass index is 22.6 kg/m².    PHYSICAL EXAM:    Neck:      Vascular: No JVD.   Pulmonary:      Effort: Pulmonary effort is normal.      Breath sounds: Normal breath sounds.   Cardiovascular:      Normal rate. Frequent ectopic beats. Regular rhythm.      Murmurs: There is no murmur.      No gallop.  No click. No rub.   Pulses:     Intact distal pulses.   Skin:     Comments: Radial site well healed without erythema or edema. Proximal and distal pulses palpable. Good capillary refill.           ECG 12 Lead    Date/Time: 9/5/2024 10:31 AM  Performed by: Brenda Gutiérrez APRN    Authorized by: Brenda Gutiérrez APRN  Comparison: compared with previous ECG from 8/23/2024  Similar to previous ECG  Rhythm: sinus rhythm  Ectopy: unifocal PVCs and trigeminy  Rate: normal  BPM: 57  T inversion: II, III and aVF            Assessment:       Diagnosis Plan   1. Coronary artery disease involving native coronary artery of native heart without angina pectoris  ECG 12 Lead      2. Dyspnea on exertion  Basic Metabolic Panel    CBC (No Diff)      3. Frequent PVCs  Basic Metabolic Panel    Holter Monitor - 48 Hour        Orders Placed This Encounter   Procedures    Basic Metabolic Panel     Standing Status:   Future     Standing Expiration Date:   9/5/2025     Order Specific Question:   Release to patient     Answer:   Routine Release [[id number]]    CBC (No Diff)     Standing Status:   Future     Standing " Expiration Date:   9/5/2025     Order Specific Question:   Release to patient     Answer:   Routine Release [[id number]]    Holter Monitor - 48 Hour     Standing Status:   Future     Standing Expiration Date:   9/5/2025     Order Specific Question:   Reason for exam?     Answer:   Ventricular Arrhythmia     Order Specific Question:   Ventricular arrhythmia specification?     Answer:   PVC     Order Specific Question:   Release to patient     Answer:   Routine Release [[id number]]    ECG 12 Lead     This order was created via procedure documentation     Order Specific Question:   Release to patient     Answer:   Routine Release [[id number]]          Plan:       1.  Coronary artery disease.  Status post DEBO of the RCA.  Unfortunately there has been no improvement in her symptoms.  Discussed with Dr. Hatch.  Will increase her medical therapy with close follow-up.  Metoprolol has been added to her regimen.  Continue DAPT with aspirin and clopidogrel along with high intensity statin therapy and isosorbide.        2.  Frequent PVCs.  I recommended repeating labs today.  In addition, I started her on metoprolol and ordered a 48-hour Holter.      Further recommendations will be made pending the results of the above.  She will see me back in 2 weeks.      As always, it has been a pleasure to participate in your patient's care.      Sincerely,         STACIA Dawson

## 2024-09-05 NOTE — TELEPHONE ENCOUNTER
I left her voicemail to call me regarding her lab results which I reviewed with Dr. Hatch.  Initially, we felt she needed to see nephrology.  She actually saw nephrology in November 2023 at which time it was recommended she remain off ACEs/ARB's.  She is back on losartan 100 mg.  I think we should discontinue that and switch her back to hydralazine.  Of note, she cannot take amlodipine secondary to swelling.

## 2024-09-06 RX ORDER — HYDRALAZINE HYDROCHLORIDE 50 MG/1
50 TABLET, FILM COATED ORAL 2 TIMES DAILY
Qty: 60 TABLET | Refills: 2 | Status: SHIPPED | OUTPATIENT
Start: 2024-09-06

## 2024-09-11 ENCOUNTER — TELEPHONE (OUTPATIENT)
Dept: CARDIOLOGY | Facility: CLINIC | Age: 89
End: 2024-09-11
Payer: MEDICARE

## 2024-09-11 DIAGNOSIS — R06.09 DYSPNEA ON EXERTION: Primary | ICD-10-CM

## 2024-09-11 NOTE — TELEPHONE ENCOUNTER
Can we arrange for her to arrive early enough to check labs in [facility]? And also make sure she's checking her blood pressure.  I would love to see some readings at tomorrows visit.

## 2024-09-11 NOTE — TELEPHONE ENCOUNTER
Patient called the office today requesting to speak with you. I gave her a call back to see what's been going on and she states she's been super dizzy , sob with any amount of exertion , extreme fatigue , slight chest pressure when moving around or walking stairs , extreme weakness in limbs. Denies any edema. She states this has been going on since her last visit with you.   She would like to discuss things in person instead of over the phone so I have scheduled her for an appointment tomorrow at 11:30 to see you.

## 2024-09-12 ENCOUNTER — OFFICE VISIT (OUTPATIENT)
Dept: CARDIOLOGY | Facility: CLINIC | Age: 89
End: 2024-09-12
Payer: MEDICARE

## 2024-09-12 ENCOUNTER — HOSPITAL ENCOUNTER (OUTPATIENT)
Dept: CARDIOLOGY | Facility: HOSPITAL | Age: 89
Discharge: HOME | End: 2024-09-12
Admitting: NURSE PRACTITIONER
Payer: MEDICARE

## 2024-09-12 ENCOUNTER — HOSPITAL ENCOUNTER (OUTPATIENT)
Facility: HOSPITAL | Age: 89
Setting detail: OBSERVATION
Discharge: HOME | End: 2024-09-13
Attending: INTERNAL MEDICINE | Admitting: INTERNAL MEDICINE
Payer: MEDICARE

## 2024-09-12 VITALS
HEART RATE: 69 BPM | DIASTOLIC BLOOD PRESSURE: 68 MMHG | WEIGHT: 136 LBS | SYSTOLIC BLOOD PRESSURE: 160 MMHG | BODY MASS INDEX: 22.66 KG/M2 | HEIGHT: 65 IN

## 2024-09-12 DIAGNOSIS — I25.10 CORONARY ARTERY DISEASE INVOLVING NATIVE CORONARY ARTERY OF NATIVE HEART WITHOUT ANGINA PECTORIS: Primary | ICD-10-CM

## 2024-09-12 DIAGNOSIS — R06.09 DYSPNEA ON EXERTION: ICD-10-CM

## 2024-09-12 DIAGNOSIS — I49.3 FREQUENT PVCS: ICD-10-CM

## 2024-09-12 DIAGNOSIS — N18.30 CHRONIC KIDNEY FAILURE, STAGE 3 (MODERATE): Primary | ICD-10-CM

## 2024-09-12 DIAGNOSIS — I25.10 CORONARY ARTERY DISEASE INVOLVING NATIVE CORONARY ARTERY OF NATIVE HEART WITHOUT ANGINA PECTORIS: ICD-10-CM

## 2024-09-12 DIAGNOSIS — I10 ESSENTIAL HYPERTENSION: ICD-10-CM

## 2024-09-12 PROBLEM — I20.0 ANGINA PECTORIS, UNSTABLE: Status: ACTIVE | Noted: 2024-09-12

## 2024-09-12 LAB
ALBUMIN SERPL-MCNC: 4.2 G/DL (ref 3.5–5.2)
ALBUMIN/GLOB SERPL: 1.5 G/DL
ALP SERPL-CCNC: 154 U/L (ref 39–117)
ALT SERPL W P-5'-P-CCNC: 77 U/L (ref 1–33)
ANION GAP SERPL CALCULATED.3IONS-SCNC: 13.2 MMOL/L (ref 5–15)
AST SERPL-CCNC: 70 U/L (ref 1–32)
BILIRUB SERPL-MCNC: 0.3 MG/DL (ref 0–1.2)
BUN SERPL-MCNC: 39 MG/DL (ref 8–23)
BUN/CREAT SERPL: 20.4 (ref 7–25)
CALCIUM SPEC-SCNC: 9.5 MG/DL (ref 8.2–9.6)
CHLORIDE SERPL-SCNC: 95 MMOL/L (ref 98–107)
CO2 SERPL-SCNC: 25.8 MMOL/L (ref 22–29)
CREAT SERPL-MCNC: 1.91 MG/DL (ref 0.57–1)
DEPRECATED RDW RBC AUTO: 42.6 FL (ref 37–54)
EGFRCR SERPLBLD CKD-EPI 2021: 24.5 ML/MIN/1.73
ERYTHROCYTE [DISTWIDTH] IN BLOOD BY AUTOMATED COUNT: 13.1 % (ref 12.3–15.4)
GLOBULIN UR ELPH-MCNC: 2.8 GM/DL
GLUCOSE SERPL-MCNC: 113 MG/DL (ref 65–99)
HCT VFR BLD AUTO: 34.2 % (ref 34–46.6)
HGB BLD-MCNC: 11.2 G/DL (ref 12–15.9)
MCH RBC QN AUTO: 29.2 PG (ref 26.6–33)
MCHC RBC AUTO-ENTMCNC: 32.7 G/DL (ref 31.5–35.7)
MCV RBC AUTO: 89.3 FL (ref 79–97)
NT-PROBNP SERPL-MCNC: 4297 PG/ML (ref 0–1800)
PLATELET # BLD AUTO: 284 10*3/MM3 (ref 140–450)
PMV BLD AUTO: 11 FL (ref 6–12)
POTASSIUM SERPL-SCNC: 4.1 MMOL/L (ref 3.5–5.2)
PROT SERPL-MCNC: 7 G/DL (ref 6–8.5)
RBC # BLD AUTO: 3.83 10*6/MM3 (ref 3.77–5.28)
SODIUM SERPL-SCNC: 134 MMOL/L (ref 136–145)
WBC NRBC COR # BLD AUTO: 8.8 10*3/MM3 (ref 3.4–10.8)

## 2024-09-12 PROCEDURE — 63710000001 ACETAMINOPHEN 325 MG TABLET: Performed by: INTERNAL MEDICINE

## 2024-09-12 PROCEDURE — C1887 CATHETER, GUIDING: HCPCS | Performed by: INTERNAL MEDICINE

## 2024-09-12 PROCEDURE — C1769 GUIDE WIRE: HCPCS | Performed by: INTERNAL MEDICINE

## 2024-09-12 PROCEDURE — 25010000002 HEPARIN (PORCINE) PER 1000 UNITS: Performed by: INTERNAL MEDICINE

## 2024-09-12 PROCEDURE — 25010000002 MIDAZOLAM PER 1 MG: Performed by: INTERNAL MEDICINE

## 2024-09-12 PROCEDURE — 25510000001 IOPAMIDOL PER 1 ML: Performed by: INTERNAL MEDICINE

## 2024-09-12 PROCEDURE — 80053 COMPREHEN METABOLIC PANEL: CPT | Performed by: NURSE PRACTITIONER

## 2024-09-12 PROCEDURE — G0378 HOSPITAL OBSERVATION PER HR: HCPCS

## 2024-09-12 PROCEDURE — C1894 INTRO/SHEATH, NON-LASER: HCPCS | Performed by: INTERNAL MEDICINE

## 2024-09-12 PROCEDURE — 93455 CORONARY ART/GRFT ANGIO S&I: CPT | Performed by: INTERNAL MEDICINE

## 2024-09-12 PROCEDURE — 36415 COLL VENOUS BLD VENIPUNCTURE: CPT

## 2024-09-12 PROCEDURE — A9270 NON-COVERED ITEM OR SERVICE: HCPCS | Performed by: INTERNAL MEDICINE

## 2024-09-12 PROCEDURE — 25010000002 FENTANYL CITRATE (PF) 50 MCG/ML SOLUTION: Performed by: INTERNAL MEDICINE

## 2024-09-12 PROCEDURE — 85347 COAGULATION TIME ACTIVATED: CPT

## 2024-09-12 PROCEDURE — 85027 COMPLETE CBC AUTOMATED: CPT | Performed by: NURSE PRACTITIONER

## 2024-09-12 PROCEDURE — 83880 ASSAY OF NATRIURETIC PEPTIDE: CPT | Performed by: NURSE PRACTITIONER

## 2024-09-12 PROCEDURE — 25810000003 SODIUM CHLORIDE 0.9 % SOLUTION: Performed by: INTERNAL MEDICINE

## 2024-09-12 RX ORDER — HYDROCODONE BITARTRATE AND ACETAMINOPHEN 5; 325 MG/1; MG/1
1 TABLET ORAL EVERY 4 HOURS PRN
Status: DISCONTINUED | OUTPATIENT
Start: 2024-09-12 | End: 2024-09-13 | Stop reason: HOSPADM

## 2024-09-12 RX ORDER — NITROGLYCERIN 0.4 MG/1
0.4 TABLET SUBLINGUAL
Status: DISCONTINUED | OUTPATIENT
Start: 2024-09-12 | End: 2024-09-13 | Stop reason: HOSPADM

## 2024-09-12 RX ORDER — LIDOCAINE HYDROCHLORIDE 20 MG/ML
INJECTION, SOLUTION INFILTRATION; PERINEURAL
Status: DISCONTINUED | OUTPATIENT
Start: 2024-09-12 | End: 2024-09-12 | Stop reason: HOSPADM

## 2024-09-12 RX ORDER — ACETAMINOPHEN 325 MG/1
650 TABLET ORAL EVERY 4 HOURS PRN
Status: DISCONTINUED | OUTPATIENT
Start: 2024-09-12 | End: 2024-09-13 | Stop reason: HOSPADM

## 2024-09-12 RX ORDER — HYDROCHLOROTHIAZIDE 50 MG/1
25 TABLET ORAL DAILY
COMMUNITY
End: 2024-09-12

## 2024-09-12 RX ORDER — SODIUM CHLORIDE 9 MG/ML
75 INJECTION, SOLUTION INTRAVENOUS CONTINUOUS
Status: DISCONTINUED | OUTPATIENT
Start: 2024-09-12 | End: 2024-09-13 | Stop reason: HOSPADM

## 2024-09-12 RX ORDER — HEPARIN SODIUM 1000 [USP'U]/ML
INJECTION, SOLUTION INTRAVENOUS; SUBCUTANEOUS
Status: DISCONTINUED | OUTPATIENT
Start: 2024-09-12 | End: 2024-09-12 | Stop reason: HOSPADM

## 2024-09-12 RX ORDER — MIDAZOLAM HYDROCHLORIDE 1 MG/ML
INJECTION INTRAMUSCULAR; INTRAVENOUS
Status: DISCONTINUED | OUTPATIENT
Start: 2024-09-12 | End: 2024-09-12 | Stop reason: HOSPADM

## 2024-09-12 RX ORDER — SODIUM CHLORIDE 0.9 % (FLUSH) 0.9 %
10 SYRINGE (ML) INJECTION AS NEEDED
Status: DISCONTINUED | OUTPATIENT
Start: 2024-09-12 | End: 2024-09-12 | Stop reason: HOSPADM

## 2024-09-12 RX ORDER — HYDROCHLOROTHIAZIDE 25 MG/1
25 TABLET ORAL DAILY
COMMUNITY
End: 2024-09-12

## 2024-09-12 RX ORDER — VERAPAMIL HYDROCHLORIDE 2.5 MG/ML
INJECTION, SOLUTION INTRAVENOUS
Status: DISCONTINUED | OUTPATIENT
Start: 2024-09-12 | End: 2024-09-12 | Stop reason: HOSPADM

## 2024-09-12 RX ORDER — SODIUM CHLORIDE 0.9 % (FLUSH) 0.9 %
10 SYRINGE (ML) INJECTION EVERY 12 HOURS SCHEDULED
Status: DISCONTINUED | OUTPATIENT
Start: 2024-09-12 | End: 2024-09-12 | Stop reason: HOSPADM

## 2024-09-12 RX ORDER — SODIUM CHLORIDE 9 MG/ML
125 INJECTION, SOLUTION INTRAVENOUS CONTINUOUS
Status: ACTIVE | OUTPATIENT
Start: 2024-09-12 | End: 2024-09-13

## 2024-09-12 RX ORDER — ONDANSETRON 2 MG/ML
4 INJECTION INTRAMUSCULAR; INTRAVENOUS EVERY 6 HOURS PRN
Status: DISCONTINUED | OUTPATIENT
Start: 2024-09-12 | End: 2024-09-13 | Stop reason: HOSPADM

## 2024-09-12 RX ORDER — IOPAMIDOL 755 MG/ML
INJECTION, SOLUTION INTRAVASCULAR
Status: DISCONTINUED | OUTPATIENT
Start: 2024-09-12 | End: 2024-09-12 | Stop reason: HOSPADM

## 2024-09-12 RX ORDER — ONDANSETRON 4 MG/1
4 TABLET, ORALLY DISINTEGRATING ORAL EVERY 6 HOURS PRN
Status: DISCONTINUED | OUTPATIENT
Start: 2024-09-12 | End: 2024-09-13 | Stop reason: HOSPADM

## 2024-09-12 RX ORDER — FENTANYL CITRATE 50 UG/ML
INJECTION, SOLUTION INTRAMUSCULAR; INTRAVENOUS
Status: DISCONTINUED | OUTPATIENT
Start: 2024-09-12 | End: 2024-09-12 | Stop reason: HOSPADM

## 2024-09-12 RX ADMIN — SODIUM CHLORIDE 75 ML/HR: 9 INJECTION, SOLUTION INTRAVENOUS at 13:33

## 2024-09-12 RX ADMIN — SODIUM CHLORIDE 125 ML/HR: 9 INJECTION, SOLUTION INTRAVENOUS at 17:55

## 2024-09-12 RX ADMIN — ACETAMINOPHEN 325MG 650 MG: 325 TABLET ORAL at 18:35

## 2024-09-12 NOTE — TELEPHONE ENCOUNTER
Spoke to patient and she said she has already spoken to Cristal about this.     Yahaira Pratt RN  Triage [facility]

## 2024-09-12 NOTE — H&P (VIEW-ONLY)
Date of Office Visit: 2024  Encounter Provider: STACIA Guaman  Place of Service: [facility] CARDIOLOGY  Patient Name: Brodie Mo  :10/31/1932    Chief Complaint   Patient presents with    Chest Pain   :     HPI: Brodie Mo is a 89 y.o. female patient of Dr. Hammer with coronary artery disease and mitral insufficiency.  In , she had a CABG x 2 with a LIMA to the LAD and a vein graft to the OM 2.  In 2020, she underwent a MitraClip.     Early last year, she presented with dyspnea on exertion for which she underwent an echocardiogram and a stress test.  Both were unrevealing.  In May 2023, she was noted to be in [place] at her PCPs office.  We never saw any evidence of high degree block.  She was never symptomatic, so we chose to watch things     In May, she was referred for a left and right heart catheterization secondary to persistent shortness of breath on exertion.  On , this demonstrated severe native disease with 2/2 bypasses patent and a 70% mid RCA for which she underwent DEBO.    In early July, hydralazine was discontinued secondary to dizziness.  Subsequently, amlodipine was discontinued secondary to lower extremity swelling.  She was started on losartan instead.     On , I saw her in the office for follow-up.  Her swelling had improved but her blood pressure remained elevated.  Ultimately, she was started on HCTZ.     On , I saw her in the office for intermittent confusion, dizziness, dyspnea on exertion, and chest heaviness.  The HCTZ was discontinued.  Additionally, EKG demonstrated ST-T wave changes, ST depression, and frequent PVCs.  Ultimately, she was scheduled for repeat cardiac catheterization.  On , this demonstrated 90% in-stent stenosis of the proximal RCA for which she underwent angioplasty and DEBO.     On , I saw her in the office for follow-up.  There had essentially been no change in her symptoms.   She continued reporting exertional chest pressure and breathlessness.  She was also experiencing intermittent dizziness and occasional palpitations.  Frequent PVCs were noted on her EKG for which she was started on metoprolol.  Additionally, a 48-hour Holter was ordered.  Repeat labs demonstrated worsening renal insufficiency.  Ultimately, the losartan was discontinued and she was transition to hydralazine.  She was advised follow-up in 2 weeks.    She called the office yesterday to report continued symptoms and was scheduled to see me today.    She feels terrible.  She reports profound weakness, chest pressure, and shortness of breath.  The symptoms are essentially constant now.  She basically cannot do anything.  She also recalls an extreme pain in her chest during her last heart catheterization which she has never experienced before.  She denies any edema, syncope, bleeding difficulties or melena.  It turns out there was some confusion regarding the medication changes recently made.  Instead of starting hydralazine she restarted the hydrochlorothiazide.    Past Medical History:   Diagnosis Date    CAD (coronary artery disease)     Cancer     bladder     Chronic kidney disease     Deep vein thrombosis     Left leg-2002    Disease of thyroid gland     Health care maintenance     HTN (hypertension)     Hyperlipidemia     LVH (left ventricular hypertrophy)     Pneumonia     S/P CABG (coronary artery bypass graft)        Past Surgical History:   Procedure Laterality Date    BLADDER SURGERY      cancer    CARDIAC CATHETERIZATION N/A 5/2/2024    Procedure: Right Heart Cath;  Surgeon: José Hatch MD;  [place] [facility] [place];  Service: Cardiovascular;  Laterality: N/A;    CARDIAC CATHETERIZATION N/A 5/2/2024    Procedure: Left Heart Cath;  Surgeon: José Hatch MD;  [place] [facility] [place] [place];  Service: Cardiovascular;  Laterality: N/A;    CARDIAC CATHETERIZATION N/A 5/2/2024     Procedure: Native mammary injection;  Surgeon: José Hatch MD;  Location: [facility] LOC[facility];  Service: Cardiovascular;  Laterality: N/A;    CARDIAC CATHETERIZATION  5/2/2024    Procedure: Saphenous Vein Graft;  Surgeon: José Hatch MD;  [facility];  Service: Cardiovascular;;    CARDIAC CATHETERIZATION N/A 5/2/2024    Procedure: Percutaneous Coronary Intervention;  Surgeon: José Hatch MD;  [facility];  Service: Cardiovascular;  Laterality: N/A;    CARDIAC CATHETERIZATION N/A 5/2/2024    Procedure: Stent DEBO coronary;  Surgeon: José Hatch MD;  Location:  [facility] CATH INVASIVE LOCATION;  Service: Cardiovascular;  Laterality: N/A;    CARDIAC CATHETERIZATION N/A 5/2/2024    Procedure: Optical Coherence Tomography;  Surgeon: José Hatch MD;  [facility];  Service: Cardiovascular;  Laterality: N/A;    CARDIAC CATHETERIZATION N/A 5/2/2024    Procedure: Resting Full Cycle Ratio;  Surgeon: José Hatch MD;  [facility];  Service: Cardiovascular;  Laterality: N/A;    CARDIAC CATHETERIZATION N/A 8/29/2024    Procedure: Coronary angiography;  Surgeon: José Hatch MD;  [facility]: [facility];  Service: Cardiovascular;  Laterality: N/A;    CARDIAC CATHETERIZATION N/A 8/29/2024    Procedure: Percutaneous Coronary Intervention;  Surgeon: José Hatch MD;  [facility]: [facility];  Service: Cardiovascular;  Laterality: N/A;    CARDIAC CATHETERIZATION N/A 8/29/2024    Procedure: Optical Coherence Tomography;  Surgeon: José Hatch MD;  [facility];  Service: Cardiovascular;  Laterality: N/A;    CARDIAC CATHETERIZATION N/A 8/29/2024    Procedure: Stent DEBO coronary;  Surgeon: José Hatch MD;  [facility];  Service: Cardiovascular;  Laterality: N/A;    CARPAL TUNNEL RELEASE Right  12/15/2021    Procedure: RIGHT CARPAL TUNNEL RELEASE WITH POSSIBLE SYNOVECTOMY;  Surgeon: Georgie Hines MD;  [place] [facility] [place]  Service: Plastics;  Laterality: Right;    CORONARY ARTERY BYPASS GRAFT      2002 with Dr Prabhakar; AGUAYO to LAD and SVG to OM2; 2005 cath all grafts open and 30% dx in RCA; nl stress in 2010, 2013    HYSTERECTOMY      MITRAL VALVE REPAIR/REPLACEMENT N/A 9/2/2020    Procedure: TRANSCATHETER MITRAL VALVE REPAIR;  Surgeon: Edward Humphreys MD;  Location: [facility] CATH INVASIVE LOCATION;  Service: Cardiovascular;  Laterality: N/A;       Social History     Socioeconomic History    Marital status:    Tobacco Use    Smoking status: Former     Passive exposure: Past    Smokeless tobacco: Never    Tobacco comments:     CAFFEINE USE: 2  CUPS COFFEE DAILY   Vaping Use    Vaping status: Never Used   Substance and Sexual Activity    Alcohol use: Yes     Alcohol/week: 7.0 standard drinks of alcohol     Types: 7 Shots of liquor per week     Comment: 1 DRINK DAILY    Drug use: No    Sexual activity: Defer       Family History   Problem Relation Age of Onset    Heart disease Mother     Kidney cancer Father     No Known Problems Maternal Grandmother     No Known Problems Maternal Grandfather     No Known Problems Paternal Grandmother     No Known Problems Paternal Grandfather     Heart disease Sister     Heart disease Brother        Review of Systems   Constitutional: Positive for malaise/fatigue.   Cardiovascular:  Positive for chest pain and dyspnea on exertion. Negative for leg swelling, orthopnea, paroxysmal nocturnal dyspnea and syncope.   Respiratory: Negative.     Hematologic/Lymphatic: Negative for bleeding problem.   Musculoskeletal:  Negative for falls.   Gastrointestinal:  Negative for melena.   Neurological:  Positive for dizziness and weakness. Negative for light-headedness.       Allergies   Allergen Reactions    No Known Drug Allergy          Current Outpatient  "Medications:     aspirin 81 MG tablet, Take 1 tablet by mouth Daily., Disp: , Rfl:     atorvastatin (LIPITOR) 40 MG tablet, TAKE 1 TABLET BY MOUTH EVERY DAY, Disp: 90 tablet, Rfl: 3    B Complex Vitamins (vitamin b complex) capsule capsule, Take 1 capsule by mouth 2 (Two) Times a Week., Disp: , Rfl:     cholecalciferol (VITAMIN D3) 1000 units tablet, Take 1 tablet by mouth Daily., Disp: , Rfl:     clopidogrel (PLAVIX) 75 MG tablet, Take 1 tablet by mouth Daily., Disp: , Rfl:     coenzyme Q10 100 MG capsule, Take 1 capsule by mouth Daily., Disp: , Rfl:     furosemide (LASIX) 40 MG tablet, TAKE 1 TABLET BY MOUTH EVERY DAY, Disp: 90 tablet, Rfl: 1    isosorbide mononitrate (IMDUR) 60 MG 24 hr tablet, Take 1 tablet by mouth Every Morning., Disp: 60 tablet, Rfl: 0    levothyroxine (SYNTHROID, LEVOTHROID) 50 MCG tablet, Take 1 tablet by mouth Daily., Disp: , Rfl:     metoprolol tartrate (LOPRESSOR) 25 MG tablet, Take 1 tablet by mouth 2 (Two) Times a Day., Disp: 60 tablet, Rfl: 2    hydrALAZINE (APRESOLINE) 50 MG tablet, Take 1 tablet by mouth 2 (Two) Times a Day., Disp: 60 tablet, Rfl: 2      Objective:     Vitals:    09/12/24 1104   BP: 160/68   Pulse: 69   Weight: 61.7 kg (136 lb)   Height: 165.1 cm (65\")     Body mass index is 22.63 kg/m².    PHYSICAL EXAM:    Neck:      Vascular: No JVD.   Pulmonary:      Effort: Pulmonary effort is normal.      Breath sounds: Normal breath sounds.   Cardiovascular:      Normal rate. Frequent ectopic beats. Regular rhythm.      Murmurs: There is no murmur.      No gallop.  No click. No rub.   Pulses:     Intact distal pulses.           ECG 12 Lead    Date/Time: 9/12/2024 11:13 AM  Performed by: Brenda Gutiérrez APRN    Authorized by: Brenda Gutiérrez APRN  Comparison: compared with previous ECG from 9/5/2024  Similar to previous ECG  Rhythm: sinus rhythm  Ectopy: unifocal PVCs and bigeminy  Rate: normal  BPM: 69            Assessment:       Diagnosis Plan   1. Coronary artery " disease involving native coronary artery of native heart without angina pectoris  ECG 12 Lead    Case Request Cath Lab: Left Heart Cath      2. Dyspnea on exertion        3. Frequent PVCs        4. Essential hypertension          Orders Placed This Encounter   Procedures    ECG 12 Lead     This order was created via procedure documentation     Order Specific Question:   Release to patient     Answer:   Routine Release [[id number]]          Plan:       1.  Coronary artery disease.  Status post DEBO of the mid RCA in May followed by DEBO of the proximal RCA in August.  Her symptoms have not improved at all.  In fact, they have worsened.  I think we have to take another look.  I discussed with Dr. Hatch.  We will send her for a repeat cath today.      2.  Frequent PVCs.  She is in bigeminy today.  Although the Holter has not been read officially, the preliminary report demonstrated a 30% PVC burden.  I discussed with the EP.  May need to consider amiodarone.  Await cath results.      3.  Hypertension.  Her blood pressure is elevated today.  There has been some confusion regarding her medication.  I advised her to discontinue the HCTZ.  This previously made her feel poorly.  In addition, I am worried about her renal function.  Advised her to begin the hydralazine.      Further recommendations will be made pending the results of the cath today.      As always, it has been a pleasure to participate in your patient's care.      Sincerely,         STACIA Dawson

## 2024-09-12 NOTE — Clinical Note
Hemostasis started on the left radial artery. R-Band was used in achieving hemostasis. Radial compression device applied to vessel. Hemostasis achieved successfully. Closure device additional comment: Tr band applied by Brianne BHAGAT(R)      12cc injected

## 2024-09-12 NOTE — PROGRESS NOTES
Date of Office Visit: 2024  Encounter Provider: STACIA Guaman  Place of Service: [facility] CARDIOLOGY  Patient Name: Brodie Mo  :10/31/1932    Chief Complaint   Patient presents with    Chest Pain   :     HPI: Brodie Mo is a 89 y.o. female patient of Dr. Hammer with coronary artery disease and mitral insufficiency.  In , she had a CABG x 2 with a LIMA to the LAD and a vein graft to the OM 2.  In 2020, she underwent a MitraClip.     Early last year, she presented with dyspnea on exertion for which she underwent an echocardiogram and a stress test.  Both were unrevealing.  In May 2023, she was noted to be in [place] at her PCPs office.  We never saw any evidence of high degree block.  She was never symptomatic, so we chose to watch things     In May, she was referred for a left and right heart catheterization secondary to persistent shortness of breath on exertion.  On , this demonstrated severe native disease with 2/2 bypasses patent and a 70% mid RCA for which she underwent DEBO.    In early July, hydralazine was discontinued secondary to dizziness.  Subsequently, amlodipine was discontinued secondary to lower extremity swelling.  She was started on losartan instead.     On , I saw her in the office for follow-up.  Her swelling had improved but her blood pressure remained elevated.  Ultimately, she was started on HCTZ.     On , I saw her in the office for intermittent confusion, dizziness, dyspnea on exertion, and chest heaviness.  The HCTZ was discontinued.  Additionally, EKG demonstrated ST-T wave changes, ST depression, and frequent PVCs.  Ultimately, she was scheduled for repeat cardiac catheterization.  On , this demonstrated 90% in-stent stenosis of the proximal RCA for which she underwent angioplasty and DEBO.     On , I saw her in the office for follow-up.  There had essentially been no change in her symptoms.   She continued reporting exertional chest pressure and breathlessness.  She was also experiencing intermittent dizziness and occasional palpitations.  Frequent PVCs were noted on her EKG for which she was started on metoprolol.  Additionally, a 48-hour Holter was ordered.  Repeat labs demonstrated worsening renal insufficiency.  Ultimately, the losartan was discontinued and she was transition to hydralazine.  She was advised follow-up in 2 weeks.    She called the office yesterday to report continued symptoms and was scheduled to see me today.    She feels terrible.  She reports profound weakness, chest pressure, and shortness of breath.  The symptoms are essentially constant now.  She basically cannot do anything.  She also recalls an extreme pain in her chest during her last heart catheterization which she has never experienced before.  She denies any edema, syncope, bleeding difficulties or melena.  It turns out there was some confusion regarding the medication changes recently made.  Instead of starting hydralazine she restarted the hydrochlorothiazide.    Past Medical History:   Diagnosis Date    CAD (coronary artery disease)     Cancer     bladder     Chronic kidney disease     Deep vein thrombosis     Left leg-2002    Disease of thyroid gland     Health care maintenance     HTN (hypertension)     Hyperlipidemia     LVH (left ventricular hypertrophy)     Pneumonia     S/P CABG (coronary artery bypass graft)        Past Surgical History:   Procedure Laterality Date    BLADDER SURGERY      cancer    CARDIAC CATHETERIZATION N/A 5/2/2024    Procedure: Right Heart Cath;  Surgeon: José Hatch MD;  Location: [facility] CATH INVASIVE LOCATION;  Service: Cardiovascular;  Laterality: N/A;    CARDIAC CATHETERIZATION N/A 5/2/2024    Procedure: Left Heart Cath;  Surgeon: José Hatch MD;  Location: [facility] CATH INVASIVE LOCATION;  Service: Cardiovascular;  Laterality: N/A;    CARDIAC CATHETERIZATION N/A 5/2/2024     Procedure: Native mammary injection;  Surgeon: José Hatch MD;  [facility];  Service: Cardiovascular;  Laterality: N/A;    CARDIAC CATHETERIZATION  5/2/2024    Procedure: Saphenous Vein Graft;  Surgeon: José Hatch MD;  [facility];  Service: Cardiovascular;;    CARDIAC CATHETERIZATION N/A 5/2/2024    Procedure: Percutaneous Coronary Intervention;  Surgeon: José Hatch MD;  [facility];  Service: Cardiovascular;  Laterality: N/A;    CARDIAC CATHETERIZATION N/A 5/2/2024    Procedure: Stent DEBO coronary;  Surgeon: José Hatch MD;  Location:  [facility] CATH INVASIVE LOCATION;  Service: Cardiovascular;  Laterality: N/A;    CARDIAC CATHETERIZATION N/A 5/2/2024    Procedure: Optical Coherence Tomography;  Surgeon: José Hatch MD;  [facility];  Service: Cardiovascular;  Laterality: N/A;    CARDIAC CATHETERIZATION N/A 5/2/2024    Procedure: Resting Full Cycle Ratio;  Surgeon: José Hatch MD;  [facility];  Service: Cardiovascular;  Laterality: N/A;    CARDIAC CATHETERIZATION N/A 8/29/2024    Procedure: Coronary angiography;  Surgeon: José Hatch MD;  [facility];  Service: Cardiovascular;  Laterality: N/A;    CARDIAC CATHETERIZATION N/A 8/29/2024    Procedure: Percutaneous Coronary Intervention;  Surgeon: José Hatch MD;  [facility];  Service: Cardiovascular;  Laterality: N/A;    CARDIAC CATHETERIZATION N/A 8/29/2024    Procedure: Optical Coherence Tomography;  Surgeon: José Hatch MD;  [facility];  Service: Cardiovascular;  Laterality: N/A;    CARDIAC CATHETERIZATION N/A 8/29/2024    Procedure: Stent DEBO coronary;  Surgeon: José Hatch MD;  [facility];  Service: Cardiovascular;  Laterality: N/A;    CARPAL TUNNEL RELEASE Right  12/15/2021    Procedure: RIGHT CARPAL TUNNEL RELEASE WITH POSSIBLE SYNOVECTOMY;  Surgeon: Georgie Hines MD;  [place] [facility] [place];  Service: Plastics;  Laterality: Right;    CORONARY ARTERY BYPASS GRAFT      2002 with Dr Prabhakar; AGUAYO to LAD and SVG to OM2; 2005 cath all grafts open and 30% dx in RCA; nl stress in 2010, 2013    HYSTERECTOMY      MITRAL VALVE REPAIR/REPLACEMENT N/A 9/2/2020    Procedure: TRANSCATHETER MITRAL VALVE REPAIR;  Surgeon: Edward Humphreys MD;  Location: [facility] CATH INVASIVE LOCATION;  Service: Cardiovascular;  Laterality: N/A;       Social History     Socioeconomic History    Marital status:    Tobacco Use    Smoking status: Former     Passive exposure: Past    Smokeless tobacco: Never    Tobacco comments:     CAFFEINE USE: 2  CUPS COFFEE DAILY   Vaping Use    Vaping status: Never Used   Substance and Sexual Activity    Alcohol use: Yes     Alcohol/week: 7.0 standard drinks of alcohol     Types: 7 Shots of liquor per week     Comment: 1 DRINK DAILY    Drug use: No    Sexual activity: Defer       Family History   Problem Relation Age of Onset    Heart disease Mother     Kidney cancer Father     No Known Problems Maternal Grandmother     No Known Problems Maternal Grandfather     No Known Problems Paternal Grandmother     No Known Problems Paternal Grandfather     Heart disease Sister     Heart disease Brother        Review of Systems   Constitutional: Positive for malaise/fatigue.   Cardiovascular:  Positive for chest pain and dyspnea on exertion. Negative for leg swelling, orthopnea, paroxysmal nocturnal dyspnea and syncope.   Respiratory: Negative.     Hematologic/Lymphatic: Negative for bleeding problem.   Musculoskeletal:  Negative for falls.   Gastrointestinal:  Negative for melena.   Neurological:  Positive for dizziness and weakness. Negative for light-headedness.       Allergies   Allergen Reactions    No Known Drug Allergy          Current Outpatient  "Medications:     aspirin 81 MG tablet, Take 1 tablet by mouth Daily., Disp: , Rfl:     atorvastatin (LIPITOR) 40 MG tablet, TAKE 1 TABLET BY MOUTH EVERY DAY, Disp: 90 tablet, Rfl: 3    B Complex Vitamins (vitamin b complex) capsule capsule, Take 1 capsule by mouth 2 (Two) Times a Week., Disp: , Rfl:     cholecalciferol (VITAMIN D3) 1000 units tablet, Take 1 tablet by mouth Daily., Disp: , Rfl:     clopidogrel (PLAVIX) 75 MG tablet, Take 1 tablet by mouth Daily., Disp: , Rfl:     coenzyme Q10 100 MG capsule, Take 1 capsule by mouth Daily., Disp: , Rfl:     furosemide (LASIX) 40 MG tablet, TAKE 1 TABLET BY MOUTH EVERY DAY, Disp: 90 tablet, Rfl: 1    isosorbide mononitrate (IMDUR) 60 MG 24 hr tablet, Take 1 tablet by mouth Every Morning., Disp: 60 tablet, Rfl: 0    levothyroxine (SYNTHROID, LEVOTHROID) 50 MCG tablet, Take 1 tablet by mouth Daily., Disp: , Rfl:     metoprolol tartrate (LOPRESSOR) 25 MG tablet, Take 1 tablet by mouth 2 (Two) Times a Day., Disp: 60 tablet, Rfl: 2    hydrALAZINE (APRESOLINE) 50 MG tablet, Take 1 tablet by mouth 2 (Two) Times a Day., Disp: 60 tablet, Rfl: 2      Objective:     Vitals:    09/12/24 1104   BP: 160/68   Pulse: 69   Weight: 61.7 kg (136 lb)   Height: 165.1 cm (65\")     Body mass index is 22.63 kg/m².    PHYSICAL EXAM:    Neck:      Vascular: No JVD.   Pulmonary:      Effort: Pulmonary effort is normal.      Breath sounds: Normal breath sounds.   Cardiovascular:      Normal rate. Frequent ectopic beats. Regular rhythm.      Murmurs: There is no murmur.      No gallop.  No click. No rub.   Pulses:     Intact distal pulses.           ECG 12 Lead    Date/Time: 9/12/2024 11:13 AM  Performed by: Brenda Gutiérrez APRN    Authorized by: Brenda Gutiérrez APRN  Comparison: compared with previous ECG from 9/5/2024  Similar to previous ECG  Rhythm: sinus rhythm  Ectopy: unifocal PVCs and bigeminy  Rate: normal  BPM: 69            Assessment:       Diagnosis Plan   1. Coronary artery " disease involving native coronary artery of native heart without angina pectoris  ECG 12 Lead    Case Request Cath Lab: Left Heart Cath      2. Dyspnea on exertion        3. Frequent PVCs        4. Essential hypertension          Orders Placed This Encounter   Procedures    ECG 12 Lead     This order was created via procedure documentation     Order Specific Question:   Release to patient     Answer:   Routine Release [[id number]]          Plan:       1.  Coronary artery disease.  Status post DEBO of the mid RCA in May followed by DEBO of the proximal RCA in August.  Her symptoms have not improved at all.  In fact, they have worsened.  I think we have to take another look.  I discussed with Dr. Hatch.  We will send her for a repeat cath today.      2.  Frequent PVCs.  She is in bigeminy today.  Although the Holter has not been read officially, the preliminary report demonstrated a 30% PVC burden.  I discussed with the EP.  May need to consider amiodarone.  Await cath results.      3.  Hypertension.  Her blood pressure is elevated today.  There has been some confusion regarding her medication.  I advised her to discontinue the HCTZ.  This previously made her feel poorly.  In addition, I am worried about her renal function.  Advised her to begin the hydralazine.      Further recommendations will be made pending the results of the cath today.      As always, it has been a pleasure to participate in your patient's care.      Sincerely,         STACIA Dawson

## 2024-09-12 NOTE — INTERVAL H&P NOTE
Worse recurrent angina immediately after PCI we did take a quick look at her coronaries.  H&P reviewed. The patient was examined and there are no changes to the H&P. I have explained the risks and benefits of the procedure to the patient.  The patient understands and agrees to proceed

## 2024-09-13 VITALS
DIASTOLIC BLOOD PRESSURE: 50 MMHG | HEART RATE: 91 BPM | BODY MASS INDEX: 22.33 KG/M2 | HEIGHT: 65 IN | OXYGEN SATURATION: 96 % | WEIGHT: 134 LBS | SYSTOLIC BLOOD PRESSURE: 152 MMHG | TEMPERATURE: 98.6 F | RESPIRATION RATE: 16 BRPM

## 2024-09-13 LAB
ANION GAP SERPL CALCULATED.3IONS-SCNC: 12 MMOL/L (ref 5–15)
BUN SERPL-MCNC: 37 MG/DL (ref 8–23)
BUN/CREAT SERPL: 20.1 (ref 7–25)
CALCIUM SPEC-SCNC: 8.8 MG/DL (ref 8.2–9.6)
CHLORIDE SERPL-SCNC: 99 MMOL/L (ref 98–107)
CO2 SERPL-SCNC: 25 MMOL/L (ref 22–29)
CREAT SERPL-MCNC: 1.84 MG/DL (ref 0.57–1)
DEPRECATED RDW RBC AUTO: 42.2 FL (ref 37–54)
EGFRCR SERPLBLD CKD-EPI 2021: 25.6 ML/MIN/1.73
ERYTHROCYTE [DISTWIDTH] IN BLOOD BY AUTOMATED COUNT: 13.2 % (ref 12.3–15.4)
GLUCOSE SERPL-MCNC: 100 MG/DL (ref 65–99)
HCT VFR BLD AUTO: 30.1 % (ref 34–46.6)
HGB BLD-MCNC: 9.8 G/DL (ref 12–15.9)
MCH RBC QN AUTO: 28.7 PG (ref 26.6–33)
MCHC RBC AUTO-ENTMCNC: 32.6 G/DL (ref 31.5–35.7)
MCV RBC AUTO: 88.3 FL (ref 79–97)
PLATELET # BLD AUTO: 219 10*3/MM3 (ref 140–450)
PMV BLD AUTO: 11.1 FL (ref 6–12)
POTASSIUM SERPL-SCNC: 3.3 MMOL/L (ref 3.5–5.2)
QT INTERVAL: 478 MS
QTC INTERVAL: 462 MS
RBC # BLD AUTO: 3.41 10*6/MM3 (ref 3.77–5.28)
SODIUM SERPL-SCNC: 136 MMOL/L (ref 136–145)
WBC NRBC COR # BLD AUTO: 6.55 10*3/MM3 (ref 3.4–10.8)

## 2024-09-13 PROCEDURE — A9270 NON-COVERED ITEM OR SERVICE: HCPCS | Performed by: INTERNAL MEDICINE

## 2024-09-13 PROCEDURE — 93010 ELECTROCARDIOGRAM REPORT: CPT | Performed by: INTERNAL MEDICINE

## 2024-09-13 PROCEDURE — 63710000001 METOPROLOL TARTRATE 25 MG TABLET: Performed by: INTERNAL MEDICINE

## 2024-09-13 PROCEDURE — 63710000001 LEVOTHYROXINE 50 MCG TABLET: Performed by: INTERNAL MEDICINE

## 2024-09-13 PROCEDURE — 63710000001 ASPIRIN 81 MG TABLET DELAYED-RELEASE: Performed by: INTERNAL MEDICINE

## 2024-09-13 PROCEDURE — 63710000001 ISOSORBIDE MONONITRATE 30 MG TABLET SUSTAINED-RELEASE 24 HOUR: Performed by: INTERNAL MEDICINE

## 2024-09-13 PROCEDURE — 63710000001 POTASSIUM CHLORIDE 10 MEQ TABLET CONTROLLED-RELEASE: Performed by: INTERNAL MEDICINE

## 2024-09-13 PROCEDURE — 85027 COMPLETE CBC AUTOMATED: CPT | Performed by: INTERNAL MEDICINE

## 2024-09-13 PROCEDURE — G0378 HOSPITAL OBSERVATION PER HR: HCPCS

## 2024-09-13 PROCEDURE — 63710000001 ATORVASTATIN 20 MG TABLET: Performed by: INTERNAL MEDICINE

## 2024-09-13 PROCEDURE — 93005 ELECTROCARDIOGRAM TRACING: CPT | Performed by: INTERNAL MEDICINE

## 2024-09-13 PROCEDURE — 99238 HOSP IP/OBS DSCHRG MGMT 30/<: CPT | Performed by: INTERNAL MEDICINE

## 2024-09-13 PROCEDURE — 63710000001 CHOLECALCIFEROL 25 MCG (1000 UT) TABLET: Performed by: INTERNAL MEDICINE

## 2024-09-13 PROCEDURE — 63710000001 HYDRALAZINE 50 MG TABLET: Performed by: INTERNAL MEDICINE

## 2024-09-13 PROCEDURE — 63710000001 ACETAMINOPHEN 325 MG TABLET: Performed by: INTERNAL MEDICINE

## 2024-09-13 PROCEDURE — 63710000001 CLOPIDOGREL 75 MG TABLET: Performed by: INTERNAL MEDICINE

## 2024-09-13 PROCEDURE — 80048 BASIC METABOLIC PNL TOTAL CA: CPT | Performed by: INTERNAL MEDICINE

## 2024-09-13 RX ORDER — CHOLECALCIFEROL (VITAMIN D3) 25 MCG
1000 TABLET ORAL DAILY
Status: DISCONTINUED | OUTPATIENT
Start: 2024-09-13 | End: 2024-09-13 | Stop reason: HOSPADM

## 2024-09-13 RX ORDER — POTASSIUM CHLORIDE 750 MG/1
40 TABLET, FILM COATED, EXTENDED RELEASE ORAL ONCE
Status: COMPLETED | OUTPATIENT
Start: 2024-09-13 | End: 2024-09-13

## 2024-09-13 RX ORDER — FUROSEMIDE 40 MG
40 TABLET ORAL DAILY
Status: DISCONTINUED | OUTPATIENT
Start: 2024-09-13 | End: 2024-09-13 | Stop reason: HOSPADM

## 2024-09-13 RX ORDER — ASPIRIN 81 MG/1
81 TABLET ORAL DAILY
Status: DISCONTINUED | OUTPATIENT
Start: 2024-09-13 | End: 2024-09-13 | Stop reason: HOSPADM

## 2024-09-13 RX ORDER — ATORVASTATIN CALCIUM 20 MG/1
40 TABLET, FILM COATED ORAL DAILY
Status: DISCONTINUED | OUTPATIENT
Start: 2024-09-13 | End: 2024-09-13 | Stop reason: HOSPADM

## 2024-09-13 RX ORDER — METOPROLOL TARTRATE 25 MG/1
25 TABLET, FILM COATED ORAL 2 TIMES DAILY
Status: DISCONTINUED | OUTPATIENT
Start: 2024-09-13 | End: 2024-09-13 | Stop reason: HOSPADM

## 2024-09-13 RX ORDER — CLOPIDOGREL BISULFATE 75 MG/1
75 TABLET ORAL DAILY
Status: DISCONTINUED | OUTPATIENT
Start: 2024-09-13 | End: 2024-09-13 | Stop reason: HOSPADM

## 2024-09-13 RX ORDER — ISOSORBIDE MONONITRATE 30 MG/1
90 TABLET, EXTENDED RELEASE ORAL
Qty: 30 TABLET | Refills: 6 | Status: SHIPPED | OUTPATIENT
Start: 2024-09-13

## 2024-09-13 RX ORDER — LEVOTHYROXINE SODIUM 50 UG/1
50 TABLET ORAL DAILY
Status: DISCONTINUED | OUTPATIENT
Start: 2024-09-13 | End: 2024-09-13 | Stop reason: HOSPADM

## 2024-09-13 RX ORDER — HYDRALAZINE HYDROCHLORIDE 50 MG/1
50 TABLET, FILM COATED ORAL 2 TIMES DAILY
Status: DISCONTINUED | OUTPATIENT
Start: 2024-09-13 | End: 2024-09-13 | Stop reason: HOSPADM

## 2024-09-13 RX ADMIN — POTASSIUM CHLORIDE 40 MEQ: 750 TABLET, EXTENDED RELEASE ORAL at 09:21

## 2024-09-13 RX ADMIN — CLOPIDOGREL BISULFATE 75 MG: 75 TABLET, FILM COATED ORAL at 09:21

## 2024-09-13 RX ADMIN — HYDRALAZINE HYDROCHLORIDE 50 MG: 50 TABLET ORAL at 10:50

## 2024-09-13 RX ADMIN — METOPROLOL TARTRATE 25 MG: 25 TABLET, FILM COATED ORAL at 09:22

## 2024-09-13 RX ADMIN — ISOSORBIDE MONONITRATE 90 MG: 30 TABLET, EXTENDED RELEASE ORAL at 10:50

## 2024-09-13 RX ADMIN — ASPIRIN 81 MG: 81 TABLET, COATED ORAL at 09:21

## 2024-09-13 RX ADMIN — Medication 1000 UNITS: at 09:22

## 2024-09-13 RX ADMIN — LEVOTHYROXINE SODIUM 50 MCG: 50 TABLET ORAL at 10:50

## 2024-09-13 RX ADMIN — ACETAMINOPHEN 325MG 650 MG: 325 TABLET ORAL at 09:21

## 2024-09-13 RX ADMIN — ATORVASTATIN CALCIUM 40 MG: 20 TABLET, FILM COATED ORAL at 09:21

## 2024-09-13 NOTE — PROGRESS NOTES
[facility] Clinical Pharmacy Services: National Cardiology Data Registry (NCDR) Medication Review    Aydencris DANIELSON Naive is s/p PCI with drug-eluting stent placement for CAD . Pharmacy to review discharge medications to make sure appropriate medications have been prescribed.    Patient has been discharged on the following:  Aspirin: 81 mg daily  High Intensity Statin: Atorvastatin 40 mg nightly  Beta-blocker: Metoprolol Tartrate 25 mg twice daily  P2Y12 Inhibitor: Plavix 75 mg daily  LVEF <40: not applicable (EF 61.3%)    These medications meet the requirements for NCDR discharge medication for chest pain and MI.    Melina Garner [facility]  Clinical Pharmacist

## 2024-09-13 NOTE — DISCHARGE SUMMARY
Brodie DANIELSON Naive  [id number]    Date of Admit: 9/12/2024  Date of Discharge:  9/13/2024    Discharge Diagnosis:  Active Hospital Problems    Diagnosis  POA    **Coronary artery disease involving native coronary artery of native heart without angina pectoris [I25.10]  Yes    Angina pectoris, unstable [I20.0]  Yes      Resolved Hospital Problems   No resolved problems to display.       Hospital Course: Ms. mcgee is a 89-year-old lady well-known to us earlier this summer we ended up having to do a PCI on her RCA due to angina.  She did well for a period of time but then had recurrent symptoms about 10 days ago we brought her back to the Cath Lab and indeed the stent had restenosed at 1 place we did repeat intervention on it to the RCA.  Since that time she is felt no better she is actually in some weight is down a little bit worse with more angina we have been following her and we brought her back to the Cath Lab again cath revealed that her LIMA to her LAD was patent and looks good her vein graft to her OM 2 is unchanged and looks okay and the right coronary artery stent was widely patent and look good what has gotten worse is she has 90% disease in her left main and 2 locations and now has like subtotal flow and a small diagonal.  This disease is been there for a while and its I have always felt it was too small to intervene on but in light of worsening symptoms we did try and get a wire across and see if we can get it open but we were unable to and actually felt that further aggressive maneuvers were not justified from a risk standpoint.  We will have to manage it medically I am increasing her isosorbide to 90 mg a day.  Will continue her other medications she does not want to go to cardiac rehab.  I will have her come see Brenda in a week and she will see me in about 6 weeks    Procedures Performed  Procedure(s):  Left Heart Cath  Stent DEBO coronary       Consults       No orders found for last 30 day(s).             Discharge Medications     Your medication list        ASK your doctor about these medications        Instructions Last Dose Given Next Dose Due   aspirin 81 MG tablet      Take 1 tablet by mouth Daily.       atorvastatin 40 MG tablet  Commonly known as: LIPITOR      TAKE 1 TABLET BY MOUTH EVERY DAY       cholecalciferol 25 MCG (1000 UT) tablet  Commonly known as: VITAMIN D3      Take 1 tablet by mouth Daily.       clopidogrel 75 MG tablet  Commonly known as: PLAVIX      Take 1 tablet by mouth Daily.       coenzyme Q10 100 MG capsule      Take 1 capsule by mouth Daily.       furosemide 40 MG tablet  Commonly known as: LASIX      TAKE 1 TABLET BY MOUTH EVERY DAY       hydrALAZINE 50 MG tablet  Commonly known as: APRESOLINE      Take 1 tablet by mouth 2 (Two) Times a Day.       isosorbide mononitrate 60 MG 24 hr tablet  Commonly known as: IMDUR      Take 1 tablet by mouth Every Morning.       levothyroxine 50 MCG tablet  Commonly known as: SYNTHROID, LEVOTHROID      Take 1 tablet by mouth Daily.       metoprolol tartrate 25 MG tablet  Commonly known as: LOPRESSOR      Take 1 tablet by mouth 2 (Two) Times a Day.       vitamin b complex capsule capsule      Take 1 capsule by mouth 2 (Two) Times a Week.                Discharge Diet:     Activity at Discharge:     Discharge disposition: home    Condition on Discharge: stable    Follow-up Appointments  Future Appointments   Date Time Provider Department Center   8/6/2025  1:20 PM José Hatch MD [facility]         Test Results Pending at Discharge       José Hatch MD  09/13/24  09:54 EDT

## 2024-09-13 NOTE — CASE MANAGEMENT/SOCIAL WORK
Discharge Planning Assessment  [facility]     Patient Name: Brodie Mo  MRN: [id number]  Today's Date: 9/13/2024    Admit Date: 9/12/2024    Plan: Home; Denies needs   Discharge Needs Assessment       Row Name 09/13/24 1307       Living Environment    People in Home alone    Current Living Arrangements home    Potentially Unsafe Housing Conditions none    In the past 12 months has the electric, gas, oil, or water company threatened to shut off services in your home? No    Primary Care Provided by self    Provides Primary Care For pet(s)  2 CATS    Family Caregiver if Needed child(cathy), adult    Family Caregiver Names Son/Emiliano Mo and ASHKAN.I.L/Anali Chepe    Quality of Family Relationships helpful;involved;supportive    Able to Return to Prior Arrangements yes       Resource/Environmental Concerns    Resource/Environmental Concerns home accessibility    Home Accessibility Concerns stairs to access bedroom or bathroom  15 stair steps to bedroom.  Has option to sleep on main level of home.    Transportation Concerns none       Transportation Needs    In the past 12 months, has lack of transportation kept you from medical appointments or from getting medications? no    In the past 12 months, has lack of transportation kept you from meetings, work, or from getting things needed for daily living? No       Food Insecurity    Within the past 12 months, you worried that your food would run out before you got the money to buy more. Never true    Within the past 12 months, the food you bought just didn't last and you didn't have money to get more. Never true       Transition Planning    Patient/Family Anticipates Transition to home    Patient/Family Anticipated Services at Transition none    Transportation Anticipated family or friend will provide       Discharge Needs Assessment    Readmission Within the Last 30 Days no previous admission in last 30 days    Equipment Currently Used at Home grab bar;bp  cuff;scales    Concerns to be Addressed no discharge needs identified;denies needs/concerns at this time    Anticipated Changes Related to Illness none    Equipment Needed After Discharge none    Provided Post Acute Provider List? N/A    N/A Provider List Comment no need for list identified at this time                   Discharge Plan       Row Name 09/13/24 1310       Plan    Plan Home; Denies needs    Plan Comments CCP spoke with pleasant patient at bedside.  Explained role of CCP and discharge planning discussed.  Information on face sheet verified.  Patient stated she is IADL's, retired and drives.  Patient lives alone in two-story [facility].  Pt reports she has 15 stair steps to her bedroom, but she does have the option to stay on the first floor of home.  Pt has one entrance stair step.  Patients PCP confirmed as, Georgie Diane.  Patient's pharmacy confirmed as, [facility] in [place].  Patient has the following DME- grab bars, BP cuff monitor, and scale.  Patient reports she has been to sub-acute rehab and used past home health back in 2002.  Patient plans to return home at discharge.  Patient denies current discharge needs.  Pt reports her friend will transport her home at d/c.  CCP will continue to follow….…Kelly DUNCAN /WILY.                  Continued Care and Services - Discharged on 9/13/2024 Admission date: 9/12/2024 - Discharge disposition: Home or Self Care   No active coordination exists for this encounter.       Expected Discharge Date and Time       Expected Discharge Date Expected Discharge Time    Sep 13, 2024            Demographic Summary       Row Name 09/13/24 1304       General Information    Admission Type observation    Arrived From home    Required Notices Provided Observation Status Notice    Referral Source admission list    Reason for Consult discharge planning    Preferred Language English       Contact Information    Permission Granted to Share Info With family/designee                    Functional Status       Row Name 09/13/24 1306       Functional Status    Usual Activity Tolerance good    Current Activity Tolerance good       Physical Activity    On average, how many days per week do you engage in moderate to strenuous exercise (like a brisk walk)? 5 days    On average, how many minutes do you engage in exercise at this level? 30 min    Number of minutes of exercise per week 150       Assessment of Health Literacy    How often do you have someone help you read hospital materials? Never    Health Literacy Good       Functional Status, IADL    Medications independent    Meal Preparation independent    Housekeeping independent    Laundry independent    Shopping independent       Mental Status    General Appearance WDL WDL       Employment/    Employment Status retired                   Psychosocial    No documentation.                  Abuse/Neglect    No documentation.                  Legal    No documentation.                  Substance Abuse    No documentation.                  Patient Forms    No documentation.                     Kelly Hunter RN

## 2024-09-13 NOTE — PLAN OF CARE
Problem: Adult Inpatient Plan of Care  Goal: Absence of Hospital-Acquired Illness or Injury  Intervention: Identify and Manage Fall Risk  Description: Perform standard risk assessment on admission using a validated tool or comprehensive approach appropriate to the patient; reassess fall risk frequently, with change in status or transfer to another level of care.  Communicate fall injury risk to interprofessional healthcare team.  Determine need for increased observation, equipment and environmental modification, such as low bed, signage and supportive, nonskid footwear.  Adjust safety measures to individual developmental age, stage and identified risk factors.  Reinforce the importance of safety and physical activity with patient and family.  Perform regular intentional rounding to assess need for position change, pain assessment and personal needs, including assistance with toileting.  Recent Flowsheet Documentation  Taken 9/13/2024 0417 by Erik Garcia RN  Safety Promotion/Fall Prevention:   safety round/check completed   nonskid shoes/slippers when out of bed   clutter free environment maintained   assistive device/personal items within reach   activity supervised  Taken 9/13/2024 0247 by Erik Garcia RN  Safety Promotion/Fall Prevention:   safety round/check completed   nonskid shoes/slippers when out of bed   clutter free environment maintained   assistive device/personal items within reach   activity supervised  Taken 9/13/2024 0000 by Erik Garcia RN  Safety Promotion/Fall Prevention:   safety round/check completed   nonskid shoes/slippers when out of bed   clutter free environment maintained   assistive device/personal items within reach   activity supervised  Taken 9/12/2024 2201 by Erik Garcia RN  Safety Promotion/Fall Prevention:   safety round/check completed   nonskid shoes/slippers when out of bed   clutter free environment maintained   assistive device/personal items within  reach   activity supervised  Taken 9/12/2024 2000 by Erik Garcia, RN  Safety Promotion/Fall Prevention:   activity supervised   assistive device/personal items within reach   clutter free environment maintained   fall prevention program maintained   room organization consistent   safety round/check completed   Goal Outcome Evaluation:  Plan of Care Reviewed With: patient        Progress: improving

## 2024-09-14 ENCOUNTER — READMISSION MANAGEMENT (OUTPATIENT)
Dept: CALL CENTER | Facility: HOSPITAL | Age: 89
End: 2024-09-14
Payer: MEDICARE

## 2024-09-14 NOTE — OUTREACH NOTE
Prep Survey      Flowsheet Row Responses   Holiness facility patient discharged from? [place]   Is LACE score < 7 ? No   Eligibility Readm Mgmt   Discharge diagnosis Left Heart Cath   Does the patient have one of the following disease processes/diagnoses(primary or secondary)? Other   Prep survey completed? Yes            Karrie LUTHER - Registered Nurse

## 2024-09-16 LAB — ACT BLD: 275 SECONDS (ref 82–152)

## 2024-09-18 ENCOUNTER — TELEPHONE (OUTPATIENT)
Dept: CARDIOLOGY | Facility: CLINIC | Age: 89
End: 2024-09-18
Payer: MEDICARE

## 2024-09-18 RX ORDER — HYDRALAZINE HYDROCHLORIDE 50 MG/1
25 TABLET, FILM COATED ORAL 2 TIMES DAILY
Start: 2024-09-18

## 2024-09-19 ENCOUNTER — READMISSION MANAGEMENT (OUTPATIENT)
Dept: CALL CENTER | Facility: HOSPITAL | Age: 89
End: 2024-09-19
Payer: MEDICARE

## 2024-09-24 ENCOUNTER — OFFICE VISIT (OUTPATIENT)
Dept: CARDIOLOGY | Facility: CLINIC | Age: 89
End: 2024-09-24
Payer: MEDICARE

## 2024-09-24 VITALS
DIASTOLIC BLOOD PRESSURE: 72 MMHG | HEART RATE: 64 BPM | WEIGHT: 139 LBS | HEIGHT: 65 IN | SYSTOLIC BLOOD PRESSURE: 200 MMHG | BODY MASS INDEX: 23.16 KG/M2

## 2024-09-24 DIAGNOSIS — R53.83 OTHER FATIGUE: ICD-10-CM

## 2024-09-24 DIAGNOSIS — I25.118 CORONARY ARTERY DISEASE OF NATIVE ARTERY OF NATIVE HEART WITH STABLE ANGINA PECTORIS: Primary | ICD-10-CM

## 2024-09-24 DIAGNOSIS — I49.3 FREQUENT PVCS: ICD-10-CM

## 2024-09-24 PROCEDURE — 1159F MED LIST DOCD IN RCRD: CPT | Performed by: NURSE PRACTITIONER

## 2024-09-24 PROCEDURE — 1160F RVW MEDS BY RX/DR IN RCRD: CPT | Performed by: NURSE PRACTITIONER

## 2024-09-24 PROCEDURE — 99214 OFFICE O/P EST MOD 30 MIN: CPT | Performed by: NURSE PRACTITIONER

## 2024-09-24 PROCEDURE — 93000 ELECTROCARDIOGRAM COMPLETE: CPT | Performed by: NURSE PRACTITIONER

## 2024-09-24 RX ORDER — CARVEDILOL 6.25 MG/1
6.25 TABLET ORAL 2 TIMES DAILY
Qty: 60 TABLET | Refills: 2 | Status: SHIPPED | OUTPATIENT
Start: 2024-09-24

## 2024-09-26 ENCOUNTER — TELEPHONE (OUTPATIENT)
Dept: CARDIOLOGY | Facility: CLINIC | Age: 89
End: 2024-09-26
Payer: MEDICARE

## 2024-09-27 ENCOUNTER — TELEPHONE (OUTPATIENT)
Dept: CARDIOLOGY | Facility: CLINIC | Age: 89
End: 2024-09-27
Payer: MEDICARE

## 2024-10-01 ENCOUNTER — READMISSION MANAGEMENT (OUTPATIENT)
Dept: CALL CENTER | Facility: HOSPITAL | Age: 89
End: 2024-10-01
Payer: MEDICARE

## 2024-10-01 ENCOUNTER — OFFICE VISIT (OUTPATIENT)
Dept: CARDIOLOGY | Facility: CLINIC | Age: 89
End: 2024-10-01
Payer: MEDICARE

## 2024-10-01 VITALS
HEIGHT: 65 IN | BODY MASS INDEX: 22.89 KG/M2 | OXYGEN SATURATION: 97 % | SYSTOLIC BLOOD PRESSURE: 138 MMHG | WEIGHT: 137.4 LBS | DIASTOLIC BLOOD PRESSURE: 78 MMHG | HEART RATE: 67 BPM

## 2024-10-01 DIAGNOSIS — I49.3 FREQUENT PVCS: ICD-10-CM

## 2024-10-01 DIAGNOSIS — I25.118 CORONARY ARTERY DISEASE OF NATIVE ARTERY OF NATIVE HEART WITH STABLE ANGINA PECTORIS: Primary | ICD-10-CM

## 2024-10-01 PROCEDURE — 1160F RVW MEDS BY RX/DR IN RCRD: CPT | Performed by: NURSE PRACTITIONER

## 2024-10-01 PROCEDURE — 1159F MED LIST DOCD IN RCRD: CPT | Performed by: NURSE PRACTITIONER

## 2024-10-01 PROCEDURE — 93000 ELECTROCARDIOGRAM COMPLETE: CPT | Performed by: NURSE PRACTITIONER

## 2024-10-01 PROCEDURE — 99214 OFFICE O/P EST MOD 30 MIN: CPT | Performed by: NURSE PRACTITIONER

## 2024-10-01 NOTE — PROGRESS NOTES
Date of Office Visit: 10/01/2024  Encounter Provider: STACIA Guaman  Place of Service: [facility] CARDIOLOGY  Patient Name: Brodie Mo  :10/31/1932    Chief Complaint   Patient presents with    Coronary Artery Disease   :     HPI: Brodie Mo is a 89 y.o. female patient of Dr. Hatch's with coronary artery disease and mitral insufficiency.  In , she had a CABG x 2 with a LIMA to the LAD and a vein graft to the OM 2.  In 2020, she underwent a MitraClip.     In May 2023, she was noted to be in [place] at her PCPs office.  We never saw any evidence of high degree block.  She was never symptomatic, so we chose to watch things     In May of this year, she underwent DEBO of the mid RCA.     In July, hydralazine and amlodipine were discontinued secondary to side effects.  Subsequently, she was started on losartan.  She continued to struggle with elevated blood pressures and was ultimately started on HCTZ.  However, she developed significant side effects including confusion and dizziness so this was ultimately discontinued.  Secondary to recurrent dyspnea and chest heaviness, she was scheduled for repeat cardiac catheterization which demonstrated stent stenosis of the proximal RCA for which she underwent DEBO.     Unfortunately there was no improvement in her symptoms.  Frequent PVCs were also noted on her EKG for which she was started on metoprolol.  48-hour Holter demonstrated 29% PVC burden.  Secondary to worsening renal insufficiency, losartan was ultimately discontinued and she was restarted on hydralazine.     On , she presented for follow-up.  She felt terrible.  There had also been confusion regarding her medication changes.  She had restarted HCTZ instead of hydralazine.  Ultimately, she was scheduled sent for a repeat cardiac catheterization demonstrating 2/2 bypass grafts patent and a subtotally occluded diagonal which Dr. Hatch felt was  likely the cause of her symptoms.  Unfortunately, he was unable to get a wire across, and medical management was recommended.      On 9/24, I saw her in the office.  She actually felt worse.  She reported continued chest pressure and shortness of breath with any type of exertion.  She was weak and profoundly fatigued.  I suspected some of her symptoms could be medication induced.  It was also discovered she was taking the isosorbide 30 mg 3 times daily instead of 90 mg every 24 hours.  I discontinued the hydralazine and metoprolol and started carvedilol instead.  She was advised to follow-up in 1 week for reassessment.    Fortunately, she is finally starting to feel better.  Most noticeably, the dizziness has improved greatly.  She had an episode of dizziness yesterday but otherwise denies any.  She also denies any recurrent chest pain.  She does report continued dyspnea with mild exertion.  She continues experiencing palpitations at nighttime.  She says her fingertips are cold and sort of numb.  On Friday, she had two episodes of an excruciating pain in her kidneys.  She denies any edema, syncope, bleeding difficulties or melena.    Past Medical History:   Diagnosis Date    CAD (coronary artery disease)     Cancer     bladder     Chronic kidney disease     Deep vein thrombosis     Left leg-2002    Disease of thyroid gland     Health care maintenance     HTN (hypertension)     Hyperlipidemia     LVH (left ventricular hypertrophy)     Pneumonia     S/P CABG (coronary artery bypass graft)        Past Surgical History:   Procedure Laterality Date    BLADDER SURGERY      cancer    CARDIAC CATHETERIZATION N/A 5/2/2024    Procedure: Right Heart Cath;  Surgeon: José Hatch MD;  Location: [facility] INVASIVE LOCATION;  Service: Cardiovascular;  Laterality: N/A;    CARDIAC CATHETERIZATION N/A 5/2/2024    Procedure: Left Heart Cath;  Surgeon: José Hatch MD;  Location: [facility] INVASIVE LOCATION;  Service:  Cardiovascular;  Laterality: N/A;    CARDIAC CATHETERIZATION N/A 5/2/2024    Procedure: Native mammary injection;  Surgeon: José Hatch MD;  Location: [facility] CATH INVASIVE LOCATION;  Service: Cardiovascular;  Laterality: N/A;    CARDIAC CATHETERIZATION  5/2/2024    Procedure: Saphenous Vein Graft;  Surgeon: José Hatch MD;  Location: [facility] CATH INVASIVE LOCATION;  Service: Cardiovascular;;    CARDIAC CATHETERIZATION N/A 5/2/2024    Procedure: Percutaneous Coronary Intervention;  Surgeon: José Hatch MD;  Location: [facility] CATH INVASIVE LOC[facility];  Service: Cardiovascular;  Laterality: N/A;    CARDIAC CATHETERIZATION N/A 5/2/2024    Procedure: Stent DEBO coronary;  Surgeon: José Hatch MD;  Location: [facility] CATH INVASIVE LOCATION;  Service: Cardiovascular;  Laterality: N/A;    CARDIAC CATHETERIZATION N/A 5/2/2024    Procedure: Optical Coherence Tomography;  Surgeon: José Hatch MD;  Location: [facility] CATH INVASIVE LOCATION;  Service: Cardiovascular;  Laterality: N/A;    CARDIAC CATHETERIZATION N/A 5/2/2024    Procedure: Resting Full Cycle Ratio;  Surgeon: José Hatch MD;  Location: [facility] CATH INVASIVE LOCATION;  Service: Cardiovascular;  Laterality: N/A;    CARDIAC CATHETERIZATION N/A 8/29/2024    Procedure: Coronary angiography;  Surgeon: José Hatch MD;  Location: [facility] CATH INVASIVE LOCATION;  Service: Cardiovascular;  Laterality: N/A;    CARDIAC CATHETERIZATION N/A 8/29/2024    Procedure: Percutaneous Coronary Intervention;  Surgeon: José Hatch MD;  Location: [facility] CATH INVASIVE LOCATION;  Service: Cardiovascular;  Laterality: N/A;    CARDIAC CATHETERIZATION N/A 8/29/2024    Procedure: Optical Coherence Tomography;  Surgeon: José Hatch MD;  Location: [facility] CATH INVASIVE LOCATION;  Service: Cardiovascular;  Laterality: N/A;    CARDIAC CATHETERIZATION N/A 8/29/2024    Procedure: Stent DEBO coronary;  Surgeon: José Hatch MD;  Location: [facility] CATH INVASIVE  LOCATION;  Service: Cardiovascular;  Laterality: N/A;    CARDIAC CATHETERIZATION N/A 9/12/2024    Procedure: Left Heart Cath;  Surgeon: José Hatch MD;  Location: [facility] CATH INVASIVE LOCATION;  Service: Cardiovascular;  Laterality: N/A;    CARDIAC CATHETERIZATION N/A 9/12/2024    Procedure: Stent DEBO coronary;  Surgeon: José Hatch MD;  Location: [facility] CATH INVASIVE LOCATION;  Service: Cardiovascular;  Laterality: N/A;    CARPAL TUNNEL RELEASE Right 12/15/2021    Procedure: RIGHT CARPAL TUNNEL RELEASE WITH POSSIBLE SYNOVECTOMY;  Surgeon: Georgie Hines MD;  Location: [facility];  Service: Plastics;  Laterality: Right;    CORONARY ARTERY BYPASS GRAFT      2002 with Dr Prabhakar; AGUAYO to LAD and SVG to OM2; 2005 cath all grafts open and 30% dx in RCA; nl stress in 2010, 2013    HYSTERECTOMY      MITRAL VALVE REPAIR/REPLACEMENT N/A 9/2/2020    Procedure: TRANSCATHETER MITRAL VALVE REPAIR;  Surgeon: Edward Humphreys MD;  Location: [facility] INVASIVE LOCATION;  Service: Cardiovascular;  Laterality: N/A;       Social History     Socioeconomic History    Marital status:    Tobacco Use    Smoking status: Former     Passive exposure: Past    Smokeless tobacco: Never    Tobacco comments:     CAFFEINE USE: 2  CUPS COFFEE DAILY   Vaping Use    Vaping status: Never Used   Substance and Sexual Activity    Alcohol use: Yes     Alcohol/week: 7.0 standard drinks of alcohol     Types: 7 Shots of liquor per week     Comment: 1 DRINK DAILY    Drug use: No    Sexual activity: Defer       Family History   Problem Relation Age of Onset    Heart disease Mother     Kidney cancer Father     No Known Problems Maternal Grandmother     No Known Problems Maternal Grandfather     No Known Problems Paternal Grandmother     No Known Problems Paternal Grandfather     Heart disease Sister     Heart disease Brother        Review of Systems   Constitutional: Negative.   Cardiovascular:  Positive for dyspnea on  "exertion and palpitations. Negative for chest pain, leg swelling, orthopnea, paroxysmal nocturnal dyspnea and syncope.   Respiratory: Negative.     Hematologic/Lymphatic: Negative for bleeding problem.   Musculoskeletal:  Negative for falls.   Gastrointestinal:  Negative for melena.   Neurological:  Positive for dizziness. Negative for light-headedness.       Allergies   Allergen Reactions    No Known Drug Allergy          Current Outpatient Medications:     aspirin 81 MG tablet, Take 1 tablet by mouth Daily., Disp: , Rfl:     carvedilol (COREG) 6.25 MG tablet, Take 1 tablet by mouth 2 (Two) Times a Day., Disp: 60 tablet, Rfl: 2    cholecalciferol (VITAMIN D3) 1000 units tablet, Take 1 tablet by mouth Daily., Disp: , Rfl:     clopidogrel (PLAVIX) 75 MG tablet, Take 1 tablet by mouth Daily., Disp: , Rfl:     coenzyme Q10 100 MG capsule, Take 1 capsule by mouth Daily., Disp: , Rfl:     furosemide (LASIX) 40 MG tablet, TAKE 1 TABLET BY MOUTH EVERY DAY, Disp: 90 tablet, Rfl: 1    isosorbide mononitrate (IMDUR) 30 MG 24 hr tablet, Take 3 tablets by mouth Daily., Disp: 30 tablet, Rfl: 6    levothyroxine (SYNTHROID, LEVOTHROID) 50 MCG tablet, Take 1 tablet by mouth Daily., Disp: , Rfl:       Objective:     Vitals:    10/01/24 1436   BP: 138/78   BP Location: Left arm   Patient Position: Sitting   Cuff Size: Small Adult   Pulse: 67   SpO2: 97%   Weight: 62.3 kg (137 lb 6.4 oz)   Height: 165.1 cm (65\")     Body mass index is 22.86 kg/m².    PHYSICAL EXAM:    Neck:      Vascular: No JVD.   Pulmonary:      Effort: Pulmonary effort is normal.      Breath sounds: Normal breath sounds.   Cardiovascular:      Normal rate. Regular rhythm.      Murmurs: There is no murmur.      No gallop.  No click. No rub.   Pulses:     Intact distal pulses.           ECG 12 Lead    Date/Time: 10/1/2024 2:43 PM  Performed by: Brenda Gutiérrez APRN    Authorized by: Brenda Gutiérrez APRN  Comparison: compared with previous ECG from " 9/24/2024  Similar to previous ECG  Rhythm: sinus bradycardia  Rate: bradycardic  BPM: 55  Other findings: non-specific ST-T wave changes            Assessment:       Diagnosis Plan   1. Coronary artery disease of native artery of native heart with stable angina pectoris  ECG 12 Lead      2. Frequent PVCs          Orders Placed This Encounter   Procedures    ECG 12 Lead     This order was created via procedure documentation     Order Specific Question:   Release to patient     Answer:   Routine Release [[id number]]          Plan:       1.  Coronary artery disease.  She is finally starting to feel better.  She is still reporting dyspnea with exertion but the chest pain has resolved.  We do have room to increase the isosorbide.  However, I would sit tight for now given all the recent changes.  Continue aspirin and clopidogrel.  Atorvastatin was discontinued secondary to elevated LFTs.  Will repeat a CMP in 1 month.      2.  PVCs.  Improved with carvedilol.      We are finally making progress.  Will continue the current regimen.  She will see me back in 1 month.      As always, it has been a pleasure to participate in your patient's care.      Sincerely,         STACIA Dawson

## 2024-10-01 NOTE — OUTREACH NOTE
Medical Week 3 Survey      Flowsheet Row Responses   [facility] patient discharged from? [place]   Does the patient have one of the following disease processes/diagnoses(primary or secondary)? Other   Week 3 attempt successful? No   Unsuccessful attempts Attempt 1            Liana CANTOR - Registered Nurse

## 2024-11-11 ENCOUNTER — OFFICE VISIT (OUTPATIENT)
Dept: CARDIOLOGY | Facility: CLINIC | Age: 89
End: 2024-11-11
Payer: MEDICARE

## 2024-11-11 VITALS
HEART RATE: 76 BPM | WEIGHT: 135.8 LBS | BODY MASS INDEX: 22.63 KG/M2 | DIASTOLIC BLOOD PRESSURE: 58 MMHG | SYSTOLIC BLOOD PRESSURE: 150 MMHG | HEIGHT: 65 IN

## 2024-11-11 DIAGNOSIS — I49.3 FREQUENT PVCS: ICD-10-CM

## 2024-11-11 DIAGNOSIS — I25.10 CORONARY ARTERY DISEASE INVOLVING NATIVE CORONARY ARTERY OF NATIVE HEART WITHOUT ANGINA PECTORIS: Primary | ICD-10-CM

## 2024-11-11 DIAGNOSIS — I10 ESSENTIAL HYPERTENSION: ICD-10-CM

## 2024-11-11 PROCEDURE — 1159F MED LIST DOCD IN RCRD: CPT | Performed by: NURSE PRACTITIONER

## 2024-11-11 PROCEDURE — 93000 ELECTROCARDIOGRAM COMPLETE: CPT | Performed by: NURSE PRACTITIONER

## 2024-11-11 PROCEDURE — 99214 OFFICE O/P EST MOD 30 MIN: CPT | Performed by: NURSE PRACTITIONER

## 2024-11-11 PROCEDURE — 1160F RVW MEDS BY RX/DR IN RCRD: CPT | Performed by: NURSE PRACTITIONER

## 2024-11-11 RX ORDER — ATORVASTATIN CALCIUM 40 MG/1
40 TABLET, FILM COATED ORAL DAILY
COMMUNITY

## 2024-11-11 RX ORDER — VITAMIN B COMPLEX
CAPSULE ORAL DAILY
COMMUNITY

## 2024-11-11 RX ORDER — CARVEDILOL 12.5 MG/1
12.5 TABLET ORAL 2 TIMES DAILY
Start: 2024-11-11

## 2024-11-11 NOTE — PROGRESS NOTES
Date of Office Visit: 2024  Encounter Provider: STACIA Guaman  Place of Service: [facility] CARDIOLOGY  Patient Name: Brodie Mo  :10/31/1932    Chief Complaint   Patient presents with    Coronary Artery Disease   :     HPI: Brodie Mo is a 89 y.o. female patient of Dr. Hatch's with coronary artery disease and mitral insufficiency.  In , she had a CABG x 2 with a LIMA to the LAD and a vein graft to the OM 2.  In 2020, she underwent a MitraClip.     In May 2023, she was noted to be in [place] at her PCPs office.  We never saw any evidence of high degree block.  She was never symptomatic, so we chose to watch things     In May of this year, she underwent DEBO of the mid RCA.     In July, hydralazine and amlodipine were discontinued secondary to side effects.  Subsequently, she was started on losartan.  She continued to struggle with elevated blood pressures and was ultimately started on HCTZ.  However, she developed significant side effects including confusion and dizziness so this was ultimately discontinued.  Secondary to recurrent dyspnea and chest heaviness, she was scheduled for repeat cardiac catheterization which demonstrated stent stenosis of the proximal RCA for which she underwent DEBO.     Unfortunately there was no improvement in her symptoms.  Frequent PVCs were also noted on her EKG for which she was started on metoprolol.  48-hour Holter demonstrated 29% PVC burden.  Secondary to worsening renal insufficiency, losartan was ultimately discontinued and she was restarted on hydralazine.     On , she presented for follow-up.  She felt terrible.  Ultimately, she was scheduled sent for a repeat cardiac catheterization demonstrating 2/2 bypass grafts patent and a subtotally occluded diagonal which Dr. Hatch felt was likely the cause of her symptoms.  Unfortunately, he was unable to get a wire across, and medical management was  recommended.       On 9/24, I saw her in the office.  She actually felt worse.  She reported continued chest pressure and shortness of breath with any type of exertion.  She was weak and profoundly fatigued.  I suspected some of her symptoms could be medication induced.  It was also discovered she was taking the isosorbide 30 mg 3 times daily instead of 90 mg every 24 hours.  I discontinued the hydralazine and metoprolol and started carvedilol instead    On 10/1, I saw her in the office for follow-up.  She was finally feeling better.  Most noticeably the dizziness had improved.  She denied any recurrent chest pain.  No changes were made to her regimen, and she was advised to follow-up in 1 month.    She says she feels great.  This is the best she has felt in some time.  She denies any chest pain, palpitations, edema, dizziness, syncope, bleeding difficulties or melena.  She does have dyspnea on exertion that is mild and unchanged.  She reports a slight headache every morning after taking her medications.  Her blood pressure typically runs in the 150s.    Past Medical History:   Diagnosis Date    CAD (coronary artery disease)     Cancer     bladder     Chronic kidney disease     Deep vein thrombosis     Left leg-2002    Disease of thyroid gland     Health care maintenance     HTN (hypertension)     Hyperlipidemia     LVH (left ventricular hypertrophy)     Pneumonia     S/P CABG (coronary artery bypass graft)        Past Surgical History:   Procedure Laterality Date    BLADDER SURGERY      cancer    CARDIAC CATHETERIZATION N/A 5/2/2024    Procedure: Right Heart Cath;  Surgeon: José Hatch MD;  Location: [facility] INVASIVE LOCATION;  Service: Cardiovascular;  Laterality: N/A;    CARDIAC CATHETERIZATION N/A 5/2/2024    Procedure: Left Heart Cath;  Surgeon: José Hatch MD;  Location: [facility] INVASIVE LOCATION;  Service: Cardiovascular;  Laterality: N/A;    CARDIAC CATHETERIZATION N/A 5/2/2024     Procedure: Native mammary injection;  Surgeon: José Hatch MD;  Location: [facility] CATH [facility]VASIVE LOC[facility];  Service: Cardiovascular;  Laterality: N/A;    CARDIAC CATHETERIZATION  5/2/2024    Procedure: Saphenous Vein Graft;  Surgeon: José Hatch MD;  [facility];  Service: Cardiovascular;;    CARDIAC CATHETERIZATION N/A 5/2/2024    Procedure: Percutaneous Coronary Intervention;  Surgeon: José Hatch MD;  [facility];  Service: Cardiovascular;  Laterality: N/A;    CARDIAC CATHETERIZATION N/A 5/2/2024    Procedure: Stent DEBO coronary;  Surgeon: José Hatch MD;  [facility];  Service: Cardiovascular;  Laterality: N/A;    CARDIAC CATHETERIZATION N/A 5/2/2024    Procedure: Optical Coherence Tomography;  Surgeon: José Hatch MD;  [facility];  Service: Cardiovascular;  Laterality: N/A;    CARDIAC CATHETERIZATION N/A 5/2/2024    Procedure: Resting Full Cycle Ratio;  Surgeon: José Hatch MD;  [facility];  Service: Cardiovascular;  Laterality: N/A;    CARDIAC CATHETERIZATION N/A 8/29/2024    Procedure: Coronary angiography;  Surgeon: José Hatch MD;  [facility] CATH [facility] LOC[facility];  Service: Cardiovascular;  Laterality: N/A;    CARDIAC CATHETERIZATION N/A 8/29/2024    Procedure: Percutaneous Coronary Intervention;  Surgeon: José Hatch MD;  Location: [facility] CATH INVASIVE LOCATION;  Service: Cardiovascular;  Laterality: N/A;    CARDIAC CATHETERIZATION N/A 8/29/2024    Procedure: Optical Coherence Tomography;  Surgeon: José Hatch MD;  [facility];  Service: Cardiovascular;  Laterality: N/A;    CARDIAC CATHETERIZATION N/A 8/29/2024    Procedure: Stent DEBO coronary;  Surgeon: José Hatch MD;  [facility];  Service: Cardiovascular;  Laterality: N/A;    CARDIAC CATHETERIZATION N/A  9/12/2024    Procedure: Left Heart Cath;  Surgeon: José Hatch MD;  Location: [facility] CATH INVASIVE LOCATION;  Service: Cardiovascular;  Laterality: N/A;    CARDIAC CATHETERIZATION N/A 9/12/2024    Procedure: Stent DEBO coronary;  Surgeon: José Hatch MD;  Location:  [facility] CATH INVASIVE LOCATION;  Service: Cardiovascular;  Laterality: N/A;    CARPAL TUNNEL RELEASE Right 12/15/2021    Procedure: RIGHT CARPAL TUNNEL RELEASE WITH POSSIBLE SYNOVECTOMY;  Surgeon: Georgie Hines MD;  [place] [facility] [place];  Service: Plastics;  Laterality: Right;    CORONARY ARTERY BYPASS GRAFT      2002 with Dr Prabhakar; AGUAYO to LAD and SVG to OM2; 2005 cath all grafts open and 30% dx in RCA; nl stress in 2010, 2013    HYSTERECTOMY      MITRAL VALVE REPAIR/REPLACEMENT N/A 9/2/2020    Procedure: TRANSCATHETER MITRAL VALVE REPAIR;  Surgeon: Edward Humphreys MD;  Location: [facility] CATH INVASIVE LOCATION;  Service: Cardiovascular;  Laterality: N/A;       Social History     Socioeconomic History    Marital status:    Tobacco Use    Smoking status: Former     Passive exposure: Past    Smokeless tobacco: Never    Tobacco comments:     CAFFEINE USE: 2  CUPS COFFEE DAILY   Vaping Use    Vaping status: Never Used   Substance and Sexual Activity    Alcohol use: Yes     Alcohol/week: 7.0 standard drinks of alcohol     Types: 7 Shots of liquor per week     Comment: 1 DRINK DAILY    Drug use: No    Sexual activity: Defer       Family History   Problem Relation Age of Onset    Heart disease Mother     Kidney cancer Father     No Known Problems Maternal Grandmother     No Known Problems Maternal Grandfather     No Known Problems Paternal Grandmother     No Known Problems Paternal Grandfather     Heart disease Sister     Heart disease Brother        Review of Systems   Constitutional: Negative.   Cardiovascular: Negative.  Negative for chest pain, dyspnea on exertion, leg swelling, orthopnea, paroxysmal nocturnal dyspnea and  "syncope.   Respiratory: Negative.     Hematologic/Lymphatic: Negative for bleeding problem.   Musculoskeletal:  Negative for falls.   Gastrointestinal:  Negative for melena.   Neurological:  Negative for dizziness and light-headedness.       Allergies   Allergen Reactions    No Known Drug Allergy          Current Outpatient Medications:     aspirin 81 MG tablet, Take 1 tablet by mouth Daily., Disp: , Rfl:     atorvastatin (LIPITOR) 40 MG tablet, Take 1 tablet by mouth Daily., Disp: , Rfl:     B Complex Vitamins (vitamin b complex) capsule capsule, Take  by mouth Daily., Disp: , Rfl:     carvedilol (COREG) 12.5 MG tablet, Take 1 tablet by mouth 2 (Two) Times a Day., Disp: , Rfl:     cholecalciferol (VITAMIN D3) 1000 units tablet, Take 1 tablet by mouth Daily., Disp: , Rfl:     clopidogrel (PLAVIX) 75 MG tablet, Take 1 tablet by mouth Daily., Disp: , Rfl:     coenzyme Q10 100 MG capsule, Take 1 capsule by mouth Daily., Disp: , Rfl:     furosemide (LASIX) 40 MG tablet, TAKE 1 TABLET BY MOUTH EVERY DAY, Disp: 90 tablet, Rfl: 1    isosorbide mononitrate (IMDUR) 30 MG 24 hr tablet, Take 3 tablets by mouth Daily., Disp: 30 tablet, Rfl: 6    levothyroxine (SYNTHROID, LEVOTHROID) 50 MCG tablet, Take 1 tablet by mouth Daily., Disp: , Rfl:       Objective:     Vitals:    11/11/24 1433   BP: 150/58   Pulse: 76   Weight: 61.6 kg (135 lb 12.8 oz)   Height: 165.1 cm (65\")     Body mass index is 22.6 kg/m².    PHYSICAL EXAM:    Neck:      Vascular: No JVD.   Pulmonary:      Effort: Pulmonary effort is normal.      Breath sounds: Normal breath sounds.   Cardiovascular:      Normal rate. Regular rhythm.      Murmurs: There is no murmur.      No gallop.  No click. No rub.   Pulses:     Intact distal pulses.           ECG 12 Lead    Date/Time: 11/11/2024 2:43 PM  Performed by: Brenda Gutiérrez APRN    Authorized by: Brenda Gutiérrez APRN  Comparison: compared with previous ECG from 10/1/2024  Similar to previous ECG  Rhythm: " sinus rhythm  Ectopy: unifocal PVCs and trigeminy  Rate: normal  BPM: 76  Other findings: non-specific ST-T wave changes            Assessment:       Diagnosis Plan   1. Coronary artery disease involving native coronary artery of native heart without angina pectoris  ECG 12 Lead      2. Essential hypertension        3. Frequent PVCs          Orders Placed This Encounter   Procedures    ECG 12 Lead     This order was created via procedure documentation     Order Specific Question:   Release to patient     Answer:   Routine Release [[id number]]          Plan:       1.  Coronary artery disease.  She finally feels better.  She denies any chest pain.  Her dyspnea is stable.  It is likely the isosorbide that is causing her headaches.  She says they are tolerable, and she does not want to stop the medication.  Continue aspirin and atorvastatin.      2.  Hypertension/PVCs.  Secondary to her blood pressure and frequent PVCs, I did recommend increasing the carvedilol dose.      I think she is doing well.  I am thrilled that she is finally feeling better.  She will follow-up with Dr. Hatch in 3 months.      As always, it has been a pleasure to participate in your patient's care.      Sincerely,         STACIA Dawson

## 2024-11-25 ENCOUNTER — HOSPITAL ENCOUNTER (INPATIENT)
Facility: HOSPITAL | Age: 89
LOS: 2 days | Discharge: HOME | End: 2024-11-28
Attending: INTERNAL MEDICINE | Admitting: STUDENT IN AN ORGANIZED HEALTH CARE EDUCATION/TRAINING PROGRAM
Payer: MEDICARE

## 2024-11-25 ENCOUNTER — APPOINTMENT (OUTPATIENT)
Dept: GENERAL RADIOLOGY | Facility: HOSPITAL | Age: 89
End: 2024-11-25
Payer: MEDICARE

## 2024-11-25 PROBLEM — J40 BRONCHITIS: Status: ACTIVE | Noted: 2024-11-25

## 2024-11-25 LAB
ALBUMIN SERPL-MCNC: 3.9 G/DL (ref 3.5–5.2)
ALBUMIN/GLOB SERPL: 1.2 G/DL
ALP SERPL-CCNC: 114 U/L (ref 39–117)
ALT SERPL W P-5'-P-CCNC: 24 U/L (ref 1–33)
ANION GAP SERPL CALCULATED.3IONS-SCNC: 13 MMOL/L (ref 5–15)
AST SERPL-CCNC: 29 U/L (ref 1–32)
B PARAPERT DNA SPEC QL NAA+PROBE: NOT DETECTED
B PERT DNA SPEC QL NAA+PROBE: NOT DETECTED
BASOPHILS # BLD AUTO: 0.05 10*3/MM3 (ref 0–0.2)
BASOPHILS NFR BLD AUTO: 0.6 % (ref 0–1.5)
BILIRUB SERPL-MCNC: 0.4 MG/DL (ref 0–1.2)
BUN SERPL-MCNC: 18 MG/DL (ref 8–23)
BUN/CREAT SERPL: 17.6 (ref 7–25)
C PNEUM DNA NPH QL NAA+NON-PROBE: NOT DETECTED
CALCIUM SPEC-SCNC: 9.6 MG/DL (ref 8.2–9.6)
CHLORIDE SERPL-SCNC: 96 MMOL/L (ref 98–107)
CO2 SERPL-SCNC: 23 MMOL/L (ref 22–29)
CREAT SERPL-MCNC: 1.02 MG/DL (ref 0.57–1)
D-LACTATE SERPL-SCNC: 1 MMOL/L (ref 0.5–2)
DEPRECATED RDW RBC AUTO: 44.4 FL (ref 37–54)
EGFRCR SERPLBLD CKD-EPI 2021: 51.7 ML/MIN/1.73
EOSINOPHIL # BLD AUTO: 0.13 10*3/MM3 (ref 0–0.4)
EOSINOPHIL NFR BLD AUTO: 1.5 % (ref 0.3–6.2)
ERYTHROCYTE [DISTWIDTH] IN BLOOD BY AUTOMATED COUNT: 13.5 % (ref 12.3–15.4)
FLUAV SUBTYP SPEC NAA+PROBE: NOT DETECTED
FLUBV RNA ISLT QL NAA+PROBE: NOT DETECTED
GLOBULIN UR ELPH-MCNC: 3.3 GM/DL
GLUCOSE SERPL-MCNC: 117 MG/DL (ref 65–99)
HADV DNA SPEC NAA+PROBE: NOT DETECTED
HCOV 229E RNA SPEC QL NAA+PROBE: NOT DETECTED
HCOV HKU1 RNA SPEC QL NAA+PROBE: NOT DETECTED
HCOV NL63 RNA SPEC QL NAA+PROBE: NOT DETECTED
HCOV OC43 RNA SPEC QL NAA+PROBE: NOT DETECTED
HCT VFR BLD AUTO: 35.3 % (ref 34–46.6)
HGB BLD-MCNC: 11.6 G/DL (ref 12–15.9)
HMPV RNA NPH QL NAA+NON-PROBE: NOT DETECTED
HPIV1 RNA ISLT QL NAA+PROBE: NOT DETECTED
HPIV2 RNA SPEC QL NAA+PROBE: NOT DETECTED
HPIV3 RNA NPH QL NAA+PROBE: NOT DETECTED
HPIV4 P GENE NPH QL NAA+PROBE: NOT DETECTED
IMM GRANULOCYTES # BLD AUTO: 0.04 10*3/MM3 (ref 0–0.05)
IMM GRANULOCYTES NFR BLD AUTO: 0.4 % (ref 0–0.5)
INR PPP: 1.12 (ref 0.9–1.1)
LYMPHOCYTES # BLD AUTO: 1.09 10*3/MM3 (ref 0.7–3.1)
LYMPHOCYTES NFR BLD AUTO: 12.2 % (ref 19.6–45.3)
M PNEUMO IGG SER IA-ACNC: NOT DETECTED
MCH RBC QN AUTO: 28.9 PG (ref 26.6–33)
MCHC RBC AUTO-ENTMCNC: 32.9 G/DL (ref 31.5–35.7)
MCV RBC AUTO: 88 FL (ref 79–97)
MONOCYTES # BLD AUTO: 1.56 10*3/MM3 (ref 0.1–0.9)
MONOCYTES NFR BLD AUTO: 17.5 % (ref 5–12)
NEUTROPHILS NFR BLD AUTO: 6.03 10*3/MM3 (ref 1.7–7)
NEUTROPHILS NFR BLD AUTO: 67.8 % (ref 42.7–76)
NRBC BLD AUTO-RTO: 0 /100 WBC (ref 0–0.2)
PLATELET # BLD AUTO: 199 10*3/MM3 (ref 140–450)
PMV BLD AUTO: 10.5 FL (ref 6–12)
POTASSIUM SERPL-SCNC: 3.8 MMOL/L (ref 3.5–5.2)
PROCALCITONIN SERPL-MCNC: 0.08 NG/ML (ref 0–0.25)
PROT SERPL-MCNC: 7.2 G/DL (ref 6–8.5)
PROTHROMBIN TIME: 14.6 SECONDS (ref 11.7–14.2)
QT INTERVAL: 380 MS
QTC INTERVAL: 439 MS
RBC # BLD AUTO: 4.01 10*6/MM3 (ref 3.77–5.28)
RHINOVIRUS RNA SPEC NAA+PROBE: NOT DETECTED
RSV RNA NPH QL NAA+NON-PROBE: NOT DETECTED
SARS-COV-2 RNA NPH QL NAA+NON-PROBE: NOT DETECTED
SODIUM SERPL-SCNC: 132 MMOL/L (ref 136–145)
WBC NRBC COR # BLD AUTO: 8.9 10*3/MM3 (ref 3.4–10.8)

## 2024-11-25 PROCEDURE — 94799 UNLISTED PULMONARY SVC/PX: CPT

## 2024-11-25 PROCEDURE — 25010000002 METHYLPREDNISOLONE PER 40 MG: Performed by: INTERNAL MEDICINE

## 2024-11-25 PROCEDURE — 85610 PROTHROMBIN TIME: CPT | Performed by: NURSE PRACTITIONER

## 2024-11-25 PROCEDURE — 84145 PROCALCITONIN (PCT): CPT | Performed by: NURSE PRACTITIONER

## 2024-11-25 PROCEDURE — 85025 COMPLETE CBC W/AUTO DIFF WBC: CPT | Performed by: NURSE PRACTITIONER

## 2024-11-25 PROCEDURE — 93005 ELECTROCARDIOGRAM TRACING: CPT | Performed by: NURSE PRACTITIONER

## 2024-11-25 PROCEDURE — 80053 COMPREHEN METABOLIC PANEL: CPT | Performed by: NURSE PRACTITIONER

## 2024-11-25 PROCEDURE — 94640 AIRWAY INHALATION TREATMENT: CPT

## 2024-11-25 PROCEDURE — 83880 ASSAY OF NATRIURETIC PEPTIDE: CPT | Performed by: NURSE PRACTITIONER

## 2024-11-25 PROCEDURE — 93010 ELECTROCARDIOGRAM REPORT: CPT | Performed by: INTERNAL MEDICINE

## 2024-11-25 PROCEDURE — 25010000002 FUROSEMIDE PER 20 MG: Performed by: NURSE PRACTITIONER

## 2024-11-25 PROCEDURE — 0202U NFCT DS 22 TRGT SARS-COV-2: CPT | Performed by: NURSE PRACTITIONER

## 2024-11-25 PROCEDURE — 71045 X-RAY EXAM CHEST 1 VIEW: CPT

## 2024-11-25 PROCEDURE — G0378 HOSPITAL OBSERVATION PER HR: HCPCS

## 2024-11-25 PROCEDURE — 83605 ASSAY OF LACTIC ACID: CPT | Performed by: NURSE PRACTITIONER

## 2024-11-25 RX ORDER — SODIUM CHLORIDE 0.9 % (FLUSH) 0.9 %
10 SYRINGE (ML) INJECTION AS NEEDED
Status: DISCONTINUED | OUTPATIENT
Start: 2024-11-25 | End: 2024-11-28 | Stop reason: HOSPADM

## 2024-11-25 RX ORDER — ACETAMINOPHEN 160 MG/5ML
650 SOLUTION ORAL EVERY 4 HOURS PRN
Status: DISCONTINUED | OUTPATIENT
Start: 2024-11-25 | End: 2024-11-28 | Stop reason: HOSPADM

## 2024-11-25 RX ORDER — LEVOTHYROXINE SODIUM 50 UG/1
50 TABLET ORAL
Status: DISCONTINUED | OUTPATIENT
Start: 2024-11-25 | End: 2024-11-28 | Stop reason: HOSPADM

## 2024-11-25 RX ORDER — CARVEDILOL 12.5 MG/1
12.5 TABLET ORAL 2 TIMES DAILY
Status: DISCONTINUED | OUTPATIENT
Start: 2024-11-25 | End: 2024-11-26

## 2024-11-25 RX ORDER — ALUMINA, MAGNESIA, AND SIMETHICONE 2400; 2400; 240 MG/30ML; MG/30ML; MG/30ML
15 SUSPENSION ORAL EVERY 6 HOURS PRN
Status: DISCONTINUED | OUTPATIENT
Start: 2024-11-25 | End: 2024-11-28 | Stop reason: HOSPADM

## 2024-11-25 RX ORDER — CLOPIDOGREL BISULFATE 75 MG/1
75 TABLET ORAL DAILY
Status: DISCONTINUED | OUTPATIENT
Start: 2024-11-26 | End: 2024-11-28 | Stop reason: HOSPADM

## 2024-11-25 RX ORDER — ACETAMINOPHEN 325 MG/1
650 TABLET ORAL EVERY 4 HOURS PRN
Status: DISCONTINUED | OUTPATIENT
Start: 2024-11-25 | End: 2024-11-28 | Stop reason: HOSPADM

## 2024-11-25 RX ORDER — BISACODYL 5 MG/1
5 TABLET, DELAYED RELEASE ORAL DAILY PRN
Status: DISCONTINUED | OUTPATIENT
Start: 2024-11-25 | End: 2024-11-28 | Stop reason: HOSPADM

## 2024-11-25 RX ORDER — ONDANSETRON 4 MG/1
4 TABLET, ORALLY DISINTEGRATING ORAL EVERY 6 HOURS PRN
Status: DISCONTINUED | OUTPATIENT
Start: 2024-11-25 | End: 2024-11-28 | Stop reason: HOSPADM

## 2024-11-25 RX ORDER — NITROGLYCERIN 0.4 MG/1
0.4 TABLET SUBLINGUAL
Status: DISCONTINUED | OUTPATIENT
Start: 2024-11-25 | End: 2024-11-28 | Stop reason: HOSPADM

## 2024-11-25 RX ORDER — AMOXICILLIN 250 MG
2 CAPSULE ORAL 2 TIMES DAILY PRN
Status: DISCONTINUED | OUTPATIENT
Start: 2024-11-25 | End: 2024-11-28 | Stop reason: HOSPADM

## 2024-11-25 RX ORDER — METHYLPREDNISOLONE SODIUM SUCCINATE 40 MG/ML
40 INJECTION, POWDER, LYOPHILIZED, FOR SOLUTION INTRAMUSCULAR; INTRAVENOUS EVERY 8 HOURS
Status: DISCONTINUED | OUTPATIENT
Start: 2024-11-25 | End: 2024-11-26

## 2024-11-25 RX ORDER — ATORVASTATIN CALCIUM 20 MG/1
40 TABLET, FILM COATED ORAL 2 TIMES WEEKLY
Status: DISCONTINUED | OUTPATIENT
Start: 2024-11-28 | End: 2024-11-28 | Stop reason: HOSPADM

## 2024-11-25 RX ORDER — POLYETHYLENE GLYCOL 3350 17 G/17G
17 POWDER, FOR SOLUTION ORAL DAILY PRN
Status: DISCONTINUED | OUTPATIENT
Start: 2024-11-25 | End: 2024-11-28 | Stop reason: HOSPADM

## 2024-11-25 RX ORDER — ISOSORBIDE MONONITRATE 30 MG/1
90 TABLET, EXTENDED RELEASE ORAL
Status: DISCONTINUED | OUTPATIENT
Start: 2024-11-26 | End: 2024-11-28 | Stop reason: HOSPADM

## 2024-11-25 RX ORDER — BISACODYL 10 MG
10 SUPPOSITORY, RECTAL RECTAL DAILY PRN
Status: DISCONTINUED | OUTPATIENT
Start: 2024-11-25 | End: 2024-11-28 | Stop reason: HOSPADM

## 2024-11-25 RX ORDER — IPRATROPIUM BROMIDE AND ALBUTEROL SULFATE 2.5; .5 MG/3ML; MG/3ML
3 SOLUTION RESPIRATORY (INHALATION)
Status: DISCONTINUED | OUTPATIENT
Start: 2024-11-25 | End: 2024-11-27

## 2024-11-25 RX ORDER — ACETAMINOPHEN 650 MG/1
650 SUPPOSITORY RECTAL EVERY 4 HOURS PRN
Status: DISCONTINUED | OUTPATIENT
Start: 2024-11-25 | End: 2024-11-28 | Stop reason: HOSPADM

## 2024-11-25 RX ORDER — ONDANSETRON 2 MG/ML
4 INJECTION INTRAMUSCULAR; INTRAVENOUS EVERY 6 HOURS PRN
Status: DISCONTINUED | OUTPATIENT
Start: 2024-11-25 | End: 2024-11-28 | Stop reason: HOSPADM

## 2024-11-25 RX ORDER — ASPIRIN 81 MG/1
81 TABLET ORAL DAILY
Status: DISCONTINUED | OUTPATIENT
Start: 2024-11-25 | End: 2024-11-28 | Stop reason: HOSPADM

## 2024-11-25 RX ORDER — FUROSEMIDE 10 MG/ML
40 INJECTION INTRAMUSCULAR; INTRAVENOUS ONCE
Status: COMPLETED | OUTPATIENT
Start: 2024-11-25 | End: 2024-11-25

## 2024-11-25 RX ORDER — ALBUTEROL SULFATE 0.83 MG/ML
2.5 SOLUTION RESPIRATORY (INHALATION) EVERY 6 HOURS PRN
Status: DISCONTINUED | OUTPATIENT
Start: 2024-11-25 | End: 2024-11-28 | Stop reason: HOSPADM

## 2024-11-25 RX ADMIN — CARVEDILOL 12.5 MG: 12.5 TABLET, FILM COATED ORAL at 22:38

## 2024-11-25 RX ADMIN — FUROSEMIDE 40 MG: 10 INJECTION, SOLUTION INTRAMUSCULAR; INTRAVENOUS at 22:38

## 2024-11-25 RX ADMIN — METHYLPREDNISOLONE SODIUM SUCCINATE 40 MG: 40 INJECTION, POWDER, FOR SOLUTION INTRAMUSCULAR; INTRAVENOUS at 20:39

## 2024-11-25 RX ADMIN — IPRATROPIUM BROMIDE AND ALBUTEROL SULFATE 3 ML: 2.5; .5 SOLUTION RESPIRATORY (INHALATION) at 20:42

## 2024-11-25 RX ADMIN — LEVOTHYROXINE SODIUM 50 MCG: 50 TABLET ORAL at 22:38

## 2024-11-26 ENCOUNTER — APPOINTMENT (OUTPATIENT)
Dept: CT IMAGING | Facility: HOSPITAL | Age: 89
End: 2024-11-26
Payer: MEDICARE

## 2024-11-26 ENCOUNTER — APPOINTMENT (OUTPATIENT)
Dept: CARDIOLOGY | Facility: HOSPITAL | Age: 89
End: 2024-11-26
Payer: MEDICARE

## 2024-11-26 PROBLEM — I16.0 HYPERTENSIVE URGENCY: Status: ACTIVE | Noted: 2024-11-26

## 2024-11-26 LAB
ANION GAP SERPL CALCULATED.3IONS-SCNC: 13.5 MMOL/L (ref 5–15)
AORTIC DIMENSIONLESS INDEX: 0.81 (DI)
ASCENDING AORTA: 2.3 CM
BH CV ECHO MEAS - ACS: 1.51 CM
BH CV ECHO MEAS - AO MAX PG: 6.5 MMHG
BH CV ECHO MEAS - AO MEAN PG: 4.4 MMHG
BH CV ECHO MEAS - AO ROOT AREA (BSA CORRECTED): 1.6 CM2
BH CV ECHO MEAS - AO ROOT DIAM: 2.7 CM
BH CV ECHO MEAS - AO V2 MAX: 127 CM/SEC
BH CV ECHO MEAS - AO V2 VTI: 36.2 CM
BH CV ECHO MEAS - AVA(I,D): 1.85 CM2
BH CV ECHO MEAS - EDV(CUBED): 98.3 ML
BH CV ECHO MEAS - EDV(MOD-SP2): 78 ML
BH CV ECHO MEAS - EDV(MOD-SP4): 69 ML
BH CV ECHO MEAS - EF(MOD-BP): 59.3 %
BH CV ECHO MEAS - EF(MOD-SP2): 64.1 %
BH CV ECHO MEAS - EF(MOD-SP4): 58 %
BH CV ECHO MEAS - ESV(CUBED): 29 ML
BH CV ECHO MEAS - ESV(MOD-SP2): 28 ML
BH CV ECHO MEAS - ESV(MOD-SP4): 29 ML
BH CV ECHO MEAS - FS: 33.4 %
BH CV ECHO MEAS - IVS/LVPW: 1.02 CM
BH CV ECHO MEAS - IVSD: 0.8 CM
BH CV ECHO MEAS - LAT PEAK E' VEL: 4.2 CM/SEC
BH CV ECHO MEAS - LV DIASTOLIC VOL/BSA (35-75): 41.1 CM2
BH CV ECHO MEAS - LV MASS(C)D: 117.6 GRAMS
BH CV ECHO MEAS - LV MAX PG: 4.3 MMHG
BH CV ECHO MEAS - LV MEAN PG: 2.6 MMHG
BH CV ECHO MEAS - LV SYSTOLIC VOL/BSA (12-30): 17.3 CM2
BH CV ECHO MEAS - LV V1 MAX: 103.6 CM/SEC
BH CV ECHO MEAS - LV V1 VTI: 29.6 CM
BH CV ECHO MEAS - LVIDD: 4.6 CM
BH CV ECHO MEAS - LVIDS: 3.1 CM
BH CV ECHO MEAS - LVOT AREA: 2.27 CM2
BH CV ECHO MEAS - LVOT DIAM: 1.7 CM
BH CV ECHO MEAS - LVPWD: 0.79 CM
BH CV ECHO MEAS - MED PEAK E' VEL: 3.2 CM/SEC
BH CV ECHO MEAS - MV A DUR: 0.11 SEC
BH CV ECHO MEAS - MV A MAX VEL: 119.1 CM/SEC
BH CV ECHO MEAS - MV DEC SLOPE: 495.3 CM/SEC2
BH CV ECHO MEAS - MV DEC TIME: 0.29 SEC
BH CV ECHO MEAS - MV E MAX VEL: 172 CM/SEC
BH CV ECHO MEAS - MV E/A: 1.44
BH CV ECHO MEAS - MV MAX PG: 12.1 MMHG
BH CV ECHO MEAS - MV MEAN PG: 5.7 MMHG
BH CV ECHO MEAS - MV P1/2T: 102.7 MSEC
BH CV ECHO MEAS - MV V2 VTI: 52.4 CM
BH CV ECHO MEAS - MVA(P1/2T): 2.14 CM2
BH CV ECHO MEAS - MVA(VTI): 1.28 CM2
BH CV ECHO MEAS - PA ACC TIME: 0.21 SEC
BH CV ECHO MEAS - PA V2 MAX: 74.7 CM/SEC
BH CV ECHO MEAS - PULM A REVS DUR: 0.09 SEC
BH CV ECHO MEAS - PULM A REVS VEL: 20.7 CM/SEC
BH CV ECHO MEAS - PULM DIAS VEL: 50.9 CM/SEC
BH CV ECHO MEAS - PULM S/D: 1.28
BH CV ECHO MEAS - PULM SYS VEL: 65.4 CM/SEC
BH CV ECHO MEAS - RAP SYSTOLE: 8 MMHG
BH CV ECHO MEAS - RV MAX PG: 1.36 MMHG
BH CV ECHO MEAS - RV V1 MAX: 58.3 CM/SEC
BH CV ECHO MEAS - RV V1 VTI: 14 CM
BH CV ECHO MEAS - RVSP: 71 MMHG
BH CV ECHO MEAS - SV(LVOT): 67 ML
BH CV ECHO MEAS - SV(MOD-SP2): 50 ML
BH CV ECHO MEAS - SV(MOD-SP4): 40 ML
BH CV ECHO MEAS - SVI(LVOT): 39.9 ML/M2
BH CV ECHO MEAS - SVI(MOD-SP2): 29.8 ML/M2
BH CV ECHO MEAS - SVI(MOD-SP4): 23.8 ML/M2
BH CV ECHO MEAS - TAPSE (>1.6): 1.69 CM
BH CV ECHO MEAS - TR MAX PG: 63.8 MMHG
BH CV ECHO MEAS - TR MAX VEL: 399.3 CM/SEC
BH CV ECHO MEASUREMENTS AVERAGE E/E' RATIO: 46.49
BH CV XLRA - RV BASE: 2.9 CM
BH CV XLRA - TDI S': 10.9 CM/SEC
BUN SERPL-MCNC: 19 MG/DL (ref 8–23)
BUN/CREAT SERPL: 16.4 (ref 7–25)
CALCIUM SPEC-SCNC: 9.3 MG/DL (ref 8.2–9.6)
CHLORIDE SERPL-SCNC: 93 MMOL/L (ref 98–107)
CO2 SERPL-SCNC: 25.5 MMOL/L (ref 22–29)
CREAT SERPL-MCNC: 1.16 MG/DL (ref 0.57–1)
CRP SERPL-MCNC: 7.86 MG/DL (ref 0–0.5)
DEPRECATED RDW RBC AUTO: 42.7 FL (ref 37–54)
EGFRCR SERPLBLD CKD-EPI 2021: 44.3 ML/MIN/1.73
ERYTHROCYTE [DISTWIDTH] IN BLOOD BY AUTOMATED COUNT: 13.2 % (ref 12.3–15.4)
ERYTHROCYTE [SEDIMENTATION RATE] IN BLOOD: 33 MM/HR (ref 0–30)
GLUCOSE SERPL-MCNC: 218 MG/DL (ref 65–99)
HCT VFR BLD AUTO: 35 % (ref 34–46.6)
HGB BLD-MCNC: 11 G/DL (ref 12–15.9)
LEFT ATRIUM VOLUME INDEX: 47.6 ML/M2
MAGNESIUM SERPL-MCNC: 2.1 MG/DL (ref 1.7–2.3)
MCH RBC QN AUTO: 27.6 PG (ref 26.6–33)
MCHC RBC AUTO-ENTMCNC: 31.4 G/DL (ref 31.5–35.7)
MCV RBC AUTO: 87.7 FL (ref 79–97)
NT-PROBNP SERPL-MCNC: 6504 PG/ML (ref 0–1800)
PHOSPHATE SERPL-MCNC: 3.1 MG/DL (ref 2.5–4.5)
PLATELET # BLD AUTO: 198 10*3/MM3 (ref 140–450)
PMV BLD AUTO: 10.6 FL (ref 6–12)
POTASSIUM SERPL-SCNC: 4 MMOL/L (ref 3.5–5.2)
RBC # BLD AUTO: 3.99 10*6/MM3 (ref 3.77–5.28)
SINUS: 2.42 CM
SODIUM SERPL-SCNC: 132 MMOL/L (ref 136–145)
STJ: 2.17 CM
WBC NRBC COR # BLD AUTO: 6.5 10*3/MM3 (ref 3.4–10.8)

## 2024-11-26 PROCEDURE — 85652 RBC SED RATE AUTOMATED: CPT | Performed by: INTERNAL MEDICINE

## 2024-11-26 PROCEDURE — 87040 BLOOD CULTURE FOR BACTERIA: CPT | Performed by: INTERNAL MEDICINE

## 2024-11-26 PROCEDURE — 93306 TTE W/DOPPLER COMPLETE: CPT

## 2024-11-26 PROCEDURE — 94799 UNLISTED PULMONARY SVC/PX: CPT

## 2024-11-26 PROCEDURE — 94664 DEMO&/EVAL PT USE INHALER: CPT

## 2024-11-26 PROCEDURE — 99223 1ST HOSP IP/OBS HIGH 75: CPT | Performed by: NURSE PRACTITIONER

## 2024-11-26 PROCEDURE — 93306 TTE W/DOPPLER COMPLETE: CPT | Performed by: INTERNAL MEDICINE

## 2024-11-26 PROCEDURE — 84100 ASSAY OF PHOSPHORUS: CPT | Performed by: NURSE PRACTITIONER

## 2024-11-26 PROCEDURE — 94760 N-INVAS EAR/PLS OXIMETRY 1: CPT

## 2024-11-26 PROCEDURE — 85027 COMPLETE CBC AUTOMATED: CPT | Performed by: NURSE PRACTITIONER

## 2024-11-26 PROCEDURE — 86140 C-REACTIVE PROTEIN: CPT | Performed by: INTERNAL MEDICINE

## 2024-11-26 PROCEDURE — 25510000001 PERFLUTREN 6.52 MG/ML SUSPENSION 2 ML VIAL: Performed by: NURSE PRACTITIONER

## 2024-11-26 PROCEDURE — 25010000002 FUROSEMIDE PER 20 MG: Performed by: STUDENT IN AN ORGANIZED HEALTH CARE EDUCATION/TRAINING PROGRAM

## 2024-11-26 PROCEDURE — 71250 CT THORAX DX C-: CPT

## 2024-11-26 PROCEDURE — 25010000002 CEFTRIAXONE PER 250 MG: Performed by: INTERNAL MEDICINE

## 2024-11-26 PROCEDURE — 83735 ASSAY OF MAGNESIUM: CPT | Performed by: NURSE PRACTITIONER

## 2024-11-26 PROCEDURE — 94761 N-INVAS EAR/PLS OXIMETRY MLT: CPT

## 2024-11-26 PROCEDURE — 80048 BASIC METABOLIC PNL TOTAL CA: CPT | Performed by: NURSE PRACTITIONER

## 2024-11-26 PROCEDURE — 25010000002 METHYLPREDNISOLONE PER 40 MG: Performed by: INTERNAL MEDICINE

## 2024-11-26 RX ORDER — AZITHROMYCIN 250 MG/1
500 TABLET, FILM COATED ORAL
Status: DISCONTINUED | OUTPATIENT
Start: 2024-11-26 | End: 2024-11-28

## 2024-11-26 RX ORDER — GUAIFENESIN/DEXTROMETHORPHAN 100-10MG/5
5 SYRUP ORAL EVERY 4 HOURS PRN
Status: DISCONTINUED | OUTPATIENT
Start: 2024-11-26 | End: 2024-11-28 | Stop reason: HOSPADM

## 2024-11-26 RX ORDER — BENZONATATE 100 MG/1
200 CAPSULE ORAL 3 TIMES DAILY PRN
Status: DISCONTINUED | OUTPATIENT
Start: 2024-11-26 | End: 2024-11-28 | Stop reason: HOSPADM

## 2024-11-26 RX ORDER — FUROSEMIDE 10 MG/ML
40 INJECTION INTRAMUSCULAR; INTRAVENOUS
Status: DISCONTINUED | OUTPATIENT
Start: 2024-11-26 | End: 2024-11-26

## 2024-11-26 RX ORDER — CARVEDILOL 25 MG/1
25 TABLET ORAL 2 TIMES DAILY
Status: DISCONTINUED | OUTPATIENT
Start: 2024-11-26 | End: 2024-11-28

## 2024-11-26 RX ORDER — LABETALOL HYDROCHLORIDE 5 MG/ML
10 INJECTION, SOLUTION INTRAVENOUS
Status: DISCONTINUED | OUTPATIENT
Start: 2024-11-26 | End: 2024-11-28 | Stop reason: HOSPADM

## 2024-11-26 RX ORDER — ATORVASTATIN CALCIUM 20 MG/1
40 TABLET, FILM COATED ORAL ONCE
Status: COMPLETED | OUTPATIENT
Start: 2024-11-26 | End: 2024-11-26

## 2024-11-26 RX ORDER — FUROSEMIDE 10 MG/ML
40 INJECTION INTRAMUSCULAR; INTRAVENOUS DAILY
Status: DISCONTINUED | OUTPATIENT
Start: 2024-11-27 | End: 2024-11-28

## 2024-11-26 RX ORDER — PREDNISONE 20 MG/1
20 TABLET ORAL
Status: DISCONTINUED | OUTPATIENT
Start: 2024-11-27 | End: 2024-11-27

## 2024-11-26 RX ADMIN — METHYLPREDNISOLONE SODIUM SUCCINATE 40 MG: 40 INJECTION, POWDER, FOR SOLUTION INTRAMUSCULAR; INTRAVENOUS at 04:40

## 2024-11-26 RX ADMIN — IPRATROPIUM BROMIDE AND ALBUTEROL SULFATE 3 ML: 2.5; .5 SOLUTION RESPIRATORY (INHALATION) at 07:15

## 2024-11-26 RX ADMIN — IPRATROPIUM BROMIDE AND ALBUTEROL SULFATE 3 ML: 2.5; .5 SOLUTION RESPIRATORY (INHALATION) at 11:51

## 2024-11-26 RX ADMIN — FUROSEMIDE 40 MG: 10 INJECTION, SOLUTION INTRAMUSCULAR; INTRAVENOUS at 10:09

## 2024-11-26 RX ADMIN — CLOPIDOGREL BISULFATE 75 MG: 75 TABLET, FILM COATED ORAL at 10:09

## 2024-11-26 RX ADMIN — PERFLUTREN 4 ML: 6.52 INJECTION, SUSPENSION INTRAVENOUS at 15:52

## 2024-11-26 RX ADMIN — CARVEDILOL 25 MG: 25 TABLET, FILM COATED ORAL at 20:53

## 2024-11-26 RX ADMIN — GUAIFENESIN SYRUP AND DEXTROMETHORPHAN 5 ML: 100; 10 SYRUP ORAL at 21:35

## 2024-11-26 RX ADMIN — LEVOTHYROXINE SODIUM 50 MCG: 50 TABLET ORAL at 21:37

## 2024-11-26 RX ADMIN — ASPIRIN 81 MG: 81 TABLET, COATED ORAL at 10:08

## 2024-11-26 RX ADMIN — CARVEDILOL 25 MG: 25 TABLET, FILM COATED ORAL at 10:09

## 2024-11-26 RX ADMIN — ATORVASTATIN CALCIUM 40 MG: 20 TABLET, FILM COATED ORAL at 10:03

## 2024-11-26 RX ADMIN — AZITHROMYCIN DIHYDRATE 500 MG: 250 TABLET ORAL at 13:09

## 2024-11-26 RX ADMIN — GUAIFENESIN SYRUP AND DEXTROMETHORPHAN 5 ML: 100; 10 SYRUP ORAL at 04:40

## 2024-11-26 RX ADMIN — CEFTRIAXONE 2000 MG: 2 INJECTION, POWDER, FOR SOLUTION INTRAMUSCULAR; INTRAVENOUS at 16:28

## 2024-11-26 RX ADMIN — ISOSORBIDE MONONITRATE 90 MG: 30 TABLET, EXTENDED RELEASE ORAL at 10:04

## 2024-11-26 RX ADMIN — IPRATROPIUM BROMIDE AND ALBUTEROL SULFATE 3 ML: 2.5; .5 SOLUTION RESPIRATORY (INHALATION) at 20:38

## 2024-11-26 RX ADMIN — METHYLPREDNISOLONE SODIUM SUCCINATE 40 MG: 40 INJECTION, POWDER, FOR SOLUTION INTRAMUSCULAR; INTRAVENOUS at 12:28

## 2024-11-26 NOTE — PROGRESS NOTES
[facility] Medicine Services  PROGRESS NOTE    Patient Name: Brodie Mo  : 10/31/1932  MRN: [id number]    Date of Admission: 2024  Primary Care Physician: Georgie Diane MD    Subjective   Subjective     CC:  Follow up Cough    S: Patient still having some productive cough.  She feels generally weak.  She denies any other new complaints or events.  We reviewed her treatment course so far and all of the findings thus far at the hospitalization.  All questions answered at the bedside.    Review of Systems  No current nausea, vomiting, or diarrhea  No current chest pain or palpitations       Objective   Objective     Vital Signs:   Temp:  [97.3 °F (36.3 °C)-99 °F (37.2 °C)] 97.9 °F (36.6 °C)  Heart Rate:  [65-88] 77  Resp:  [16-24] 20  BP: (127-198)/() 127/58        Physical Exam:  Constitutional:Awake, alert, elderly appearing  HENT: NCAT, mucous membranes moist, neck supple  Respiratory: Cough, mild tachypnea, decreased inspiration, coarse sounds  Cardiovascular: Pulse rate is normal, palpable radial pulses  Gastrointestinal:  soft, nontender, nondistended  Musculoskeletal: Frail and chronically debilitated in appearance, thin BMI is only 22, no lower extremity edema  Psychiatric: Appropriate affect, cooperative, conversational  Neurologic: No slurred speech or facial droop, follows commands  Skin: No rashes or jaundice, warm      Results Reviewed:  Results from last 7 days   Lab Units 24   WBC 10*3/mm3 6.50 8.90  --    HEMOGLOBIN g/dL 11.0* 11.6*  --    HEMATOCRIT % 35.0 35.3  --    PLATELETS 10*3/mm3 198 199  --    INR   --   --  1.12*   PROCALCITONIN ng/mL  --   --  0.08     Results from last 7 days   Lab Units 24   SODIUM mmol/L 132* 132*   POTASSIUM mmol/L 4.0 3.8   CHLORIDE mmol/L 93* 96*   CO2 mmol/L 25.5 23.0   BUN mg/dL 19 18   CREATININE mg/dL 1.16* 1.02*   GLUCOSE mg/dL 218* 117*   CALCIUM mg/dL 9.3 9.6    ALK PHOS U/L  --  114   ALT (SGPT) U/L  --  24   AST (SGOT) U/L  --  29   PROBNP pg/mL  --  6,504.0*     Estimated Creatinine Clearance: 30.1 mL/min (A) (by C-G formula based on SCr of 1.16 mg/dL (H)).    Microbiology Results Abnormal       None            Imaging Results (Last 24 Hours)       Procedure Component Value Units Date/Time    CT Chest Without Contrast Diagnostic [[id number]] Collected: 11/26/24 1104     Updated: 11/26/24 1135    Narrative:      CT CHEST WITHOUT IV CONTRAST     HISTORY: Abnormal chest radiograph, cough     TECHNIQUE: Radiation dose reduction techniques were utilized, including  automated exposure control and exposure modulation based on body size.   3 mm images were obtained through the chest without IV contrast.     COMPARISON CT chest 7/30/2024     FINDINGS:  Evaluation is suboptimal without intravenous contrast.     No mediastinal or axillary adenopathy by size criteria. Postsurgical  change from prior CABG are present. No significant pericardial effusion.  There is intralobular septal thickening within the bilateral lung  apices. Reticulonodular opacification within the right upper lobe     Masslike opacification within the right lower lobe measuring 3.3 x 2.7  cm is grossly similar in size since 4/5/2018. Surrounding irregular  pulmonary groundglass opacification is present, also seen on prior CTs.  There is worsening consolidation within the right middle lobe since  7/30/2024. There is also small worsening area of pulmonary opacification  within the anterior aspect left upper lobe. Small left pleural effusion.  Rounded masslike pulmonary consolidation within the left lower lobe,  grossly unchanged in size since 4/27/2024. Small left pleural effusion  is increased in size since 7/30/2024..     No suspicious lytic or blastic osseous lesion.       Impression:      1.  New pulmonary consolidation within the right middle lobe as well as  a small area of opacification within the anterior  aspect the left upper  lobe concerning for multifocal pneumonia. Stenosis and filling defect  within a right middle lobe bronchus is new. A small left pleural  effusion has increased in size since 7/30/2024. Given the somewhat  masslike appearance of consolidation within the right middle lobe,  follow-up with CT chest in 6 to 8 weeks recommended to ensure resolution  exclude the possibility malignancy.  2.  Bibasilar rounded pulmonary consolidation favored to represent round  atelectasis, as seen over multiple prior CT; however, continued  attention on above-mentioned follow-up is recommended.. Small at least  partially loculated right pleural effusion, as before.  3.  Intralobular septal thickening within the bilateral lung apices  which can be seen in setting of early pulmonary edema in the appropriate  context.  4.  Other findings as above.           This report was finalized on 11/26/2024 11:32 AM by Dr. Jose Edwards M.D on Workstation: [id number]       XR Chest 1 View [[id number]] Collected: 11/25/24 1941     Updated: 11/25/24 2038    Narrative:      XR CHEST 1 VW-     HISTORY: 89-year-old female with dyspnea.     FINDINGS: Since the 03/29/2024 radiograph, there has been development of  airspace opacities at the mid and lower lung fields bilaterally likely  representing multifocal pneumonia superimposed on chronic scarring.  There is also central pulmonary vascular congestion. There are likely  small bilateral pleural effusions.     This report was finalized on 11/25/2024 8:34 PM by Dr. Nkiia Sharma M.D  on Workstation: [id number]               Results for orders placed during the hospital encounter of 03/29/24    Adult Transthoracic Echo Complete W/ Cont if Necessary Per Protocol    Interpretation Summary    Left ventricular systolic function is normal. Calculated left ventricular EF = 61.3%    Left ventricular diastolic function is consistent with (grade II w/high LAP) pseudonormalization.    Repaired  mitral valve with MitraClip.  Mean gradient slightly elevated compared to prior study( 8mmHg vs 4 mmHg)  but valve opens and closes well.  There is mild mitral valve regurgitation present.    Calculated right ventricular systolic pressure from tricuspid regurgitation is 43 mmHg.Mild pulmonary hypertension is present.      I have reviewed the medications:  Scheduled Meds:aspirin, 81 mg, Oral, Daily  [START ON 11/28/2024] atorvastatin, 40 mg, Oral, Once per day on Monday Thursday  azithromycin, 500 mg, Oral, Q24H  carvedilol, 25 mg, Oral, BID  cefTRIAXone, 2,000 mg, Intravenous, Q24H  clopidogrel, 75 mg, Oral, Daily  [START ON 11/27/2024] furosemide, 40 mg, Intravenous, Daily  ipratropium-albuterol, 3 mL, Nebulization, Q6H While Awake - RT  isosorbide mononitrate, 90 mg, Oral, Q24H  levothyroxine, 50 mcg, Oral, Q AM  [START ON 11/27/2024] predniSONE, 20 mg, Oral, Daily With Breakfast      Continuous Infusions:   PRN Meds:.  acetaminophen **OR** acetaminophen **OR** acetaminophen    albuterol    aluminum-magnesium hydroxide-simethicone    benzonatate    senna-docusate sodium **AND** polyethylene glycol **AND** bisacodyl **AND** bisacodyl    guaiFENesin-dextromethorphan    melatonin    nitroglycerin    ondansetron ODT **OR** ondansetron    sodium chloride    Assessment & Plan   Assessment & Plan     Active Hospital Problems    Diagnosis  POA    **Bronchitis [J40]  Yes    History of DVT (deep vein thrombosis) [Z86.718]  Not Applicable    Hyperlipidemia [E78.5]  Yes    Chronic diastolic CHF (congestive heart failure) [I50.32]  Yes    Chronic kidney failure, stage 3 (moderate) [N18.30]  Yes    Coronary artery disease [I25.10]  Yes    History of bladder cancer [C80.1]  Yes    Essential hypertension [I10]  Yes    S/P CABG (coronary artery bypass graft) [Z95.1]  Not Applicable      Resolved Hospital Problems   No resolved problems to display.        Brief Hospital Course to date:  Brodie Mo is a 89 y.o. female  presents the hospital with cough and general weakness and symptoms of acute bronchitis and on CT scan was found to have right middle lobe pneumonia.    Discussion/plan for today: CT images reviewed and pneumonia present.  Antibiotics adjusted and azithromycin added.  Adjusting steroids.  Discussed with cardiology.  Otherwise per below.  Treatment plan discussed with patient who is in agreement.    Right middle lobe pneumonia, presumed bacterial:  Plan for broad-spectrum coverage with azithromycin and ceftriaxone.  Patient initially started on IV steroids, wean to low-dose oral based on risk versus benefit assessment.  Radiologist recommends repeat CT scan in 6 to 8 weeks.  This finding was discussed with the patient who can follow-up with repeat scan with primary care provider.  Small right effusion noted possibly from CHF or parapneumonic.    Acute CHF:  Responding to IV diuresis.  Case discussed with cardiologist and I have consulted them to assist today.  Continue appropriate cardiac medication.  Monitor and adjust as needed.    CAD and hyperlipidemia and hypertension: No chest pain.  Continue aspirin and atorvastatin.  Patient initially with hypertensive urgency.  Plan for labetalol as needed for greater than 180.    History of DVT: Noted.  Not currently on anticoagulation    CKD 3a:  Monitor renal function and electrolytes.    DVT Prophylaxis:   SCDs      Disposition:pending    CODE STATUS:   Code Status and Medical Interventions: CPR (Attempt to Resuscitate); Full Support   Ordered at: 11/25/24 4227     Code Status (Patient has no pulse and is not breathing):    CPR (Attempt to Resuscitate)     Medical Interventions (Patient has pulse or is breathing):    Full Support       Aniceto Walker MD  11/26/24

## 2024-11-26 NOTE — CONSULTS
Patient Name: Brodie Mo  :10/31/1932  89 y.o.    Date of Admission: 2024  Date of Consultation:  24  Encounter Provider: STACIA Guaman  Place of Service: [facility] CARDIOLOGY  Referring Provider: Brooke Smith MD  Patient Care Team:  Georgie Diane MD as PCP - General (Internal Medicine)      Chief complaint: cough      History of Present Illness: Brodie Mo is a 89 y.o. female patient of Dr. Hatch's with coronary artery disease and mitral insufficiency.  In , she had a CABG x 2 with a LIMA to the LAD and a vein graft to the OM 2.  In 2020, she underwent a MitraClip.     In May 2023, she was noted to be in [place] at her PCPs office.  We never saw any evidence of high degree block.  She was never symptomatic, so we chose to watch things     In May of this year, she underwent DEBO of the mid RCA.    In July, she developed recurrent dyspnea and chest heaviness and was referred for repeat cardiac catheterization.  This demonstrated in-stent restenosis of the proximal RCA for which she underwent DEBO.    Unfortunately, she continued to struggle and in September she was sent for a repeat cardiac catheterization.  This demonstrated 2/2 bypass grafts patent and a subtotally occluded diagonal which Dr. Hatch felt was likely the cause of her symptoms. Unfortunately, he was unable to get a wire across, and medical management was recommended.     She continued to struggle with chest pressure, shortness of breath, weakness, and fatigue.  Several medication changes were made and she finally started feeling better.    I last saw her in the office on .  It was the best she had felt in some time.  Her blood pressure was elevated, and I increased the carvedilol.  Otherwise, she was advised to follow-up in 3 months.    She came to the ED yesterday complaining of a persistent productive cough.  It sounds like she was seen last  Wednesday due to similar symptoms and given a Z-Sebastian.  She reported minimal improvement in her symptoms.  Chest x-ray showed development of airspace opacities in lower lung fields bilaterally.  There is also central pulmonary vascular congestion and likely small bilateral pleural effusions.  She was given a one-time dose of Lasix. Respiratory viral panel negative. CRP is elevated.     She says she's feeling better this morning. She has chronic dyspnea on exertion that has been relatively stable. However, in addition to the persistent cough, she also reports some PND/orthopnea over the last week. She had a fever last week when this all started but denies any fevers since. She denies chest pain, palpitations, edema, dizziness, syncope.     Past Medical History:   Diagnosis Date    CAD (coronary artery disease)     Cancer     bladder     Chronic kidney disease     Deep vein thrombosis     Left leg-2002    Disease of thyroid gland     Health care maintenance     HTN (hypertension)     Hyperlipidemia     LVH (left ventricular hypertrophy)     Pneumonia     S/P CABG (coronary artery bypass graft)        Past Surgical History:   Procedure Laterality Date    BLADDER SURGERY      cancer    CARDIAC CATHETERIZATION N/A 5/2/2024    Procedure: Right Heart Cath;  Surgeon: José Hatch MD;  Location: [facility] CATH INVASIVE LOCATION;  Service: Cardiovascular;  Laterality: N/A;    CARDIAC CATHETERIZATION N/A 5/2/2024    Procedure: Left Heart Cath;  Surgeon: José Hatch MD;  Location: [facility] CATH INVASIVE LOCATION;  Service: Cardiovascular;  Laterality: N/A;    CARDIAC CATHETERIZATION N/A 5/2/2024    Procedure: Native mammary injection;  Surgeon: José Hatch MD;  Location: [facility] CATH INVASIVE LOCATION;  Service: Cardiovascular;  Laterality: N/A;    CARDIAC CATHETERIZATION  5/2/2024    Procedure: Saphenous Vein Graft;  Surgeon: José Hatch MD;  Location: [facility] CATH INVASIVE LOCATION;  Service: Cardiovascular;;     CARDIAC CATHETERIZATION N/A 5/2/2024    Procedure: Percutaneous Coronary Intervention;  Surgeon: José Hatch MD;  Location:  [facility] CATH INVASIVE LOCATION;  Service: Cardiovascular;  Laterality: N/A;    CARDIAC CATHETERIZATION N/A 5/2/2024    Procedure: Stent DEBO coronary;  Surgeon: José Hatch MD;  Location:  [facility] CATH INVASIVE LOCATION;  Service: Cardiovascular;  Laterality: N/A;    CARDIAC CATHETERIZATION N/A 5/2/2024    Procedure: Optical Coherence Tomography;  Surgeon: José Hatch MD;  Location:  [facility] CATH INVASIVE LOCATION;  Service: Cardiovascular;  Laterality: N/A;    CARDIAC CATHETERIZATION N/A 5/2/2024    Procedure: Resting Full Cycle Ratio;  Surgeon: José Hatch MD;  Location: [facility] CATH INVASIVE LOCATION;  Service: Cardiovascular;  Laterality: N/A;    CARDIAC CATHETERIZATION N/A 8/29/2024    Procedure: Coronary angiography;  Surgeon: José Hatch MD;  Location: [facility] CATH INVASIVE LOCATION;  Service: Cardiovascular;  Laterality: N/A;    CARDIAC CATHETERIZATION N/A 8/29/2024    Procedure: Percutaneous Coronary Intervention;  Surgeon: José Hatch MD;  Location: [facility] CATH INVASIVE LOCATION;  Service: Cardiovascular;  Laterality: N/A;    CARDIAC CATHETERIZATION N/A 8/29/2024    Procedure: Optical Coherence Tomography;  Surgeon: José Hatch MD;  Location: [facility] CATH INVASIVE LOCATION;  Service: Cardiovascular;  Laterality: N/A;    CARDIAC CATHETERIZATION N/A 8/29/2024    Procedure: Stent DEBO coronary;  Surgeon: José Hatch MD;  Location: [facility] CATH INVASIVE LOCATION;  Service: Cardiovascular;  Laterality: N/A;    CARDIAC CATHETERIZATION N/A 9/12/2024    Procedure: Left Heart Cath;  Surgeon: José Hatch MD;  Location: [facility] CATH INVASIVE LOCATION;  Service: Cardiovascular;  Laterality: N/A;    CARDIAC CATHETERIZATION N/A 9/12/2024    Procedure: Stent DEBO coronary;  Surgeon: José Hatch MD;  Location: [facility] CATH INVASIVE LOCATION;  Service:  Cardiovascular;  Laterality: N/A;    CARPAL TUNNEL RELEASE Right 12/15/2021    Procedure: RIGHT CARPAL TUNNEL RELEASE WITH POSSIBLE SYNOVECTOMY;  Surgeon: Georgie Hines MD;  [place] [facility] [place];  Service: Plastics;  Laterality: Right;    CORONARY ARTERY BYPASS GRAFT      2002 with Dr Prabhakar; AGUAYO to LAD and SVG to OM2; 2005 cath all grafts open and 30% dx in RCA; nl stress in 2010, 2013    HYSTERECTOMY      MITRAL VALVE REPAIR/REPLACEMENT N/A 9/2/2020    Procedure: TRANSCATHETER MITRAL VALVE REPAIR;  Surgeon: Edward Humphreys MD;  Location: [facility] CATH INVASIVE LOCATION;  Service: Cardiovascular;  Laterality: N/A;         Prior to Admission medications    Medication Sig Start Date End Date Taking? Authorizing Provider   aspirin 81 MG tablet Take 1 tablet by mouth Daily. 5/20/13  Yes Conner Coe MD   atorvastatin (LIPITOR) 40 MG tablet Take 1 tablet by mouth 2 (Two) Times a Week. Tuesdays and Saturdays.   Yes Conner Coe MD   B Complex Vitamins (vitamin b complex) capsule capsule Take  by mouth Daily.  Patient taking differently: Take 1 capsule by mouth 2 (Two) Times a Week. Tuesdays and Saturdays.   Yes Conner Coe MD   carvedilol (COREG) 12.5 MG tablet Take 1 tablet by mouth 2 (Two) Times a Day. 11/11/24  Yes Brenda Gutiérrez APRN   cholecalciferol (VITAMIN D3) 1000 units tablet Take 1 tablet by mouth Daily.   Yes Conner Coe MD   clopidogrel (PLAVIX) 75 MG tablet Take 1 tablet by mouth Daily.   Yes Conner Coe MD   coenzyme Q10 100 MG capsule Take 1 capsule by mouth Daily.   Yes Conner Coe MD   furosemide (LASIX) 40 MG tablet TAKE 1 TABLET BY MOUTH EVERY DAY 7/29/24  Yes Brenda Gutiérrez APRN   isosorbide mononitrate (IMDUR) 30 MG 24 hr tablet Take 3 tablets by mouth Daily.  Patient taking differently: Take 3 tablets by mouth Daily. qam 9/13/24  Yes José Hatch MD   levothyroxine (SYNTHROID, LEVOTHROID) 50 MCG tablet Take  1 tablet by mouth Daily. 8/1/24  Yes Provider, Conner, MD       Allergies   Allergen Reactions    No Known Drug Allergy        Social History     Socioeconomic History    Marital status:    Tobacco Use    Smoking status: Former     Passive exposure: Past    Smokeless tobacco: Never    Tobacco comments:     CAFFEINE USE: 2  CUPS COFFEE DAILY   Vaping Use    Vaping status: Never Used   Substance and Sexual Activity    Alcohol use: Yes     Alcohol/week: 7.0 standard drinks of alcohol     Types: 7 Shots of liquor per week     Comment: 1 DRINK DAILY    Drug use: No    Sexual activity: Defer       Family History   Problem Relation Age of Onset    Heart disease Mother     Kidney cancer Father     No Known Problems Maternal Grandmother     No Known Problems Maternal Grandfather     No Known Problems Paternal Grandmother     No Known Problems Paternal Grandfather     Heart disease Sister     Heart disease Brother        REVIEW OF SYSTEMS:   Review of Systems   Constitutional: Negative for chills, fever and malaise/fatigue.   Cardiovascular:  Positive for dyspnea on exertion, orthopnea and paroxysmal nocturnal dyspnea. Negative for chest pain, leg swelling, near-syncope, palpitations and syncope.   Respiratory:  Positive for cough. Negative for shortness of breath.    Musculoskeletal:  Negative for joint pain, joint swelling and myalgias.   Gastrointestinal:  Negative for abdominal pain, diarrhea, melena, nausea and vomiting.   Genitourinary:  Negative for frequency and hematuria.   Neurological:  Negative for light-headedness, numbness, paresthesias and seizures.   Allergic/Immunologic: Negative.    All other systems reviewed and are negative.          Objective:     Vitals:    11/25/24 2319 11/26/24 0400 11/26/24 0711 11/26/24 0715   BP: 161/89 178/61 165/51    BP Location: Left arm Right arm Right arm    Patient Position: Lying Lying Lying    Pulse: 78 74 65    Resp: 16 16 16 24   Temp: 99 °F (37.2 °C) 97.5 °F  (36.4 °C) 97.3 °F (36.3 °C)    TempSrc: Oral Oral Oral    SpO2: 90% 91% 94%    Weight:         Body mass index is 22.64 kg/m².    Physical Exam:  Constitutional:       General: Not in acute distress.     Appearance: Well-developed. Not diaphoretic.   Eyes:      Pupils: Pupils are equal, round, and reactive to light.   HENT:      Head: Normocephalic and atraumatic.   Neck:      Thyroid: No thyromegaly.      Vascular: No JVD.   Pulmonary:      Effort: Pulmonary effort is normal. No respiratory distress.      Breath sounds: Normal breath sounds.   Cardiovascular:      Normal rate. Regular rhythm.   Pulses:     Intact distal pulses.   Edema:     Peripheral edema absent.   Abdominal:      General: Bowel sounds are normal. There is no distension.      Palpations: Abdomen is soft. There is no hepatomegaly or splenomegaly.      Tenderness: There is no abdominal tenderness.   Musculoskeletal: Normal range of motion. Skin:     General: Skin is warm and dry.      Findings: No erythema.   Neurological:      Mental Status: Alert and oriented to person, place, and time.   Psychiatric:         Behavior: Behavior normal.         Judgment: Judgment normal.           Lab Review:   Results from last 7 days   Lab Units 11/26/24 0626 11/25/24 2103   SODIUM mmol/L 132* 132*   POTASSIUM mmol/L 4.0 3.8   CHLORIDE mmol/L 93* 96*   CO2 mmol/L 25.5 23.0   BUN mg/dL 19 18   CREATININE mg/dL 1.16* 1.02*   GLUCOSE mg/dL 218* 117*   CALCIUM mg/dL 9.3 9.6         Results from last 7 days   Lab Units 11/26/24 0626 11/25/24 2104   WBC 10*3/mm3 6.50 8.90   HEMOGLOBIN g/dL 11.0* 11.6*   HEMATOCRIT % 35.0 35.3   PLATELETS 10*3/mm3 198 199     Results from last 7 days   Lab Units 11/25/24 2103   INR  1.12*         Results from last 7 days   Lab Units 11/26/24 0626   MAGNESIUM mg/dL 2.1         Previous EKG 11/11/2024      Admission EKG      I personally viewed and interpreted the patient's EKG/Telemetry data.      Current Facility-Administered  Medications:     acetaminophen (TYLENOL) tablet 650 mg, 650 mg, Oral, Q4H PRN **OR** acetaminophen (TYLENOL) 160 MG/5ML oral solution 650 mg, 650 mg, Oral, Q4H PRN **OR** acetaminophen (TYLENOL) suppository 650 mg, 650 mg, Rectal, Q4H PRN, Shaunna Fonseca APRN    albuterol (PROVENTIL) nebulizer solution 0.083% 2.5 mg/3mL, 2.5 mg, Nebulization, Q6H PRN, Brooke Smith MD    aluminum-magnesium hydroxide-simethicone (MAALOX MAX) 400-400-40 MG/5ML suspension 15 mL, 15 mL, Oral, Q6H PRN, Shaunna Fonseca APRN    aspirin EC tablet 81 mg, 81 mg, Oral, Daily, Shaunna Fonseca APRN    [START ON 11/28/2024] atorvastatin (LIPITOR) tablet 40 mg, 40 mg, Oral, Once per day on Monday Thursday, Shaunna Fonseca APRN    atorvastatin (LIPITOR) tablet 40 mg, 40 mg, Oral, Once, Aniceto Walker MD    benzonatate (TESSALON) capsule 200 mg, 200 mg, Oral, TID PRN, Martin Stratton MD    sennosides-docusate (PERICOLACE) 8.6-50 MG per tablet 2 tablet, 2 tablet, Oral, BID PRN **AND** polyethylene glycol (MIRALAX) packet 17 g, 17 g, Oral, Daily PRN **AND** bisacodyl (DULCOLAX) EC tablet 5 mg, 5 mg, Oral, Daily PRN **AND** bisacodyl (DULCOLAX) suppository 10 mg, 10 mg, Rectal, Daily PRN, Shaunna Fonseca APRN    carvedilol (COREG) tablet 25 mg, 25 mg, Oral, BID, Brenda Gutiérrez APRN    clopidogrel (PLAVIX) tablet 75 mg, 75 mg, Oral, Daily, Shaunna Fonseca APRN    furosemide (LASIX) injection 40 mg, 40 mg, Intravenous, BID, Martin Stratton MD    guaiFENesin-dextromethorphan (ROBITUSSIN DM) 100-10 MG/5ML syrup 5 mL, 5 mL, Oral, Q4H PRN, Martin Stratton MD, 5 mL at 11/26/24 0440    ipratropium-albuterol (DUO-NEB) nebulizer solution 3 mL, 3 mL, Nebulization, Q6H While Awake - RT, StinglBrooke MD, 3 mL at 11/26/24 0715    isosorbide mononitrate (IMDUR) 24 hr tablet 90 mg, 90 mg, Oral, Q24H, Shaunna Fonseca APRN    levothyroxine (SYNTHROID, LEVOTHROID) tablet 50 mcg,  50 mcg, Oral, Q AM, Shaunna Fonseca APRN, 50 mcg at 11/25/24 2238    melatonin tablet 2.5 mg, 2.5 mg, Oral, Nightly PRN, Shaunna Fonseca APRN    methylPREDNISolone sodium succinate (SOLU-Medrol) injection 40 mg, 40 mg, Intravenous, Q8H, StingBrooke thomas MD, 40 mg at 11/26/24 0440    nitroglycerin (NITROSTAT) SL tablet 0.4 mg, 0.4 mg, Sublingual, Q5 Min PRN, Shaunna Fonseca APRN    ondansetron ODT (ZOFRAN-ODT) disintegrating tablet 4 mg, 4 mg, Oral, Q6H PRN **OR** ondansetron (ZOFRAN) injection 4 mg, 4 mg, Intravenous, Q6H PRN, Shaunna Fonseca APRN    sodium chloride 0.9 % flush 10 mL, 10 mL, Intravenous, PRN, Shaunna Fonseca APRN    Assessment and Plan:       Active Hospital Problems    Diagnosis  POA    **Bronchitis [J40]  Yes    History of DVT (deep vein thrombosis) [Z86.718]  Not Applicable    Hyperlipidemia [E78.5]  Yes    Chronic diastolic CHF (congestive heart failure) [I50.32]  Yes    Chronic kidney failure, stage 3 (moderate) [N18.30]  Yes    Coronary artery disease [I25.10]  Yes    History of bladder cancer [C80.1]  Yes    Essential hypertension [I10]  Yes    S/P CABG (coronary artery bypass graft) [Z95.1]  Not Applicable      Resolved Hospital Problems   No resolved problems to display.     1.  Dyspnea/cough.  Chest x-ray concerning for multifocal pneumonia.  She also has vascular congestion and small bilateral pleural effusions.  CRP is elevated. She did have a fever a week ago but none since. She did not respond to the Z-pack.   2.  Acute on chronic diastolic CHF.  Her last echocardiogram was in March at which time she had normal LV systolic function and grade 2 diastolic dysfunction. Repeat echocardiogram ordered. Continue Lasix.   3.  Coronary artery disease.  History of CABG.  Status post DEBO of the mid RCA in May of this year.  In July, she developed in-stent restenosis of the proximal RCA and underwent DEBO.  In September, she was found to have a subtotally occluded  diagonal for which medical therapy was recommended.  4.  Hypertension. Her blood pressure is quite elevated. She has a number of medication intolerances including amlodipine, hydralazine, and HCTZ.  Losartan resulted in worsening renal insufficiency. She seems to tolerate the carvedilol, and I am going to increase the dose to 25 BID.   5.  Mitral insufficiency.  Status post mitral clip in 2020.  6.  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 Holter from September with a 29% PVC burden.  Carvedilol increased. We will see what her LV function is like on the echo.     Brenda Gutiérrez, STACIA  11/26/24  09:28 EST

## 2024-11-26 NOTE — PLAN OF CARE
Problem: Adult Inpatient Plan of Care  Goal: Plan of Care Review  Outcome: Progressing  Flowsheets (Taken 11/25/2024 1946)  Outcome Evaluation: This is a sofia, independent 89 year old female. Up ad jessica, steady gait. Glasses to correct vision. Friend at bedside, supportive. She has been experiencing a frequent cough interfering with eating, sleeping etc. She was sent over from her outpatient doctors appointment for further work-up and treatment. Home medication list verified. Attending notified.  Goal: Patient-Specific Goal (Individualized)  Outcome: Progressing  Goal: Absence of Hospital-Acquired Illness or Injury  Outcome: Progressing  Intervention: Identify and Manage Fall Risk  Description: Perform standard risk assessment on admission using a validated tool or comprehensive approach appropriate to the patient; reassess fall risk frequently, with change in status or transfer to another level of care.  Communicate risk to interprofessional healthcare team; ensure fall risk visible cue.  Determine need for increased observation, equipment and environmental modification, as well as use of supportive, nonskid footwear.  Adjust safety measures to individual needs and identified risk factors.  Reinforce the importance of active participation with fall risk prevention, safety, and physical activity with the patient and family.  Perform regular intentional rounding to assess need for position change, pain assessment and personal needs, including assistance with toileting.  Recent Flowsheet Documentation  Taken 11/25/2024 1832 by Temitope Grigsby RN  Safety Promotion/Fall Prevention:   assistive device/personal items within reach   clutter free environment maintained   fall prevention program maintained   nonskid shoes/slippers when out of bed   room organization consistent   safety round/check completed  Intervention: Prevent Skin Injury  Description: Perform a screening for skin injury risk, such as pressure or  moisture-associated skin damage on admission and at regular intervals throughout hospital stay.  Keep all areas of skin (especially folds) clean and dry.  Maintain adequate skin hydration.  Relieve and redistribute pressure and protect bony prominences and skin at risk for injury; implement measures based on patient-specific risk factors.  Match turning and repositioning schedule to clinical condition.  Encourage weight shift frequently; assist with reposition if unable to complete independently.  Float heels off bed; avoid pressure on the Achilles tendon.  Keep skin free from extended contact with medical devices.  Optimize nutrition and hydration.  Encourage functional activity and mobility, as early as tolerated.  Use aids (e.g., slide boards, mechanical lift) during transfer.  Recent Flowsheet Documentation  Taken 11/25/2024 1832 by Temitope Grigsby RN  Body Position:   position changed independently   neutral body alignment   neutral head position   supine  Intervention: Prevent Infection  Description: Maintain skin and mucous membrane integrity; promote hand, oral and pulmonary hygiene.  Optimize fluid balance, nutrition, sleep and glycemic control to maximize infection resistance.  Identify potential sources of infection early to prevent or mitigate progression of infection (e.g., wound, lines, devices).  Evaluate ongoing need for invasive devices; remove promptly when no longer indicated.  Review vaccination status.  Recent Flowsheet Documentation  Taken 11/25/2024 1832 by Temitope Grigsby, RN  Infection Prevention:   environmental surveillance performed   equipment surfaces disinfected   hand hygiene promoted   single patient room provided  Goal: Optimal Comfort and Wellbeing  Outcome: Progressing  Intervention: Monitor Pain and Promote Comfort  Description: Assess pain level, treatment efficacy and patient response at regular intervals using a consistent pain scale.  Consider the presence and impact of  preexisting chronic pain.  Encourage patient and caregiver involvement in pain assessment, interventions and safety measures.  Promote activity; balance with sleep and rest to enhance healing.  Recent Flowsheet Documentation  Taken 11/25/2024 1832 by Temitope Grigsby, RN  Pain Management Interventions:   care clustered   pillow support provided   position adjusted  Intervention: Provide Person-Centered Care  Description: Use a family-focused approach to care; encourage support system presence and participation.  Develop trust and rapport by proactively providing information, encouraging questions, addressing concerns and offering reassurance.  Acknowledge emotional response to hospitalization.  Recognize and utilize personal coping strategies and strengths; develop goals via shared decision-making.  Honor spiritual and cultural preferences.  Recent Flowsheet Documentation  Taken 11/25/2024 1832 by Temitope Grigsby, RN  Trust Relationship/Rapport:   care explained   choices provided   emotional support provided   questions encouraged   empathic listening provided   questions answered   reassurance provided   thoughts/feelings acknowledged  Goal: Readiness for Transition of Care  Outcome: Progressing  Intervention: Mutually Develop Transition Plan  Description: Identify available resources for support (e.g., family, friends, community).  Identify and address barriers to ongoing treatment and home management (e.g., environmental, financial).  Provide opportunities to practice self-management skills.  Assess and monitor emotional readiness for transition.  Establish or reconnect linkage with outpatient providers or community-based services.  Recent Flowsheet Documentation  Taken 11/25/2024 1913 by Temitope Grigsby, RN  Transportation Anticipated: family or friend will provide  Patient/Family Anticipated Services at Transition: none  Patient/Family Anticipates Transition to: home  Taken 11/25/2024 1909 by Temitope Grigsby,  RN  Equipment Currently Used at Home: none     Problem: COPD (Chronic Obstructive Pulmonary Disease) Exacerbation  Goal: Optimal Coping with Chronic Illness  Outcome: Progressing  Intervention: Support and Optimize Psychosocial Response  Description: Monitor for signs of anxiety-dyspnea cycle; use nonpharmacologic strategies to relieve pain, anxiety, stress and panic to restore sense of control.  Acknowledge, normalize, validate intensity and complexity of patient and family/caregiver response to and experience of COPD (chronic obstructive pulmonary disease).  Acknowledge unpredictable nature of the disease; assist with maintaining realistic hope.  Assess and monitor patient and family/caregiver perspective on quality of life and personal wellbeing; consider palliative care approach to enhance symptom relief and quality of life.  Engage patient in early, proactive and ongoing discussion about goals of care and what matters most to them. Facilitate shared decision-making regarding goals and advance care planning.  Assess and monitor patient and family/caregiver for symptoms of depression and anxiety disorders; if present, refer for treatment, such as cognitive behavioral therapy and mind-body interventions.  Recent Flowsheet Documentation  Taken 11/25/2024 1832 by Temitope Grigsby RN  Family/Support System Care: support provided  Goal: Optimal Level of Functional [place]  Outcome: Progressing  Goal: Absence of Infection Signs and Symptoms  Outcome: Progressing  Goal: Improved Oral Intake  Outcome: Progressing  Goal: Effective Oxygenation and Ventilation  Outcome: Progressing  Intervention: Optimize Oxygenation and Ventilation  Description: Assess and monitor airway, breathing and circulation for effective oxygenation and ventilation; consider oxygenation and ventilation parameters and goal.  Maintain head of bed elevation with regular position changes to minimize ventilation-perfusion mismatch and  breathlessness.  Provide oxygen therapy judiciously to avoid hyperoxemia; adjust to achieve oxygenation goal.  Anticipate the need for breathing techniques and activity pacing to minimize fatigue and breathlessness.  Minimize environmental irritants and odors that may contribute to breathlessness and wheezing.  Monitor fluid balance closely to minimize the risk of fluid overload.  Consider positive pressure ventilation to enhance oxygenation and ventilation, as well as reduce work of breathing.  Maintain close surveillance for deterioration in clinical status that requires intubation and mechanical ventilation.  Recent Flowsheet Documentation  Taken 11/25/2024 1832 by Temitope Grigsby, RN  Head of Bed (HOB) Positioning: HOB at 30 degrees   Goal Outcome Evaluation:              Outcome Evaluation: This is a sofia, independent 89 year old female. Up ad jessica, steady gait. Glasses to correct vision. Friend at bedside, supportive. She has been experiencing a frequent cough interfering with eating, sleeping etc. She was sent over from her outpatient doctors appointment for further work-up and treatment. Home medication list verified. Attending notified.

## 2024-11-26 NOTE — CASE MANAGEMENT/SOCIAL WORK
Discharge Planning Assessment  [facility]     Patient Name: Brodie Mo  MRN: [id number]  Today's Date: 11/26/2024    Admit Date: 11/25/2024    Plan: Home alone   Discharge Needs Assessment       Row Name 11/26/24 1103       Living Environment    People in Home alone    Current Living Arrangements home    Potentially Unsafe Housing Conditions none    Primary Care Provided by self    Family Caregiver if Needed child(cathy), adult    Quality of Family Relationships involved;helpful    Able to Return to Prior Arrangements yes       Resource/Environmental Concerns    Resource/Environmental Concerns none    Transportation Concerns none       Transition Planning    Patient/Family Anticipates Transition to home    Patient/Family Anticipated Services at Transition none    Transportation Anticipated family or friend will provide       Discharge Needs Assessment    Readmission Within the Last 30 Days no previous admission in last 30 days    Equipment Currently Used at Home lift device  Stair lift    Concerns to be Addressed no discharge needs identified    Anticipated Changes Related to Illness none    Equipment Needed After Discharge none                   Discharge Plan       Row Name 11/26/24 9127       Plan    Plan Home alone    Patient/Family in Agreement with Plan yes    Plan Comments Spoke with patient at bedside introduced self, explained CCP role and verified face sheet and pharmacy information. Pt lives alone in 2 level home with 1 MARK and 2nd floor bedroom, she utilizes a stair lift. She has no HH history and has been to [facility] in the past. She plans home with family to transport, denies dc needs. CCP will follow - Charito DUNCAN                  Continued Care and Services - Admitted Since 11/25/2024    No active coordination exists for this encounter.       Expected Discharge Date and Time       Expected Discharge Date Expected Discharge Time    Nov 28, 2024            Demographic Summary       Row Name  11/26/24 1103       General Information    Admission Type observation                   Functional Status       Row Name 11/26/24 1103       Functional Status    Usual Activity Tolerance excellent    Current Activity Tolerance good       Assessment of Health Literacy    Health Literacy Good       Functional Status, IADL    Medications independent    Meal Preparation independent    Housekeeping independent    Laundry independent    Shopping independent       Mental Status    General Appearance WDL WDL       Mental Status Summary    Recent Changes in Mental Status/Cognitive Functioning no changes                   Psychosocial    No documentation.                  Abuse/Neglect    No documentation.                  Legal       Row Name 11/26/24 1103       Financial/Legal    Who Manages Finances if Patient Unable son                   Substance Abuse    No documentation.                  Patient Forms    No documentation.                     Charito Davison RN

## 2024-11-26 NOTE — PLAN OF CARE
Goal Outcome Evaluation:  Plan of Care Reviewed With: patient        Progress: no change   No acute respiratory distress noted. Up to bsc to void frequently after lasix given. Frequent nonproductive cough. Alert& oriented. VSS. Rested at intervals.

## 2024-11-26 NOTE — PLAN OF CARE
Goal Outcome Evaluation:           Progress: improving  Outcome Evaluation: Pt is A&Ox4, remains room air, cough is now productive with white/pale yellow sputum, iv antibiotics lasix po anitbiotics increased some home meds, CT and ECHO completed, SR SA with pvc's, family visited, BM today, up ad jessica with falls education, HH diet thins, no complaints of pain/nausea, anxious to discharge home

## 2024-11-26 NOTE — H&P
Patient Name:  Brodie Mo  YOB: 1932  MRN:  [id number]  Admit Date:  11/25/2024  Patient Care Team:  Georgie Diane MD as PCP - General (Internal Medicine)      Subjective   History Present Illness     Chief complaint: Cough, shortness of breath    History of Present Illness  Ms. Mo is a 89 y.o. non-smoker with a history of chronic diastolic CHF, CKD stage III, CAD status post CABG, HTN, bladder cancer, DVT that presents to [facility] from her primary care's office secondary to worsening nonproductive cough and shortness of breath with possible bronchitis.  Patient reports an ongoing cough for the past week.  She seen her PCP a week ago and  prescribed a Z-Sebastian which she completed in its entirety.  She went back to her primary care office today as her cough has not been improving.  She also endorses constant shortness of breath.  Due to her worsening symptoms, her primary care doctor asked that we admit her for bronchitis.  She denies any chest pain, dizziness, lightheadedness, nausea, vomiting, or diarrhea.  She does report a fever at the beginning of this week as high as 103 that has since resolved.  She has been tested for COVID and reports this was negative.    Review of Systems   Constitutional:  Negative for chills and fever.   HENT:  Negative for congestion and rhinorrhea.    Eyes:  Negative for photophobia and visual disturbance.   Respiratory:  Positive for cough and shortness of breath.    Cardiovascular:  Negative for chest pain and palpitations.   Gastrointestinal:  Negative for constipation, diarrhea, nausea and vomiting.   Endocrine: Negative for cold intolerance and heat intolerance.   Genitourinary:  Negative for difficulty urinating and dysuria.   Musculoskeletal:  Negative for gait problem and joint swelling.   Skin:  Negative for rash and wound.   Neurological:  Negative for dizziness, light-headedness and headaches.   Psychiatric/Behavioral:   Negative for sleep disturbance and suicidal ideas.         Personal History     Past Medical History:   Diagnosis Date    CAD (coronary artery disease)     Cancer     bladder     Chronic kidney disease     Deep vein thrombosis     Left leg-2002    Disease of thyroid gland     Health care maintenance     HTN (hypertension)     Hyperlipidemia     LVH (left ventricular hypertrophy)     Pneumonia     S/P CABG (coronary artery bypass graft)      Past Surgical History:   Procedure Laterality Date    BLADDER SURGERY      cancer    CARDIAC CATHETERIZATION N/A 5/2/2024    Procedure: Right Heart Cath;  Surgeon: José Hatch MD;  [facility];  Service: Cardiovascular;  Laterality: N/A;    CARDIAC CATHETERIZATION N/A 5/2/2024    Procedure: Left Heart Cath;  Surgeon: José Hatch MD;  [facility];  Service: Cardiovascular;  Laterality: N/A;    CARDIAC CATHETERIZATION N/A 5/2/2024    Procedure: Native mammary injection;  Surgeon: José Hatch MD;  [facility];  Service: Cardiovascular;  Laterality: N/A;    CARDIAC CATHETERIZATION  5/2/2024    Procedure: Saphenous Vein Graft;  Surgeon: José Hatch MD;  [facility];  Service: Cardiovascular;;    CARDIAC CATHETERIZATION N/A 5/2/2024    Procedure: Percutaneous Coronary Intervention;  Surgeon: José Hatch MD;  [facility];  Service: Cardiovascular;  Laterality: N/A;    CARDIAC CATHETERIZATION N/A 5/2/2024    Procedure: Stent DEBO coronary;  Surgeon: José Hatch MD;  [facility];  Service: Cardiovascular;  Laterality: N/A;    CARDIAC CATHETERIZATION N/A 5/2/2024    Procedure: Optical Coherence Tomography;  Surgeon: José Hatch MD;  Location: [facility] CATH INVASIVE LOCATION;  Service: Cardiovascular;  Laterality: N/A;    CARDIAC CATHETERIZATION N/A 5/2/2024    Procedure: Resting Full Cycle  Ratio;  Surgeon: José Hatch MD;  Location: [facility] CATH INVASIVE LOCATION;  Service: Cardiovascular;  Laterality: N/A;    CARDIAC CATHETERIZATION N/A 8/29/2024    Procedure: Coronary angiography;  Surgeon: José Hatch MD;  Location: [facility] CATH INVASIVE LOCATION;  Service: Cardiovascular;  Laterality: N/A;    CARDIAC CATHETERIZATION N/A 8/29/2024    Procedure: Percutaneous Coronary Intervention;  Surgeon: José Hatch MD;  Location: [facility] CATH INVASIVE LOCATION;  Service: Cardiovascular;  Laterality: N/A;    CARDIAC CATHETERIZATION N/A 8/29/2024    Procedure: Optical Coherence Tomography;  Surgeon: José Hatch MD;  [facility];  Service: Cardiovascular;  Laterality: N/A;    CARDIAC CATHETERIZATION N/A 8/29/2024    Procedure: Stent DEBO coronary;  Surgeon: José Hatch MD;  Location: [facility] CATH INVASIVE LOCATION;  Service: Cardiovascular;  Laterality: N/A;    CARDIAC CATHETERIZATION N/A 9/12/2024    Procedure: Left Heart Cath;  Surgeon: José Hatch MD;  Location: [facility] INVASIVE LOCATION;  Service: Cardiovascular;  Laterality: N/A;    CARDIAC CATHETERIZATION N/A 9/12/2024    Procedure: Stent DEBO coronary;  Surgeon: José Hatch MD;  Location: [facility] CATH INVASIVE LOCATION;  Service: Cardiovascular;  Laterality: N/A;    CARPAL TUNNEL RELEASE Right 12/15/2021    Procedure: RIGHT CARPAL TUNNEL RELEASE WITH POSSIBLE SYNOVECTOMY;  Surgeon: Georgie Hines MD;  Location: [facility] MAIN OR;  Service: Plastics;  Laterality: Right;    CORONARY ARTERY BYPASS GRAFT      2002 with Dr Prabhakar; AGUAYO to LAD and SVG to OM2; 2005 cath all grafts open and 30% dx in RCA; nl stress in 2010, 2013    HYSTERECTOMY      MITRAL VALVE REPAIR/REPLACEMENT N/A 9/2/2020    Procedure: TRANSCATHETER MITRAL VALVE REPAIR;  Surgeon: Edward Humphreys MD;  Location: [facility] CATH INVASIVE LOCATION;  Service: Cardiovascular;  Laterality: N/A;     Family History   Problem Relation  Age of Onset    Heart disease Mother     Kidney cancer Father     No Known Problems Maternal Grandmother     No Known Problems Maternal Grandfather     No Known Problems Paternal Grandmother     No Known Problems Paternal Grandfather     Heart disease Sister     Heart disease Brother      Social History     Tobacco Use    Smoking status: Former     Passive exposure: Past    Smokeless tobacco: Never    Tobacco comments:     CAFFEINE USE: 2  CUPS COFFEE DAILY   Vaping Use    Vaping status: Never Used   Substance Use Topics    Alcohol use: Yes     Alcohol/week: 7.0 standard drinks of alcohol     Types: 7 Shots of liquor per week     Comment: 1 DRINK DAILY    Drug use: No     No current facility-administered medications on file prior to encounter.     Current Outpatient Medications on File Prior to Encounter   Medication Sig Dispense Refill    aspirin 81 MG tablet Take 1 tablet by mouth Daily.      atorvastatin (LIPITOR) 40 MG tablet Take 1 tablet by mouth 2 (Two) Times a Week. Tuesdays and Saturdays.      B Complex Vitamins (vitamin b complex) capsule capsule Take  by mouth Daily. (Patient taking differently: Take 1 capsule by mouth 2 (Two) Times a Week. Tuesdays and Saturdays.)      carvedilol (COREG) 12.5 MG tablet Take 1 tablet by mouth 2 (Two) Times a Day.      cholecalciferol (VITAMIN D3) 1000 units tablet Take 1 tablet by mouth Daily.      clopidogrel (PLAVIX) 75 MG tablet Take 1 tablet by mouth Daily.      coenzyme Q10 100 MG capsule Take 1 capsule by mouth Daily.      furosemide (LASIX) 40 MG tablet TAKE 1 TABLET BY MOUTH EVERY DAY 90 tablet 1    isosorbide mononitrate (IMDUR) 30 MG 24 hr tablet Take 3 tablets by mouth Daily. (Patient taking differently: Take 3 tablets by mouth Daily. qam) 30 tablet 6    levothyroxine (SYNTHROID, LEVOTHROID) 50 MCG tablet Take 1 tablet by mouth Daily.       Allergies   Allergen Reactions    No Known Drug Allergy        Objective    Objective     Vital Signs  Temp:  [98.6 °F (37  °C)] 98.6 °F (37 °C)  Heart Rate:  [82-88] 84  Resp:  [16] 16  BP: (198)/(60) 198/60  SpO2:  [90 %-92 %] 90 %  on   ;   Device (Oxygen Therapy): room air  Body mass index is 22.64 kg/m².    Physical Exam  Vitals and nursing note reviewed.   Constitutional:       General: She is not in acute distress.     Appearance: She is well-developed. She is not toxic-appearing.      Comments: Appears stated age   HENT:      Head: Normocephalic and atraumatic.   Eyes:      General: No scleral icterus.        Right eye: No discharge.         Left eye: No discharge.      Conjunctiva/sclera: Conjunctivae normal.   Neck:      Vascular: No JVD.   Cardiovascular:      Rate and Rhythm: Normal rate and regular rhythm.      Heart sounds: Normal heart sounds. No murmur heard.     No friction rub. No gallop.   Pulmonary:      Effort: Pulmonary effort is normal. No respiratory distress.      Breath sounds: Decreased breath sounds and wheezing present. No rales.      Comments: Currently on room air  Abdominal:      General: Bowel sounds are normal. There is no distension.      Palpations: Abdomen is soft.      Tenderness: There is no abdominal tenderness. There is no guarding.   Musculoskeletal:         General: No tenderness or deformity. Normal range of motion.      Cervical back: Normal range of motion and neck supple.   Skin:     General: Skin is warm and dry.      Capillary Refill: Capillary refill takes less than 2 seconds.   Neurological:      Mental Status: She is alert and oriented to person, place, and time.   Psychiatric:         Behavior: Behavior normal.     Results Review:  I reviewed the patient's new clinical results.    Lab Results (last 24 hours)       Procedure Component Value Units Date/Time    Respiratory Panel PCR w/COVID-19(SARS-CoV-2) MINNIE/SCOTT/TIFFANY/PAD/COR/AYANNA In-House, NP Swab in UTM/VTM, 2 HR TAT - Swab, Nasopharynx [[id number]]  (Normal) Collected: 11/25/24 2024    Specimen: Swab from Nasopharynx Updated: 11/25/24  2126     ADENOVIRUS, PCR Not Detected     Coronavirus 229E Not Detected     Coronavirus HKU1 Not Detected     Coronavirus NL63 Not Detected     Coronavirus OC43 Not Detected     COVID19 Not Detected     Human Metapneumovirus Not Detected     Human Rhinovirus/Enterovirus Not Detected     Influenza A PCR Not Detected     Influenza B PCR Not Detected     Parainfluenza Virus 1 Not Detected     Parainfluenza Virus 2 Not Detected     Parainfluenza Virus 3 Not Detected     Parainfluenza Virus 4 Not Detected     RSV, PCR Not Detected     Bordetella pertussis pcr Not Detected     Bordetella parapertussis PCR Not Detected     Chlamydophila pneumoniae PCR Not Detected     Mycoplasma pneumo by PCR Not Detected    Narrative:      In the setting of a positive respiratory panel with a viral infection PLUS a negative procalcitonin without other underlying concern for bacterial infection, consider observing off antibiotics or discontinuation of antibiotics and continue supportive care. If the respiratory panel is positive for atypical bacterial infection (Bordetella pertussis, Chlamydophila pneumoniae, or Mycoplasma pneumoniae), consider antibiotic de-escalation to target atypical bacterial infection.    Comprehensive Metabolic Panel [[id number]] Collected: 11/25/24 2103    Specimen: Blood Updated: 11/25/24 2114    Lactic Acid, Plasma [[id number]]  (Normal) Collected: 11/25/24 2103    Specimen: Blood Updated: 11/25/24 2136     Lactate 1.0 mmol/L     Procalcitonin [[id number]] Collected: 11/25/24 2103    Specimen: Blood Updated: 11/25/24 2114    Protime-INR [[id number]]  (Abnormal) Collected: 11/25/24 2103    Specimen: Blood Updated: 11/25/24 2129     Protime 14.6 Seconds      INR 1.12    CBC Auto Differential [[id number]]  (Abnormal) Collected: 11/25/24 2104    Specimen: Blood Updated: 11/25/24 2121     WBC 8.90 10*3/mm3      RBC 4.01 10*6/mm3      Hemoglobin 11.6 g/dL      Hematocrit 35.3 %      MCV 88.0 fL      MCH 28.9 pg      MCHC  32.9 g/dL      RDW 13.5 %      RDW-SD 44.4 fl      MPV 10.5 fL      Platelets 199 10*3/mm3      Neutrophil % 67.8 %      Lymphocyte % 12.2 %      Monocyte % 17.5 %      Eosinophil % 1.5 %      Basophil % 0.6 %      Immature Grans % 0.4 %      Neutrophils, Absolute 6.03 10*3/mm3      Lymphocytes, Absolute 1.09 10*3/mm3      Monocytes, Absolute 1.56 10*3/mm3      Eosinophils, Absolute 0.13 10*3/mm3      Basophils, Absolute 0.05 10*3/mm3      Immature Grans, Absolute 0.04 10*3/mm3      nRBC 0.0 /100 WBC             Imaging Results (Last 24 Hours)       Procedure Component Value Units Date/Time    XR Chest 1 View [[id number]] Collected: 11/25/24 1941     Updated: 11/25/24 2038    Narrative:      XR CHEST 1 VW-     HISTORY: 89-year-old female with dyspnea.     FINDINGS: Since the 03/29/2024 radiograph, there has been development of  airspace opacities at the mid and lower lung fields bilaterally likely  representing multifocal pneumonia superimposed on chronic scarring.  There is also central pulmonary vascular congestion. There are likely  small bilateral pleural effusions.     This report was finalized on 11/25/2024 8:34 PM by Dr. Nikia Sharma M.D  on Workstation: [id number]               Results for orders placed during the hospital encounter of 03/29/24    Adult Transthoracic Echo Complete W/ Cont if Necessary Per Protocol    Interpretation Summary    Left ventricular systolic function is normal. Calculated left ventricular EF = 61.3%    Left ventricular diastolic function is consistent with (grade II w/high LAP) pseudonormalization.    Repaired mitral valve with MitraClip.  Mean gradient slightly elevated compared to prior study( 8mmHg vs 4 mmHg)  but valve opens and closes well.  There is mild mitral valve regurgitation present.    Calculated right ventricular systolic pressure from tricuspid regurgitation is 43 mmHg.Mild pulmonary hypertension is present.      ECG 12 Lead Other; New admit   Final Result   HEART  RATE=80  bpm   RR Xnpbhnur=205  ms   CO Irnoxqkb=603  ms   P Horizontal Axis=21  deg   P Front Axis=68  deg   QRSD Interval=79  ms   QT Isskrxeu=796  ms   GUoV=830  ms   QRS Axis=92  deg   T Wave Axis=-33  deg   - ABNORMAL ECG -   Sinus rhythm   Multiple ventricular premature complexes   Right axis deviation   Nonspecific  repol abnormality, diffuse leads   new ectopy   Electronically Signed By: José HatchJULISSA) ([facility]) 2024-11-25 21:06:32   Date and Time of Study:2024-11-25 19:04:34      Telemetry Scan   Final Result      Telemetry Scan   Final Result           Assessment/Plan     Active Hospital Problems    Diagnosis  POA    **Bronchitis [J40]  Yes    History of DVT (deep vein thrombosis) [Z86.718]  Not Applicable    Hyperlipidemia [E78.5]  Yes    Chronic diastolic CHF (congestive heart failure) [I50.32]  Yes    Chronic kidney failure, stage 3 (moderate) [N18.30]  Yes    Coronary artery disease [I25.10]  Yes    History of bladder cancer [C80.1]  Yes    Essential hypertension [I10]  Yes    S/P CABG (coronary artery bypass graft) [Z95.1]  Not Applicable      Resolved Hospital Problems   No resolved problems to display.       Ms. Mo is a 89 y.o. former smoker with a history of chronic diastolic CHF, CKD stage III, HLD, CAD status post CABG, bladder cancer, HTN who presents with bronchitis from her PCP office.    Acute dyspnea  -Stat chest x-ray shows development of airspace opacities at the mid and lower lung fields bilaterally likely representing multifocal pneumonia superimposed on chronic scarring.  There is also central pulmonary vascular congestion and likely small bilateral pleural effusions noted.  -Will give a one-time dose of IV Lasix  -RVP currently pending  -Stat CBC, CMP, Pro-Kwasi, lactate  -DuoNebs every 4 and as needed  -Supplemental oxygen as needed    CKD stage III  -CMP currently pending, will await for results    Chronic diastolic congestive heart failure  -She does not look terribly overloaded  on exam.  Her lungs are diminished and wheezy but chest x-ray does show pulmonary vascular congestion.  Last 2D echo showed an EF of 61% on 3/29/2024.  I have ordered one-time dose of IV Lasix  -Daily weights  -Strict I's and O  -Resume home regimen    CAD  -S/p CABG.  Patient currently denies chest pain  -Resume home regimen    Essential hypertension  -Pressure elevated upon presentation.  Will resume home dose or Coreg 2 times daily    HLD  -Continue statin therapy      I discussed the patient's findings and my recommendations with patient.    VTE Prophylaxis - SCDs.  Code Status - Full code.       STACIA Hector  [facility]  11/25/24  21:40 EST

## 2024-11-27 LAB
ANION GAP SERPL CALCULATED.3IONS-SCNC: 12.5 MMOL/L (ref 5–15)
BUN SERPL-MCNC: 29 MG/DL (ref 8–23)
BUN/CREAT SERPL: 21.8 (ref 7–25)
CALCIUM SPEC-SCNC: 9.2 MG/DL (ref 8.2–9.6)
CHLORIDE SERPL-SCNC: 94 MMOL/L (ref 98–107)
CO2 SERPL-SCNC: 24.5 MMOL/L (ref 22–29)
CREAT SERPL-MCNC: 1.33 MG/DL (ref 0.57–1)
DEPRECATED RDW RBC AUTO: 40.4 FL (ref 37–54)
EGFRCR SERPLBLD CKD-EPI 2021: 37.6 ML/MIN/1.73
ERYTHROCYTE [DISTWIDTH] IN BLOOD BY AUTOMATED COUNT: 13.2 % (ref 12.3–15.4)
GLUCOSE SERPL-MCNC: 162 MG/DL (ref 65–99)
HCT VFR BLD AUTO: 33.3 % (ref 34–46.6)
HGB BLD-MCNC: 11 G/DL (ref 12–15.9)
MCH RBC QN AUTO: 28.2 PG (ref 26.6–33)
MCHC RBC AUTO-ENTMCNC: 33 G/DL (ref 31.5–35.7)
MCV RBC AUTO: 85.4 FL (ref 79–97)
PLATELET # BLD AUTO: 233 10*3/MM3 (ref 140–450)
PMV BLD AUTO: 10.4 FL (ref 6–12)
POTASSIUM SERPL-SCNC: 3.8 MMOL/L (ref 3.5–5.2)
RBC # BLD AUTO: 3.9 10*6/MM3 (ref 3.77–5.28)
SODIUM SERPL-SCNC: 131 MMOL/L (ref 136–145)
WBC NRBC COR # BLD AUTO: 16.12 10*3/MM3 (ref 3.4–10.8)

## 2024-11-27 PROCEDURE — 94799 UNLISTED PULMONARY SVC/PX: CPT

## 2024-11-27 PROCEDURE — 85027 COMPLETE CBC AUTOMATED: CPT | Performed by: INTERNAL MEDICINE

## 2024-11-27 PROCEDURE — 25010000002 CEFTRIAXONE PER 250 MG: Performed by: INTERNAL MEDICINE

## 2024-11-27 PROCEDURE — 99232 SBSQ HOSP IP/OBS MODERATE 35: CPT | Performed by: NURSE PRACTITIONER

## 2024-11-27 PROCEDURE — 80048 BASIC METABOLIC PNL TOTAL CA: CPT | Performed by: INTERNAL MEDICINE

## 2024-11-27 PROCEDURE — 94760 N-INVAS EAR/PLS OXIMETRY 1: CPT

## 2024-11-27 PROCEDURE — 94761 N-INVAS EAR/PLS OXIMETRY MLT: CPT

## 2024-11-27 PROCEDURE — 94664 DEMO&/EVAL PT USE INHALER: CPT

## 2024-11-27 RX ORDER — IPRATROPIUM BROMIDE AND ALBUTEROL SULFATE 2.5; .5 MG/3ML; MG/3ML
3 SOLUTION RESPIRATORY (INHALATION)
Status: DISCONTINUED | OUTPATIENT
Start: 2024-11-27 | End: 2024-11-28 | Stop reason: HOSPADM

## 2024-11-27 RX ADMIN — IPRATROPIUM BROMIDE AND ALBUTEROL SULFATE 3 ML: 2.5; .5 SOLUTION RESPIRATORY (INHALATION) at 19:09

## 2024-11-27 RX ADMIN — CARVEDILOL 25 MG: 25 TABLET, FILM COATED ORAL at 20:03

## 2024-11-27 RX ADMIN — IPRATROPIUM BROMIDE AND ALBUTEROL SULFATE 3 ML: 2.5; .5 SOLUTION RESPIRATORY (INHALATION) at 07:05

## 2024-11-27 RX ADMIN — ISOSORBIDE MONONITRATE 90 MG: 30 TABLET, EXTENDED RELEASE ORAL at 10:27

## 2024-11-27 RX ADMIN — LEVOTHYROXINE SODIUM 50 MCG: 50 TABLET ORAL at 21:28

## 2024-11-27 RX ADMIN — IPRATROPIUM BROMIDE AND ALBUTEROL SULFATE 3 ML: 2.5; .5 SOLUTION RESPIRATORY (INHALATION) at 15:40

## 2024-11-27 RX ADMIN — CLOPIDOGREL BISULFATE 75 MG: 75 TABLET, FILM COATED ORAL at 10:27

## 2024-11-27 RX ADMIN — IPRATROPIUM BROMIDE AND ALBUTEROL SULFATE 3 ML: 2.5; .5 SOLUTION RESPIRATORY (INHALATION) at 12:39

## 2024-11-27 RX ADMIN — AZITHROMYCIN DIHYDRATE 500 MG: 250 TABLET ORAL at 10:27

## 2024-11-27 RX ADMIN — CARVEDILOL 25 MG: 25 TABLET, FILM COATED ORAL at 10:27

## 2024-11-27 RX ADMIN — ASPIRIN 81 MG: 81 TABLET, COATED ORAL at 10:27

## 2024-11-27 RX ADMIN — CEFTRIAXONE 2000 MG: 2 INJECTION, POWDER, FOR SOLUTION INTRAMUSCULAR; INTRAVENOUS at 14:14

## 2024-11-27 NOTE — PROGRESS NOTES
[facility]  PROGRESS NOTE    Patient Name: Brodie Mo  : 10/31/1932  MRN: [id number]    Date of Admission: 2024  Primary Care Physician: Georgie Diane MD    Subjective   Subjective     CC:  Follow up Cough    S:   Patient feels her breathing is drastically better.  She still has occasional cough.  She is still little weak.  No new events or complaints reported.  She is hoping to get home soon.    Review of Systems  No current nausea, vomiting, or diarrhea  No current chest pain or palpitations       Objective   Objective     Vital Signs:   Temp:  [97.7 °F (36.5 °C)-98.6 °F (37 °C)] 98.2 °F (36.8 °C)  Heart Rate:  [64-75] 64  Resp:  [18-20] 18  BP: (112-154)/(44-74) 123/55        Physical Exam:  Constitutional:Awake, alert, elderly appearing  HENT: NCAT, mucous membranes moist, neck supple  Respiratory: Improved cough with some coarse sounds, normal respiratory rate and nonlabored breathing currently  Cardiovascular: Pulse rate is normal, palpable radial pulses  Gastrointestinal:  soft, nontender, nondistended  Musculoskeletal: Frail and chronically debilitated in appearance, thin BMI is only 22, no lower extremity edema  Psychiatric: Appropriate affect, cooperative, conversational  Neurologic: No slurred speech or facial droop, follows commands  Skin: No rashes or jaundice, warm      Results Reviewed:  Results from last 7 days   Lab Units 24  0746 24   WBC 10*3/mm3 16.12* 6.50 8.90  --    HEMOGLOBIN g/dL 11.0* 11.0* 11.6*  --    HEMATOCRIT % 33.3* 35.0 35.3  --    PLATELETS 10*3/mm3 233 198 199  --    INR   --   --   --  1.12*   PROCALCITONIN ng/mL  --   --   --  0.08     Results from last 7 days   Lab Units 24  0746 24   SODIUM mmol/L 131* 132* 132*   POTASSIUM mmol/L 3.8 4.0 3.8   CHLORIDE mmol/L 94* 93* 96*   CO2 mmol/L 24.5 25.5 23.0   BUN mg/dL 29* 19 18   CREATININE mg/dL 1.33* 1.16*  1.02*   GLUCOSE mg/dL 162* 218* 117*   CALCIUM mg/dL 9.2 9.3 9.6   ALK PHOS U/L  --   --  114   ALT (SGPT) U/L  --   --  24   AST (SGOT) U/L  --   --  29   PROBNP pg/mL  --   --  6,504.0*     Estimated Creatinine Clearance: 26.3 mL/min (A) (by C-G formula based on SCr of 1.33 mg/dL (H)).    Microbiology Results Abnormal       None            Imaging Results (Last 24 Hours)       ** No results found for the last 24 hours. **            Results for orders placed during the hospital encounter of 11/25/24    Adult Transthoracic Echo Complete W/ Cont if Necessary Per Protocol    Interpretation Summary    Left ventricular systolic function is normal. Calculated left ventricular EF = 59.3%    Left ventricular diastolic function was indeterminate due to mitraclip/mild mitral stenosis.    The left atrial cavity is severely dilated.    Left atrial volume is severely increased.    The right atrial cavity is mildly  dilated.    Mild mitral valve stenosis is present.    There is a MitraClip mitral valve repair present. The prosthetic mitral valve peak and mean gradients are mildly elevated but unchanged from prior..    Moderate tricuspid valve regurgitation is present.    Estimated right ventricular systolic pressure from tricuspid regurgitation is markedly elevated (>55 mmHg). Calculated right ventricular systolic pressure from tricuspid regurgitation is 71 mmHg.    Severe pulmonary hypertension is present.      I have reviewed the medications:  Scheduled Meds:aspirin, 81 mg, Oral, Daily  [START ON 11/28/2024] atorvastatin, 40 mg, Oral, Once per day on Monday Thursday  azithromycin, 500 mg, Oral, Q24H  carvedilol, 25 mg, Oral, BID  cefTRIAXone, 2,000 mg, Intravenous, Q24H  clopidogrel, 75 mg, Oral, Daily  [Held by provider] furosemide, 40 mg, Intravenous, Daily  ipratropium-albuterol, 3 mL, Nebulization, Q4H - RT  isosorbide mononitrate, 90 mg, Oral, Q24H  levothyroxine, 50 mcg, Oral, Q AM      Continuous Infusions:   PRN  Meds:.  acetaminophen **OR** acetaminophen **OR** acetaminophen    albuterol    aluminum-magnesium hydroxide-simethicone    benzonatate    senna-docusate sodium **AND** polyethylene glycol **AND** bisacodyl **AND** bisacodyl    guaiFENesin-dextromethorphan    labetalol    melatonin    nitroglycerin    ondansetron ODT **OR** ondansetron    sodium chloride    Assessment & Plan   Assessment & Plan     Active Hospital Problems    Diagnosis  POA    **Pneumonia of right middle lobe due to infectious organism [J18.9]  Yes    Hypertensive urgency [I16.0]  Yes    Bronchitis [J40]  Yes    Other fatigue [R53.83]  Yes    Frequent PVCs [I49.3]  Yes    History of DVT (deep vein thrombosis) [Z86.718]  Not Applicable    Hyperlipidemia [E78.5]  Yes    Essential hypertension [I10]  Yes    Status post implantation of mitral valve leaflet clip [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        Brief Hospital Course to date:  Brodie DANIELSON Naive is a 89 y.o. female presents the hospital with cough and general weakness and symptoms of acute bronchitis and on CT scan was found to have right middle lobe pneumonia.    Discussion/plan for today: Plan to discuss case further with cardiology consult team.  CT scan was abnormal with possible pulmonary lesion.  Radiologist recommends repeat CT scan in 6 to 8 weeks.  Pulmonology consult.  Diuretics currently on hold as patient is relatively euvolemic but I will discuss this further with cardiologist.  Steroids discontinued as no longer felt to be needed.  Continue supportive  care and symptom treatment.  Treatment plan discussed with patient is in agreement.      Right middle lobe pneumonia, presumed bacterial:  Plan for broad-spectrum coverage with azithromycin and ceftriaxone.  Patient initially started on IV steroids, now discontinued   Radiologist recommends repeat CT scan in 6 to 8 weeks.  Lung finding was discussed with the patient who can follow-up with repeat scan with primary care provider or pulmonology clinic.  Patient evaluated by pulmonologist.  Small right effusion noted possibly from CHF or parapneumonic.    Acute CHF:  Improved with initial IV diuresis.    Cardiology consult team following.    Careful adjustments to cardiac medications as needed.    CAD and hyperlipidemia and hypertension: No chest pain.  Continue aspirin and atorvastatin.  Patient initially with hypertensive urgency however blood pressure improved.  Plan for labetalol as needed for greater than 180.    History of DVT: Noted.  No longer currently on anticoagulation.  No new issues.    CKD 3a:  Monitor renal function and electrolytes.    DVT Prophylaxis:   SCDs      Disposition:pending    CODE STATUS:   Code Status and Medical Interventions: CPR (Attempt to Resuscitate); Full Support   Ordered at: 11/25/24 1855     Code Status (Patient has no pulse and is not breathing):    CPR (Attempt to Resuscitate)     Medical Interventions (Patient has pulse or is breathing):    Full Support       Aniceto Walker MD  11/27/24

## 2024-11-27 NOTE — PROGRESS NOTES
LOS: 1 day   Patient Care Team:  Georgie Diane MD as PCP - General (Internal Medicine)      Chief Complaint: Follow up CHF, CAD       Interval History: Still coughing, but denies shortness of breath.      Objective   Vital Signs  Temp:  [97.7 °F (36.5 °C)-98.6 °F (37 °C)] 97.7 °F (36.5 °C)  Heart Rate:  [64-77] 75  Resp:  [18-20] 18  BP: (112-154)/(44-74) 154/70    Intake/Output Summary (Last 24 hours) at 11/27/2024 0804  Last data filed at 11/26/2024 1632  Gross per 24 hour   Intake 480 ml   Output 800 ml   Net -320 ml         Physical Exam:  Constitutional: Well appearing, well developed, no acute distress   HENT: Oropharynx clear and membrane moist  Eyes: Normal conjunctiva, no sclera icterus.  Neck: Supple, no carotid bruit bilaterally.  Cardiovascular: Regular rate and rhythm, No Murmur, No bilateral lower extremity edema.  Pulmonary: Normal respiratory effort, diminished lung sounds, no wheezing.  Abdominal: Soft, nontender, no hepatosplenomegaly, liver is non-pulsatile.  Neurological: Alert and orient x 3.   Skin: Warm, dry, no ecchymosis, no rash.  Psych: Appropriate mood and affect. Normal judgment and insight.      Results Review:      Results from last 7 days   Lab Units 11/26/24 0626 11/25/24 2103   SODIUM mmol/L 132* 132*   POTASSIUM mmol/L 4.0 3.8   CHLORIDE mmol/L 93* 96*   CO2 mmol/L 25.5 23.0   BUN mg/dL 19 18   CREATININE mg/dL 1.16* 1.02*   GLUCOSE mg/dL 218* 117*   CALCIUM mg/dL 9.3 9.6         Results from last 7 days   Lab Units 11/26/24  0626 11/25/24 2104   WBC 10*3/mm3 6.50 8.90   HEMOGLOBIN g/dL 11.0* 11.6*   HEMATOCRIT % 35.0 35.3   PLATELETS 10*3/mm3 198 199     Results from last 7 days   Lab Units 11/25/24  2103   INR  1.12*         Results from last 7 days   Lab Units 11/26/24 0626   MAGNESIUM mg/dL 2.1           I reviewed the patient's new clinical results.  I personally viewed and interpreted the patient's EKG/Telemetry data        Medication Review:   aspirin, 81 mg, Oral,  Daily  [START ON 11/28/2024] atorvastatin, 40 mg, Oral, Once per day on Monday Thursday  azithromycin, 500 mg, Oral, Q24H  carvedilol, 25 mg, Oral, BID  cefTRIAXone, 2,000 mg, Intravenous, Q24H  clopidogrel, 75 mg, Oral, Daily  [Held by provider] furosemide, 40 mg, Intravenous, Daily  ipratropium-albuterol, 3 mL, Nebulization, Q6H While Awake - RT  isosorbide mononitrate, 90 mg, Oral, Q24H  levothyroxine, 50 mcg, Oral, Q AM  predniSONE, 20 mg, Oral, Daily With Breakfast             Assessment & Plan       Pneumonia of right middle lobe due to infectious organism    S/P CABG (coronary artery bypass graft)    Essential hypertension    History of bladder cancer    Coronary artery disease    Chronic kidney failure, stage 3 (moderate)    Chronic diastolic CHF (congestive heart failure)    Nonrheumatic mitral valve regurgitation    Status post implantation of mitral valve leaflet clip    Essential hypertension    Hyperlipidemia    History of DVT (deep vein thrombosis)    Frequent PVCs    Other fatigue    Bronchitis    Hypertensive urgency      1.  Dyspnea/cough.  Chest x-ray concerning for multifocal pneumonia.  She also has vascular congestion and small bilateral pleural effusions.  CRP is elevated. She did have a fever a week ago but none since. She did not respond to the Z-pack.   2.  Acute on chronic diastolic CHF.  Echocardiogram with normal LV function. Continue Lasix.   3.  Coronary artery disease.  History of CABG.  Status post DEBO of the mid RCA in May of this year.  In July, she developed in-stent restenosis of the proximal RCA and underwent DEBO.  In September, she was found to have a subtotally occluded diagonal for which medical therapy was recommended. Continue aspirin and statin. Also on isosorbide.  4.  Hypertension. Improved with the increased carvedilol.   5.  Mitral insufficiency.  Status post mitral clip in 2020.   6.  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 Holter from September with a 29% PVC  burden. Continue carvedilol.   7.  Pulmonary hypertension. Severe on echo. Possibly related to underlying lung pathology.   8.  Abnormal chest CT.  Reviewed with Dr. Hatch. Consult pulmonary.      Brenda Gutiérrez, APRN  11/27/24  08:04 EST

## 2024-11-27 NOTE — PLAN OF CARE
Goal Outcome Evaluation:  Plan of Care Reviewed With: patient        Progress: improving  Outcome Evaluation: Pt A&Ox4 reg diet thins, up ad jessica and walked unit today, room air, cough infrequent with occassional production, iv antbiotics as ordered, pulmonary consult and saw patient today, family visited, SA / SB on monitor with pvc's and vitals as charted,  eager to discharge home,  no pain/nause reported

## 2024-11-27 NOTE — CONSULTS
Patient Identification:  Brodie Mo  89 y.o.  female  10/31/1932  [id number]          LOS 1    Requesting physician:   Aniceto Walker,*    Reason for Consultation:    Abnormal chest x-ray, shortness of breath, cough    History of Present Illness:   89-year-old female with a history of coronary artery disease status post CABG, chronic kidney disease, heart failure with preserved ejection fraction, hypertension, hyperlipidemia who presented to the hospital with persistent cough.    Patient presents with a chief complaint of cough and shortness of breath. She reports that her cough has improved since being admitted to the hospital. The patient experiences shortness of breath during exertion. She has a past history of pneumonia but denies any history of asthma, COPD, or frequent lung infections. The patient has a remote history of smoking, having quit at the age of 25.    The patient denies any current fevers, chills, nausea, or vomiting. She reports that her bowel movements and appetite are normal. The patient is currently on nebulizers and they are helping her cough.     Past Medical History:  Past Medical History:   Diagnosis Date    CAD (coronary artery disease)     Cancer     bladder     Chronic kidney disease     Deep vein thrombosis     Left leg-2002    Disease of thyroid gland     Health care maintenance     HTN (hypertension)     Hyperlipidemia     LVH (left ventricular hypertrophy)     Pneumonia     S/P CABG (coronary artery bypass graft)        Past Surgical History:  Past Surgical History:   Procedure Laterality Date    BLADDER SURGERY      cancer    CARDIAC CATHETERIZATION N/A 5/2/2024    Procedure: Right Heart Cath;  Surgeon: José Hatch MD;  Location: [facility] INVASIVE LOCATION;  Service: Cardiovascular;  Laterality: N/A;    CARDIAC CATHETERIZATION N/A 5/2/2024    Procedure: Left Heart Cath;  Surgeon: José Hatch MD;  Location: [facility] INVASIVE LOCATION;  Service:  Cardiovascular;  Laterality: N/A;    CARDIAC CATHETERIZATION N/A 5/2/2024    Procedure: Native mammary injection;  Surgeon: José Hatch MD;  [facility] C[facility];  Service: Cardiovascular;  Laterality: N/A;    CARDIAC CATHETERIZATION  5/2/2024    Procedure: Saphenous Vein Graft;  Surgeon: José Hatch MD;  [facility];  Service: Cardiovascular;;    CARDIAC CATHETERIZATION N/A 5/2/2024    Procedure: Percutaneous Coronary Intervention;  Surgeon: José Hatch MD;  [facility];  Service: Cardiovascular;  Laterality: N/A;    CARDIAC CATHETERIZATION N/A 5/2/2024    Procedure: Stent DEBO coronary;  Surgeon: José Hatch MD;  [facility];  Service: Cardiovascular;  Laterality: N/A;    CARDIAC CATHETERIZATION N/A 5/2/2024    Procedure: Optical Coherence Tomography;  Surgeon: José Hatch MD;  [facility];  Service: Cardiovascular;  Laterality: N/A;    CARDIAC CATHETERIZATION N/A 5/2/2024    Procedure: Resting Full Cycle Ratio;  Surgeon: José Hatch MD;  [facility];  Service: Cardiovascular;  Laterality: N/A;    CARDIAC CATHETERIZATION N/A 8/29/2024    Procedure: Coronary angiography;  Surgeon: José Hatch MD;  [facility];  Service: Cardiovascular;  Laterality: N/A;    CARDIAC CATHETERIZATION N/A 8/29/2024    Procedure: Percutaneous Coronary Intervention;  Surgeon: José Hatch MD;  [facility];  Service: Cardiovascular;  Laterality: N/A;    CARDIAC CATHETERIZATION N/A 8/29/2024    Procedure: Optical Coherence Tomography;  Surgeon: José Hatch MD;  Location: [facility] CATH INVASIVE LOCATION;  Service: Cardiovascular;  Laterality: N/A;    CARDIAC CATHETERIZATION N/A 8/29/2024    Procedure: Stent DEBO coronary;  Surgeon: José Hatch MD;  [facility]  [facility];  Service: Cardiovascular;  Laterality: N/A;    CARDIAC CATHETERIZATION N/A 9/12/2024    Procedure: Left Heart Cath;  Surgeon: José Hatch MD;  [facility];  Service: Cardiovascular;  Laterality: N/A;    CARDIAC CATHETERIZATION N/A 9/12/2024    Procedure: Stent DEBO coronary;  Surgeon: José Hatch MD;  Location: [facility] CATH INVASIVE LOCATION;  Service: Cardiovascular;  Laterality: N/A;    CARPAL TUNNEL RELEASE Right 12/15/2021    Procedure: RIGHT CARPAL TUNNEL RELEASE WITH POSSIBLE SYNOVECTOMY;  Surgeon: Georgie Hines MD;  Location: [facility];  Service: Plastics;  Laterality: Right;    CORONARY ARTERY BYPASS GRAFT      2002 with Dr Prabhakar; AGUAYO to LAD and SVG to OM2; 2005 cath all grafts open and 30% dx in RCA; nl stress in 2010, 2013    HYSTERECTOMY      MITRAL VALVE REPAIR/REPLACEMENT N/A 9/2/2020    Procedure: TRANSCATHETER MITRAL VALVE REPAIR;  Surgeon: Edward Humphreys MD;  Location: [facility] CATH INVASIVE LOCATION;  Service: Cardiovascular;  Laterality: N/A;        Home Meds:  Medications Prior to Admission   Medication Sig Dispense Refill Last Dose/Taking    aspirin 81 MG tablet Take 1 tablet by mouth Daily.   11/25/2024    atorvastatin (LIPITOR) 40 MG tablet Take 1 tablet by mouth 2 (Two) Times a Week. Tuesdays and Saturdays.   11/24/2024    B Complex Vitamins (vitamin b complex) capsule capsule Take  by mouth Daily. (Patient taking differently: Take 1 capsule by mouth 2 (Two) Times a Week. Tuesdays and Saturdays.)   Patient Taking Differently    carvedilol (COREG) 12.5 MG tablet Take 1 tablet by mouth 2 (Two) Times a Day.   11/25/2024    cholecalciferol (VITAMIN D3) 1000 units tablet Take 1 tablet by mouth Daily.   11/24/2024    clopidogrel (PLAVIX) 75 MG tablet Take 1 tablet by mouth Daily.   11/25/2024    coenzyme Q10 100 MG capsule Take 1 capsule by mouth Daily.   11/24/2024    furosemide (LASIX) 40 MG tablet TAKE 1 TABLET BY MOUTH EVERY DAY  "90 tablet 1 11/25/2024    isosorbide mononitrate (IMDUR) 30 MG 24 hr tablet Take 3 tablets by mouth Daily. (Patient taking differently: Take 3 tablets by mouth Daily. qam) 30 tablet 6 11/25/2024    levothyroxine (SYNTHROID, LEVOTHROID) 50 MCG tablet Take 1 tablet by mouth Daily.   11/24/2024         Allergies:  Allergies   Allergen Reactions    No Known Drug Allergy        Social History:   Social History     Socioeconomic History    Marital status:    Tobacco Use    Smoking status: Former     Passive exposure: Past    Smokeless tobacco: Never    Tobacco comments:     CAFFEINE USE: 2  CUPS COFFEE DAILY   Vaping Use    Vaping status: Never Used   Substance and Sexual Activity    Alcohol use: Yes     Alcohol/week: 7.0 standard drinks of alcohol     Types: 7 Shots of liquor per week     Comment: 1 DRINK DAILY    Drug use: No    Sexual activity: Defer       Family History:  Family History   Problem Relation Age of Onset    Heart disease Mother     Kidney cancer Father     No Known Problems Maternal Grandmother     No Known Problems Maternal Grandfather     No Known Problems Paternal Grandmother     No Known Problems Paternal Grandfather     Heart disease Sister     Heart disease Brother        Review of Systems:  12 point review of systems performed and all else negative except as per HPI above.    Objective:    PHYSICAL EXAM:    /70 (BP Location: Right arm, Patient Position: Sitting)   Pulse 75   Temp 97.7 °F (36.5 °C) (Oral)   Resp 18   Ht 165.1 cm (65\")   Wt 61.7 kg (136 lb)   SpO2 94%   BMI 22.63 kg/m²  Body mass index is 22.63 kg/m². 94% 61.7 kg (136 lb)    General: Alert, nontoxic, NAD  HEENT: NC/AT, EOMI, MMM  Neck: Supple, trachea midline  Cardiac: RRR, no murmur, gallops, rubs  Pulmonary: Rhonchi bilaterally  GI: Soft, non-tender, non-distended, normal bowel sounds  Extremities: Warm, well perfused, no LE edema  Skin: no visible rash  Neuro: CN II - XII grossly intact  Psychiatry: Normal " mood and affect    Lab Review:   Results from last 7 days   Lab Units 11/27/24  0746 11/26/24 0626 11/25/24 2104   WBC 10*3/mm3 16.12* 6.50 8.90   HEMOGLOBIN g/dL 11.0* 11.0* 11.6*   PLATELETS 10*3/mm3 233 198 199     Results from last 7 days   Lab Units 11/26/24  0626 11/25/24 2103   SODIUM mmol/L 132* 132*   POTASSIUM mmol/L 4.0 3.8   CHLORIDE mmol/L 93* 96*   CO2 mmol/L 25.5 23.0   BUN mg/dL 19 18   CREATININE mg/dL 1.16* 1.02*   GLUCOSE mg/dL 218* 117*   CALCIUM mg/dL 9.3 9.6   MAGNESIUM mg/dL 2.1  --    PHOSPHORUS mg/dL 3.1  --    Estimated Creatinine Clearance: 30.1 mL/min (A) (by C-G formula based on SCr of 1.16 mg/dL (H)).    Results from last 7 days   Lab Units 11/27/24  0746 11/26/24 0626 11/25/24 2104 11/25/24 2103   AST (SGOT) U/L  --   --   --  29   ALT (SGPT) U/L  --   --   --  24   PROCALCITONIN ng/mL  --   --   --  0.08   LACTATE mmol/L  --   --   --  1.0   CRP mg/dL  --  7.86*  --   --    PLATELETS 10*3/mm3 233 198 199  --               Imaging reviewed  Chest imaging from this hospitalization reviewed.       Assessment / Recommendations:    Multifocal pneumonia  Shortness of breath  Hyponatremia  Leukocytosis  Acute on chronic heart failure with preserved ejection fraction  Pulmonary hypertension, RVSP 71  Coronary disease status post CABG  Mitral valve regurgitation  Acute kidney injury on chronic kidney disease  Hypertension  Hyperlipidemia  Anemia  History of bladder cancer  History of DVT not on anticoagulation    -Presented to the hospital with cough and shortness of breath and found to have multifocal pneumonia on CT imaging.  -Continue with course of antibiotics with ceftriaxone and azithromycin.  -Respiratory viral panel negative, blood cultures no growth to date.  -Significant cough improved with bronchodilators, will increase her DuoNebs at this time  -No indication for steroids at this time  -Patient will need repeat imaging in 6 to 8-week to ensure resolution of radiographic  findings.  -Ongoing diuresis for heart failure as per cardiology    Thank you for allowing me to participate in the care of this patient.  I will continue to follow along with you.    Jj Leos MD  [facility], [facility]  Pulmonary and Critical Care Medicine, Interventional Pulmonology    11/27/2024  08:51 EST    Parts of this note may be an electronic transcription/translation of spoken language to printed text using the Dragon dictation system.

## 2024-11-28 ENCOUNTER — READMISSION MANAGEMENT (OUTPATIENT)
Dept: CALL CENTER | Facility: HOSPITAL | Age: 89
End: 2024-11-28
Payer: MEDICARE

## 2024-11-28 VITALS
HEART RATE: 68 BPM | OXYGEN SATURATION: 100 % | WEIGHT: 136 LBS | HEIGHT: 65 IN | RESPIRATION RATE: 20 BRPM | SYSTOLIC BLOOD PRESSURE: 154 MMHG | BODY MASS INDEX: 22.66 KG/M2 | DIASTOLIC BLOOD PRESSURE: 51 MMHG | TEMPERATURE: 98.2 F

## 2024-11-28 PROBLEM — I50.33 ACUTE ON CHRONIC HEART FAILURE WITH PRESERVED EJECTION FRACTION (HFPEF): Status: ACTIVE | Noted: 2024-11-28

## 2024-11-28 LAB
ANION GAP SERPL CALCULATED.3IONS-SCNC: 10 MMOL/L (ref 5–15)
BUN SERPL-MCNC: 39 MG/DL (ref 8–23)
BUN/CREAT SERPL: 23.4 (ref 7–25)
CALCIUM SPEC-SCNC: 8.6 MG/DL (ref 8.2–9.6)
CHLORIDE SERPL-SCNC: 98 MMOL/L (ref 98–107)
CO2 SERPL-SCNC: 25 MMOL/L (ref 22–29)
CREAT SERPL-MCNC: 1.67 MG/DL (ref 0.57–1)
DEPRECATED RDW RBC AUTO: 41.7 FL (ref 37–54)
EGFRCR SERPLBLD CKD-EPI 2021: 28.6 ML/MIN/1.73
ERYTHROCYTE [DISTWIDTH] IN BLOOD BY AUTOMATED COUNT: 13.4 % (ref 12.3–15.4)
GLUCOSE SERPL-MCNC: 96 MG/DL (ref 65–99)
HCT VFR BLD AUTO: 29.8 % (ref 34–46.6)
HGB BLD-MCNC: 10 G/DL (ref 12–15.9)
MCH RBC QN AUTO: 28.9 PG (ref 26.6–33)
MCHC RBC AUTO-ENTMCNC: 33.6 G/DL (ref 31.5–35.7)
MCV RBC AUTO: 86.1 FL (ref 79–97)
PLATELET # BLD AUTO: 188 10*3/MM3 (ref 140–450)
PMV BLD AUTO: 10.1 FL (ref 6–12)
POTASSIUM SERPL-SCNC: 4 MMOL/L (ref 3.5–5.2)
RBC # BLD AUTO: 3.46 10*6/MM3 (ref 3.77–5.28)
SODIUM SERPL-SCNC: 133 MMOL/L (ref 136–145)
WBC NRBC COR # BLD AUTO: 11.69 10*3/MM3 (ref 3.4–10.8)

## 2024-11-28 PROCEDURE — 25010000002 CEFTRIAXONE PER 250 MG: Performed by: INTERNAL MEDICINE

## 2024-11-28 PROCEDURE — 94799 UNLISTED PULMONARY SVC/PX: CPT

## 2024-11-28 PROCEDURE — 94761 N-INVAS EAR/PLS OXIMETRY MLT: CPT

## 2024-11-28 PROCEDURE — 94664 DEMO&/EVAL PT USE INHALER: CPT

## 2024-11-28 PROCEDURE — 80048 BASIC METABOLIC PNL TOTAL CA: CPT | Performed by: INTERNAL MEDICINE

## 2024-11-28 PROCEDURE — 99232 SBSQ HOSP IP/OBS MODERATE 35: CPT | Performed by: NURSE PRACTITIONER

## 2024-11-28 PROCEDURE — 85027 COMPLETE CBC AUTOMATED: CPT | Performed by: INTERNAL MEDICINE

## 2024-11-28 RX ORDER — ALBUTEROL SULFATE 90 UG/1
2 INHALANT RESPIRATORY (INHALATION) EVERY 4 HOURS PRN
Qty: 18 G | Refills: 0 | Status: SHIPPED | OUTPATIENT
Start: 2024-11-28

## 2024-11-28 RX ORDER — ISOSORBIDE MONONITRATE 30 MG/1
90 TABLET, EXTENDED RELEASE ORAL EVERY MORNING
Start: 2024-11-28 | End: 2024-12-02 | Stop reason: SDUPTHER

## 2024-11-28 RX ORDER — VITAMIN B COMPLEX
1 CAPSULE ORAL 2 TIMES WEEKLY
Start: 2024-11-28

## 2024-11-28 RX ORDER — AZITHROMYCIN 250 MG/1
500 TABLET, FILM COATED ORAL ONCE
Status: COMPLETED | OUTPATIENT
Start: 2024-11-28 | End: 2024-11-28

## 2024-11-28 RX ORDER — ACETAMINOPHEN 325 MG/1
650 TABLET ORAL EVERY 6 HOURS PRN
Start: 2024-11-28

## 2024-11-28 RX ORDER — INHALER, ASSIST DEVICES
SPACER (EA) MISCELLANEOUS
Qty: 1 EACH | Refills: 2 | Status: SHIPPED | OUTPATIENT
Start: 2024-11-28

## 2024-11-28 RX ORDER — CARVEDILOL 12.5 MG/1
12.5 TABLET ORAL 2 TIMES DAILY
Status: DISCONTINUED | OUTPATIENT
Start: 2024-11-28 | End: 2024-11-28 | Stop reason: HOSPADM

## 2024-11-28 RX ORDER — CEFPODOXIME PROXETIL 200 MG/1
200 TABLET, FILM COATED ORAL
Qty: 4 TABLET | Refills: 0 | Status: SHIPPED | OUTPATIENT
Start: 2024-11-29 | End: 2024-12-03

## 2024-11-28 RX ADMIN — ATORVASTATIN CALCIUM 40 MG: 20 TABLET, FILM COATED ORAL at 10:02

## 2024-11-28 RX ADMIN — CEFTRIAXONE 2000 MG: 2 INJECTION, POWDER, FOR SOLUTION INTRAMUSCULAR; INTRAVENOUS at 10:02

## 2024-11-28 RX ADMIN — Medication 10 ML: at 10:02

## 2024-11-28 RX ADMIN — ISOSORBIDE MONONITRATE 90 MG: 30 TABLET, EXTENDED RELEASE ORAL at 10:03

## 2024-11-28 RX ADMIN — CARVEDILOL 12.5 MG: 12.5 TABLET, FILM COATED ORAL at 10:02

## 2024-11-28 RX ADMIN — AZITHROMYCIN DIHYDRATE 500 MG: 250 TABLET ORAL at 10:03

## 2024-11-28 RX ADMIN — IPRATROPIUM BROMIDE AND ALBUTEROL SULFATE 3 ML: 2.5; .5 SOLUTION RESPIRATORY (INHALATION) at 08:14

## 2024-11-28 RX ADMIN — ASPIRIN 81 MG: 81 TABLET, COATED ORAL at 10:04

## 2024-11-28 RX ADMIN — CLOPIDOGREL BISULFATE 75 MG: 75 TABLET, FILM COATED ORAL at 10:03

## 2024-11-28 NOTE — PROGRESS NOTES
LOS: 2 days   Patient Care Team:  Georgie Diane MD as PCP - General (Internal Medicine)      Chief Complaint: Follow up CHF, CAD       Interval History: Feeling better. Ready to go home.       Objective   Vital Signs  Temp:  [97.7 °F (36.5 °C)-98.2 °F (36.8 °C)] 98.2 °F (36.8 °C)  Heart Rate:  [53-69] 68  Resp:  [16-20] 20  BP: (106-154)/(39-90) 154/51    Intake/Output Summary (Last 24 hours) at 11/28/2024 0906  Last data filed at 11/28/2024 0530  Gross per 24 hour   Intake --   Output 1850 ml   Net -1850 ml         Physical Exam:  Constitutional: Well appearing, well developed, no acute distress   HENT: Oropharynx clear and membrane moist  Eyes: Normal conjunctiva, no sclera icterus.  Neck: Supple, no carotid bruit bilaterally.  Cardiovascular: Regular rate and rhythm, No Murmur, No bilateral lower extremity edema.  Pulmonary: Normal respiratory effort, diminished lung sounds, no wheezing.  Abdominal: Soft, nontender, no hepatosplenomegaly, liver is non-pulsatile.  Neurological: Alert and orient x 3.   Skin: Warm, dry, no ecchymosis, no rash.  Psych: Appropriate mood and affect. Normal judgment and insight.      Results Review:      Results from last 7 days   Lab Units 11/28/24  0556 11/27/24  0746 11/26/24  0626   SODIUM mmol/L 133* 131* 132*   POTASSIUM mmol/L 4.0 3.8 4.0   CHLORIDE mmol/L 98 94* 93*   CO2 mmol/L 25.0 24.5 25.5   BUN mg/dL 39* 29* 19   CREATININE mg/dL 1.67* 1.33* 1.16*   GLUCOSE mg/dL 96 162* 218*   CALCIUM mg/dL 8.6 9.2 9.3         Results from last 7 days   Lab Units 11/28/24  0556 11/27/24  0746 11/26/24  0626   WBC 10*3/mm3 11.69* 16.12* 6.50   HEMOGLOBIN g/dL 10.0* 11.0* 11.0*   HEMATOCRIT % 29.8* 33.3* 35.0   PLATELETS 10*3/mm3 188 233 198     Results from last 7 days   Lab Units 11/25/24  2103   INR  1.12*         Results from last 7 days   Lab Units 11/26/24  0626   MAGNESIUM mg/dL 2.1           I reviewed the patient's new clinical results.  I personally viewed and interpreted the  patient's EKG/Telemetry data        Medication Review:   aspirin, 81 mg, Oral, Daily  atorvastatin, 40 mg, Oral, Once per day on Monday Thursday  azithromycin, 500 mg, Oral, Once  carvedilol, 12.5 mg, Oral, BID  cefTRIAXone, 2,000 mg, Intravenous, Once  clopidogrel, 75 mg, Oral, Daily  ipratropium-albuterol, 3 mL, Nebulization, Q4H - RT  isosorbide mononitrate, 90 mg, Oral, Q24H  levothyroxine, 50 mcg, Oral, Q AM             Assessment & Plan       Pneumonia of right middle lobe due to infectious organism    S/P CABG (coronary artery bypass graft)    Essential hypertension    History of bladder cancer    Coronary artery disease    Chronic kidney failure, stage 3 (moderate)    Chronic diastolic CHF (congestive heart failure)    Nonrheumatic mitral valve regurgitation    Status post implantation of mitral valve leaflet clip    Essential hypertension    Hyperlipidemia    History of DVT (deep vein thrombosis)    Frequent PVCs    Other fatigue    Bronchitis    Hypertensive urgency    Acute on chronic heart failure with preserved ejection fraction (HFpEF)      1.  Dyspnea/cough.  Chest x-ray concerning for multifocal pneumonia.  She also has vascular congestion and small bilateral pleural effusions.    2.  Acute on chronic diastolic CHF.  Echocardiogram with normal LV function. Continue Lasix.   3.  Coronary artery disease.  History of CABG.  Status post DEBO of the mid RCA in May of this year.  In July, she developed in-stent restenosis of the proximal RCA and underwent DEBO.  In September, she was found to have a subtotally occluded diagonal for which medical therapy was recommended. Continue aspirin and statin. Also on isosorbide.  4.  Hypertension. Improved with the increased carvedilol.   5.  Mitral insufficiency.  Status post mitral clip in 2020.   6.  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 Holter from September with a 29% PVC burden. Continue carvedilol.   7.  Pulmonary hypertension. Severe on echo. Possibly  related to underlying lung pathology.   8.  Abnormal chest CT.  Recommendations noted for repeat scan in 6-8 weeks.    -She is ready for discharge. We will arrange a 1 week follow up in the office. She will need a BMP that day as well.       Brenda Gutiérrez, STACIA  11/28/24  09:06 EST

## 2024-11-28 NOTE — DISCHARGE SUMMARY
[facility] Medicine Services  DISCHARGE SUMMARY    Patient Name: Brodie Mo  : 10/31/1932  MRN: [id number]    Date of Admission: 2024  6:30 PM  Date of Discharge: 2024  Primary Care Physician: Georgie Diane MD    Consults       Date and Time Order Name Status Description    2024  8:08 AM Inpatient Pulmonology Consult Completed             Hospital Course       Active Hospital Problems    Diagnosis  POA    **Pneumonia of right middle lobe due to infectious organism [J18.9]  Yes    Acute on chronic heart failure with preserved ejection fraction (HFpEF) [I50.33]  Yes    Hypertensive urgency [I16.0]  Yes    Bronchitis [J40]  Yes    Other fatigue [R53.83]  Yes    Frequent PVCs [I49.3]  Yes    History of DVT (deep vein thrombosis) [Z86.718]  Not Applicable    Hyperlipidemia [E78.5]  Yes    Essential hypertension [I10]  Yes    Status post implantation of mitral valve leaflet clip [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          Hospital Course:  Brodie Mo is a 89 y.o. female  presents the hospital with cough and general weakness and symptoms of acute bronchitis and on CT scan was found to have right middle lobe pneumonia.         Right middle lobe pneumonia, presumed bacterial:  Plan for broad-spectrum coverage with azithromycin and ceftriaxone.  Completed azithromycin and will discharge on Vantin.  Patient initially started on IV steroids briefly however these have not been discontinued  Radiologist recommends repeat CT scan in 6 to 8 weeks.  Lung  finding was discussed with the patient who can follow-up with repeat scan with primary care provider or pulmonology clinic.  Patient evaluated by pulmonologist.  Small right effusion noted possibly from CHF or parapneumonic.  I again discussed need for repeat CT scan in 6 to 8 weeks with patient today.  She plans to do this with her primary care provider if possible otherwise she has been provided with contact information for our pulmonologist.  I spoke with pulmonology who is cleared her to discharge on oral antibiotics after she receives her IV dose today before discharge.     Acute CHF:  Improved with initial IV diuresis.  Plan is to start back on home Lasix tomorrow  Cardiology consult team assisted with management and titration of cardiac medications.  I discussed medications with cardiology today and have discharged below on the medications listed as per our conversation     CAD and hyperlipidemia and hypertension:   No chest pain.  Continue aspirin and atorvastatin.  Patient initially with hypertensive urgency however blood pressure improved.      History of DVT: Noted.  No longer currently on anticoagulation.  No new issues.     CKD 3a:  Monitored renal function and electrolytes.  Previous records reviewed and baseline creatinine seems to vary mostly between 1.5 and 1.9.  Initially this hospital stay though she was below her previous baseline but has returned to the baseline.  Cardiology is planning to check labs within 1 week per my conversation with them today.    At the time of discharge patient was told to take all medications as prescribed, keep all follow-up appointments, and call their doctor or return to the hospital with any worsening or concerning symptoms.    Please note that this note was made using Dragon voice recognition software        Day of Discharge       Subjective: Patient is eager to discharge today.  She understands she needs repeat CT scan.  She plans to follow-up with primary care  "provider within 1 week for general hospital follow-up and with cardiologist as instructed.  She is very eager to leave today.  She reports good family support with her sisters at discharge    Vital Signs:   Temp:  [97.7 °F (36.5 °C)-98.2 °F (36.8 °C)] 98.2 °F (36.8 °C)  Heart Rate:  [53-69] 68  Resp:  [16-20] 20  BP: (106-154)/(39-90) 154/51     Physical Exam:    Constitutional:Awake, alert, elderly appearing  HENT: NCAT, mucous membranes moist, neck supple  Respiratory: Improved cough with some coarse sounds, normal respiratory rate and nonlabored breathing currently  Cardiovascular: Pulse rate is normal, palpable radial pulses  Gastrointestinal:  soft, nontender, nondistended  Musculoskeletal: Frail and chronically debilitated in appearance, thin BMI is only 22, no lower extremity edema  Psychiatric: Appropriate affect, cooperative, conversational  Neurologic: No slurred speech or facial droop, follows commands  Skin: No rashes or jaundice, warm    Pertinent  and/or Most Recent Results     Results from last 7 days   Lab Units 11/28/24  0556 11/27/24  0746 11/26/24  0626 11/25/24  2104 11/25/24  2103   WBC 10*3/mm3  --  16.12* 6.50 8.90  --    HEMOGLOBIN g/dL  --  11.0* 11.0* 11.6*  --    HEMATOCRIT %  --  33.3* 35.0 35.3  --    PLATELETS 10*3/mm3  --  233 198 199  --    SODIUM mmol/L 133* 131* 132*  --  132*   POTASSIUM mmol/L 4.0 3.8 4.0  --  3.8   CHLORIDE mmol/L 98 94* 93*  --  96*   CO2 mmol/L 25.0 24.5 25.5  --  23.0   BUN mg/dL 39* 29* 19  --  18   CREATININE mg/dL 1.67* 1.33* 1.16*  --  1.02*   GLUCOSE mg/dL 96 162* 218*  --  117*   CALCIUM mg/dL 8.6 9.2 9.3  --  9.6     Results from last 7 days   Lab Units 11/25/24  2103   BILIRUBIN mg/dL 0.4   ALK PHOS U/L 114   ALT (SGPT) U/L 24   AST (SGOT) U/L 29   PROTIME Seconds 14.6*   INR  1.12*           Invalid input(s): \"TG\", \"LDLCALC\", \"LDLREALC\"  Results from last 7 days   Lab Units 11/25/24  2103   PROBNP pg/mL 6,504.0*   PROCALCITONIN ng/mL 0.08   LACTATE " mmol/L 1.0         Imaging Results (All)       Procedure Component Value Units Date/Time    CT Chest Without Contrast Diagnostic [[id number]] Collected: 11/26/24 1104     Updated: 11/26/24 1135    Narrative:      CT CHEST WITHOUT IV CONTRAST     HISTORY: Abnormal chest radiograph, cough     TECHNIQUE: Radiation dose reduction techniques were utilized, including  automated exposure control and exposure modulation based on body size.   3 mm images were obtained through the chest without IV contrast.     COMPARISON CT chest 7/30/2024     FINDINGS:  Evaluation is suboptimal without intravenous contrast.     No mediastinal or axillary adenopathy by size criteria. Postsurgical  change from prior CABG are present. No significant pericardial effusion.  There is intralobular septal thickening within the bilateral lung  apices. Reticulonodular opacification within the right upper lobe     Masslike opacification within the right lower lobe measuring 3.3 x 2.7  cm is grossly similar in size since 4/5/2018. Surrounding irregular  pulmonary groundglass opacification is present, also seen on prior CTs.  There is worsening consolidation within the right middle lobe since  7/30/2024. There is also small worsening area of pulmonary opacification  within the anterior aspect left upper lobe. Small left pleural effusion.  Rounded masslike pulmonary consolidation within the left lower lobe,  grossly unchanged in size since 4/27/2024. Small left pleural effusion  is increased in size since 7/30/2024..     No suspicious lytic or blastic osseous lesion.       Impression:      1.  New pulmonary consolidation within the right middle lobe as well as  a small area of opacification within the anterior aspect the left upper  lobe concerning for multifocal pneumonia. Stenosis and filling defect  within a right middle lobe bronchus is new. A small left pleural  effusion has increased in size since 7/30/2024. Given the somewhat  masslike appearance  of consolidation within the right middle lobe,  follow-up with CT chest in 6 to 8 weeks recommended to ensure resolution  exclude the possibility malignancy.  2.  Bibasilar rounded pulmonary consolidation favored to represent round  atelectasis, as seen over multiple prior CT; however, continued  attention on above-mentioned follow-up is recommended.. Small at least  partially loculated right pleural effusion, as before.  3.  Intralobular septal thickening within the bilateral lung apices  which can be seen in setting of early pulmonary edema in the appropriate  context.  4.  Other findings as above.           This report was finalized on 11/26/2024 11:32 AM by Dr. Jose Edwards M.D on Workstation: [id number]       XR Chest 1 View [[id number]] Collected: 11/25/24 1941     Updated: 11/25/24 2038    Narrative:      XR CHEST 1 VW-     HISTORY: 89-year-old female with dyspnea.     FINDINGS: Since the 03/29/2024 radiograph, there has been development of  airspace opacities at the mid and lower lung fields bilaterally likely  representing multifocal pneumonia superimposed on chronic scarring.  There is also central pulmonary vascular congestion. There are likely  small bilateral pleural effusions.     This report was finalized on 11/25/2024 8:34 PM by Dr. Nikia Sharma M.D  on Workstation: [id number]               Results for orders placed during the hospital encounter of 07/09/24    Duplex Venous Lower Extremity - Left CAR    Interpretation Summary    Normal left lower extremity venous duplex scan.      Results for orders placed during the hospital encounter of 07/09/24    Duplex Venous Lower Extremity - Left CAR    Interpretation Summary    Normal left lower extremity venous duplex scan.      Results for orders placed during the hospital encounter of 11/25/24    Adult Transthoracic Echo Complete W/ Cont if Necessary Per Protocol    Interpretation Summary    Left ventricular systolic function is normal. Calculated  left ventricular EF = 59.3%    Left ventricular diastolic function was indeterminate due to mitraclip/mild mitral stenosis.    The left atrial cavity is severely dilated.    Left atrial volume is severely increased.    The right atrial cavity is mildly  dilated.    Mild mitral valve stenosis is present.    There is a MitraClip mitral valve repair present. The prosthetic mitral valve peak and mean gradients are mildly elevated but unchanged from prior..    Moderate tricuspid valve regurgitation is present.    Estimated right ventricular systolic pressure from tricuspid regurgitation is markedly elevated (>55 mmHg). Calculated right ventricular systolic pressure from tricuspid regurgitation is 71 mmHg.    Severe pulmonary hypertension is present.        Discharge Details        Discharge Medications        New Medications        Instructions Start Date   acetaminophen 325 MG tablet  Commonly known as: TYLENOL   650 mg, Oral, Every 6 Hours PRN      AeroChamber MV inhaler   Use as instructed with albuterol inhaler      albuterol sulfate  (90 Base) MCG/ACT inhaler  Commonly known as: PROVENTIL HFA;VENTOLIN HFA;PROAIR HFA   2 puffs, Inhalation, Every 4 Hours PRN      cefpodoxime 200 MG tablet  Commonly known as: VANTIN   200 mg, Oral, Daily With Breakfast   Start Date: November 29, 2024            Changes to Medications        Instructions Start Date   isosorbide mononitrate 30 MG 24 hr tablet  Commonly known as: IMDUR  What changed:   when to take this  additional instructions   90 mg, Oral, Every Morning, qam             Continue These Medications        Instructions Start Date   aspirin 81 MG tablet   81 mg, Daily      atorvastatin 40 MG tablet  Commonly known as: LIPITOR   40 mg, 2 Times Weekly      carvedilol 12.5 MG tablet  Commonly known as: COREG   12.5 mg, Oral, 2 Times Daily      cholecalciferol 25 MCG (1000 UT) tablet  Commonly known as: VITAMIN D3   1,000 Units, Daily      clopidogrel 75 MG  tablet  Commonly known as: PLAVIX   75 mg, Daily      coenzyme Q10 100 MG capsule   100 mg, Daily      furosemide 40 MG tablet  Commonly known as: LASIX   40 mg, Oral, Daily      levothyroxine 50 MCG tablet  Commonly known as: SYNTHROID, LEVOTHROID   50 mcg, Daily      vitamin b complex capsule capsule   1 capsule, Oral, 2 Times Weekly, Tuesdays and Saturdays.               Allergies   Allergen Reactions    No Known Drug Allergy          Discharge Disposition:  Home or Self Care    Diet:  Hospital:  Diet Order   Procedures    Diet: Cardiac; Healthy Heart (2-3 Na+); Fluid Consistency: Thin (IDDSI 0)       Activity:  Activity Instructions       Activity as Tolerated                   CODE STATUS:    Code Status and Medical Interventions: CPR (Attempt to Resuscitate); Full Support   Ordered at: 11/25/24 5589     Code Status (Patient has no pulse and is not breathing):    CPR (Attempt to Resuscitate)     Medical Interventions (Patient has pulse or is breathing):    Full Support       Future Appointments   Date Time Provider Department Center   2/12/2025  2:20 PM José Hatch MD [facility]   8/6/2025  1:20 PM José Hatch MD [facility]       Additional Instructions for the Follow-ups that You Need to Schedule       Discharge Follow-up with PCP   As directed       Currently Documented PCP:    Georgie Diane MD    PCP Phone Number:    [phone]     Follow Up Details: call Monday follow up 1 week then ct chest repeat 6-8 weeks        Discharge Follow-up with Specified Provider: cardiology as scheduled   As directed      To: cardiology as scheduled               Follow-up Information       Jj Leos MD Follow up in 2 month(s).    Specialties: Pulmonary Disease, Intensive Care  Why: if primary care unable to order ct chest as discussed, call with questions  Contact information:  [address] [place] [id number] [phone]                                 Aniceto Aly  MD Denise  11/28/24      Time Spent on Discharge:  I spent greater than 35 minutes on this discharge activity which included: face-to-face encounter with the patient, reviewing the data in the system, coordination of the care with the nursing staff as well as consultants, documentation, and entering orders.

## 2024-11-28 NOTE — OUTREACH NOTE
Prep Survey      Flowsheet Row Responses   [facility] patient discharged from? [place]   Is LACE score < 7 ? No   Eligibility Readm Mgmt   Discharge diagnosis Pneumonia   Does the patient have one of the following disease processes/diagnoses(primary or secondary)? Pneumonia   Does the patient have Home health ordered? No   Is there a DME ordered? No   Prep survey completed? Yes            JACOBY CHONG - Registered Nurse

## 2024-11-28 NOTE — PROGRESS NOTES
Consult Daily Progress Note  [facility]   11/28/24      Patient Name:  Brodie Mo  MRN:  [id number]   YOB: 1932  Age: 89 y.o.  Sex: female  LOS: 2    Reason for Consult:  Abnormal chest x-ray, shortness of breath, cough    Hospital Course:   89-year-old female with a history of coronary artery disease, heart failure with preserved ejection fraction who presented with persistent cough and found to have multifocal pneumonia and CHF exacerbation.    Interval History:  No acute events overnight  Net -1.7 L over the past 24 hours  Comfortable on room air  States her breathing is improved  Continues to have a cough  No nausea vomiting  No chest pain or palpitations  Wants to go home today    Physical Exam:  Vitals:    11/28/24 0314   BP: 120/90   Pulse: 53   Resp: 16   Temp: 97.7 °F (36.5 °C)   SpO2: 90%       Intake/Output    Intake/Output Summary (Last 24 hours) at 11/28/2024 0735  Last data filed at 11/28/2024 0530  Gross per 24 hour   Intake 150 ml   Output 1850 ml   Net -1700 ml       General: Alert, nontoxic, NAD  HEENT: NC/AT, EOMI, MMM  Neck: Supple, trachea midline  Cardiac: RRR, no murmur, gallops, rubs  Pulmonary: Clear to auscultation bilaterally, no adventitious breath sounds, normal respiratory effort  GI: Soft, non-tender, non-distended, normal bowel sounds  Extremities: Warm, well perfused, no LE edema  Skin: no visible rash  Neuro: CN II - XII grossly intact  Psychiatry: Normal mood and affect      Data Review:  Results from last 7 days   Lab Units 11/27/24  0746 11/26/24  0626 11/25/24 2104   WBC 10*3/mm3 16.12* 6.50 8.90   HEMOGLOBIN g/dL 11.0* 11.0* 11.6*   PLATELETS 10*3/mm3 233 198 199     Results from last 7 days   Lab Units 11/28/24  0556 11/27/24  0746 11/26/24  0626 11/25/24  2103   SODIUM mmol/L 133* 131* 132* 132*   POTASSIUM mmol/L 4.0 3.8 4.0 3.8   CHLORIDE mmol/L 98 94* 93* 96*   CO2 mmol/L 25.0 24.5 25.5 23.0   BUN mg/dL 39* 29* 19 18   CREATININE  mg/dL 1.67* 1.33* 1.16* 1.02*   GLUCOSE mg/dL 96 162* 218* 117*   CALCIUM mg/dL 8.6 9.2 9.3 9.6   MAGNESIUM mg/dL  --   --  2.1  --    PHOSPHORUS mg/dL  --   --  3.1  --    Estimated Creatinine Clearance: 20.9 mL/min (A) (by C-G formula based on SCr of 1.67 mg/dL (H)).    Results from last 7 days   Lab Units 11/27/24  0746 11/26/24  0626 11/25/24 2104 11/25/24 2103   AST (SGOT) U/L  --   --   --  29   ALT (SGPT) U/L  --   --   --  24   PROCALCITONIN ng/mL  --   --   --  0.08   LACTATE mmol/L  --   --   --  1.0   CRP mg/dL  --  7.86*  --   --    PLATELETS 10*3/mm3 233 198 199  --                Imaging:  Reviewed chest images personally from past 3 days    ASSESSMENT  /  PLAN:    Multifocal pneumonia  Shortness of breath  Hyponatremia  Leukocytosis  Acute on chronic heart failure with preserved ejection fraction  Pulmonary hypertension, RVSP 71  Coronary disease status post CABG  Mitral valve regurgitation  Acute kidney injury on chronic kidney disease  Hypertension  Hyperlipidemia  Anemia  History of bladder cancer  History of DVT not on anticoagulation     -Presented to the hospital with cough and shortness of breath and found to have multifocal pneumonia on CT imaging.  -Complete course of antibiotics, can transition to oral upon discharge.  -Respiratory viral panel negative, blood cultures no growth to date.  -Cough persists, to use bronchodilators and over-the-counter cough medications.  This will improve anytime with resolution of her pneumonia  -No indication for steroids at this time  -Patient will need repeat imaging in 6 to 8-week to ensure resolution of radiographic findings.  -Stable for discharge from pulmonary standpoint.  We will set patient up for follow-up in pulmonary clinic as an outpatient.    Thank you for allowing us to participate in this patients care.  Pulmonary will sign off at this time.  Please contact us if further questions or concerns arise.    Jj Leos MD  [facility]  Pulmonary and Critical Care Medicine, Interventional Pulmonology    Parts of this note may be an electronic transcription/translation of spoken language to printed text using the Dragon dictation system.

## 2024-11-29 NOTE — CASE MANAGEMENT/SOCIAL WORK
Case Management Discharge Note      Final Note: Dc home         Selected Continued Care - Discharged on 11/28/2024 Admission date: 11/25/2024 - Discharge disposition: Home or Self Care      Destination    No services have been selected for the patient.                Durable Medical Equipment    No services have been selected for the patient.                Dialysis/Infusion    No services have been selected for the patient.                Home Medical Care    No services have been selected for the patient.                Therapy    No services have been selected for the patient.                Community Resources    No services have been selected for the patient.                Community & DME    No services have been selected for the patient.                         Final Discharge Disposition Code: 01 - home or self-care

## 2024-12-01 LAB
BACTERIA SPEC AEROBE CULT: NORMAL
BACTERIA SPEC AEROBE CULT: NORMAL

## 2024-12-02 RX ORDER — ISOSORBIDE MONONITRATE 30 MG/1
90 TABLET, EXTENDED RELEASE ORAL EVERY MORNING
Qty: 270 TABLET | Refills: 3 | Status: SHIPPED | OUTPATIENT
Start: 2024-12-02

## 2024-12-04 ENCOUNTER — TRANSCRIBE ORDERS (OUTPATIENT)
Dept: ADMINISTRATIVE | Facility: HOSPITAL | Age: 89
End: 2024-12-04
Payer: MEDICARE

## 2024-12-04 ENCOUNTER — READMISSION MANAGEMENT (OUTPATIENT)
Dept: CALL CENTER | Facility: HOSPITAL | Age: 89
End: 2024-12-04
Payer: MEDICARE

## 2024-12-04 DIAGNOSIS — J90 PLEURISY WITH EFFUSION: ICD-10-CM

## 2024-12-04 DIAGNOSIS — R93.89 ABNORMAL CHEST CT: Primary | ICD-10-CM

## 2024-12-04 NOTE — OUTREACH NOTE
COPD/PN Week 1 Survey      Flowsheet Row Responses   [facility] patient discharged from? [place]   Does the patient have one of the following disease processes/diagnoses(primary or secondary)? Pneumonia   Week 1 attempt successful? Yes   Call start time 0906   Call end time 0909   Discharge diagnosis Pneumonia   Person spoke with today (if not patient) and relationship Patient   Meds reviewed with patient/caregiver? Yes   Does the patient have all medications ordered at discharge? Yes   Is the patient taking all medications as directed (includes completed medication regime)? Yes   Does the patient have a primary care provider?  Yes   Does the patient have an appointment with their PCP or specialist within 7 days of discharge? Greater than 7 days   Comments regarding PCP PCP Dr Diane. Patient has PCP appt arranged for next week   Nursing Interventions Verified appointment date/time/provider   Has the patient kept scheduled appointments due by today? N/A   Comments Cardiology appt 12/10  1030am   Has home health visited the patient within 72 hours of discharge? N/A   Pulse Ox monitoring None   Psychosocial issues? No   Did the patient receive a copy of their discharge instructions? Yes   Nursing interventions Reviewed instructions with patient   What is the patient's perception of their health status since discharge? Improving   If the patient is a current smoker, are they able to teach back resources for cessation? Not a smoker   Is the patient/caregiver able to teach back the hierarchy of who to call/visit for symptoms/problems? PCP, Specialist, Home health nurse, Urgent Care, ED, 911 Yes   Is the patient/caregiver able to teach back signs and symptoms of worsening condition: Fever/chills, Shortness of breath, Chest pain   Is the patient/caregiver able to teach back importance of completing antibiotic course of treatment? Yes  [Patient reports that she completed her antibiotic yesterday]   Week 1 call  completed? Yes   Is the patient interested in additional calls from an ambulatory ? No   Would this patient benefit from a Referral to [facility] Social Work? No   Call end time 0909            JACOBY HINTON - Registered Nurse

## 2024-12-10 ENCOUNTER — OFFICE VISIT (OUTPATIENT)
Dept: CARDIOLOGY | Facility: CLINIC | Age: 89
End: 2024-12-10
Payer: MEDICARE

## 2024-12-10 ENCOUNTER — TRANSCRIBE ORDERS (OUTPATIENT)
Dept: ADMINISTRATIVE | Facility: HOSPITAL | Age: 89
End: 2024-12-10
Payer: MEDICARE

## 2024-12-10 VITALS
BODY MASS INDEX: 22.33 KG/M2 | WEIGHT: 134 LBS | HEART RATE: 56 BPM | DIASTOLIC BLOOD PRESSURE: 60 MMHG | SYSTOLIC BLOOD PRESSURE: 162 MMHG | HEIGHT: 65 IN

## 2024-12-10 DIAGNOSIS — I50.32 CHRONIC DIASTOLIC CHF (CONGESTIVE HEART FAILURE): Primary | ICD-10-CM

## 2024-12-10 DIAGNOSIS — I10 ESSENTIAL HYPERTENSION: ICD-10-CM

## 2024-12-10 DIAGNOSIS — J18.9 PNEUMONIA DUE TO INFECTIOUS ORGANISM, UNSPECIFIED LATERALITY, UNSPECIFIED PART OF LUNG: Primary | ICD-10-CM

## 2024-12-10 RX ORDER — CARVEDILOL 12.5 MG/1
12.5 TABLET ORAL 2 TIMES DAILY
Qty: 180 TABLET | Refills: 3 | Status: SHIPPED | OUTPATIENT
Start: 2024-12-10

## 2024-12-10 NOTE — PROGRESS NOTES
Date of Office Visit: 12/10/2024  Encounter Provider: STACIA Gauman  Place of Service: [facility] CARDIOLOGY  Patient Name: Brodie Mo  :10/31/1932    Chief Complaint   Patient presents with    Coronary Artery Disease   :     HPI: Brodie Mo is a 89 y.o. female patient of Dr. Hatch's with coronary artery disease and mitral insufficiency.  In , she had a CABG x 2 with a LIMA to the LAD and a vein graft to the OM 2.  In 2020, she underwent a MitraClip.     In May 2023, she was noted to be in [place] at her PCPs office.  We never saw any evidence of high degree block.  She was never symptomatic, so we chose to watch things     In May of this year, she underwent DEBO of the mid RCA.     In July, hydralazine and amlodipine were discontinued secondary to side effects.  Subsequently, she was started on losartan.  She continued to struggle with elevated blood pressures and was ultimately started on HCTZ.  However, she developed significant side effects including confusion and dizziness so this was ultimately discontinued.  Secondary to recurrent dyspnea and chest heaviness, she was scheduled for repeat cardiac catheterization which demonstrated stent stenosis of the proximal RCA for which she underwent DEBO.     Unfortunately there was no improvement in her symptoms.  Frequent PVCs were also noted on her EKG for which she was started on metoprolol.  48-hour Holter demonstrated 29% PVC burden.  Secondary to worsening renal insufficiency, losartan was ultimately discontinued and she was restarted on hydralazine.     On , she presented for follow-up.  She felt terrible.  Ultimately, she was scheduled sent for a repeat cardiac catheterization demonstrating 2/2 bypass grafts patent and a subtotally occluded diagonal which Dr. Hatch felt was likely the cause of her symptoms.  Unfortunately, he was unable to get a wire across, and medical management was  recommended.       On 9/24, I saw her in the office.  She actually felt worse.  She reported continued chest pressure and shortness of breath with any type of exertion.  She was weak and profoundly fatigued.  I suspected some of her symptoms could be medication induced.  It was also discovered she was taking the isosorbide 30 mg 3 times daily instead of 90 mg every 24 hours.  I discontinued the hydralazine and metoprolol and started carvedilol instead     On 10/1, I saw her in the office for follow-up.  She was finally feeling better.  Most noticeably the dizziness had improved.  She denied any recurrent chest pain    On 11/11, I saw her in the office for follow-up.  It was the best she had felt in some time.  Her blood pressure was a little elevated.  Additionally, frequent PVCs were noted on the EKG.  As such, I recommended increasing carvedilol.  Otherwise, she was advised to follow-up in 3 months.    On 11/26, she presented to the ED with a productive cough.  She was given IV Lasix for mild fluid overload.  Ultimately, she was diagnosed with bacterial pneumonia.  She completed azithromycin and was discharged with cefpodoxime.  On 11/28, she was discharged.  She is here today for follow-up.    She feels great.  She reports mild dyspnea on exertion that is unchanged.  She is lacking energy.  She denies any chest pain, palpitations, edema, dizziness, syncope, bleeding difficulties or melena.    Past Medical History:   Diagnosis Date    CAD (coronary artery disease)     Cancer     bladder     Chronic kidney disease     Deep vein thrombosis     Left leg-2002    Disease of thyroid gland     Health care maintenance     HTN (hypertension)     Hyperlipidemia     LVH (left ventricular hypertrophy)     Pneumonia     S/P CABG (coronary artery bypass graft)        Past Surgical History:   Procedure Laterality Date    BLADDER SURGERY      cancer    CARDIAC CATHETERIZATION N/A 5/2/2024    Procedure: Right Heart Cath;  Surgeon:  José Hatch MD;  Location: [facility] CATH INVASIVE LOCATION;  Service: Cardiovascular;  Laterality: N/A;    CARDIAC CATHETERIZATION N/A 5/2/2024    Procedure: Left Heart Cath;  Surgeon: José Hatch MD;  Location: [facility] CATH INVASIVE LOCATION;  Service: Cardiovascular;  Laterality: N/A;    CARDIAC CATHETERIZATION N/A 5/2/2024    Procedure: Native mammary injection;  Surgeon: José Hatch MD;  Location: [facility] CATH [facility]VASIVE LOCATION;  Service: Cardiovascular;  Laterality: N/A;    CARDIAC CATHETERIZATION  5/2/2024    Procedure: Saphenous Vein Graft;  Surgeon: José Hatch MD;  Location: [facility] C[facility] LOC[facility];  Service: Cardiovascular;;    CARDIAC CATHETERIZATION N/A 5/2/2024    Procedure: Percutaneous Coronary Intervention;  Surgeon: José Hatch MD;  Location: [facility] CATH INVASIVE LOCATION;  Service: Cardiovascular;  Laterality: N/A;    CARDIAC CATHETERIZATION N/A 5/2/2024    Procedure: Stent DEBO coronary;  Surgeon: José Hatch MD;  Location: [facility] CATH INVASIVE LOCATION;  Service: Cardiovascular;  Laterality: N/A;    CARDIAC CATHETERIZATION N/A 5/2/2024    Procedure: Optical Coherence Tomography;  Surgeon: José Hatch MD;  Location: [facility] CATH INVASIVE LOCATION;  Service: Cardiovascular;  Laterality: N/A;    CARDIAC CATHETERIZATION N/A 5/2/2024    Procedure: Resting Full Cycle Ratio;  Surgeon: José Hatch MD;  Location: [facility] CATH INVASIVE LOCATION;  Service: Cardiovascular;  Laterality: N/A;    CARDIAC CATHETERIZATION N/A 8/29/2024    Procedure: Coronary angiography;  Surgeon: José Hatch MD;  Location: [facility] CATH INVASIVE LOCATION;  Service: Cardiovascular;  Laterality: N/A;    CARDIAC CATHETERIZATION N/A 8/29/2024    Procedure: Percutaneous Coronary Intervention;  Surgeon: José Hatch MD;  Location: [facility] CATH INVASIVE LOCATION;  Service: Cardiovascular;  Laterality: N/A;    CARDIAC CATHETERIZATION N/A 8/29/2024    Procedure: Optical Coherence Tomography;   Surgeon: José Hatch MD;  Location: [facility] CATH INVASIVE LOCATION;  Service: Cardiovascular;  Laterality: N/A;    CARDIAC CATHETERIZATION N/A 8/29/2024    Procedure: Stent DEBO coronary;  Surgeon: José Hatch MD;  Location: [facility] CATH INVASIVE LOCATION;  Service: Cardiovascular;  Laterality: N/A;    CARDIAC CATHETERIZATION N/A 9/12/2024    Procedure: Left Heart Cath;  Surgeon: José Hatch MD;  Location: [facility] CATH INVASIVE LOCATION;  Service: Cardiovascular;  Laterality: N/A;    CARDIAC CATHETERIZATION N/A 9/12/2024    Procedure: Stent DEBO coronary;  Surgeon: José Hatch MD;  Location: [facility] CATH INVASIVE LOCATION;  Service: Cardiovascular;  Laterality: N/A;    CARPAL TUNNEL RELEASE Right 12/15/2021    Procedure: RIGHT CARPAL TUNNEL RELEASE WITH POSSIBLE SYNOVECTOMY;  Surgeon: Georgie Hines MD;  [place] [facility] [place];  Service: Plastics;  Laterality: Right;    CORONARY ARTERY BYPASS GRAFT      2002 with Dr Prabhakar; AGUAYO to LAD and SVG to OM2; 2005 cath all grafts open and 30% dx in RCA; nl stress in 2010, 2013    HYSTERECTOMY      MITRAL VALVE REPAIR/REPLACEMENT N/A 9/2/2020    Procedure: TRANSCATHETER MITRAL VALVE REPAIR;  Surgeon: Edward Humphreys MD;  Location: [facility] CATH INVASIVE LOCATION;  Service: Cardiovascular;  Laterality: N/A;       Social History     Socioeconomic History    Marital status:    Tobacco Use    Smoking status: Former     Passive exposure: Past    Smokeless tobacco: Never    Tobacco comments:     CAFFEINE USE: 2  CUPS COFFEE DAILY   Vaping Use    Vaping status: Never Used   Substance and Sexual Activity    Alcohol use: Yes     Alcohol/week: 7.0 standard drinks of alcohol     Types: 7 Shots of liquor per week     Comment: 1 DRINK DAILY    Drug use: No    Sexual activity: Defer       Family History   Problem Relation Age of Onset    Heart disease Mother     Kidney cancer Father     No Known Problems Maternal Grandmother     No Known Problems  Maternal Grandfather     No Known Problems Paternal Grandmother     No Known Problems Paternal Grandfather     Heart disease Sister     Heart disease Brother        Review of Systems   Constitutional: Positive for malaise/fatigue.   Cardiovascular:  Positive for dyspnea on exertion. Negative for chest pain, leg swelling, orthopnea, paroxysmal nocturnal dyspnea and syncope.   Respiratory: Negative.     Hematologic/Lymphatic: Negative for bleeding problem.   Musculoskeletal:  Negative for falls.   Gastrointestinal:  Negative for melena.   Neurological:  Negative for dizziness and light-headedness.       Allergies   Allergen Reactions    No Known Drug Allergy          Current Outpatient Medications:     acetaminophen (TYLENOL) 325 MG tablet, Take 2 tablets by mouth Every 6 (Six) Hours As Needed for Mild Pain., Disp: , Rfl:     albuterol sulfate  (90 Base) MCG/ACT inhaler, Inhale 2 puffs Every 4 (Four) Hours As Needed for Wheezing (Always use with spacer chamber.)., Disp: 18 g, Rfl: 0    aspirin 81 MG tablet, Take 1 tablet by mouth Daily., Disp: , Rfl:     atorvastatin (LIPITOR) 40 MG tablet, Take 1 tablet by mouth 2 (Two) Times a Week. Tuesdays and Saturdays., Disp: , Rfl:     B Complex Vitamins (vitamin b complex) capsule capsule, Take 1 capsule by mouth 2 (Two) Times a Week. Tuesdays and Saturdays., Disp: , Rfl:     carvedilol (COREG) 12.5 MG tablet, Take 1 tablet by mouth 2 (Two) Times a Day., Disp: 180 tablet, Rfl: 3    cholecalciferol (VITAMIN D3) 1000 units tablet, Take 1 tablet by mouth Daily., Disp: , Rfl:     clopidogrel (PLAVIX) 75 MG tablet, Take 1 tablet by mouth Daily., Disp: , Rfl:     coenzyme Q10 100 MG capsule, Take 1 capsule by mouth Daily., Disp: , Rfl:     furosemide (LASIX) 40 MG tablet, TAKE 1 TABLET BY MOUTH EVERY DAY, Disp: 90 tablet, Rfl: 1    isosorbide mononitrate (IMDUR) 30 MG 24 hr tablet, Take 3 tablets by mouth Every Morning. qam, Disp: 270 tablet, Rfl: 3    levothyroxine  "(SYNTHROID, LEVOTHROID) 50 MCG tablet, Take 1 tablet by mouth Daily., Disp: , Rfl:     Spacer/Aero-Holding Chambers (AeroChamber MV) inhaler, Use as instructed with albuterol inhaler, Disp: 1 each, Rfl: 2      Objective:     Vitals:    12/10/24 1022   BP: 162/60   Pulse: 56   Weight: 60.8 kg (134 lb)   Height: 165.1 cm (65\")     Body mass index is 22.3 kg/m².    PHYSICAL EXAM:    Neck:      Vascular: No JVD.   Pulmonary:      Effort: Pulmonary effort is normal.      Breath sounds: Normal breath sounds.   Cardiovascular:      Normal rate. Occasional ectopic beats. Regular rhythm.      Murmurs: There is no murmur.      No gallop.  No click. No rub.   Pulses:     Intact distal pulses.           ECG 12 Lead    Date/Time: 12/10/2024 10:35 AM  Performed by: Brenda Gutiérrez APRN    Authorized by: Brenda Gtuiérrez APRN  Comparison: compared with previous ECG from 11/25/2024  Similar to previous ECG  Rhythm: sinus bradycardia  Ectopy: infrequent PVCs  Rate: bradycardic  BPM: 56  Other findings: non-specific ST-T wave changes            Assessment:       Diagnosis Plan   1. Chronic diastolic CHF (congestive heart failure)  ECG 12 Lead      2. Essential hypertension          Orders Placed This Encounter   Procedures    ECG 12 Lead     This order was created via procedure documentation     Order Specific Question:   Release to patient     Answer:   Routine Release [[id number]]          Plan:       1.  Chronic diastolic CHF.  She denies any worsening dyspnea.  She appears euvolemic on exam.  Continue Lasix at current dose.      2.  Hypertension.  Her blood pressure is high.  However, she has been out of carvedilol for a few days.  I sent in a refill.  She will let me know if her blood pressure remains elevated.      I think she is doing well.  I am not making any changes, and she will follow-up with Dr. Hatch as scheduled on 2/12/2025.      As always, it has been a pleasure to participate in your patient's " care.      Sincerely,         STACIA Dawson

## 2024-12-12 ENCOUNTER — READMISSION MANAGEMENT (OUTPATIENT)
Dept: CALL CENTER | Facility: HOSPITAL | Age: 89
End: 2024-12-12
Payer: MEDICARE

## 2024-12-12 NOTE — OUTREACH NOTE
COPD/PN Week 2 Survey      Flowsheet Row Responses   [facility] patient discharged from? [facility]   Does the patient have one of the following disease processes/diagnoses(primary or secondary)? Pneumonia   Week 2 attempt successful? Yes   Call start time 1424   Call end time 1427   Meds reviewed with patient/caregiver? Yes   Is the patient taking all medications as directed (includes completed medication regime)? Yes   Comments regarding appointments Pt has had pcp f/u.   Has the patient kept scheduled appointments due by today? Yes   What is the patient's perception of their health status since discharge? Improving   Week 2 call completed? Yes   Graduated Yes   Wrap up additional comments Pt reports feeling well. Pt has all medications. Pt has had cardiology and pcp f/u appointments. Pt has no sob.   Call end time 1427            Karrie FRAZIER - Registered Nurse

## 2025-01-29 ENCOUNTER — HOSPITAL ENCOUNTER (OUTPATIENT)
Dept: CT IMAGING | Facility: HOSPITAL | Age: OVER 89
Discharge: HOME | End: 2025-01-29
Admitting: HOSPITALIST
Payer: MEDICARE

## 2025-01-29 DIAGNOSIS — J18.9 PNEUMONIA DUE TO INFECTIOUS ORGANISM, UNSPECIFIED LATERALITY, UNSPECIFIED PART OF LUNG: ICD-10-CM

## 2025-01-29 PROCEDURE — 71250 CT THORAX DX C-: CPT

## 2025-01-31 RX ORDER — FUROSEMIDE 40 MG/1
40 TABLET ORAL DAILY
Qty: 90 TABLET | Refills: 1 | Status: SHIPPED | OUTPATIENT
Start: 2025-01-31

## 2025-01-31 NOTE — TELEPHONE ENCOUNTER
Protocol Failed.     NOV 2/12/2025 (WD)    LOV 12/10/2024 (RLS)    Plan:       1.  Chronic diastolic CHF.  She denies any worsening dyspnea.  She appears euvolemic on exam.  Continue Lasix at current dose.        2.  Hypertension.  Her blood pressure is high.  However, she has been out of carvedilol for a few days.  I sent in a refill.  She will let me know if her blood pressure remains elevated.        I think she is doing well.  I am not making any changes, and she will follow-up with Dr. Hatch as scheduled on 2/12/2025.        As always, it has been a pleasure to participate in your patient's care

## 2025-02-12 ENCOUNTER — OFFICE VISIT (OUTPATIENT)
Dept: CARDIOLOGY | Facility: CLINIC | Age: OVER 89
End: 2025-02-12
Payer: MEDICARE

## 2025-02-12 VITALS
BODY MASS INDEX: 22.99 KG/M2 | OXYGEN SATURATION: 99 % | HEART RATE: 73 BPM | HEIGHT: 65 IN | DIASTOLIC BLOOD PRESSURE: 70 MMHG | WEIGHT: 138 LBS | SYSTOLIC BLOOD PRESSURE: 160 MMHG

## 2025-02-12 DIAGNOSIS — C80.1 CANCER: ICD-10-CM

## 2025-02-12 DIAGNOSIS — Z95.818 STATUS POST IMPLANTATION OF MITRAL VALVE LEAFLET CLIP: ICD-10-CM

## 2025-02-12 DIAGNOSIS — I10 ESSENTIAL HYPERTENSION: ICD-10-CM

## 2025-02-12 DIAGNOSIS — I49.3 FREQUENT PVCS: ICD-10-CM

## 2025-02-12 DIAGNOSIS — I25.118 CORONARY ARTERY DISEASE OF NATIVE ARTERY OF NATIVE HEART WITH STABLE ANGINA PECTORIS: ICD-10-CM

## 2025-02-12 DIAGNOSIS — Z98.890 STATUS POST IMPLANTATION OF MITRAL VALVE LEAFLET CLIP: ICD-10-CM

## 2025-02-12 DIAGNOSIS — R93.1 ABNORMAL FINDINGS ON DIAGNOSTIC IMAGING OF HEART AND CORONARY CIRCULATION: Primary | ICD-10-CM

## 2025-02-12 DIAGNOSIS — R06.09 DYSPNEA ON EXERTION: ICD-10-CM

## 2025-02-12 DIAGNOSIS — Z95.1 S/P CABG (CORONARY ARTERY BYPASS GRAFT): ICD-10-CM

## 2025-02-12 DIAGNOSIS — I50.33 ACUTE ON CHRONIC HEART FAILURE WITH PRESERVED EJECTION FRACTION (HFPEF): ICD-10-CM

## 2025-02-12 PROBLEM — I16.0 HYPERTENSIVE URGENCY: Status: RESOLVED | Noted: 2024-11-26 | Resolved: 2025-02-12

## 2025-02-12 PROBLEM — I25.10 CORONARY ARTERY DISEASE: Status: RESOLVED | Noted: 2018-04-05 | Resolved: 2025-02-12

## 2025-02-12 PROBLEM — I20.0 UNSTABLE ANGINA: Status: RESOLVED | Noted: 2024-05-02 | Resolved: 2025-02-12

## 2025-02-12 PROBLEM — I34.0 MITRAL VALVE INSUFFICIENCY: Status: RESOLVED | Noted: 2020-09-02 | Resolved: 2025-02-12

## 2025-02-12 PROBLEM — I20.0 ANGINA PECTORIS, UNSTABLE: Status: RESOLVED | Noted: 2024-09-12 | Resolved: 2025-02-12

## 2025-02-12 NOTE — PROGRESS NOTES
Date of Office Visit: 25  Encounter Provider: José Hatch MD  Place of Service: [facility] CARDIOLOGY  Patient Name: Brodie Mo  :10/31/1932  [id number]    Chief Complaint   Patient presents with    Follow-up    Coronary Artery Disease   :     HPI: Brodie Mo is a 89 y.o. female she had a 5 vessel bypass in  with Dr. Prabhakar.  She had a stress test in May 2019 that was normal she had an echo which showed moderate to severe MR and severe pulmonary hypertension.  She had a MitraClip done in 2020.  She had COVID back in 2022 and it really knocked her down and she is really been slow to recover.  She was in the hospital with pneumonia in April I think of  and since that time she has noticed that she has intermittent periods of dizziness throughout the day.  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  We saw her at that time and evaluate her never saw any high degree heart block and she was really asymptomatic with that so we chose to watch things.    She was having dyspnea on exertion and so we did an echo on her which looked pretty good the mitral valve looked good LV functions good ultimately we decided to do a left and right heart cath in May 2024.  Her right heart cath showed little bit of pulmonary hypertension with PA pressures in the 50-55 range wedge pressure was 17.  Her LIMA to the LAD was patent.   Her native left vessel essentially subtotally occluded in that left main.   There is a vein graft to an OM 2.   the OM 2 is occluded but it retrofilled the entire circumflex.  The RCA was not bypassed and there was a tight lesion in the midportion.  We ended up stenting it all the way back to the origin with 3.0  Xience stents.    In 2024 she was back with recurrent angina and we ended up taking her back to the Cath Lab and she had a lesion in between stents that  had restenosed we had a stent that.  She did well for a little while but then started having recurrent symptoms again and we took her pack to the Cath Lab in September 2024 everything that we done previously looked good there was a small diagonal that probably was causing her symptoms but we could not get across it and we have elected to manage her medically.  She had an echo and November 2024 with normal LV function little bit of mild mitral stenosis a little bit of mitral insufficiency she does have pulmonary hypertension with PA pressures in the 70s to 75.    She is here for follow-up today.  She is doing pretty well no chest pain. She does have some dyspnea on exertion.  No PND no orthopnea no edema no bleeding difficulty no rash.      Past Medical History:   Diagnosis Date    CAD (coronary artery disease)     Cancer     bladder     Chronic kidney disease     Deep vein thrombosis     Left leg-2002    Disease of thyroid gland     Health care maintenance     HTN (hypertension)     Hyperlipidemia     LVH (left ventricular hypertrophy)     Pneumonia     S/P CABG (coronary artery bypass graft)        Past Surgical History:   Procedure Laterality Date    BLADDER SURGERY      cancer    CARDIAC CATHETERIZATION N/A 5/2/2024    Procedure: Right Heart Cath;  Surgeon: José Hatch MD;  Location: [facility] INVASIVE LOCATION;  Service: Cardiovascular;  Laterality: N/A;    CARDIAC CATHETERIZATION N/A 5/2/2024    Procedure: Left Heart Cath;  Surgeon: José Hatch MD;  Location: [facility] CATH INVASIVE LOCATION;  Service: Cardiovascular;  Laterality: N/A;    CARDIAC CATHETERIZATION N/A 5/2/2024    Procedure: Native mammary injection;  Surgeon: José Hatch MD;  Location: [facility] CATH INVASIVE LOCATION;  Service: Cardiovascular;  Laterality: N/A;    CARDIAC CATHETERIZATION  5/2/2024    Procedure: Saphenous Vein Graft;  Surgeon: José Hatch MD;  Location: [facility] CATH INVASIVE LOCATION;  Service: Cardiovascular;;     CARDIAC CATHETERIZATION N/A 5/2/2024    Procedure: Percutaneous Coronary Intervention;  Surgeon: José Hatch MD;  Location:  [facility] CATH INVASIVE LOCATION;  Service: Cardiovascular;  Laterality: N/A;    CARDIAC CATHETERIZATION N/A 5/2/2024    Procedure: Stent DEBO coronary;  Surgeon: José Hatch MD;  Location:  [facility] CATH INVASIVE LOCATION;  Service: Cardiovascular;  Laterality: N/A;    CARDIAC CATHETERIZATION N/A 5/2/2024    Procedure: Optical Coherence Tomography;  Surgeon: José Hatch MD;  Location:  [facility] CATH INVASIVE LOCATION;  Service: Cardiovascular;  Laterality: N/A;    CARDIAC CATHETERIZATION N/A 5/2/2024    Procedure: Resting Full Cycle Ratio;  Surgeon: José Hatch MD;  Location: [facility] CATH INVASIVE LOCATION;  Service: Cardiovascular;  Laterality: N/A;    CARDIAC CATHETERIZATION N/A 8/29/2024    Procedure: Coronary angiography;  Surgeon: José Hatch MD;  Location: [facility] CATH INVASIVE LOC[facility];  Service: Cardiovascular;  Laterality: N/A;    CARDIAC CATHETERIZATION N/A 8/29/2024    Procedure: Percutaneous Coronary Intervention;  Surgeon: José Hatch MD;  Location: [facility] CATH INVASIVE LOCATION;  Service: Cardiovascular;  Laterality: N/A;    CARDIAC CATHETERIZATION N/A 8/29/2024    Procedure: Optical Coherence Tomography;  Surgeon: José Hatch MD;  Location: [facility] CATH INVASIVE LOC[facility];  Service: Cardiovascular;  Laterality: N/A;    CARDIAC CATHETERIZATION N/A 8/29/2024    Procedure: Stent DEBO coronary;  Surgeon: José Hatch MD;  Location: [facility] CATH INVASIVE LOC[facility];  Service: Cardiovascular;  Laterality: N/A;    CARDIAC CATHETERIZATION N/A 9/12/2024    Procedure: Left Heart Cath;  Surgeon: José Hatch MD;  Location: [facility] CATH INVASIVE LOCATION;  Service: Cardiovascular;  Laterality: N/A;    CARDIAC CATHETERIZATION N/A 9/12/2024    Procedure: Stent DEBO coronary;  Surgeon: José Hatch MD;  Location: [facility] CATH INVASIVE LOCATION;  Service:  Cardiovascular;  Laterality: N/A;    CARPAL TUNNEL RELEASE Right 12/15/2021    Procedure: RIGHT CARPAL TUNNEL RELEASE WITH POSSIBLE SYNOVECTOMY;  Surgeon: Georgie Hines MD;  Location: [facility] MAIN OR;  Service: Plastics;  Laterality: Right;    CORONARY ARTERY BYPASS GRAFT      2002 with Dr Prabhakar; AGUAYO to LAD and SVG to OM2; 2005 cath all grafts open and 30% dx in RCA; nl stress in 2010, 2013    HYSTERECTOMY      MITRAL VALVE REPAIR/REPLACEMENT N/A 9/2/2020    Procedure: TRANSCATHETER MITRAL VALVE REPAIR;  Surgeon: Edward Humphreys MD;  Location: [facility] CATH INVASIVE LOCATION;  Service: Cardiovascular;  Laterality: N/A;       Social History     Socioeconomic History    Marital status:    Tobacco Use    Smoking status: Former     Passive exposure: Past    Smokeless tobacco: Never    Tobacco comments:     CAFFEINE USE: 2  CUPS COFFEE DAILY   Vaping Use    Vaping status: Never Used   Substance and Sexual Activity    Alcohol use: Yes     Alcohol/week: 7.0 standard drinks of alcohol     Types: 7 Shots of liquor per week     Comment: 1 DRINK DAILY    Drug use: No    Sexual activity: Defer       Family History   Problem Relation Age of Onset    Heart disease Mother     Kidney cancer Father     No Known Problems Maternal Grandmother     No Known Problems Maternal Grandfather     No Known Problems Paternal Grandmother     No Known Problems Paternal Grandfather     Heart disease Sister     Heart disease Brother        Review of Systems   Constitutional: Negative for decreased appetite, fever, malaise/fatigue and weight loss.   HENT:  Negative for nosebleeds.    Eyes:  Negative for double vision.   Cardiovascular:  Negative for chest pain, claudication, cyanosis, dyspnea on exertion, irregular heartbeat, leg swelling, near-syncope, orthopnea, palpitations, paroxysmal nocturnal dyspnea and syncope.   Respiratory:  Negative for cough, hemoptysis and shortness of breath.    Hematologic/Lymphatic: Negative for  bleeding problem.   Skin:  Negative for rash.   Musculoskeletal:  Negative for falls and myalgias.   Gastrointestinal:  Negative for hematochezia, jaundice, melena, nausea and vomiting.   Genitourinary:  Negative for hematuria.   Neurological:  Negative for dizziness and seizures.   Psychiatric/Behavioral:  Negative for altered mental status and memory loss.        Allergies   Allergen Reactions    No Known Drug Allergy          Current Outpatient Medications:     acetaminophen (TYLENOL) 325 MG tablet, Take 2 tablets by mouth Every 6 (Six) Hours As Needed for Mild Pain., Disp: , Rfl:     albuterol sulfate  (90 Base) MCG/ACT inhaler, Inhale 2 puffs Every 4 (Four) Hours As Needed for Wheezing (Always use with spacer chamber.)., Disp: 18 g, Rfl: 0    aspirin 81 MG tablet, Take 1 tablet by mouth Daily., Disp: , Rfl:     atorvastatin (LIPITOR) 40 MG tablet, Take 1 tablet by mouth 2 (Two) Times a Week. Tuesdays and Saturdays., Disp: , Rfl:     B Complex Vitamins (vitamin b complex) capsule capsule, Take 1 capsule by mouth 2 (Two) Times a Week. Tuesdays and Saturdays., Disp: , Rfl:     carvedilol (COREG) 12.5 MG tablet, Take 1 tablet by mouth 2 (Two) Times a Day., Disp: 180 tablet, Rfl: 3    cholecalciferol (VITAMIN D3) 1000 units tablet, Take 1 tablet by mouth Daily., Disp: , Rfl:     clopidogrel (PLAVIX) 75 MG tablet, Take 1 tablet by mouth Daily., Disp: , Rfl:     coenzyme Q10 100 MG capsule, Take 1 capsule by mouth Daily., Disp: , Rfl:     furosemide (LASIX) 40 MG tablet, TAKE 1 TABLET BY MOUTH EVERY DAY, Disp: 90 tablet, Rfl: 1    isosorbide mononitrate (IMDUR) 30 MG 24 hr tablet, Take 3 tablets by mouth Every Morning. qam, Disp: 270 tablet, Rfl: 3    levothyroxine (SYNTHROID, LEVOTHROID) 50 MCG tablet, Take 1 tablet by mouth Daily., Disp: , Rfl:     Spacer/Aero-Holding Chambers (AeroChamber MV) inhaler, Use as instructed with albuterol inhaler, Disp: 1 each, Rfl: 2      Objective:     Vitals:    02/12/25 1429  "  BP: 160/70   Pulse: 73   SpO2: 99%   Weight: 62.6 kg (138 lb)   Height: 165.1 cm (65\")     Body mass index is 22.96 kg/m².    Physical Exam  Constitutional:       Appearance: She is well-developed.   HENT:      Head: Normocephalic.   Eyes:      General: No scleral icterus.  Neck:      Thyroid: No thyromegaly.      Vascular: No JVD.   Cardiovascular:      Rate and Rhythm: Normal rate and regular rhythm.      Heart sounds: Murmur heard.      High-pitched blowing holosystolic murmur is present with a grade of 2/6 at the upper left sternal border.      No friction rub. No gallop.   Pulmonary:      Effort: Pulmonary effort is normal.      Breath sounds: Normal breath sounds. No wheezing or rales.   Abdominal:      Palpations: Abdomen is soft.      Tenderness: There is no abdominal tenderness.   Musculoskeletal:         General: Normal range of motion.   Lymphadenopathy:      Cervical: No cervical adenopathy.   Skin:     General: Skin is warm and dry.      Findings: No rash.   Neurological:      Mental Status: She is alert and oriented to person, place, and time.           ECG 12 Lead    Date/Time: 2/12/2025 2:43 PM  Performed by: José Hatch MD    Authorized by: José Hatch MD  Rhythm: sinus rhythm  Ectopy: unifocal PVCs  Other findings: left ventricular hypertrophy with strain    Clinical impression: abnormal EKG           Assessment:       Diagnosis Plan   1. Abnormal findings on diagnostic imaging of heart and coronary circulation        2. Acute on chronic heart failure with preserved ejection fraction (HFpEF)        3. Coronary artery disease of native artery of native heart with stable angina pectoris        4. Dyspnea on exertion        5. Essential hypertension        6. S/P CABG (coronary artery bypass graft)        7. Status post implantation of mitral valve leaflet clip        8. Frequent PVCs        9. History of bladder cancer               Plan:       She I think is doing pretty well she does " have pulmonary hypertension that may be the cause of her shortness of breath of which is likely related to her valve disease or secondary medical therapy for that she is going to have to to stay measured in her activities.  Her blood pressure is too high we had her on amlodipine at 1 time and it caused a little bit of swelling in her feet we had her on losartan and her renal function worsened I am going to speak with her primary care physician about it see if we can come up with some other options otherwise she is doing pretty well I will have her come back and see Brenda in 6 months and see me in a year    As always, it has been a pleasure to participate in your patient's care.      Sincerely,       José Hatch MD

## 2025-04-03 RX ORDER — ATORVASTATIN CALCIUM 40 MG/1
40 TABLET, FILM COATED ORAL 2 TIMES WEEKLY
Qty: 90 TABLET | Refills: 0 | Status: SHIPPED | OUTPATIENT
Start: 2025-04-03

## 2025-04-21 ENCOUNTER — TRANSCRIBE ORDERS (OUTPATIENT)
Dept: ADMINISTRATIVE | Facility: HOSPITAL | Age: OVER 89
End: 2025-04-21
Payer: MEDICARE

## 2025-04-21 DIAGNOSIS — E78.5 HYPERLIPIDEMIA, UNSPECIFIED HYPERLIPIDEMIA TYPE: ICD-10-CM

## 2025-04-21 DIAGNOSIS — E11.9 DIABETES MELLITUS TYPE II, NON INSULIN DEPENDENT: ICD-10-CM

## 2025-04-21 DIAGNOSIS — I10 ESSENTIAL HYPERTENSION, BENIGN: ICD-10-CM

## 2025-04-21 DIAGNOSIS — I25.10 CVD (CARDIOVASCULAR DISEASE): Primary | ICD-10-CM

## 2025-04-28 RX ORDER — CLOPIDOGREL BISULFATE 75 MG/1
75 TABLET ORAL DAILY
Qty: 90 TABLET | Refills: 3 | Status: SHIPPED | OUTPATIENT
Start: 2025-04-28

## 2025-07-25 RX ORDER — FUROSEMIDE 40 MG/1
40 TABLET ORAL DAILY
Qty: 90 TABLET | Refills: 1 | Status: SHIPPED | OUTPATIENT
Start: 2025-07-25

## 2025-08-06 ENCOUNTER — OFFICE VISIT (OUTPATIENT)
Age: OVER 89
End: 2025-08-06
Payer: MEDICARE

## 2025-08-06 VITALS
DIASTOLIC BLOOD PRESSURE: 68 MMHG | HEART RATE: 58 BPM | SYSTOLIC BLOOD PRESSURE: 148 MMHG | HEIGHT: 65 IN | BODY MASS INDEX: 22.28 KG/M2 | WEIGHT: 133.7 LBS

## 2025-08-06 DIAGNOSIS — I25.10 CORONARY ARTERY DISEASE INVOLVING NATIVE CORONARY ARTERY OF NATIVE HEART WITHOUT ANGINA PECTORIS: Primary | ICD-10-CM

## 2025-08-06 DIAGNOSIS — E78.5 HYPERLIPIDEMIA, UNSPECIFIED HYPERLIPIDEMIA TYPE: ICD-10-CM

## 2025-08-06 DIAGNOSIS — I50.32 CHRONIC HEART FAILURE WITH PRESERVED EJECTION FRACTION (HFPEF): ICD-10-CM

## 2025-08-06 DIAGNOSIS — I10 ESSENTIAL HYPERTENSION: ICD-10-CM

## 2025-08-11 ENCOUNTER — TELEPHONE (OUTPATIENT)
Age: OVER 89
End: 2025-08-11
Payer: MEDICARE

## 2025-08-11 RX ORDER — ATORVASTATIN CALCIUM 40 MG/1
40 TABLET, FILM COATED ORAL DAILY
Qty: 90 TABLET | Refills: 0 | Status: SHIPPED | OUTPATIENT
Start: 2025-08-11

## (undated) DEVICE — GW PRESSUREWIRE X WIRELESS FFR 175CM

## (undated) DEVICE — TR BAND RADIAL ARTERY COMPRESSION DEVICE: Brand: TR BAND

## (undated) DEVICE — CATH DIAG IMPULSE FR4 5F 100CM

## (undated) DEVICE — KT MANIFLD CARDIAC

## (undated) DEVICE — DGW .035 FC J3MM 260CM TEF: Brand: EMERALD

## (undated) DEVICE — Device

## (undated) DEVICE — PK CATH CARD 40

## (undated) DEVICE — GW AMPLTZ SUPERSTIFF 3MMJTIP .035IN 260CM

## (undated) DEVICE — BNDG ELAS SLF ADHR 3IN 5YD LF

## (undated) DEVICE — CATH DIAG IMPULSE IMT 5F 100CM

## (undated) DEVICE — CATH4F INF PIG 145Â° MOD 110CM: Brand: INFINITI

## (undated) DEVICE — GLIDESHEATH BASIC HYDROPHILIC COATED INTRODUCER SHEATH: Brand: GLIDESHEATH

## (undated) DEVICE — DIL VESL 10FR .038 20CM

## (undated) DEVICE — FEMORAL ENTRY ANGIOGRAPHY SHIELD-YELLOW: Brand: RADPAD

## (undated) DEVICE — GLV SURG BIOGEL LTX PF 6 1/2

## (undated) DEVICE — DEV INDEFLATOR P/N 580289

## (undated) DEVICE — HAND PACK: Brand: MEDLINE INDUSTRIES, INC.

## (undated) DEVICE — GUIDE CATHETER: Brand: MACH1™

## (undated) DEVICE — TREK CORONARY DILATATION CATHETER 3.0 MM X 20 MM / RAPID-EXCHANGE: Brand: TREK

## (undated) DEVICE — BALN PRESS WEDGE 5F 110CM

## (undated) DEVICE — SUT MONOCRYL 4/0 PS2 27IN Y426H ETY426H

## (undated) DEVICE — PERCLOSE PROGLIDE™ SUTURE-MEDIATED CLOSURE SYSTEM: Brand: PERCLOSE PROGLIDE™

## (undated) DEVICE — TREK CORONARY DILATATION CATHETER 3.50 MM X 15 MM / RAPID-EXCHANGE: Brand: TREK

## (undated) DEVICE — CATH DIAG IMPULSE FL4 5F 100CM

## (undated) DEVICE — SKIN PREP TRAY W/CHG: Brand: MEDLINE INDUSTRIES, INC.

## (undated) DEVICE — PREMIUM DRY TRAY LF: Brand: MEDLINE INDUSTRIES, INC.

## (undated) DEVICE — Device: Brand: FIELDER XT

## (undated) DEVICE — NDL TRNSEP BRK1 18G 89CM

## (undated) DEVICE — HI-TORQUE WHISPER ES GUIDE WIRE .014 STRAIGHTTIP 3.0 CM X 190 CM: Brand: HI-TORQUE WHISPER

## (undated) DEVICE — DISPOSABLE BIPOLAR FORCEPS 4" (10.2CM) JEWELERS, STRAIGHT 0.4MM TIP AND 12 FT. (3.6M) CABLE: Brand: KIRWAN

## (undated) DEVICE — 6F .070 JR 4 100CM: Brand: CORDIS

## (undated) DEVICE — TP ELAS ELASTIKON 1IN

## (undated) DEVICE — TBG PRESS/MONITOR FIX M/F LL A/ 72IN STRL

## (undated) DEVICE — INTRO SHEATH ART/FEM ENGAGE .035 4F12CM

## (undated) DEVICE — NC TREK NEO™ CORONARY DILATATION CATHETER 3.50 MM X 15 MM / RAPID-EXCHANGE: Brand: NC TREK NEO™

## (undated) DEVICE — SYRINGE, LUER LOCK, 60ML: Brand: MEDLINE

## (undated) DEVICE — HP TBG 72IN FIX: Brand: MEDLINE INDUSTRIES, INC.

## (undated) DEVICE — SOL ANTISEP SCRB HIBICLENS CHG4PCT 4OZ

## (undated) DEVICE — RADIFOCUS GLIDEWIRE ADVANTAGE GUIDEWIRE: Brand: GLIDEWIRE ADVANTAGE

## (undated) DEVICE — INTRO PERFORMER CHECKFLO/LG RAD/BND NO/GW 16F .038IN 35CM

## (undated) DEVICE — SPLNT PLSTR CAST XFAST 3IN

## (undated) DEVICE — CATH VENT MIV RADL PIG ST TIP 4F 110CM

## (undated) DEVICE — GW HITORQUE/BAL MID/WT J W/HCOAT .014 3X190CM

## (undated) DEVICE — GW EMR FIX EXCHG J STD .035 3MM 260CM

## (undated) DEVICE — NC TREK CORONARY DILATATION CATHETER 3.25 MM X 20 MM / RAPID-EXCHANGE: Brand: NC TREK

## (undated) DEVICE — INTRO SWARTZ HEMO SL0 8.5F81CM

## (undated) DEVICE — DISPOSABLE TOURNIQUET CUFF SINGLE BLADDER, SINGLE PORT AND QUICK CONNECT CONNECTOR: Brand: COLOR CUFF

## (undated) DEVICE — KT CATH IMG DRAGONFLY/OPSTAR 2.7F 135CM

## (undated) DEVICE — DIL VESL 14F.038 20CM

## (undated) DEVICE — CVR PROB ULTRASND STRL

## (undated) DEVICE — 3M™ BAIR PAWS FLEX™ WARMING GOWN, STANDARD, 20 PER CASE 81003: Brand: BAIR PAWS™

## (undated) DEVICE — STPCK 3WY STD/PRESS SWIVELNUT

## (undated) DEVICE — INTRO SHEATH ART/FEM ENGAGE .035 6F12CM

## (undated) DEVICE — BNDG ELAS SLF ADHR 2IN 5YD LF

## (undated) DEVICE — 6F .070 JL4 100CM: Brand: CORDIS

## (undated) DEVICE — HI-TORQUE BALANCE MIDDLEWEIGHT GUIDE WIRE .014 STRAIGHT TIP 3.0 CM X 190 CM: Brand: HI-TORQUE BALANCE MIDDLEWEIGHT

## (undated) DEVICE — NC TREK CORONARY DILATATION CATHETER 3.0 MM X 20 MM / RAPID-EXCHANGE: Brand: NC TREK

## (undated) DEVICE — HI-TORQUE SUPRA CORE .035 PERIPHERAL GUIDE WIRE .035 X 190 CM: Brand: HI-TORQUE SUPRA CORE

## (undated) DEVICE — Device: Brand: FABCO